# Patient Record
Sex: FEMALE | Race: WHITE | NOT HISPANIC OR LATINO | Employment: OTHER | URBAN - METROPOLITAN AREA
[De-identification: names, ages, dates, MRNs, and addresses within clinical notes are randomized per-mention and may not be internally consistent; named-entity substitution may affect disease eponyms.]

---

## 2023-11-12 RX ORDER — MELOXICAM 15 MG/1
TABLET ORAL
COMMUNITY

## 2023-11-12 RX ORDER — POTASSIUM CHLORIDE 20 MEQ/1
TABLET, EXTENDED RELEASE ORAL
COMMUNITY
Start: 2023-01-21

## 2023-11-12 RX ORDER — METHOCARBAMOL 750 MG/1
TABLET, FILM COATED ORAL
COMMUNITY

## 2023-11-12 RX ORDER — FUROSEMIDE 20 MG/1
TABLET ORAL
COMMUNITY

## 2023-11-12 RX ORDER — OXYCODONE AND ACETAMINOPHEN 5; 325 MG/1; MG/1
0.5 TABLET ORAL EVERY 6 HOURS PRN
COMMUNITY
Start: 2021-04-08

## 2023-11-12 RX ORDER — ACETAMINOPHEN 325 MG/1
TABLET ORAL
COMMUNITY

## 2023-11-12 RX ORDER — PRAMIPEXOLE DIHYDROCHLORIDE 0.5 MG/1
TABLET ORAL
COMMUNITY

## 2023-11-12 RX ORDER — LIDOCAINE HYDROCHLORIDE 20 MG/ML
JELLY TOPICAL EVERY 12 HOURS
COMMUNITY
Start: 2023-03-13

## 2023-11-12 RX ORDER — GABAPENTIN 300 MG/1
CAPSULE ORAL
COMMUNITY
Start: 2023-06-28

## 2023-11-12 RX ORDER — DOCUSATE SODIUM 100 MG/1
100 CAPSULE, LIQUID FILLED ORAL DAILY
COMMUNITY
Start: 2021-04-08

## 2023-11-12 RX ORDER — SIMVASTATIN 20 MG/1
TABLET, FILM COATED ORAL
COMMUNITY

## 2023-11-12 RX ORDER — DILTIAZEM HYDROCHLORIDE EXTENDED-RELEASE TABLETS 180 MG/1
TABLET, EXTENDED RELEASE ORAL
COMMUNITY
Start: 2023-05-01

## 2023-11-12 RX ORDER — LIDOCAINE 50 MG/G
OINTMENT TOPICAL EVERY 8 HOURS
COMMUNITY
Start: 2020-01-07

## 2023-11-12 RX ORDER — MIRTAZAPINE 7.5 MG/1
TABLET, FILM COATED ORAL
COMMUNITY
Start: 2023-07-11

## 2023-11-12 RX ORDER — LORAZEPAM 2 MG/1
TABLET ORAL
COMMUNITY

## 2023-11-12 RX ORDER — DILTIAZEM HYDROCHLORIDE 90 MG/1
TABLET, FILM COATED ORAL
COMMUNITY

## 2023-11-14 ENCOUNTER — OFFICE VISIT (OUTPATIENT)
Dept: OPHTHALMOLOGY | Facility: CLINIC | Age: 88
End: 2023-11-14
Payer: MEDICARE

## 2023-11-14 DIAGNOSIS — H02.403 PTOSIS OF BOTH EYELIDS: Primary | ICD-10-CM

## 2023-11-14 PROCEDURE — 1159F MED LIST DOCD IN RCRD: CPT | Performed by: OPHTHALMOLOGY

## 2023-11-14 PROCEDURE — 99202 OFFICE O/P NEW SF 15 MIN: CPT | Performed by: OPHTHALMOLOGY

## 2023-11-14 ASSESSMENT — ENCOUNTER SYMPTOMS
GASTROINTESTINAL NEGATIVE: 0
CARDIOVASCULAR NEGATIVE: 0
ENDOCRINE NEGATIVE: 0
EYES NEGATIVE: 0
ALLERGIC/IMMUNOLOGIC NEGATIVE: 0
PSYCHIATRIC NEGATIVE: 0
RESPIRATORY NEGATIVE: 0
MUSCULOSKELETAL NEGATIVE: 0
CONSTITUTIONAL NEGATIVE: 0
NEUROLOGICAL NEGATIVE: 0
HEMATOLOGIC/LYMPHATIC NEGATIVE: 0

## 2023-11-14 ASSESSMENT — TONOMETRY
IOP_METHOD: GOLDMANN APPLANATION
OD_IOP_MMHG: 14
OS_IOP_MMHG: 14

## 2023-11-14 ASSESSMENT — VISUAL ACUITY
OS_SC: 20/50
METHOD: SNELLEN - LINEAR
OD_PH_SC: 20/30
OS_SC+: -2
OD_PH_SC+: -3
OD_SC: 20/40

## 2023-11-14 ASSESSMENT — SLIT LAMP EXAM - LIDS
COMMENTS: DERMATOCHALASIS - UPPER LID, PTOSIS
COMMENTS: DERMATOCHALASIS - UPPER LID, PTOSIS

## 2023-11-14 ASSESSMENT — EXTERNAL EXAM - RIGHT EYE: OD_EXAM: NORMAL

## 2023-11-14 ASSESSMENT — EXTERNAL EXAM - LEFT EYE: OS_EXAM: NORMAL

## 2023-11-14 NOTE — PROGRESS NOTES
Assessment/Plan   Diagnoses and all orders for this visit:  Ptosis of both eyelids  Rec plastic surgery consult

## 2023-11-14 NOTE — PATIENT INSTRUCTIONS
Schedule an evaluation for PTOSIS surgery with oculoplastic surgeon or plastic surgeon  Can try [facility] or [facility] or plastic surgeon near you

## 2023-12-28 ENCOUNTER — APPOINTMENT (OUTPATIENT)
Dept: OPHTHALMOLOGY | Facility: CLINIC | Age: 88
End: 2023-12-28
Payer: COMMERCIAL

## 2024-07-01 ENCOUNTER — APPOINTMENT (OUTPATIENT)
Dept: CARDIOLOGY | Facility: HOSPITAL | Age: 89
DRG: 522 | End: 2024-07-01
Payer: MEDICARE

## 2024-07-01 ENCOUNTER — HOSPITAL ENCOUNTER (INPATIENT)
Facility: HOSPITAL | Age: 89
Discharge: NURSING, REHAB OR LONG-TERM CARE | DRG: 522 | End: 2024-07-01
Attending: STUDENT IN AN ORGANIZED HEALTH CARE EDUCATION/TRAINING PROGRAM | Admitting: STUDENT IN AN ORGANIZED HEALTH CARE EDUCATION/TRAINING PROGRAM
Payer: MEDICARE

## 2024-07-01 ENCOUNTER — APPOINTMENT (OUTPATIENT)
Dept: RADIOLOGY | Facility: HOSPITAL | Age: 89
DRG: 522 | End: 2024-07-01
Payer: MEDICARE

## 2024-07-01 DIAGNOSIS — S72.002A CLOSED DISPLACED FRACTURE OF LEFT FEMORAL NECK (MULTI): Primary | ICD-10-CM

## 2024-07-01 DIAGNOSIS — S72.032A: ICD-10-CM

## 2024-07-01 LAB
ABO GROUP (TYPE) IN BLOOD: NORMAL
ALBUMIN SERPL BCP-MCNC: 3.7 G/DL (ref 3.4–5)
ALP SERPL-CCNC: 60 U/L (ref 33–136)
ALT SERPL W P-5'-P-CCNC: 11 U/L (ref 7–45)
ANION GAP SERPL CALC-SCNC: 12 MMOL/L (ref 10–20)
ANTIBODY SCREEN: NORMAL
APTT PPP: 38 SECONDS (ref 27–38)
AST SERPL W P-5'-P-CCNC: 17 U/L (ref 9–39)
BASOPHILS # BLD AUTO: 0.03 X10*3/UL (ref 0–0.1)
BASOPHILS NFR BLD AUTO: 0.5 %
BILIRUB SERPL-MCNC: 0.5 MG/DL (ref 0–1.2)
BUN SERPL-MCNC: 55 MG/DL (ref 6–23)
BURR CELLS BLD QL SMEAR: NORMAL
CALCIUM SERPL-MCNC: 8.7 MG/DL (ref 8.6–10.3)
CHLORIDE SERPL-SCNC: 103 MMOL/L (ref 98–107)
CO2 SERPL-SCNC: 28 MMOL/L (ref 21–32)
CREAT SERPL-MCNC: 2.4 MG/DL (ref 0.5–1.05)
EGFRCR SERPLBLD CKD-EPI 2021: 19 ML/MIN/1.73M*2
EOSINOPHIL # BLD AUTO: 0.06 X10*3/UL (ref 0–0.4)
EOSINOPHIL NFR BLD AUTO: 1 %
ERYTHROCYTE [DISTWIDTH] IN BLOOD BY AUTOMATED COUNT: 14.9 % (ref 11.5–14.5)
GLUCOSE SERPL-MCNC: 95 MG/DL (ref 74–99)
HCT VFR BLD AUTO: 39.5 % (ref 36–46)
HGB BLD-MCNC: 12.5 G/DL (ref 12–16)
IMM GRANULOCYTES # BLD AUTO: 0.02 X10*3/UL (ref 0–0.5)
IMM GRANULOCYTES NFR BLD AUTO: 0.3 % (ref 0–0.9)
INR PPP: 1 (ref 0.9–1.1)
LYMPHOCYTES # BLD AUTO: 0.71 X10*3/UL (ref 0.8–3)
LYMPHOCYTES NFR BLD AUTO: 11.6 %
MAGNESIUM SERPL-MCNC: 2.41 MG/DL (ref 1.6–2.4)
MCH RBC QN AUTO: 29.6 PG (ref 26–34)
MCHC RBC AUTO-ENTMCNC: 31.6 G/DL (ref 32–36)
MCV RBC AUTO: 94 FL (ref 80–100)
MONOCYTES # BLD AUTO: 0.28 X10*3/UL (ref 0.05–0.8)
MONOCYTES NFR BLD AUTO: 4.6 %
NEUTROPHILS # BLD AUTO: 5.02 X10*3/UL (ref 1.6–5.5)
NEUTROPHILS NFR BLD AUTO: 82 %
NRBC BLD-RTO: 0 /100 WBCS (ref 0–0)
OVALOCYTES BLD QL SMEAR: NORMAL
PLATELET # BLD AUTO: 85 X10*3/UL (ref 150–450)
POTASSIUM SERPL-SCNC: 4.4 MMOL/L (ref 3.5–5.3)
PROT SERPL-MCNC: 6.5 G/DL (ref 6.4–8.2)
PROTHROMBIN TIME: 10.8 SECONDS (ref 9.8–12.8)
RBC # BLD AUTO: 4.22 X10*6/UL (ref 4–5.2)
RBC MORPH BLD: NORMAL
RH FACTOR (ANTIGEN D): NORMAL
SCHISTOCYTES BLD QL SMEAR: NORMAL
SODIUM SERPL-SCNC: 139 MMOL/L (ref 136–145)
WBC # BLD AUTO: 6.1 X10*3/UL (ref 4.4–11.3)

## 2024-07-01 PROCEDURE — 73552 X-RAY EXAM OF FEMUR 2/>: CPT | Mod: LEFT SIDE | Performed by: RADIOLOGY

## 2024-07-01 PROCEDURE — 36415 COLL VENOUS BLD VENIPUNCTURE: CPT

## 2024-07-01 PROCEDURE — 86900 BLOOD TYPING SEROLOGIC ABO: CPT

## 2024-07-01 PROCEDURE — 99285 EMERGENCY DEPT VISIT HI MDM: CPT

## 2024-07-01 PROCEDURE — 93005 ELECTROCARDIOGRAM TRACING: CPT

## 2024-07-01 PROCEDURE — 85610 PROTHROMBIN TIME: CPT

## 2024-07-01 PROCEDURE — 73560 X-RAY EXAM OF KNEE 1 OR 2: CPT | Mod: RIGHT SIDE | Performed by: RADIOLOGY

## 2024-07-01 PROCEDURE — 73590 X-RAY EXAM OF LOWER LEG: CPT | Mod: LEFT SIDE | Performed by: RADIOLOGY

## 2024-07-01 PROCEDURE — 71045 X-RAY EXAM CHEST 1 VIEW: CPT

## 2024-07-01 PROCEDURE — 73560 X-RAY EXAM OF KNEE 1 OR 2: CPT | Mod: RT

## 2024-07-01 PROCEDURE — 99222 1ST HOSP IP/OBS MODERATE 55: CPT | Performed by: STUDENT IN AN ORGANIZED HEALTH CARE EDUCATION/TRAINING PROGRAM

## 2024-07-01 PROCEDURE — 73502 X-RAY EXAM HIP UNI 2-3 VIEWS: CPT | Mod: RIGHT SIDE | Performed by: RADIOLOGY

## 2024-07-01 PROCEDURE — 85025 COMPLETE CBC W/AUTO DIFF WBC: CPT

## 2024-07-01 PROCEDURE — 71045 X-RAY EXAM CHEST 1 VIEW: CPT | Mod: FOREIGN READ | Performed by: RADIOLOGY

## 2024-07-01 PROCEDURE — 1100000001 HC PRIVATE ROOM DAILY

## 2024-07-01 PROCEDURE — 2500000004 HC RX 250 GENERAL PHARMACY W/ HCPCS (ALT 636 FOR OP/ED)

## 2024-07-01 PROCEDURE — 80053 COMPREHEN METABOLIC PANEL: CPT

## 2024-07-01 PROCEDURE — 73552 X-RAY EXAM OF FEMUR 2/>: CPT | Mod: LT

## 2024-07-01 PROCEDURE — 73590 X-RAY EXAM OF LOWER LEG: CPT | Mod: LT

## 2024-07-01 PROCEDURE — 83735 ASSAY OF MAGNESIUM: CPT

## 2024-07-01 PROCEDURE — 73502 X-RAY EXAM HIP UNI 2-3 VIEWS: CPT | Mod: RT

## 2024-07-01 RX ORDER — POLYETHYLENE GLYCOL 3350 17 G/17G
17 POWDER, FOR SOLUTION ORAL DAILY
Status: DISCONTINUED | OUTPATIENT
Start: 2024-07-02 | End: 2024-07-08 | Stop reason: HOSPADM

## 2024-07-01 RX ORDER — SODIUM CHLORIDE, SODIUM LACTATE, POTASSIUM CHLORIDE, CALCIUM CHLORIDE 600; 310; 30; 20 MG/100ML; MG/100ML; MG/100ML; MG/100ML
75 INJECTION, SOLUTION INTRAVENOUS CONTINUOUS
Status: DISCONTINUED | OUTPATIENT
Start: 2024-07-01 | End: 2024-07-02

## 2024-07-01 RX ORDER — OXYCODONE AND ACETAMINOPHEN 5; 325 MG/1; MG/1
1 TABLET ORAL EVERY 4 HOURS PRN
Status: DISCONTINUED | OUTPATIENT
Start: 2024-07-01 | End: 2024-07-02

## 2024-07-01 RX ORDER — MORPHINE SULFATE 4 MG/ML
2 INJECTION, SOLUTION INTRAMUSCULAR; INTRAVENOUS ONCE
Status: COMPLETED | OUTPATIENT
Start: 2024-07-01 | End: 2024-07-01

## 2024-07-01 RX ORDER — ACETAMINOPHEN 500 MG
5 TABLET ORAL NIGHTLY PRN
Status: DISCONTINUED | OUTPATIENT
Start: 2024-07-01 | End: 2024-07-08 | Stop reason: HOSPADM

## 2024-07-01 RX ORDER — FENTANYL CITRATE 50 UG/ML
25 INJECTION, SOLUTION INTRAMUSCULAR; INTRAVENOUS ONCE
Status: DISCONTINUED | OUTPATIENT
Start: 2024-07-01 | End: 2024-07-01

## 2024-07-01 RX ORDER — ACETAMINOPHEN 650 MG/1
650 SUPPOSITORY RECTAL EVERY 4 HOURS PRN
Status: DISCONTINUED | OUTPATIENT
Start: 2024-07-01 | End: 2024-07-08 | Stop reason: HOSPADM

## 2024-07-01 RX ORDER — ONDANSETRON 4 MG/1
4 TABLET, FILM COATED ORAL EVERY 8 HOURS PRN
Status: DISCONTINUED | OUTPATIENT
Start: 2024-07-01 | End: 2024-07-02

## 2024-07-01 RX ORDER — ACETAMINOPHEN 325 MG/1
650 TABLET ORAL EVERY 4 HOURS PRN
Status: DISCONTINUED | OUTPATIENT
Start: 2024-07-01 | End: 2024-07-08 | Stop reason: HOSPADM

## 2024-07-01 RX ORDER — HYDROMORPHONE HYDROCHLORIDE 0.2 MG/ML
0.2 INJECTION INTRAMUSCULAR; INTRAVENOUS; SUBCUTANEOUS EVERY 6 HOURS PRN
Status: DISCONTINUED | OUTPATIENT
Start: 2024-07-01 | End: 2024-07-02

## 2024-07-01 RX ORDER — ONDANSETRON HYDROCHLORIDE 2 MG/ML
4 INJECTION, SOLUTION INTRAVENOUS EVERY 8 HOURS PRN
Status: DISCONTINUED | OUTPATIENT
Start: 2024-07-01 | End: 2024-07-02

## 2024-07-01 RX ORDER — ACETAMINOPHEN 160 MG/5ML
650 SOLUTION ORAL EVERY 4 HOURS PRN
Status: DISCONTINUED | OUTPATIENT
Start: 2024-07-01 | End: 2024-07-08 | Stop reason: HOSPADM

## 2024-07-01 SDOH — ECONOMIC STABILITY: HOUSING INSECURITY
IN THE LAST 12 MONTHS, WAS THERE A TIME WHEN YOU DID NOT HAVE A STEADY PLACE TO SLEEP OR SLEPT IN A SHELTER (INCLUDING NOW)?: NO

## 2024-07-01 SDOH — ECONOMIC STABILITY: INCOME INSECURITY: IN THE LAST 12 MONTHS, WAS THERE A TIME WHEN YOU WERE NOT ABLE TO PAY THE MORTGAGE OR RENT ON TIME?: NO

## 2024-07-01 SDOH — ECONOMIC STABILITY: FOOD INSECURITY: WITHIN THE PAST 12 MONTHS, THE FOOD YOU BOUGHT JUST DIDN'T LAST AND YOU DIDN'T HAVE MONEY TO GET MORE.: NEVER TRUE

## 2024-07-01 SDOH — SOCIAL STABILITY: SOCIAL NETWORK: ARE YOU MARRIED, WIDOWED, DIVORCED, SEPARATED, NEVER MARRIED, OR LIVING WITH A PARTNER?: WIDOWED

## 2024-07-01 SDOH — SOCIAL STABILITY: SOCIAL INSECURITY
WITHIN THE LAST YEAR, HAVE TO BEEN RAPED OR FORCED TO HAVE ANY KIND OF SEXUAL ACTIVITY BY YOUR PARTNER OR EX-PARTNER?: NO

## 2024-07-01 SDOH — SOCIAL STABILITY: SOCIAL NETWORK: HOW OFTEN DO YOU ATTENT MEETINGS OF THE CLUB OR ORGANIZATION YOU BELONG TO?: NEVER

## 2024-07-01 SDOH — SOCIAL STABILITY: SOCIAL INSECURITY: ABUSE: ADULT

## 2024-07-01 SDOH — SOCIAL STABILITY: SOCIAL INSECURITY: WITHIN THE LAST YEAR, HAVE YOU BEEN AFRAID OF YOUR PARTNER OR EX-PARTNER?: NO

## 2024-07-01 SDOH — SOCIAL STABILITY: SOCIAL INSECURITY: HAVE YOU HAD THOUGHTS OF HARMING ANYONE ELSE?: YES

## 2024-07-01 SDOH — SOCIAL STABILITY: SOCIAL INSECURITY: WITHIN THE LAST YEAR, HAVE YOU BEEN HUMILIATED OR EMOTIONALLY ABUSED IN OTHER WAYS BY YOUR PARTNER OR EX-PARTNER?: NO

## 2024-07-01 SDOH — SOCIAL STABILITY: SOCIAL INSECURITY: HAS ANYONE EVER THREATENED TO HURT YOUR FAMILY OR YOUR PETS?: NO

## 2024-07-01 SDOH — SOCIAL STABILITY: SOCIAL NETWORK
IN A TYPICAL WEEK, HOW MANY TIMES DO YOU TALK ON THE PHONE WITH FAMILY, FRIENDS, OR NEIGHBORS?: MORE THAN THREE TIMES A WEEK

## 2024-07-01 SDOH — SOCIAL STABILITY: SOCIAL INSECURITY: DO YOU FEEL ANYONE HAS EXPLOITED OR TAKEN ADVANTAGE OF YOU FINANCIALLY OR OF YOUR PERSONAL PROPERTY?: NO

## 2024-07-01 SDOH — SOCIAL STABILITY: SOCIAL NETWORK: HOW OFTEN DO YOU ATTEND CHURCH OR RELIGIOUS SERVICES?: MORE THAN 4 TIMES PER YEAR

## 2024-07-01 SDOH — HEALTH STABILITY: PHYSICAL HEALTH: ON AVERAGE, HOW MANY MINUTES DO YOU ENGAGE IN EXERCISE AT THIS LEVEL?: 10 MIN

## 2024-07-01 SDOH — SOCIAL STABILITY: SOCIAL INSECURITY: HAVE YOU HAD ANY THOUGHTS OF HARMING ANYONE ELSE?: NO

## 2024-07-01 SDOH — SOCIAL STABILITY: SOCIAL INSECURITY: DO YOU FEEL UNSAFE GOING BACK TO THE PLACE WHERE YOU ARE LIVING?: NO

## 2024-07-01 SDOH — ECONOMIC STABILITY: TRANSPORTATION INSECURITY
IN THE PAST 12 MONTHS, HAS LACK OF TRANSPORTATION KEPT YOU FROM MEETINGS, WORK, OR FROM GETTING THINGS NEEDED FOR DAILY LIVING?: NO

## 2024-07-01 SDOH — SOCIAL STABILITY: SOCIAL NETWORK
DO YOU BELONG TO ANY CLUBS OR ORGANIZATIONS SUCH AS CHURCH GROUPS UNIONS, FRATERNAL OR ATHLETIC GROUPS, OR SCHOOL GROUPS?: NO

## 2024-07-01 SDOH — SOCIAL STABILITY: SOCIAL NETWORK: HOW OFTEN DO YOU GET TOGETHER WITH FRIENDS OR RELATIVES?: MORE THAN THREE TIMES A WEEK

## 2024-07-01 SDOH — HEALTH STABILITY: MENTAL HEALTH
STRESS IS WHEN SOMEONE FEELS TENSE, NERVOUS, ANXIOUS, OR CAN'T SLEEP AT NIGHT BECAUSE THEIR MIND IS TROUBLED. HOW STRESSED ARE YOU?: ONLY A LITTLE

## 2024-07-01 SDOH — HEALTH STABILITY: PHYSICAL HEALTH: ON AVERAGE, HOW MANY DAYS PER WEEK DO YOU ENGAGE IN MODERATE TO STRENUOUS EXERCISE (LIKE A BRISK WALK)?: 1 DAY

## 2024-07-01 SDOH — SOCIAL STABILITY: SOCIAL INSECURITY
WITHIN THE LAST YEAR, HAVE YOU BEEN KICKED, HIT, SLAPPED, OR OTHERWISE PHYSICALLY HURT BY YOUR PARTNER OR EX-PARTNER?: NO

## 2024-07-01 SDOH — ECONOMIC STABILITY: FOOD INSECURITY: WITHIN THE PAST 12 MONTHS, YOU WORRIED THAT YOUR FOOD WOULD RUN OUT BEFORE YOU GOT MONEY TO BUY MORE.: NEVER TRUE

## 2024-07-01 SDOH — SOCIAL STABILITY: SOCIAL INSECURITY: ARE YOU OR HAVE YOU BEEN THREATENED OR ABUSED PHYSICALLY, EMOTIONALLY, OR SEXUALLY BY ANYONE?: NO

## 2024-07-01 SDOH — ECONOMIC STABILITY: TRANSPORTATION INSECURITY
IN THE PAST 12 MONTHS, HAS THE LACK OF TRANSPORTATION KEPT YOU FROM MEDICAL APPOINTMENTS OR FROM GETTING MEDICATIONS?: NO

## 2024-07-01 SDOH — SOCIAL STABILITY: SOCIAL INSECURITY: DOES ANYONE TRY TO KEEP YOU FROM HAVING/CONTACTING OTHER FRIENDS OR DOING THINGS OUTSIDE YOUR HOME?: NO

## 2024-07-01 SDOH — ECONOMIC STABILITY: INCOME INSECURITY: HOW HARD IS IT FOR YOU TO PAY FOR THE VERY BASICS LIKE FOOD, HOUSING, MEDICAL CARE, AND HEATING?: NOT HARD AT ALL

## 2024-07-01 SDOH — SOCIAL STABILITY: SOCIAL INSECURITY: ARE THERE ANY APPARENT SIGNS OF INJURIES/BEHAVIORS THAT COULD BE RELATED TO ABUSE/NEGLECT?: NO

## 2024-07-01 ASSESSMENT — PAIN SCALES - GENERAL
PAINLEVEL_OUTOF10: 10 - WORST POSSIBLE PAIN
PAINLEVEL_OUTOF10: 8

## 2024-07-01 ASSESSMENT — ACTIVITIES OF DAILY LIVING (ADL)
TOILETING: NEEDS ASSISTANCE
ASSISTIVE_DEVICE: WALKER
JUDGMENT_ADEQUATE_SAFELY_COMPLETE_DAILY_ACTIVITIES: YES
PATIENT'S MEMORY ADEQUATE TO SAFELY COMPLETE DAILY ACTIVITIES?: YES
HEARING - RIGHT EAR: DIFFICULTY WITH NOISE
ADEQUATE_TO_COMPLETE_ADL: YES
DRESSING YOURSELF: INDEPENDENT
WALKS IN HOME: INDEPENDENT
ASSISTIVE_DEVICE: WALKER
JUDGMENT_ADEQUATE_SAFELY_COMPLETE_DAILY_ACTIVITIES: YES
FEEDING YOURSELF: INDEPENDENT
BATHING: INDEPENDENT
LACK_OF_TRANSPORTATION: NO
DRESSING YOURSELF: INDEPENDENT
PATIENT'S MEMORY ADEQUATE TO SAFELY COMPLETE DAILY ACTIVITIES?: YES
GROOMING: INDEPENDENT
WALKS IN HOME: INDEPENDENT
ADEQUATE_TO_COMPLETE_ADL: YES
HEARING - RIGHT EAR: HEARING AID
GROOMING: INDEPENDENT
FEEDING YOURSELF: INDEPENDENT
HEARING - LEFT EAR: HEARING AID
BATHING: INDEPENDENT
HEARING - LEFT EAR: DIFFICULTY WITH NOISE

## 2024-07-01 ASSESSMENT — LIFESTYLE VARIABLES
HOW MANY STANDARD DRINKS CONTAINING ALCOHOL DO YOU HAVE ON A TYPICAL DAY: PATIENT DOES NOT DRINK
SUBSTANCE_ABUSE_PAST_12_MONTHS: NO
AUDIT-C TOTAL SCORE: 0
HOW OFTEN DO YOU HAVE A DRINK CONTAINING ALCOHOL: NEVER
SKIP TO QUESTIONS 9-10: 1
PRESCIPTION_ABUSE_PAST_12_MONTHS: NO
HOW OFTEN DO YOU HAVE 6 OR MORE DRINKS ON ONE OCCASION: NEVER
AUDIT-C TOTAL SCORE: 0

## 2024-07-01 ASSESSMENT — COGNITIVE AND FUNCTIONAL STATUS - GENERAL
MOVING FROM LYING ON BACK TO SITTING ON SIDE OF FLAT BED WITH BEDRAILS: A LITTLE
EATING MEALS: A LITTLE
DRESSING REGULAR UPPER BODY CLOTHING: A LITTLE
PERSONAL GROOMING: A LITTLE
MOBILITY SCORE: 18
CLIMB 3 TO 5 STEPS WITH RAILING: A LITTLE
STANDING UP FROM CHAIR USING ARMS: A LITTLE
MOVING TO AND FROM BED TO CHAIR: A LITTLE
DRESSING REGULAR LOWER BODY CLOTHING: A LITTLE
PATIENT BASELINE BEDBOUND: NO
HELP NEEDED FOR BATHING: A LITTLE
DAILY ACTIVITIY SCORE: 18
TOILETING: A LITTLE
WALKING IN HOSPITAL ROOM: A LITTLE
TURNING FROM BACK TO SIDE WHILE IN FLAT BAD: A LITTLE

## 2024-07-01 ASSESSMENT — COLUMBIA-SUICIDE SEVERITY RATING SCALE - C-SSRS
1. IN THE PAST MONTH, HAVE YOU WISHED YOU WERE DEAD OR WISHED YOU COULD GO TO SLEEP AND NOT WAKE UP?: NO
6. HAVE YOU EVER DONE ANYTHING, STARTED TO DO ANYTHING, OR PREPARED TO DO ANYTHING TO END YOUR LIFE?: NO
2. HAVE YOU ACTUALLY HAD ANY THOUGHTS OF KILLING YOURSELF?: NO

## 2024-07-01 ASSESSMENT — PATIENT HEALTH QUESTIONNAIRE - PHQ9
2. FEELING DOWN, DEPRESSED OR HOPELESS: NOT AT ALL
1. LITTLE INTEREST OR PLEASURE IN DOING THINGS: NOT AT ALL
SUM OF ALL RESPONSES TO PHQ9 QUESTIONS 1 & 2: 0

## 2024-07-01 ASSESSMENT — ENCOUNTER SYMPTOMS: ARTHRALGIAS: 1

## 2024-07-01 NOTE — ED PROVIDER NOTES
EMERGENCY DEPARTMENT ENCOUNTER      Pt Name: Estefanía Contreras  MRN: [id number]  Birthdate 12/11/1934  Date of evaluation: 7/1/2024  Provider: Nate Cardozo DO    CHIEF COMPLAINT     No chief complaint on file.        HISTORY OF PRESENT ILLNESS    This is an 89-year-old female arrives via EMS with chief complaint of bilateral leg pain.  Patient does have a history significant for hypertension and CKD 3B, PVD, carotid stenosis, aortic aneurysm without repair, history of unsteady gait per medical chart review.  Patient is alert and oriented to self, place and time.  Patient states that she recently had surgery performed on her right toe by her podiatrist and was at home today where she usually ambulates with a walker when she leaned forward to make a can of soup and fell forward onto her left femur.  She states she did not strike her head, states she did not lose consciousness, states she did not feel lightheaded or dizzy.  She denies any anticoagulative medications and chart review shows no anticoagulant medications.  She denies nausea or vomiting, denies chest pain, denies blood in stool or urine, denies other symptomology.  She does endorse left femur and hip pain as well as right knee pain.  Patient states she does live at home alone and has had some falls in the past.  She also states she has a history of neuropathy in all distal extremities and that has been progressively worsening.      History provided by:  Patient, medical records and EMS personnel      Nursing Notes were reviewed.    PAST MEDICAL HISTORY   No past medical history on file.      SURGICAL HISTORY     No past surgical history on file.      CURRENT MEDICATIONS       Previous Medications    ACETAMINOPHEN (TYLENOL) 325 MG TABLET    every 4 hours.    DILTIAZEM (CARDIZEM) 90 MG IMMEDIATE RELEASE TABLET    Take by mouth.    DILTIAZEM LA (CARDIZEM LA) 180 MG 24 HR TABLET    Take by mouth.    DOCUSATE SODIUM (COLACE) 100 MG CAPSULE    Take 1 capsule (100 mg)  by mouth once daily.    FUROSEMIDE (LASIX) 20 MG TABLET    Take by mouth.    GABAPENTIN (NEURONTIN) 300 MG CAPSULE    Take by mouth.    LIDOCAINE (XYLOCAINE) 2 % GEL    Apply topically every 12 hours.    LIDOCAINE (XYLOCAINE) 5 % OINTMENT    every 8 hours.    LORAZEPAM (ATIVAN) 2 MG TABLET    Take by mouth.    MELOXICAM (MOBIC) 15 MG TABLET    Take by mouth.    METHOCARBAMOL (ROBAXIN) 750 MG TABLET    every 4 hours.    MIRTAZAPINE (REMERON) 7.5 MG TABLET    Take by mouth.    OXYCODONE-ACETAMINOPHEN (PERCOCET) 5-325 MG TABLET    Take 0.5 tablets by mouth every 6 hours if needed (pain).    POTASSIUM CHLORIDE CR 20 MEQ ER TABLET    Take by mouth.    PRAMIPEXOLE (MIRAPEX) 0.5 MG TABLET    Take by mouth.    SIMVASTATIN (ZOCOR) 20 MG TABLET    Take by mouth.       ALLERGIES     Patient has no known allergies.    FAMILY HISTORY     No family history on file.       SOCIAL HISTORY       Social History     Socioeconomic History   • Marital status:      Spouse name: Not on file   • Number of children: Not on file   • Years of education: Not on file   • Highest education level: Not on file   Occupational History   • Not on file   Tobacco Use   • Smoking status: Not on file   • Smokeless tobacco: Not on file   Substance and Sexual Activity   • Alcohol use: Not on file   • Drug use: Not on file   • Sexual activity: Not on file   Other Topics Concern   • Not on file   Social History Narrative   • Not on file     Social Determinants of Health     Financial Resource Strain: Not on file   Food Insecurity: Not on file   Transportation Needs: Not on file   Physical Activity: Not on file   Stress: Not on file   Social Connections: Not on file   Intimate Partner Violence: Not on file   Housing Stability: Not on file       SCREENINGS                        PHYSICAL EXAM    (up to 7 for level 4, 8 or more for level 5)     ED Triage Vitals   Temp Pulse Resp BP   -- -- -- --      SpO2 Temp src Heart Rate Source Patient Position   --  -- -- --      BP Location FiO2 (%)     -- --       Physical Exam  Constitutional:       General: She is not in acute distress.     Appearance: Normal appearance. She is normal weight. She is not toxic-appearing.   HENT:      Head: Normocephalic and atraumatic.      Nose: Nose normal. No congestion.      Mouth/Throat:      Mouth: Mucous membranes are moist.      Pharynx: Oropharynx is clear.   Eyes:      Extraocular Movements: Extraocular movements intact.      Pupils: Pupils are equal, round, and reactive to light.   Cardiovascular:      Rate and Rhythm: Normal rate and regular rhythm.      Pulses: Normal pulses.      Heart sounds: Normal heart sounds. No murmur heard.     No gallop.   Pulmonary:      Effort: Pulmonary effort is normal. No respiratory distress.      Breath sounds: Normal breath sounds. No wheezing or rales.   Abdominal:      General: Abdomen is flat.      Palpations: Abdomen is soft.   Musculoskeletal:         General: Tenderness and signs of injury present. No swelling or deformity. Normal range of motion.      Cervical back: Normal range of motion.      Right lower leg: No edema.      Left lower leg: No edema.      Comments: Right toenail surgery site for toenail removal was evaluated, dressing is clean and dry no dehiscence   Skin:     General: Skin is warm and dry.      Capillary Refill: Capillary refill takes less than 2 seconds.      Coloration: Skin is not jaundiced.      Findings: No bruising or lesion.   Neurological:      General: No focal deficit present.      Mental Status: She is alert and oriented to person, place, and time.      Cranial Nerves: No cranial nerve deficit.      Motor: No weakness.      Gait: Gait normal.   Psychiatric:         Mood and Affect: Mood normal.         Behavior: Behavior normal.         Thought Content: Thought content normal.         Judgment: Judgment normal.          DIAGNOSTIC RESULTS     LABS:  Labs Reviewed - No data to display    All other labs were  within normal range or not returned as of this dictation.    Imaging  No orders to display        Procedures  Procedures     EMERGENCY DEPARTMENT COURSE/MDM:     ED Course as of 07/02/24 2026 Mon Jul 01, 2024   1910 Brought in by EMS after mechanical fall at home.  Patient states that she had an ingrown toenail removed from her right nail today.  States that she was in the kitchen using her walker when her can of soup spilled.  She bent over to try to pick this up when she felt her left side.  States she not strike her head.  No loss consciousness.  On exam, she is well-appearing.  She has no signs of trauma to the head.  Her lungs are clear to auscultation bilaterally.  Her pulses are equal peripherally.  GCS is 15.  Her pelvis is stable.  On exam of the left lower extremity, there is ecchymosis of the proximal tibia that is mildly tender to palpation; the compartments are soft; this extremity is neurovascular intact; she does not have tenderness palpation of the proximal femur or significant discomfort with logroll of this extremity.  On examination of the right lower extremity, there is a bandage to the right great toe; no other obvious deformity; no bony tenderness palpation; no pain with logroll of the extremity; this extremity is neurovascularly intact.  Will obtain imaging and reevaluate.  Patient is very much hoping to be discharged home, where she lives alone with family nearby. [AB]   1955 XR femur left 2+ views  Per my view, there is a left femoral neck fracture.  Fracture alert paged.  Orthopedic surgery paged.  Will escalate care to hospitalization for further management. [AB]   2018 X-ray of the left femoral neck shows a fracture, orthopedic surgery was consulted as well as hospitalist for admission.  Additionally type and screen coagulation panel were ordered anticipation of possible surgical intervention. [PV]   2019 Admission to the hospital for further surgical treatment and evaluation of femoral  head fracture was discussed with the patient and she was amenable for admission as well as for pain management. [PV]   2038 This time patient still have pain despite administration of 2 mg of morphine, and additional dose of 2 mg of morphine was provided for pain management. [PV]   2039 XR hip right with pelvis when performed 2 or 3 views [PV]   2039 XR chest 1 view [PV]   2039 XR tibia fibula left 2 views [PV]   2039 XR femur left 2+ views [PV]   2039 XR knee right 1-2 views  X-ray images showed an acute left subcapital femoral neck fracture. [PV]   2040 Chest x-ray interpreted as mild cardiomegaly  with mild pulmonary vascular congestion.. [PV]   2041 Patient showed no leukocytosis to indicate a systemic infection, stable hematocrit and hemoglobin 12.5/39.5, mild elevation of magnesium of 2.41.  Does appear on review of the patient's last renal function exams that the patient has developed an acute kidney injury, rehydration therapy was begun with 500 cc of lactated Ringer's. [PV]      ED Course User Index  [AB] Scott Lyons MD  [PV] Nate Cardozo, DO         Diagnoses as of 07/02/24 2026   Closed displaced fracture of left femoral neck (Multi)        Medical Decision Making  89-year-old female who arrives via EMS with chief complaint of mechanical fall and pain in her left hip and right knee afterwards, no loss of consciousness, does report any head injury, no nausea or vomiting, no associated symptomology.  However of concern patient does live alone, states she uses a walker to ambulate.  She is alert and oriented to self, place and time and does possess capacity.  X-rays of the left femur and hip were ordered as well as right hip and pelvis, x-ray of the knee on the right ordered due to complaint of pain as well. .  Initially patient was offered Tylenol, states that she would like something more potent.  Basic lab work was ordered for the patient CBC, CMP, urinalysis, chest x-ray due to reported low  pulse oximetry saturation however this could be due to cold extremities, and the patient does states she has a history of peripheral vascular disease as well as neuropathy in her hands.  Patient was given a dose of 2 milligrams of morphine for initial pain management, will reconsider dosing with morphine or extra fentanyl should the patient tolerate appropriately.  We did discuss admission to the hospital for physical therapy and Occupational Therapy, the patient will consider this however initially states that she would prefer to be discharged back to her own home if imaging and lab work are unremarkable.  Please see ED course for further medical decision making    Reviewed and discussed with attending physician      Nate Cardozo DO  PGY-3 Emergency Medicine        Patient and or family in agreement and understanding of treatment plan.  All questions answered.      I reviewed the case with the attending ED physician. The attending ED physician agrees with the plan. Patient and/or patient´s representative was counseled regarding labs, imaging, likely diagnosis, and plan. All questions were answered.    ED Medications administered this visit:  Medications - No data to display    New Prescriptions from this visit:    New Prescriptions    No medications on file       Follow-up:  No follow-up provider specified.      Final Impression: No diagnosis found.      (Please note that portions of this note were completed with a voice recognition program.  Efforts were made to edit the dictations but occasionally words are mis-transcribed.)     Nate Cardozo DO  Resident  07/02/24 2026

## 2024-07-01 NOTE — ED TRIAGE NOTES
Pt presents to ED with c/o left leg pain after a fall. Pt bent down and fell, denies hitting her head or LOC or use of thinners. Pt states she walks with walker at home, lives alone.

## 2024-07-02 ENCOUNTER — ANESTHESIA (OUTPATIENT)
Dept: OPERATING ROOM | Facility: HOSPITAL | Age: 89
End: 2024-07-02
Payer: MEDICARE

## 2024-07-02 ENCOUNTER — APPOINTMENT (OUTPATIENT)
Dept: RADIOLOGY | Facility: HOSPITAL | Age: 89
DRG: 522 | End: 2024-07-02
Payer: MEDICARE

## 2024-07-02 ENCOUNTER — ANESTHESIA EVENT (OUTPATIENT)
Dept: OPERATING ROOM | Facility: HOSPITAL | Age: 89
End: 2024-07-02
Payer: MEDICARE

## 2024-07-02 PROBLEM — I10 HTN (HYPERTENSION): Status: ACTIVE | Noted: 2024-07-02

## 2024-07-02 LAB
ANION GAP SERPL CALC-SCNC: 11 MMOL/L (ref 10–20)
ATRIAL RATE: 76 BPM
BUN SERPL-MCNC: 55 MG/DL (ref 6–23)
CALCIUM SERPL-MCNC: 8.2 MG/DL (ref 8.6–10.3)
CHLORIDE SERPL-SCNC: 105 MMOL/L (ref 98–107)
CO2 SERPL-SCNC: 28 MMOL/L (ref 21–32)
CREAT SERPL-MCNC: 1.91 MG/DL (ref 0.5–1.05)
EGFRCR SERPLBLD CKD-EPI 2021: 25 ML/MIN/1.73M*2
ERYTHROCYTE [DISTWIDTH] IN BLOOD BY AUTOMATED COUNT: 14.9 % (ref 11.5–14.5)
GLUCOSE SERPL-MCNC: 77 MG/DL (ref 74–99)
HCT VFR BLD AUTO: 37.2 % (ref 36–46)
HGB BLD-MCNC: 11.6 G/DL (ref 12–16)
MCH RBC QN AUTO: 29.4 PG (ref 26–34)
MCHC RBC AUTO-ENTMCNC: 31.2 G/DL (ref 32–36)
MCV RBC AUTO: 94 FL (ref 80–100)
NRBC BLD-RTO: 0 /100 WBCS (ref 0–0)
P AXIS: 74 DEGREES
P OFFSET: 175 MS
P ONSET: 114 MS
PLATELET # BLD AUTO: 82 X10*3/UL (ref 150–450)
POTASSIUM SERPL-SCNC: 5 MMOL/L (ref 3.5–5.3)
PR INTERVAL: 200 MS
Q ONSET: 214 MS
QRS COUNT: 12 BEATS
QRS DURATION: 84 MS
QT INTERVAL: 386 MS
QTC CALCULATION(BAZETT): 434 MS
QTC FREDERICIA: 417 MS
R AXIS: -50 DEGREES
RBC # BLD AUTO: 3.95 X10*6/UL (ref 4–5.2)
SODIUM SERPL-SCNC: 139 MMOL/L (ref 136–145)
T AXIS: 41 DEGREES
T OFFSET: 407 MS
VENTRICULAR RATE: 76 BPM
WBC # BLD AUTO: 8.5 X10*3/UL (ref 4.4–11.3)

## 2024-07-02 PROCEDURE — C1713 ANCHOR/SCREW BN/BN,TIS/BN: HCPCS | Performed by: ORTHOPAEDIC SURGERY

## 2024-07-02 PROCEDURE — 2720000007 HC OR 272 NO HCPCS: Performed by: ORTHOPAEDIC SURGERY

## 2024-07-02 PROCEDURE — 2500000004 HC RX 250 GENERAL PHARMACY W/ HCPCS (ALT 636 FOR OP/ED): Performed by: STUDENT IN AN ORGANIZED HEALTH CARE EDUCATION/TRAINING PROGRAM

## 2024-07-02 PROCEDURE — 85027 COMPLETE CBC AUTOMATED: CPT | Performed by: STUDENT IN AN ORGANIZED HEALTH CARE EDUCATION/TRAINING PROGRAM

## 2024-07-02 PROCEDURE — 72170 X-RAY EXAM OF PELVIS: CPT | Performed by: RADIOLOGY

## 2024-07-02 PROCEDURE — 3600000009 HC OR TIME - EACH INCREMENTAL 1 MINUTE - PROCEDURE LEVEL FOUR: Performed by: ORTHOPAEDIC SURGERY

## 2024-07-02 PROCEDURE — C1776 JOINT DEVICE (IMPLANTABLE): HCPCS | Performed by: ORTHOPAEDIC SURGERY

## 2024-07-02 PROCEDURE — 99221 1ST HOSP IP/OBS SF/LOW 40: CPT | Performed by: ORTHOPAEDIC SURGERY

## 2024-07-02 PROCEDURE — 36415 COLL VENOUS BLD VENIPUNCTURE: CPT | Performed by: STUDENT IN AN ORGANIZED HEALTH CARE EDUCATION/TRAINING PROGRAM

## 2024-07-02 PROCEDURE — 0SRS0J9 REPLACEMENT OF LEFT HIP JOINT, FEMORAL SURFACE WITH SYNTHETIC SUBSTITUTE, CEMENTED, OPEN APPROACH: ICD-10-PCS | Performed by: ORTHOPAEDIC SURGERY

## 2024-07-02 PROCEDURE — 72170 X-RAY EXAM OF PELVIS: CPT

## 2024-07-02 PROCEDURE — 2500000004 HC RX 250 GENERAL PHARMACY W/ HCPCS (ALT 636 FOR OP/ED): Mod: JZ | Performed by: ORTHOPAEDIC SURGERY

## 2024-07-02 PROCEDURE — 2780000003 HC OR 278 NO HCPCS: Performed by: ORTHOPAEDIC SURGERY

## 2024-07-02 PROCEDURE — 2500000004 HC RX 250 GENERAL PHARMACY W/ HCPCS (ALT 636 FOR OP/ED): Performed by: ANESTHESIOLOGIST ASSISTANT

## 2024-07-02 PROCEDURE — 99233 SBSQ HOSP IP/OBS HIGH 50: CPT | Performed by: STUDENT IN AN ORGANIZED HEALTH CARE EDUCATION/TRAINING PROGRAM

## 2024-07-02 PROCEDURE — 3600000004 HC OR TIME - INITIAL BASE CHARGE - PROCEDURE LEVEL FOUR: Performed by: ORTHOPAEDIC SURGERY

## 2024-07-02 PROCEDURE — 82374 ASSAY BLOOD CARBON DIOXIDE: CPT | Performed by: STUDENT IN AN ORGANIZED HEALTH CARE EDUCATION/TRAINING PROGRAM

## 2024-07-02 PROCEDURE — 3700000001 HC GENERAL ANESTHESIA TIME - INITIAL BASE CHARGE: Performed by: ORTHOPAEDIC SURGERY

## 2024-07-02 PROCEDURE — 2500000001 HC RX 250 WO HCPCS SELF ADMINISTERED DRUGS (ALT 637 FOR MEDICARE OP): Performed by: ORTHOPAEDIC SURGERY

## 2024-07-02 PROCEDURE — 2500000004 HC RX 250 GENERAL PHARMACY W/ HCPCS (ALT 636 FOR OP/ED): Performed by: ANESTHESIOLOGY

## 2024-07-02 PROCEDURE — 2500000005 HC RX 250 GENERAL PHARMACY W/O HCPCS: Performed by: ANESTHESIOLOGIST ASSISTANT

## 2024-07-02 PROCEDURE — 7100000001 HC RECOVERY ROOM TIME - INITIAL BASE CHARGE: Performed by: ORTHOPAEDIC SURGERY

## 2024-07-02 PROCEDURE — 7100000002 HC RECOVERY ROOM TIME - EACH INCREMENTAL 1 MINUTE: Performed by: ORTHOPAEDIC SURGERY

## 2024-07-02 PROCEDURE — 1100000001 HC PRIVATE ROOM DAILY

## 2024-07-02 PROCEDURE — 2500000002 HC RX 250 W HCPCS SELF ADMINISTERED DRUGS (ALT 637 FOR MEDICARE OP, ALT 636 FOR OP/ED): Performed by: STUDENT IN AN ORGANIZED HEALTH CARE EDUCATION/TRAINING PROGRAM

## 2024-07-02 PROCEDURE — 2500000005 HC RX 250 GENERAL PHARMACY W/O HCPCS: Performed by: ORTHOPAEDIC SURGERY

## 2024-07-02 PROCEDURE — 27236 TREAT THIGH FRACTURE: CPT | Performed by: ORTHOPAEDIC SURGERY

## 2024-07-02 PROCEDURE — 3700000002 HC GENERAL ANESTHESIA TIME - EACH INCREMENTAL 1 MINUTE: Performed by: ORTHOPAEDIC SURGERY

## 2024-07-02 PROCEDURE — 2500000004 HC RX 250 GENERAL PHARMACY W/ HCPCS (ALT 636 FOR OP/ED): Performed by: ORTHOPAEDIC SURGERY

## 2024-07-02 PROCEDURE — 2500000001 HC RX 250 WO HCPCS SELF ADMINISTERED DRUGS (ALT 637 FOR MEDICARE OP): Performed by: STUDENT IN AN ORGANIZED HEALTH CARE EDUCATION/TRAINING PROGRAM

## 2024-07-02 PROCEDURE — 2500000002 HC RX 250 W HCPCS SELF ADMINISTERED DRUGS (ALT 637 FOR MEDICARE OP, ALT 636 FOR OP/ED): Performed by: ORTHOPAEDIC SURGERY

## 2024-07-02 DEVICE — FULL DOSE BONE CEMENT, 10 PACK CATALOG NUMBER IS 6191-1-010
Type: IMPLANTABLE DEVICE | Site: HIP | Status: FUNCTIONAL
Brand: SIMPLEX

## 2024-07-02 DEVICE — SUMMIT FEMORAL STEM 12/14 TAPER CEMENTED SIZE 6 HI 127MM
Type: IMPLANTABLE DEVICE | Site: HIP | Status: FUNCTIONAL
Brand: SUMMIT

## 2024-07-02 DEVICE — SELF CENTERING BI-POLAR HEAD 28MM ID 46MM OD
Type: IMPLANTABLE DEVICE | Site: HIP | Status: FUNCTIONAL
Brand: SELF CENTERING

## 2024-07-02 DEVICE — ARTICUL/EZE FEMORAL HEAD DIAMETER 28MM +8.5 12/14 TAPER
Type: IMPLANTABLE DEVICE | Site: HIP | Status: FUNCTIONAL
Brand: ARTICUL/EZE

## 2024-07-02 DEVICE — CEMENTRALIZER STEM CENTRALIZER 11.0MM CEMENTED
Type: IMPLANTABLE DEVICE | Site: HIP | Status: FUNCTIONAL
Brand: CEMENTRALIZER

## 2024-07-02 RX ORDER — LIDOCAINE HYDROCHLORIDE 10 MG/ML
0.1 INJECTION INFILTRATION; PERINEURAL ONCE
Status: DISCONTINUED | OUTPATIENT
Start: 2024-07-02 | End: 2024-07-02 | Stop reason: HOSPADM

## 2024-07-02 RX ORDER — DILTIAZEM HYDROCHLORIDE 60 MG/1
90 TABLET, FILM COATED ORAL DAILY
Status: DISCONTINUED | OUTPATIENT
Start: 2024-07-02 | End: 2024-07-04

## 2024-07-02 RX ORDER — OXYCODONE HYDROCHLORIDE 5 MG/1
10 TABLET ORAL EVERY 4 HOURS PRN
Status: DISCONTINUED | OUTPATIENT
Start: 2024-07-02 | End: 2024-07-04

## 2024-07-02 RX ORDER — CEFAZOLIN SODIUM 2 G/100ML
2 INJECTION, SOLUTION INTRAVENOUS ONCE
Status: COMPLETED | OUTPATIENT
Start: 2024-07-02 | End: 2024-07-02

## 2024-07-02 RX ORDER — SODIUM CHLORIDE 9 MG/ML
70 INJECTION, SOLUTION INTRAVENOUS CONTINUOUS
Status: DISCONTINUED | OUTPATIENT
Start: 2024-07-02 | End: 2024-07-04

## 2024-07-02 RX ORDER — PHENYLEPHRINE HCL IN 0.9% NACL 1 MG/10 ML
SYRINGE (ML) INTRAVENOUS AS NEEDED
Status: DISCONTINUED | OUTPATIENT
Start: 2024-07-02 | End: 2024-07-02

## 2024-07-02 RX ORDER — DOCUSATE SODIUM 100 MG/1
100 CAPSULE, LIQUID FILLED ORAL 2 TIMES DAILY
Status: DISCONTINUED | OUTPATIENT
Start: 2024-07-02 | End: 2024-07-04

## 2024-07-02 RX ORDER — GLYCOPYRROLATE 0.2 MG/ML
INJECTION INTRAMUSCULAR; INTRAVENOUS AS NEEDED
Status: DISCONTINUED | OUTPATIENT
Start: 2024-07-02 | End: 2024-07-02

## 2024-07-02 RX ORDER — OXYCODONE AND ACETAMINOPHEN 5; 325 MG/1; MG/1
1 TABLET ORAL EVERY 4 HOURS PRN
Status: DISCONTINUED | OUTPATIENT
Start: 2024-07-02 | End: 2024-07-04

## 2024-07-02 RX ORDER — ONDANSETRON HYDROCHLORIDE 2 MG/ML
INJECTION, SOLUTION INTRAVENOUS AS NEEDED
Status: DISCONTINUED | OUTPATIENT
Start: 2024-07-02 | End: 2024-07-02

## 2024-07-02 RX ORDER — PRAMIPEXOLE DIHYDROCHLORIDE 0.25 MG/1
0.5 TABLET ORAL NIGHTLY
Status: DISCONTINUED | OUTPATIENT
Start: 2024-07-02 | End: 2024-07-06

## 2024-07-02 RX ORDER — LORAZEPAM 0.5 MG/1
2 TABLET ORAL NIGHTLY PRN
Status: DISCONTINUED | OUTPATIENT
Start: 2024-07-02 | End: 2024-07-06

## 2024-07-02 RX ORDER — FENTANYL CITRATE 50 UG/ML
INJECTION, SOLUTION INTRAMUSCULAR; INTRAVENOUS AS NEEDED
Status: DISCONTINUED | OUTPATIENT
Start: 2024-07-02 | End: 2024-07-02

## 2024-07-02 RX ORDER — SODIUM CHLORIDE, SODIUM LACTATE, POTASSIUM CHLORIDE, CALCIUM CHLORIDE 600; 310; 30; 20 MG/100ML; MG/100ML; MG/100ML; MG/100ML
100 INJECTION, SOLUTION INTRAVENOUS CONTINUOUS
Status: DISCONTINUED | OUTPATIENT
Start: 2024-07-02 | End: 2024-07-02

## 2024-07-02 RX ORDER — MEPERIDINE HYDROCHLORIDE 25 MG/ML
12.5 INJECTION INTRAMUSCULAR; INTRAVENOUS; SUBCUTANEOUS EVERY 10 MIN PRN
Status: DISCONTINUED | OUTPATIENT
Start: 2024-07-02 | End: 2024-07-02 | Stop reason: HOSPADM

## 2024-07-02 RX ORDER — ENOXAPARIN SODIUM 100 MG/ML
30 INJECTION SUBCUTANEOUS DAILY
Status: DISCONTINUED | OUTPATIENT
Start: 2024-07-02 | End: 2024-07-08 | Stop reason: HOSPADM

## 2024-07-02 RX ORDER — OXYCODONE AND ACETAMINOPHEN 5; 325 MG/1; MG/1
0.5 TABLET ORAL EVERY 4 HOURS PRN
Status: DISCONTINUED | OUTPATIENT
Start: 2024-07-02 | End: 2024-07-04

## 2024-07-02 RX ORDER — PROPOFOL 10 MG/ML
INJECTION, EMULSION INTRAVENOUS AS NEEDED
Status: DISCONTINUED | OUTPATIENT
Start: 2024-07-02 | End: 2024-07-02

## 2024-07-02 RX ORDER — ONDANSETRON 4 MG/1
4 TABLET, FILM COATED ORAL EVERY 8 HOURS PRN
Status: DISCONTINUED | OUTPATIENT
Start: 2024-07-02 | End: 2024-07-08 | Stop reason: HOSPADM

## 2024-07-02 RX ORDER — FENTANYL CITRATE 50 UG/ML
25 INJECTION, SOLUTION INTRAMUSCULAR; INTRAVENOUS EVERY 5 MIN PRN
Status: DISCONTINUED | OUTPATIENT
Start: 2024-07-02 | End: 2024-07-02 | Stop reason: HOSPADM

## 2024-07-02 RX ORDER — ACETAMINOPHEN 325 MG/1
650 TABLET ORAL EVERY 6 HOURS SCHEDULED
Status: DISCONTINUED | OUTPATIENT
Start: 2024-07-02 | End: 2024-07-08 | Stop reason: HOSPADM

## 2024-07-02 RX ORDER — FUROSEMIDE 20 MG/1
20 TABLET ORAL DAILY
Status: DISCONTINUED | OUTPATIENT
Start: 2024-07-02 | End: 2024-07-02

## 2024-07-02 RX ORDER — LIDOCAINE HCL/PF 100 MG/5ML
SYRINGE (ML) INTRAVENOUS AS NEEDED
Status: DISCONTINUED | OUTPATIENT
Start: 2024-07-02 | End: 2024-07-02

## 2024-07-02 RX ORDER — FENTANYL CITRATE 50 UG/ML
50 INJECTION, SOLUTION INTRAMUSCULAR; INTRAVENOUS EVERY 5 MIN PRN
Status: DISCONTINUED | OUTPATIENT
Start: 2024-07-02 | End: 2024-07-02 | Stop reason: HOSPADM

## 2024-07-02 RX ORDER — NALOXONE HYDROCHLORIDE 1 MG/ML
0.2 INJECTION INTRAMUSCULAR; INTRAVENOUS; SUBCUTANEOUS EVERY 5 MIN PRN
Status: DISCONTINUED | OUTPATIENT
Start: 2024-07-02 | End: 2024-07-08 | Stop reason: HOSPADM

## 2024-07-02 RX ORDER — CEFAZOLIN SODIUM 2 G/100ML
INJECTION, SOLUTION INTRAVENOUS
Status: COMPLETED
Start: 2024-07-02 | End: 2024-07-02

## 2024-07-02 RX ORDER — ONDANSETRON HYDROCHLORIDE 2 MG/ML
4 INJECTION, SOLUTION INTRAVENOUS EVERY 8 HOURS PRN
Status: DISCONTINUED | OUTPATIENT
Start: 2024-07-02 | End: 2024-07-08 | Stop reason: HOSPADM

## 2024-07-02 RX ORDER — CEFAZOLIN SODIUM 2 G/100ML
2 INJECTION, SOLUTION INTRAVENOUS EVERY 8 HOURS
Status: COMPLETED | OUTPATIENT
Start: 2024-07-02 | End: 2024-07-03

## 2024-07-02 RX ORDER — FUROSEMIDE 20 MG/1
20 TABLET ORAL DAILY
Status: DISCONTINUED | OUTPATIENT
Start: 2024-07-02 | End: 2024-07-08 | Stop reason: HOSPADM

## 2024-07-02 RX ORDER — ROCURONIUM BROMIDE 10 MG/ML
INJECTION, SOLUTION INTRAVENOUS AS NEEDED
Status: DISCONTINUED | OUTPATIENT
Start: 2024-07-02 | End: 2024-07-02

## 2024-07-02 RX ORDER — MORPHINE SULFATE 2 MG/ML
2 INJECTION, SOLUTION INTRAMUSCULAR; INTRAVENOUS EVERY 2 HOUR PRN
Status: DISCONTINUED | OUTPATIENT
Start: 2024-07-02 | End: 2024-07-04

## 2024-07-02 RX ORDER — SIMVASTATIN 20 MG/1
20 TABLET, FILM COATED ORAL NIGHTLY
Status: DISCONTINUED | OUTPATIENT
Start: 2024-07-02 | End: 2024-07-08 | Stop reason: HOSPADM

## 2024-07-02 SDOH — HEALTH STABILITY: MENTAL HEALTH: HOW OFTEN DO YOU HAVE A DRINK CONTAINING ALCOHOL?: NEVER

## 2024-07-02 SDOH — SOCIAL STABILITY: SOCIAL INSECURITY: WITHIN THE LAST YEAR, HAVE YOU BEEN HUMILIATED OR EMOTIONALLY ABUSED IN OTHER WAYS BY YOUR PARTNER OR EX-PARTNER?: NO

## 2024-07-02 SDOH — SOCIAL STABILITY: SOCIAL INSECURITY: WITHIN THE LAST YEAR, HAVE YOU BEEN AFRAID OF YOUR PARTNER OR EX-PARTNER?: NO

## 2024-07-02 SDOH — ECONOMIC STABILITY: INCOME INSECURITY: IN THE LAST 12 MONTHS, WAS THERE A TIME WHEN YOU WERE NOT ABLE TO PAY THE MORTGAGE OR RENT ON TIME?: NO

## 2024-07-02 SDOH — SOCIAL STABILITY: SOCIAL NETWORK: ARE YOU MARRIED, WIDOWED, DIVORCED, SEPARATED, NEVER MARRIED, OR LIVING WITH A PARTNER?: WIDOWED

## 2024-07-02 SDOH — ECONOMIC STABILITY: INCOME INSECURITY: HOW HARD IS IT FOR YOU TO PAY FOR THE VERY BASICS LIKE FOOD, HOUSING, MEDICAL CARE, AND HEATING?: NOT VERY HARD

## 2024-07-02 SDOH — HEALTH STABILITY: MENTAL HEALTH: HOW OFTEN DO YOU HAVE 6 OR MORE DRINKS ON ONE OCCASION?: NEVER

## 2024-07-02 SDOH — HEALTH STABILITY: MENTAL HEALTH
HOW OFTEN DO YOU NEED TO HAVE SOMEONE HELP YOU WHEN YOU READ INSTRUCTIONS, PAMPHLETS, OR OTHER WRITTEN MATERIAL FROM YOUR DOCTOR OR PHARMACY?: SOMETIMES

## 2024-07-02 SDOH — ECONOMIC STABILITY: HOUSING INSECURITY: IN THE LAST 12 MONTHS, HOW MANY PLACES HAVE YOU LIVED?: 1

## 2024-07-02 SDOH — ECONOMIC STABILITY: FOOD INSECURITY: WITHIN THE PAST 12 MONTHS, YOU WORRIED THAT YOUR FOOD WOULD RUN OUT BEFORE YOU GOT MONEY TO BUY MORE.: NEVER TRUE

## 2024-07-02 SDOH — SOCIAL STABILITY: SOCIAL NETWORK: HOW OFTEN DO YOU GET TOGETHER WITH FRIENDS OR RELATIVES?: MORE THAN THREE TIMES A WEEK

## 2024-07-02 SDOH — HEALTH STABILITY: MENTAL HEALTH: CURRENT SMOKER: 0

## 2024-07-02 SDOH — HEALTH STABILITY: MENTAL HEALTH
STRESS IS WHEN SOMEONE FEELS TENSE, NERVOUS, ANXIOUS, OR CAN'T SLEEP AT NIGHT BECAUSE THEIR MIND IS TROUBLED. HOW STRESSED ARE YOU?: NOT AT ALL

## 2024-07-02 SDOH — ECONOMIC STABILITY: INCOME INSECURITY: IN THE PAST 12 MONTHS, HAS THE ELECTRIC, GAS, OIL, OR WATER COMPANY THREATENED TO SHUT OFF SERVICE IN YOUR HOME?: NO

## 2024-07-02 SDOH — SOCIAL STABILITY: SOCIAL NETWORK: HOW OFTEN DO YOU ATTENT MEETINGS OF THE CLUB OR ORGANIZATION YOU BELONG TO?: NEVER

## 2024-07-02 SDOH — SOCIAL STABILITY: SOCIAL NETWORK: HOW OFTEN DO YOU ATTEND CHURCH OR RELIGIOUS SERVICES?: MORE THAN 4 TIMES PER YEAR

## 2024-07-02 SDOH — ECONOMIC STABILITY: FOOD INSECURITY: WITHIN THE PAST 12 MONTHS, THE FOOD YOU BOUGHT JUST DIDN'T LAST AND YOU DIDN'T HAVE MONEY TO GET MORE.: NEVER TRUE

## 2024-07-02 SDOH — HEALTH STABILITY: PHYSICAL HEALTH: ON AVERAGE, HOW MANY MINUTES DO YOU ENGAGE IN EXERCISE AT THIS LEVEL?: 10 MIN

## 2024-07-02 SDOH — HEALTH STABILITY: PHYSICAL HEALTH: ON AVERAGE, HOW MANY DAYS PER WEEK DO YOU ENGAGE IN MODERATE TO STRENUOUS EXERCISE (LIKE A BRISK WALK)?: 1 DAY

## 2024-07-02 SDOH — HEALTH STABILITY: MENTAL HEALTH: HOW MANY STANDARD DRINKS CONTAINING ALCOHOL DO YOU HAVE ON A TYPICAL DAY?: PATIENT DOES NOT DRINK

## 2024-07-02 ASSESSMENT — PAIN SCALES - GENERAL
PAINLEVEL_OUTOF10: 0 - NO PAIN
PAIN_LEVEL: 0
PAINLEVEL_OUTOF10: 3
PAINLEVEL_OUTOF10: 6
PAINLEVEL_OUTOF10: 0 - NO PAIN

## 2024-07-02 ASSESSMENT — COGNITIVE AND FUNCTIONAL STATUS - GENERAL
MOVING TO AND FROM BED TO CHAIR: A LOT
MOBILITY SCORE: 10
MOVING FROM LYING ON BACK TO SITTING ON SIDE OF FLAT BED WITH BEDRAILS: A LOT
DAILY ACTIVITIY SCORE: 15
DRESSING REGULAR LOWER BODY CLOTHING: A LOT
WALKING IN HOSPITAL ROOM: TOTAL
HELP NEEDED FOR BATHING: A LOT
TOILETING: A LOT
PERSONAL GROOMING: A LITTLE
CLIMB 3 TO 5 STEPS WITH RAILING: TOTAL
DRESSING REGULAR UPPER BODY CLOTHING: A LOT
TURNING FROM BACK TO SIDE WHILE IN FLAT BAD: A LOT
STANDING UP FROM CHAIR USING ARMS: A LOT

## 2024-07-02 ASSESSMENT — PAIN - FUNCTIONAL ASSESSMENT
PAIN_FUNCTIONAL_ASSESSMENT: 0-10
PAIN_FUNCTIONAL_ASSESSMENT: 0-10

## 2024-07-02 ASSESSMENT — PAIN DESCRIPTION - ORIENTATION: ORIENTATION: LEFT

## 2024-07-02 ASSESSMENT — LIFESTYLE VARIABLES
SKIP TO QUESTIONS 9-10: 1
AUDIT-C TOTAL SCORE: 0

## 2024-07-02 ASSESSMENT — PAIN DESCRIPTION - DESCRIPTORS: DESCRIPTORS: ACHING

## 2024-07-02 ASSESSMENT — PAIN DESCRIPTION - LOCATION: LOCATION: HIP

## 2024-07-02 NOTE — CARE PLAN
The patient's goals for the shift include rest.     The clinical goals for the shift include safety and pain control.    Skin  Add All  Decreased wound size/increased tissue granulation at next dressing change  Add  Today at 0138 - Progressing by Bahman Atkins RN  Add  Flowsheets  Taken today at 0138  Decreased wound size/increased tissue granulation at next dressing change  Promote sleep for wound healing  Participates in plan/prevention/treatment measures  Add  Today at 0138 - Progressing by Bahman Atkins RN  Add  Flowsheets  Taken today at 0138  Participates in plan/prevention/treatment measures  Elevate heels  Prevent/manage excess moisture  Add  Today at 0138 - Progressing by Bahman Atkins RN  Add  Flowsheets  Taken today at 0138  Prevent/manage excess moisture  Cleanse incontinence/protect with barrier cream  Prevent/minimize sheer/friction injuries  Add  Today at 0138 - Progressing by Bahman Atkins RN  Add  Flowsheets  Taken today at 0138  Prevent/minimize sheer/friction injuries  HOB 30 degrees or less  Promote/optimize nutrition  Add  Today at 0138 - Progressing by Bahman Atkins RN  Add  Flowsheets  Taken today at 0138  Promote/optimize nutrition  Discuss with provider if NPO > 2 days  Promote skin healing  Add  Today at 0138 - Progressing by Bahman Atkins RN  Add  Flowsheets  Taken today at 0138  Promote skin healing  Assess skin/pad under line(s)/device(s)  Pain - Adult  Add All  Verbalizes/displays adequate comfort level or baseline comfort level  Add  Today at 0138 - Progressing by Bahman Atkins RN  Add  Safety - Adult  Add All  Free from fall injury  Add  Today at 0138 - Progressing by Bahman Atkins RN  Add  Discharge Planning  Add All  Discharge to home or other facility with appropriate resources  Add  Today at 0138 - Progressing by Bahman Atkins RN  Add  Chronic Conditions and Co-morbidities  Add All  Patient's chronic conditions and co-morbidity symptoms are  monitored and maintained or improved  Add  Today at 0138 - Progressing by Bahman Atkins RN  Add  Resident is recovering from orthopedic surgery  Add All  I will report effective pain management  Add  Today at 0138 - Progressing by Bahman Atkins RN  Add  I will remain free from infection  Add  Today at 0138 - Progressing by Bahman Atkins RN  Add  I will verbalize and demonstrate increased strength and ability to move  Add  Today at 0138 - Progressing by Bahman Atkins RN  Add  I will demonstrate an understanding of plan to heal skin and care for incision  Add  Today at 0138 - Progressing by Bahman Atkins RN

## 2024-07-02 NOTE — PROGRESS NOTES
Physical Therapy                 Therapy Communication Note    Patient Name: Estefanía Contreras  MRN: [id number]  Today's Date: 7/2/2024     Discipline: Physical Therapy    Missed Visit Reason: Missed Visit Reason:  (Pt presents s/p fall,LE xrays showed acute L subcapital femoral neck fracture w/moderate angulation;  PT eval deferred await ortho consult & orders.)

## 2024-07-02 NOTE — PROGRESS NOTES
Occupational Therapy                 Therapy Communication Note    Patient Name: Estefanía Contreras  MRN: [id number]  Today's Date: 7/2/2024     Discipline: Occupational Therapy    Missed Visit Reason: Missed Visit Reason:  (Pt presents s/p fall,LE xrays showed acute L subcapital femoral neck fracture w/moderate angulation; OT eval deferred await ortho consult & orders.)

## 2024-07-02 NOTE — H&P
History Of Present Illness  Estefanía Contreras is a 89 y.o. female presenting after a mechanical fall at home. Patient reports using a walker; states she fell forward when trying to  a can of soup. She fell onto her L side and reports significant L hip pain. Patient reports she had podiatric surgery for an ingrown toenail earlier today. She denies frequent falls, primarily d/t the use of assistive devices as she has peripheral neuropathy that she reports is worsening. She denies head injury, LOC, dizziness, lightheadedness, CP, SOB, palpitations. Patient also report R knee pain after the fall. ED vitals are stable. Pt temporarily placed on NC d/t slightly low O2 sat of 93 while supine. Labs significant for BUN/Cr 55/2.4, plts of 85. LE xrays showed acute L subcapital femoral neck fracture w/moderate angulation; RLE xrays negative for acute fx. Ortho consulted by ED. Patient received IVF and pain control.     Past Medical History  HTN, HLD, CKD, PVD, AAA, peripheral neuropathy, ITP in remission    Surgical History  Back sx, hysterectomy, R shoulder     Social History  Denies smoking, alcohol, or illicit drug use    Family History  No family history on file.     Allergies  Patient has no known allergies.    Review of Systems   Musculoskeletal:  Positive for arthralgias and gait problem.   All other systems reviewed and are negative.       Physical Exam  General Appearance: awake and alert, AAOx3, slight distress 2/2 pain  HEENT: nc/at, eomi, perrla, dry oral mucosa  Resp: ctab; no wheezing, rhonchi, or rales, normal respiratory effort  Cardio: rrr. S1s2  GI: soft, ntnd, BS+  Ext: no edema, 2+ pulses b/l, limited ROM of LLE 2/2 hip pain, tenderness of lateral L hip region, b/l knee tenderness       Last Recorded Vitals  /87   Pulse 80   Wt 65.8 kg (145 lb)   SpO2 (!) 93%     Relevant Results  Results for orders placed or performed during the hospital encounter of 07/01/24 (from the past 24 hour(s))   CBC and  Auto Differential   Result Value Ref Range    WBC 6.1 4.4 - 11.3 x10*3/uL    nRBC 0.0 0.0 - 0.0 /100 WBCs    RBC 4.22 4.00 - 5.20 x10*6/uL    Hemoglobin 12.5 12.0 - 16.0 g/dL    Hematocrit 39.5 36.0 - 46.0 %    MCV 94 80 - 100 fL    MCH 29.6 26.0 - 34.0 pg    MCHC 31.6 (L) 32.0 - 36.0 g/dL    RDW 14.9 (H) 11.5 - 14.5 %    Platelets 85 (L) 150 - 450 x10*3/uL    Neutrophils % 82.0 40.0 - 80.0 %    Immature Granulocytes %, Automated 0.3 0.0 - 0.9 %    Lymphocytes % 11.6 13.0 - 44.0 %    Monocytes % 4.6 2.0 - 10.0 %    Eosinophils % 1.0 0.0 - 6.0 %    Basophils % 0.5 0.0 - 2.0 %    Neutrophils Absolute 5.02 1.60 - 5.50 x10*3/uL    Immature Granulocytes Absolute, Automated 0.02 0.00 - 0.50 x10*3/uL    Lymphocytes Absolute 0.71 (L) 0.80 - 3.00 x10*3/uL    Monocytes Absolute 0.28 0.05 - 0.80 x10*3/uL    Eosinophils Absolute 0.06 0.00 - 0.40 x10*3/uL    Basophils Absolute 0.03 0.00 - 0.10 x10*3/uL   Comprehensive metabolic panel   Result Value Ref Range    Glucose 95 74 - 99 mg/dL    Sodium 139 136 - 145 mmol/L    Potassium 4.4 3.5 - 5.3 mmol/L    Chloride 103 98 - 107 mmol/L    Bicarbonate 28 21 - 32 mmol/L    Anion Gap 12 10 - 20 mmol/L    Urea Nitrogen 55 (H) 6 - 23 mg/dL    Creatinine 2.40 (H) 0.50 - 1.05 mg/dL    eGFR 19 (L) >60 mL/min/1.73m*2    Calcium 8.7 8.6 - 10.3 mg/dL    Albumin 3.7 3.4 - 5.0 g/dL    Alkaline Phosphatase 60 33 - 136 U/L    Total Protein 6.5 6.4 - 8.2 g/dL    AST 17 9 - 39 U/L    Bilirubin, Total 0.5 0.0 - 1.2 mg/dL    ALT 11 7 - 45 U/L   Magnesium   Result Value Ref Range    Magnesium 2.41 (H) 1.60 - 2.40 mg/dL   Morphology   Result Value Ref Range    RBC Morphology See Below     RBC Fragments Few     Ovalocytes Few     Panfilo Cells Few    Coagulation Screen   Result Value Ref Range    Protime 10.8 9.8 - 12.8 seconds    INR 1.0 0.9 - 1.1    aPTT 38 27 - 38 seconds     XR hip right with pelvis when performed 2 or 3 views    Result Date: 7/1/2024  STUDY: Pelvis and right Hip Radiographs; 7/1/2024  7:46PM INDICATION: Patient had a mechanical fall forward complaining of left hip and femur pain, also tender to palpation of right knee and right hip. COMPARISON: None available. ACCESSION NUMBER(S): [id number] ORDERING CLINICIAN: JOVITA WOOTEN TECHNIQUE:  AP view of the pelvis and two view(s) of the right hip. FINDINGS:  PELVIS: The pelvic ring is intact.  There is no acute fracture in the pelvis itself but there is a displaced femoral neck fracture on the left.. An aortobiiliac stent is partially visible in the upper pelvis. Right HIP: There is no displaced fracture.  There is mild osteoarthritis in the right hip joint..  No soft tissue abnormality is seen.    There is mild osteoarthritis in the right hip but the right hip otherwise appears intact.  There is a displaced femoral neck fracture on the left.. Signed by Wilfred Choi MD    XR chest 1 view    Result Date: 7/1/2024  STUDY: Chest Radiograph;  7/1/2024 7:46PM INDICATION: Reported new oxygen demand. COMPARISON: XR chest 12/14/2022 ACCESSION NUMBER(S): [id number] ORDERING CLINICIAN: JOVITA WOOTEN TECHNIQUE:  Frontal chest was obtained at 19:46 hours. FINDINGS: CARDIOMEDIASTINAL SILHOUETTE: Heart is mildly enlarged with mild pulmonary vascular congestion..  LUNGS: Lungs are clear. Calcified granuloma in the left midlung field.  ABDOMEN: Aortic stent graft noted.  BONES: No acute osseous changes. Reverse left shoulder arthroplasty noted.    Mild cardiomegaly and pulmonary vascular congestion. Signed by Chauncey Chu MD    XR tibia fibula left 2 views    Result Date: 7/1/2024  STUDY: Tibia and Fibula Radiographs; 7/1/2024 7:46 PM. INDICATION: Pain and bruising at left tibial plateau.  Evaluate for fracture. COMPARISON: None. ACCESSION NUMBER(S): [id number] ORDERING CLINICIAN: JOVITA WOOTEN TECHNIQUE:  2 view(s) of the left tibia and fibula. FINDINGS:  There is no displaced fracture.  The alignment is anatomic.  No soft tissue abnormality is seen.  Degenerative change of the knee and ankle joint noted.    No acute osseous abnormality Signed by Chauncey Chu MD    XR femur left 2+ views    Result Date: 7/1/2024  STUDY: Femur Radiographs; 7/1/2024 7:46PM INDICATION: Patient had a mechanical fall forward complaining of left hip and femur pain. COMPARISON: None available. ACCESSION NUMBER(S): [id number] ORDERING CLINICIAN: JOVITA WOOTEN TECHNIQUE:  Two view(four images) of the left femur. FINDINGS:  There is an acute left subcapital femoral neck fracture with moderate varus angulation.  The alignment is anatomic.  No soft tissue abnormality is seen. Degenerative changes of the hip and knee joint noted.    Acute left subcapital femoral neck fracture. Signed by Chauncey Chu MD    XR knee right 1-2 views    Result Date: 7/1/2024  STUDY: Knee Radiographs; 7/1/2024 7:46PM INDICATION: Patient had a mechanical fall forward complaining of left hip and femur pain also tender to palpitation of right knee and right hip. COMPARISON: None available. ACCESSION NUMBER(S): [id number] ORDERING CLINICIAN: JOVITA WOOTEN TECHNIQUE:  Two view(s) of the right knee. FINDINGS:  There is no displaced fracture.  The alignment is anatomic.  Diffuse osteopenia is seen.  Chondrocalcinosis is present..  There is no joint effusion.    Osteopenia and chondrocalcinosis.  No acute abnormality.. Signed by Ky Greenberg MD   Scheduled medications  lactated Ringer's, 500 mL, intravenous, Once      Continuous medications     PRN medications                 Assessment/Plan   Principal Problem:    Closed displaced midcervical fracture of left femur (Multi)  Active Problems:    Closed displaced fracture of left femoral neck (Multi)      88 y/o F w/PMH of HTN, HLD, CKD, PVD, AAA, peripheral neuropathy presenting after mechanical fall at home injuring her L hip. ED workup revealing L hip fracture.     Acute L femoral neck fracture, s/p mechanical fall  Likely PALLAVI on CKD, unknown baseline  H/o ITP;  per chart in remission since 2014 tx  HTN  HLD  PVD    Continue pain regimen w/IV dilaudid, percocet prn  Gentle IVF, NPO at midnight  Ortho consulted by ED  Creatinine elevated; last Cr 1.39 from >1 year ago. Received IVF, repeat labs in the am  On lasix for LE edema  Plts low but stable. Follows w/outpt hem/onc  Resume home meds once reconciled  PT/OT  DVT ppx w/SCDs    DNR/DNI confirmed w/pt    Patient's RCRI score is 1. She is at low risk for major adverse cardiac events in the perioperative period. EKG reviewed. No further cardiac workup required at this time. She is medically optimized for surgery.          Esvin Sims MD

## 2024-07-02 NOTE — PROGRESS NOTES
Medication Adjustment    The following medication(s) was/were adjusted for Estefanía A Niec per protocol/policy due to altered renal function.    Medication(s) adjusted:   Enoxaparin 40mg subcutaneous daily adjust to enoxaparin 30mg subcutaneous daily based on hospital renal dosing protocol and CrCl <30mL/min (current CrCl is 16.9mL/min)     Karlene Meraz, David, BCPS   7/2/2024 5:32 PM

## 2024-07-02 NOTE — PROGRESS NOTES
07/02/24 1140   Discharge Planning   Living Arrangements Alone   Support Systems Children   Assistance Needed adl's   Type of Residence Private residence   Number of Stairs to Enter Residence 7   Number of Stairs Within Residence 0   Do you have animals or pets at home? No   Home or Post Acute Services In home services;Post acute facilities (Rehab/SNF/etc)   Type of Post Acute Facility Services Skilled nursing   Patient expects to be discharged to: SNF     Met with patient and family at bedside. Introduced self and role as care coordinator. Demographic verified. Patient has care givers 3-4 times a week for 4 hours a day. Patient uses a walker. Care coordinators will follow for discharge planning.

## 2024-07-02 NOTE — ANESTHESIA PREPROCEDURE EVALUATION
Patient: Estefanía Contreras    Procedure Information       Date/Time: 07/02/24 1400    Procedure: LEFT BIPOLAR HIP HEMIARTHROPLASTY (Left: Hip)    Location: [facility] / [facility]    Surgeons: Demarco Montaño MD            Relevant Problems   Anesthesia (within normal limits)      Cardiac   (+) HTN (hypertension)       Clinical information reviewed:   Tobacco  Allergies  Meds  Problems  Med Hx  Surg Hx   Fam Hx  Soc   Hx        NPO Detail:  NPO/Void Status  Carbohydrate Drink Given Prior to Surgery? : N  Date of Last Liquid: 07/02/24  Time of Last Liquid: 1130  Date of Last Solid: 07/01/24  Time of Last Solid: 2100  Last Intake Type: Light meal         Physical Exam    Airway  Mallampati: II  TM distance: >3 FB  Neck ROM: full     Cardiovascular - normal exam  (+) murmur     Dental - normal exam     Pulmonary - normal exam     Abdominal - normal exam             Anesthesia Plan    History of general anesthesia?: yes  History of complications of general anesthesia?: no    ASA 3     general     The patient is not a current smoker.  Patient was previously instructed to abstain from smoking on day of procedure.  Patient did not smoke on day of procedure.    intravenous induction   Postoperative administration of opioids is intended.  Trial extubation is planned.  Anesthetic plan and risks discussed with patient.  Use of blood products discussed with patient who consented to blood products.

## 2024-07-02 NOTE — OP NOTE
LEFT BIPOLAR HIP HEMIARTHROPLASTY (L) Operative Note     Date: 2024 - 2024  OR Location: [facility]    Name: Estefanía Contreras, : 1934, Age: 89 y.o., MRN: [id number], Sex: female    Diagnosis  Pre-op Diagnosis     * Closed displaced midcervical fracture of left femur, initial encounter (Multi) [S72.032A] Post-op Diagnosis     * Closed displaced midcervical fracture of left femur, initial encounter (Multi) [S72.032A]     Procedures  LEFT BIPOLAR HIP HEMIARTHROPLASTY  14753 - AK OPTX FEM FX PROX END NCK INT FIXJ/PROSTC RPLCMT      Surgeons      * Demarco Montaño - Primary    Resident/Fellow/Other Assistant:  Surgeons and Role:  * No surgeons found with a matching role *    Procedure Summary  Anesthesia: General  ASA: III  Anesthesia Staff: Anesthesiologist: Jones Molina MD  C-AA: EARNESTINE Monahan; EARNESTINE Andrade  Estimated Blood Loss: 350 mL  Intra-op Medications: Administrations occurring from 1400 to 1535 on 24:  * No intraprocedure medications in log *           Anesthesia Record               Intraprocedure I/O Totals          Intake    ceFAZolin in dextrose (iso-os) (Ancef) IVPB 2 g 100.00 mL    Total Intake 100 mL       Output    Est. Blood Loss 350 mL    Total Output 350 mL       Net    Net Volume -250 mL          Specimen:   ID Type Source Tests Collected by Time   1 : left hip bone and tissue Tissue FEMORAL HEAD LEFT SURGICAL PATHOLOGY EXAM Demarco Montaño MD 2024 1452        Staff:   Donnell: Yanira Davis Person: Jose  Circulator: Rosemary  Circulator: Nori         Drains and/or Catheters: * None in log *    Tourniquet Times:         Implants:  Implants       Type Name Action Serial No.      Implant CEMENT, BONE, SIMPLEX P, RADIOPAQUE, FULL DOSE, 40 GM - [id number] Implanted      Implant CEMENT, BONE, SIMPLEX P, RADIOPAQUE, FULL DOSE, 40 GM - [id number] Implanted      Implant CEMENT, BONE, SIMPLEX P, RADIOPAQUE, FULL DOSE, 40 GM - [id number] Implanted      Joint Hip  HEAD, FEMORAL, BIPOLAR, SELF-CENTERING, 28 MM, 46 MM - [id number] Implanted      Joint Hip HEAD, FEMORAL, 12/14 TAPER, ARTICUL/RAJESH, 28 MM, +8.5 MM NECK, COBALT CHROME - [id number] Implanted      Joint Hip HEAD, FEMORAL, 12/14 TAPER, ARTICUL/RAJESH, 28 MM, +8.5 MM NECK, COBALT CHROME - [id number] Implanted      Joint Hip HIP STEM, SUMMIT CEMENT 6 HI - [id number] Implanted      Joint Hip CEMENTRALIZER, HIP, 11.0MM - [id number] Implanted               Findings: Displaced left femoral neck fracture    Indications: Estefanía Contreras is an 89 y.o. female who is having surgery for Closed displaced midcervical fracture of left femur, initial encounter (Multi) [S72.032A].  Patient had fall at home 1 day prior and landed on her left lower extremity.  She was found to have sustained a left femoral neck fracture.  She was admitted to the medical service.  She was medically optimized.  I explained to the patient that typically this fracture is treated with surgical intervention.  I explained her this is treated with a left hip hemiarthroplasty.  I explained to her the risks, benefits alternatives of a left hip hemiarthroplasty.  I explained to her that the risks of the procedure include but are not limited to infection, iatrogenic fracture, damage to nerves and blood vessels, postoperative pain and stiffness, limb length inequality, postoperative dislocation, postoperative DVT and pulm emboli, as well as risks associate with anesthesia.  The patient voiced understanding and informed consent was obtained.    The patient was seen in the preoperative area. The risks, benefits, complications, treatment options, non-operative alternatives, expected recovery and outcomes were discussed with the patient. The possibilities of reaction to medication, pulmonary aspiration, injury to surrounding structures, bleeding, recurrent infection, the need for additional procedures, failure to diagnose a condition, and creating a complication requiring  transfusion or operation were discussed with the patient. The patient concurred with the proposed plan, giving informed consent.  The site of surgery was properly noted/marked if necessary per policy. The patient has been actively warmed in preoperative area. Preoperative antibiotics have been ordered and given within 1 hours of incision. Venous thrombosis prophylaxis have been ordered including unilateral sequential compression device    Procedure Details: The patient was prepped identified the preoperative waiting area and her left hip was marked as site of surgery.  Ancef was administered intravenously.  Patient was taken back to the operating room suite placed supine on the OR table.  After general anesthesia with endotracheal ovation was administered patient was placed in the right lateral decubitus position with the left hip elevated.  Patient's left lower extremity was prepped and draped in the usual sterile fashion.  A preoperative verification timeout was taken.  Approximately an 8 inch curvilinear incision was made along the lateral aspect of the proximal femur.  Sharp dissection was carried through skin and subcutaneous tissue.  Electrocautery was used to achieve hemostasis.  I dissected down to level of the IT band and opened this up.  At this point I tenotomized the short external rotators off the greater trochanter and tagged them with a #2 Ethibond.  The fracture site was easily identified within the femoral neck.  I used a sagittal saw to make my cut through the neck at about 14 mm of neck length.  Excess bone from the neck was removed.  I also removed the femoral head from the acetabulum.  Head was sized to a 46 mm diameter head.  Soft tissue was removed from the acetabulum.    I began preparing the proximal femur.  I used a box cutting osteotome followed by canal finding reamer.  I then used a lateralizing reamer.  I began broaching with a size 2 broach and increased to I had best fit with a size 6  broach.  I initially trialed with a standard offset neck but had best stability with a high offset neck.  The best construct for stability was the size 6 broach with a high offset neck with a 28 mm diameter femoral head with a +8.5 mm neck length.  There was a 46 mm bipolar cup on top of this.  With this trial construct in place the hip was stable in full extension as well as with the hip flexed to 90 degrees and internally rotated greater than 60 degrees.  Hip was dislocated.  Trial components were removed from the femur.  Proximal femur was prepared for cementing.  I then cemented in the final DePuy size 6 Starke femoral stem.  This had an high offset neck.  Excess cement was removed.  Once the cement hardened I impacted on the 28 mm diameter femoral head with +8.5 mm neck length as well as the 46 mm bipolar cup and liner.  Femoral head was relocated.  Again I am satisfied with my limb length as well as stability in full extension and with the hip flexed to 90 degrees and internally rotated greater than 60 degrees.  Wound was copiously irrigated with normal saline.  The short external rotators were repaired back to the greater trochanter.  IT band was closed with #1 Vicryl.  Subcutaneous tissue was closed with 2-0 Vicryl.  Skin was approximated staples.  A sterile Mepilex dressing was applied.  Patient was returned to the supine position with an abduction pillow between her legs.  Patient was awakened from anesthesia and returned to recovery room stable condition.  There were no complications in the case.  All sponge and needle counts were correct at the end of the case.    The patient will be weightbearing as tolerated on her left lower extremity.  She will be on Ancef antibiotic prophylaxis.  She will be on Lovenox for DVT prophylaxis.  Complications:  None; patient tolerated the procedure well.    Disposition: PACU - hemodynamically stable.  Condition: stable         Additional Details: None    Attending  Attestation: I performed the procedure.    Demarco Montaño  Phone Number: [phone]

## 2024-07-02 NOTE — ANESTHESIA POSTPROCEDURE EVALUATION
Patient: Estefanía Contreras    Procedure Summary       Date: 07/02/24 Room / Location: [facility] / [facility]    Anesthesia Start: 1334 Anesthesia Stop: 1545    Procedure: LEFT BIPOLAR HIP HEMIARTHROPLASTY (Left: Hip) Diagnosis:       Closed displaced midcervical fracture of left femur, initial encounter (Multi)      (Closed displaced midcervical fracture of left femur, initial encounter (Multi) [S72.032A])    Surgeons: Demarco Montaño MD Responsible Provider: Jones Molina MD    Anesthesia Type: general ASA Status: 3            Anesthesia Type: general    Vitals Value Taken Time   /84 07/02/24 1535   Temp 36.7 °C (98.1 °F) 07/02/24 1535   Pulse 82 07/02/24 1544   Resp 16 07/02/24 1535   SpO2 99 % 07/02/24 1544   Vitals shown include unfiled device data.    Anesthesia Post Evaluation    Patient location during evaluation: PACU  Patient participation: complete - patient participated  Level of consciousness: awake  Pain score: 0  Pain management: adequate  Airway patency: patent  Cardiovascular status: acceptable  Respiratory status: acceptable  Hydration status: acceptable  Postoperative Nausea and Vomiting: none        There were no known notable events for this encounter.

## 2024-07-02 NOTE — CARE PLAN
Problem: Pain - Adult  Goal: Verbalizes/displays adequate comfort level or baseline comfort level  Outcome: Progressing     Problem: Safety - Adult  Goal: Free from fall injury  Outcome: Progressing     Problem: Discharge Planning  Goal: Discharge to home or other facility with appropriate resources  Outcome: Progressing     Problem: Chronic Conditions and Co-morbidities  Goal: Patient's chronic conditions and co-morbidity symptoms are monitored and maintained or improved  Outcome: Progressing     Problem: Skin  Goal: Decreased wound size/increased tissue granulation at next dressing change  7/2/2024 1755 by Carla Saini RN  Outcome: Progressing  7/2/2024 1755 by Carla Saini RN  Flowsheets (Taken 7/2/2024 1755)  Decreased wound size/increased tissue granulation at next dressing change: Promote sleep for wound healing  Goal: Participates in plan/prevention/treatment measures  7/2/2024 1755 by Carla Saini RN  Outcome: Progressing  7/2/2024 1755 by Carla Saini RN  Flowsheets (Taken 7/2/2024 1755)  Participates in plan/prevention/treatment measures: Elevate heels  Goal: Prevent/manage excess moisture  7/2/2024 1755 by Carla Saini RN  Outcome: Progressing  7/2/2024 1755 by Carla Saini RN  Flowsheets (Taken 7/2/2024 1755)  Prevent/manage excess moisture: Cleanse incontinence/protect with barrier cream  Goal: Prevent/minimize sheer/friction injuries  7/2/2024 1755 by Carla Saini RN  Outcome: Progressing  7/2/2024 1755 by Carla Saini RN  Flowsheets (Taken 7/2/2024 1755)  Prevent/minimize sheer/friction injuries: Turn/reposition every 2 hours/use positioning/transfer devices  Goal: Promote/optimize nutrition  7/2/2024 1755 by Carla Saini RN  Outcome: Progressing  7/2/2024 1755 by Carla Saini RN  Flowsheets (Taken 7/2/2024 1755)  Promote/optimize nutrition: Monitor/record intake including meals  Goal: Promote skin  healing  7/2/2024 1755 by Carla Saini RN  Outcome: Progressing  7/2/2024 1755 by Carla Saini RN  Flowsheets (Taken 7/2/2024 1755)  Promote skin healing: Turn/reposition every 2 hours/use positioning/transfer devices     Problem: Resident is recovering from orthopedic surgery  Goal: I will report effective pain management  Outcome: Progressing  Goal: I will remain free from infection  Outcome: Progressing  Goal: I will verbalize and demonstrate increased strength and ability to move  Outcome: Progressing  Goal: I will demonstrate an understanding of plan to heal skin and care for incision  Outcome: Progressing   The patient's goals for the shift include      The clinical goals for the shift include safety and pain control.

## 2024-07-02 NOTE — PROGRESS NOTES
Estefanía Contreras is a 89 y.o. female on day 1 of admission presenting with Closed displaced midcervical fracture of left femur (Multi).      Subjective   Seen this AM, pain controlled at time of exam, will be going to OR later this afternoon     Objective     Last Recorded Vitals  /58   Pulse 79   Temp 36.7 °C (98.1 °F) (Temporal)   Resp 16   Wt 65.8 kg (145 lb)   SpO2 94%     Physical Exam  G: aox3, NAD, cooperative  HENT: neck supple, no JVD  Eyes: clear sclera  CV: RRR s1 s2  L: clear  Abd: soft, NT, non distended  Ext: no c/c/e  N: no appreciable acute focal deficits  Psych: appropriate mood and behavior    Assessment/Plan      Acute L femoral neck fx  PALLAVI on CKD IIIb  Mechanical fall    HTN, HLD  PAD  AAA  Peripheral neuropathy  h/o ITP  DVT ppx    Plan:  - Cr improving with IVF, continue to hold lasix, avoid hypotension, avoid NSAIDs, PVR, repeat BMP in AM, continue IVF overnight  - Pain control, orthopedics taking for surgery later today  - High normal K on AM BMP, will change IVF from LR which contains a small amount of K to NS  - Continue home cardizem with hold parameters  - Post op PT/OT ortega Leong,

## 2024-07-02 NOTE — CONSULTS
Reason For Consult  Left femoral neck fracture    History Of Present Illness  Estefanía Contreras is a 89 y.o. female presenting with left hip pain after a fall at home.  Patient typically uses a walker to assist with ambulation.  1 day earlier she was using it in her home and she fell in her kitchen.  She landed on her left side.  She was unable to ambulate and was brought to the emergency department where she was found to have sustained a left femoral neck fracture.  She denies any other trauma.  She denies any loss of consciousness.  She was admitted to medicine and orthopedics was consulted because of her left hip fracture.     Past Medical History  She has a past medical history of H/O heart artery stent and H/O shoulder surgery.    Surgical History  She has no past surgical history on file.     Social History  She reports that she has never smoked. She has never been exposed to tobacco smoke. She has never used smokeless tobacco. She reports that she does not use drugs. No history on file for alcohol use.    Family History  No family history on file.     Allergies  Patient has no known allergies.    Review of Systems  Noncontributory     Physical Exam  Patient is otherwise healthy appearing female in no acute distress.  She is alert and oriented x 3.  Exam of her left leg reveals her skin envelope is intact.  Her leg is being held shortened externally rotated.  She was able to plantarflex and dorsiflex her toes appropriately.  She had a palpable dorsalis pedis pulse.  She is tender to palpation near her greater trochanter.     Last Recorded Vitals  Blood pressure 132/58, pulse 67, temperature 37.3 °C (99.1 °F), temperature source Temporal, resp. rate 18, height 1.524 m (5'), weight 65.8 kg (145 lb), SpO2 90%.    Relevant Results  Patient has left hip x-rays which were personally reviewed.  They do show a displaced left femoral neck fracture.  Scheduled medications  dilTIAZem, 90 mg, oral, Daily  [Held by provider]  furosemide, 20 mg, oral, Daily  lactated Ringer's, 500 mL, intravenous, Once  polyethylene glycol, 17 g, oral, Daily  pramipexole, 0.5 mg, oral, Nightly  simvastatin, 20 mg, oral, Nightly      Continuous medications     PRN medications  PRN medications: acetaminophen **OR** acetaminophen **OR** acetaminophen, acetaminophen **OR** acetaminophen **OR** acetaminophen, HYDROmorphone, LORazepam, melatonin, ondansetron **OR** ondansetron, oxyCODONE-acetaminophen  Results for orders placed or performed during the hospital encounter of 07/01/24 (from the past 24 hour(s))   CBC and Auto Differential   Result Value Ref Range    WBC 6.1 4.4 - 11.3 x10*3/uL    nRBC 0.0 0.0 - 0.0 /100 WBCs    RBC 4.22 4.00 - 5.20 x10*6/uL    Hemoglobin 12.5 12.0 - 16.0 g/dL    Hematocrit 39.5 36.0 - 46.0 %    MCV 94 80 - 100 fL    MCH 29.6 26.0 - 34.0 pg    MCHC 31.6 (L) 32.0 - 36.0 g/dL    RDW 14.9 (H) 11.5 - 14.5 %    Platelets 85 (L) 150 - 450 x10*3/uL    Neutrophils % 82.0 40.0 - 80.0 %    Immature Granulocytes %, Automated 0.3 0.0 - 0.9 %    Lymphocytes % 11.6 13.0 - 44.0 %    Monocytes % 4.6 2.0 - 10.0 %    Eosinophils % 1.0 0.0 - 6.0 %    Basophils % 0.5 0.0 - 2.0 %    Neutrophils Absolute 5.02 1.60 - 5.50 x10*3/uL    Immature Granulocytes Absolute, Automated 0.02 0.00 - 0.50 x10*3/uL    Lymphocytes Absolute 0.71 (L) 0.80 - 3.00 x10*3/uL    Monocytes Absolute 0.28 0.05 - 0.80 x10*3/uL    Eosinophils Absolute 0.06 0.00 - 0.40 x10*3/uL    Basophils Absolute 0.03 0.00 - 0.10 x10*3/uL   Comprehensive metabolic panel   Result Value Ref Range    Glucose 95 74 - 99 mg/dL    Sodium 139 136 - 145 mmol/L    Potassium 4.4 3.5 - 5.3 mmol/L    Chloride 103 98 - 107 mmol/L    Bicarbonate 28 21 - 32 mmol/L    Anion Gap 12 10 - 20 mmol/L    Urea Nitrogen 55 (H) 6 - 23 mg/dL    Creatinine 2.40 (H) 0.50 - 1.05 mg/dL    eGFR 19 (L) >60 mL/min/1.73m*2    Calcium 8.7 8.6 - 10.3 mg/dL    Albumin 3.7 3.4 - 5.0 g/dL    Alkaline Phosphatase 60 33 - 136 U/L     Total Protein 6.5 6.4 - 8.2 g/dL    AST 17 9 - 39 U/L    Bilirubin, Total 0.5 0.0 - 1.2 mg/dL    ALT 11 7 - 45 U/L   Magnesium   Result Value Ref Range    Magnesium 2.41 (H) 1.60 - 2.40 mg/dL   Morphology   Result Value Ref Range    RBC Morphology See Below     RBC Fragments Few     Ovalocytes Few     Panfilo Cells Few    Coagulation Screen   Result Value Ref Range    Protime 10.8 9.8 - 12.8 seconds    INR 1.0 0.9 - 1.1    aPTT 38 27 - 38 seconds   Type And Screen   Result Value Ref Range    ABO TYPE A     Rh TYPE POS     ANTIBODY SCREEN NEG    ECG 12 lead   Result Value Ref Range    Ventricular Rate 76 BPM    Atrial Rate 76 BPM    MS Interval 200 ms    QRS Duration 84 ms    QT Interval 386 ms    QTC Calculation(Bazett) 434 ms    P Axis 74 degrees    R Axis -50 degrees    T Axis 41 degrees    QRS Count 12 beats    Q Onset 214 ms    P Onset 114 ms    P Offset 175 ms    T Offset 407 ms    QTC Fredericia 417 ms   CBC   Result Value Ref Range    WBC 8.5 4.4 - 11.3 x10*3/uL    nRBC 0.0 0.0 - 0.0 /100 WBCs    RBC 3.95 (L) 4.00 - 5.20 x10*6/uL    Hemoglobin 11.6 (L) 12.0 - 16.0 g/dL    Hematocrit 37.2 36.0 - 46.0 %    MCV 94 80 - 100 fL    MCH 29.4 26.0 - 34.0 pg    MCHC 31.2 (L) 32.0 - 36.0 g/dL    RDW 14.9 (H) 11.5 - 14.5 %    Platelets 82 (L) 150 - 450 x10*3/uL   Basic metabolic panel   Result Value Ref Range    Glucose 77 74 - 99 mg/dL    Sodium 139 136 - 145 mmol/L    Potassium 5.0 3.5 - 5.3 mmol/L    Chloride 105 98 - 107 mmol/L    Bicarbonate 28 21 - 32 mmol/L    Anion Gap 11 10 - 20 mmol/L    Urea Nitrogen 55 (H) 6 - 23 mg/dL    Creatinine 1.91 (H) 0.50 - 1.05 mg/dL    eGFR 25 (L) >60 mL/min/1.73m*2    Calcium 8.2 (L) 8.6 - 10.3 mg/dL        Assessment/Plan     Patient had fallen at home and sustained a left femoral neck fracture which is displaced.  She was admitted to medicine.  She will undergo medical optimization.  I did explain to the patient that typically this fracture is treated with surgical intervention.  I  explained her that this is typically treated with a placement of a left hip hemiarthroplasty.  I explained her in detail the risk, benefits alternatives of this procedure.  I also explained her that typically therapy is needed afterwards and she may require discharge to a facility.  The patient voiced understanding and informed consent was obtained.  The patient will undergo the procedure following medical optimization.    Demarco Montaño MD

## 2024-07-02 NOTE — BRIEF OP NOTE
Date: 2024 - 2024  OR Location: [facility]    Name: Estefanía Contreras, : 1934, Age: 89 y.o., MRN: [id number], Sex: female    Diagnosis  Pre-op Diagnosis     * Closed displaced midcervical fracture of left femur, initial encounter (Multi) [S72.032A] Post-op Diagnosis     * Closed displaced midcervical fracture of left femur, initial encounter (Multi) [S72.032A]     Procedures  LEFT BIPOLAR HIP HEMIARTHROPLASTY  20642 - [place] OPTX FEM FX PROX END NCK INT FIXJ/PROSTC RPLCMT      Surgeons      * Demarco Montaño - Primary    Resident/Fellow/Other Assistant:  Surgeons and Role:  * No surgeons found with a matching role *    Procedure Summary  Anesthesia: General  ASA: III  Anesthesia Staff: Anesthesiologist: Jones Molina MD  C-AA: EARNESTINE Monahan; EARNESTINE Andrade  Estimated Blood Loss: 350 mL  Intra-op Medications: Administrations occurring from 1400 to 1535 on 24:  * No intraprocedure medications in log *           Anesthesia Record               Intraprocedure I/O Totals          Intake    ceFAZolin in dextrose (iso-os) (Ancef) IVPB 2 g 100.00 mL    Total Intake 100 mL       Output    Est. Blood Loss 350 mL    Total Output 350 mL       Net    Net Volume -250 mL          Specimen:   ID Type Source Tests Collected by Time   1 : left hip bone and tissue Tissue FEMORAL HEAD LEFT SURGICAL PATHOLOGY EXAM Demarco Montaño MD 2024 0721        Staff:   Circulator: Yanira Davis Person: Jose  Circulator: Rosemary  Circulator: Nori          Findings: Displaced left femoral neck fracture    Complications:  None; patient tolerated the procedure well.     Disposition: PACU - hemodynamically stable.  Condition: stable  Specimens Collected:   ID Type Source Tests Collected by Time   1 : left hip bone and tissue Tissue FEMORAL HEAD LEFT SURGICAL PATHOLOGY EXAM Demarco Montaño MD 2024 923     Attending Attestation: I performed the procedure.    Demarco Montaño  Phone Number: [phone]

## 2024-07-02 NOTE — ANESTHESIA PROCEDURE NOTES
Airway  Date/Time: 7/2/2024 1:46 PM  Urgency: elective    Airway not difficult    Staffing  Performed: attending and CAA   Authorized by: Jones Molina MD    Performed by: EARNESTINE Andrade  Patient location during procedure: OR    Indications and Patient Condition  Indications for airway management: anesthesia  Spontaneous Ventilation: absent  Sedation level: deep  Preoxygenated: yes  Patient position: sniffing  Mask difficulty assessment: 1 - vent by mask    Final Airway Details  Final airway type: endotracheal airway      Successful airway: ETT  Cuffed: yes   Successful intubation technique: direct laryngoscopy  Facilitating devices/methods: intubating stylet  Endotracheal tube insertion site: oral  Blade: Jayro  Blade size: #4  ETT size (mm): 7.0  Cormack-Lehane Classification: grade I - full view of glottis  Placement verified by: chest auscultation and capnometry   Cuff volume (mL): 7  Measured from: gums  ETT to gums (cm): 20

## 2024-07-03 LAB
ANION GAP SERPL CALC-SCNC: 13 MMOL/L (ref 10–20)
BUN SERPL-MCNC: 53 MG/DL (ref 6–23)
CALCIUM SERPL-MCNC: 7.6 MG/DL (ref 8.6–10.3)
CHLORIDE SERPL-SCNC: 105 MMOL/L (ref 98–107)
CO2 SERPL-SCNC: 23 MMOL/L (ref 21–32)
CREAT SERPL-MCNC: 2.06 MG/DL (ref 0.5–1.05)
EGFRCR SERPLBLD CKD-EPI 2021: 23 ML/MIN/1.73M*2
ERYTHROCYTE [DISTWIDTH] IN BLOOD BY AUTOMATED COUNT: 14.5 % (ref 11.5–14.5)
GLUCOSE SERPL-MCNC: 194 MG/DL (ref 74–99)
HCT VFR BLD AUTO: 35 % (ref 36–46)
HGB BLD-MCNC: 10.7 G/DL (ref 12–16)
MAGNESIUM SERPL-MCNC: 2.28 MG/DL (ref 1.6–2.4)
MCH RBC QN AUTO: 29.8 PG (ref 26–34)
MCHC RBC AUTO-ENTMCNC: 30.6 G/DL (ref 32–36)
MCV RBC AUTO: 98 FL (ref 80–100)
NRBC BLD-RTO: 0 /100 WBCS (ref 0–0)
PLATELET # BLD AUTO: 85 X10*3/UL (ref 150–450)
POTASSIUM SERPL-SCNC: 5.3 MMOL/L (ref 3.5–5.3)
RBC # BLD AUTO: 3.59 X10*6/UL (ref 4–5.2)
SODIUM SERPL-SCNC: 136 MMOL/L (ref 136–145)
WBC # BLD AUTO: 6.5 X10*3/UL (ref 4.4–11.3)

## 2024-07-03 PROCEDURE — 99024 POSTOP FOLLOW-UP VISIT: CPT | Performed by: ORTHOPAEDIC SURGERY

## 2024-07-03 PROCEDURE — 2500000001 HC RX 250 WO HCPCS SELF ADMINISTERED DRUGS (ALT 637 FOR MEDICARE OP): Performed by: ORTHOPAEDIC SURGERY

## 2024-07-03 PROCEDURE — 1100000001 HC PRIVATE ROOM DAILY

## 2024-07-03 PROCEDURE — 2500000004 HC RX 250 GENERAL PHARMACY W/ HCPCS (ALT 636 FOR OP/ED): Performed by: ORTHOPAEDIC SURGERY

## 2024-07-03 PROCEDURE — 80048 BASIC METABOLIC PNL TOTAL CA: CPT | Performed by: STUDENT IN AN ORGANIZED HEALTH CARE EDUCATION/TRAINING PROGRAM

## 2024-07-03 PROCEDURE — 97162 PT EVAL MOD COMPLEX 30 MIN: CPT | Mod: GP

## 2024-07-03 PROCEDURE — 97166 OT EVAL MOD COMPLEX 45 MIN: CPT | Mod: GO

## 2024-07-03 PROCEDURE — 36415 COLL VENOUS BLD VENIPUNCTURE: CPT | Performed by: STUDENT IN AN ORGANIZED HEALTH CARE EDUCATION/TRAINING PROGRAM

## 2024-07-03 PROCEDURE — 99233 SBSQ HOSP IP/OBS HIGH 50: CPT | Performed by: STUDENT IN AN ORGANIZED HEALTH CARE EDUCATION/TRAINING PROGRAM

## 2024-07-03 PROCEDURE — 85027 COMPLETE CBC AUTOMATED: CPT | Performed by: STUDENT IN AN ORGANIZED HEALTH CARE EDUCATION/TRAINING PROGRAM

## 2024-07-03 PROCEDURE — 83735 ASSAY OF MAGNESIUM: CPT | Performed by: STUDENT IN AN ORGANIZED HEALTH CARE EDUCATION/TRAINING PROGRAM

## 2024-07-03 PROCEDURE — 2500000002 HC RX 250 W HCPCS SELF ADMINISTERED DRUGS (ALT 637 FOR MEDICARE OP, ALT 636 FOR OP/ED): Performed by: STUDENT IN AN ORGANIZED HEALTH CARE EDUCATION/TRAINING PROGRAM

## 2024-07-03 PROCEDURE — 2500000002 HC RX 250 W HCPCS SELF ADMINISTERED DRUGS (ALT 637 FOR MEDICARE OP, ALT 636 FOR OP/ED): Performed by: ORTHOPAEDIC SURGERY

## 2024-07-03 ASSESSMENT — ACTIVITIES OF DAILY LIVING (ADL)
ADLS_ADDRESSED: YES
ADL_ASSISTANCE: NEEDS ASSISTANCE
ADL_ASSISTANCE: NEEDS ASSISTANCE

## 2024-07-03 ASSESSMENT — PAIN - FUNCTIONAL ASSESSMENT
PAIN_FUNCTIONAL_ASSESSMENT: 0-10

## 2024-07-03 ASSESSMENT — COGNITIVE AND FUNCTIONAL STATUS - GENERAL
WALKING IN HOSPITAL ROOM: A LOT
DAILY ACTIVITIY SCORE: 14
DRESSING REGULAR LOWER BODY CLOTHING: TOTAL
DRESSING REGULAR LOWER BODY CLOTHING: TOTAL
TOILETING: TOTAL
TURNING FROM BACK TO SIDE WHILE IN FLAT BAD: A LOT
HELP NEEDED FOR BATHING: A LOT
MOVING FROM LYING ON BACK TO SITTING ON SIDE OF FLAT BED WITH BEDRAILS: A LOT
CLIMB 3 TO 5 STEPS WITH RAILING: A LOT
DRESSING REGULAR UPPER BODY CLOTHING: A LITTLE
MOBILITY SCORE: 12
PERSONAL GROOMING: A LITTLE
MOVING TO AND FROM BED TO CHAIR: A LOT
TURNING FROM BACK TO SIDE WHILE IN FLAT BAD: A LOT
CLIMB 3 TO 5 STEPS WITH RAILING: A LOT
DAILY ACTIVITIY SCORE: 14
MOVING TO AND FROM BED TO CHAIR: A LOT
STANDING UP FROM CHAIR USING ARMS: A LOT
MOBILITY SCORE: 12
PERSONAL GROOMING: A LITTLE
MOVING FROM LYING ON BACK TO SITTING ON SIDE OF FLAT BED WITH BEDRAILS: A LOT
DRESSING REGULAR UPPER BODY CLOTHING: A LITTLE
WALKING IN HOSPITAL ROOM: A LOT
STANDING UP FROM CHAIR USING ARMS: A LOT
TOILETING: TOTAL
HELP NEEDED FOR BATHING: A LOT

## 2024-07-03 ASSESSMENT — PAIN SCALES - GENERAL
PAINLEVEL_OUTOF10: 0 - NO PAIN
PAINLEVEL_OUTOF10: 10 - WORST POSSIBLE PAIN
PAINLEVEL_OUTOF10: 5 - MODERATE PAIN
PAINLEVEL_OUTOF10: 7
PAINLEVEL_OUTOF10: 10 - WORST POSSIBLE PAIN
PAINLEVEL_OUTOF10: 5 - MODERATE PAIN

## 2024-07-03 ASSESSMENT — PAIN DESCRIPTION - LOCATION: LOCATION: HIP

## 2024-07-03 ASSESSMENT — PAIN DESCRIPTION - ORIENTATION: ORIENTATION: LEFT

## 2024-07-03 ASSESSMENT — PAIN DESCRIPTION - DESCRIPTORS: DESCRIPTORS: ACHING

## 2024-07-03 NOTE — PROGRESS NOTES
Spiritual Care Visit    Clinical Encounter Type  Visited With: Patient and family together (Mrs. Contreras's son, Sharan, was present in the room.)  Routine Visit:  (I responded to a Spiritual Care Consult order.)  Referral From: Patient  Mrs. Contreras is [employer]. Her family (son) was present. Per her son, her spiritual needs are being met.  Mrs. Contreras and family offered emotional, spiritual support. No other needs at this time. I educated them on how to reach out for spiritual care in the future. I communicated with her medical team members prior to this visit. I remain available upon request. The Spiritual Care Consult order is completed.  Total time: 35 min.

## 2024-07-03 NOTE — PROGRESS NOTES
Estefanía Contreras is a 89 y.o. female on day 2 of admission presenting with Closed displaced midcervical fracture of left femur (Multi).      Subjective   Doing well post operatively, pain improved,     Objective     Last Recorded Vitals  /64 (BP Location: Left arm, Patient Position: Sitting)   Pulse 57   Temp 36.8 °C (98.2 °F) (Temporal)   Resp 16   Wt 65.8 kg (145 lb)   SpO2 98%     Physical Exam  G: aox3, NAD, cooperative  HENT: neck supple, no JVD  Eyes: clear sclera  CV: RRR s1 s2  L: clear  Abd: soft, NT, non distended  Ext: no c/c/e, L hip in dressing  N: no appreciable acute focal deficits  Psych: appropriate mood and behavior    Assessment/Plan      Acute L femoral neck fx, s/p left hip hemiarthroplasty, POD#1  PALLAVI on CKD IIIb, possible progression of CKD  Mechanical fall    HTN, HLD  PAD  AAA  Peripheral neuropathy  h/o ITP  DVT ppx    Plan:  - Cr small uptrend today but overall better from admission, no renal function for over a year, will continue IVF overnight, continue to hold lasix, avoid hypotension, avoid NSAIDs, PVR, repeat BMP in AM, she may actually have had progression of her CKD too over the past year, will ask renal to see her, K high normal, will restrict dietary K and also give a dose of Lokelma  - Pain control, orthopedics following, PT/OT, will likely need SNF  - Continue home cardizem with hold parameters        Travon Leong, DO

## 2024-07-03 NOTE — PROGRESS NOTES
Occupational Therapy    Evaluation    Patient Name: Estefanía Contreras  MRN: [id number]  Today's Date: 7/3/2024  Time Calculation  Start Time: 0855  Stop Time: 0929  Time Calculation (min): 34 min  723/723-A    Assessment  IP OT Assessment  OT Assessment: Patient would benefit from further OT to address ADL's and functional transfers/mobility due to fall, sustaining closed displaced left femoral neck fracture and recovering:  **7/2/2024   LEFT BIPOLAR HIP HEMIARTHROPLASTY  Prognosis: Good  End of Session Communication: Bedside nurse  End of Session Patient Position: Up in chair, Alarm on; call-light within reach    Plan:  Treatment Interventions: ADL retraining, Functional transfer training, Patient/family training, Equipment evaluation/education  OT Frequency: 5 times per week  OT Discharge Recommendations: Moderate intensity level of continued care  OT - OK to Discharge: Yes (to next level of care when medically cleared by physician/medical team)    Subjective     Current Problem:  1. Closed displaced fracture of left femoral neck (Multi)        2. Closed displaced midcervical fracture of left femur, initial encounter (Multi)  Case Request Operating Room: Open Reduction Internal Fixation Femur    Case Request Operating Room: Open Reduction Internal Fixation Femur    Surgical Pathology Exam    Surgical Pathology Exam          General:  General  Reason for Referral: recent surgery  Referred By: Demarco Montaño MD  Past Medical History Relevant to Rehab: HTN, HLD, CKD, PVD, AAA, peripheral neuropathy, ITP in remission, back sx, hysterectomy, R shoulder  Co-Treatment: PT (co-eval)  Prior to Session Communication: Bedside nurse who confirmed that patient is medically stable to participate in this OT session  Patient Position Received: Bed, 2 rail up, Alarm on (abduction pillow)  General Comment: Patient seen in room 723; cooperative, motivated; BLE swelling    Precautions:  LE Weight Bearing Status:  (FWB LLE)  Medical  Precautions: Fall precautions, Oxygen therapy device and L/min (UE IV)  Post-Surgical Precautions: Left hip precautions  Precautions Comment: [place] mild    Pain:  Pain Assessment  Pain Assessment: 0-10  0-10 (Numeric) Pain Score: 5 - Moderate pain  Pain Type: Surgical pain  Pain Location: Hip  Pain Orientation: Left    Objective   Cognition:  Overall Cognitive Status: Within Functional Limits     Home Living:  Type of Home: Apartment  Lives With: Alone (however son lives across street)  Home Adaptive Equipment: Walker rolling or standard  Home Access: Stairs to enter with rails (7)  Bathroom Shower/Tub: Tub/shower unit  Bathroom Toilet: Standard  Bathroom Equipment: Grab bars in shower, Shower chair with back, Hand-held shower hose, Bedside commode  Home Living Comments: also has recliner lift chair     Prior Function:  Receives Help From:  (home aide 4 days/week for 4 hours per visit)  ADL Assistance: Needs assistance (from home aide for back and feet otherwise indep in dressing tasks)  Homemaking Assistance: Needs assistance (home aide)  Ambulatory Assistance: Independent (wheeled walker)  Hand Dominance: Right  Prior Function Comments: son does driving and shopping    Current ADL:  ADL Comments: to further address lower body self-care using assisitive techniques/equipment as needed in order to adhere to post-op hip precautions    Bed Mobility/Transfers:   Bed Mobility  Bed Mobility: Yes  Bed Mobility 1  Bed Mobility 1: Supine to sitting  Level of Assistance 1: Maximum assistance, +2, Maximum verbal cues  Bed Mobility Comments 1: HOB elevated  Transfers  Transfer: Yes  Transfer 1  Transfer From 1: Sit to, Stand to  Transfer to 1: Bed, Chair with arms  Transfer Device 1: Walker, Gait belt  Transfer Level of Assistance 1: Moderate assistance, Moderate verbal cues    Ambulation/Gait Training:  Functional Mobility  Functional Mobility Performed: Yes  Functional Mobility 1  Device 1: Rolling walker  Functional Mobility  Support Devices: Gait belt  Assistance 1: Moderate assistance, Moderate verbal cues, Moderate tactile cues  Comments 1: ambulated from hospital bed to chair    Sitting Balance:  Static Sitting Balance  Static Sitting-Level of Assistance: Close supervision    Standing Balance:  Static Standing Balance  Static Standing-Level of Assistance: Moderate assistance (fair)    Extremities: RUE   RUE : Within Functional Limits (AROM throughout except shoulder flex compensated in abd ~30 degrees; elbow strength 4/5; hand  4-/5) and LUE   LUE: Within Functional Limits (AROM throughout except shoulder flex compensated in abd ~40 degrees; elbow strength 4/5; hand  4-/5)    Outcome Measures: [facility] Daily Activity  Putting on and taking off regular lower body clothing: Total  Bathing (including washing, rinsing, drying): A lot  Putting on and taking off regular upper body clothing: A little  Toileting, which includes using toilet, bedpan or urinal: Total  Taking care of personal grooming such as brushing teeth: A little  Eating Meals: None  Daily Activity - Total Score: 14     EDUCATION:  Education Documentation  Precautions, taught by Daylin Wallace OT at 7/3/2024  2:26 PM.  Learner: Patient  Readiness: Acceptance  Method: Explanation  Response: Verbalizes Understanding, Demonstrated Understanding, Needs Reinforcement  Comment: left hip precautions; proper mobility techniques to promote safety/fall prevention    Goals:   Encounter Problems       Encounter Problems (Active)       OT Goals       Patient will complete upper and lower body bathing/dressing; toileting with modified independence using adaptive equipment as needed  (Progressing)       Start:  07/03/24    Expected End:  07/24/24            Patient will perform bed mobility and functional transfers safely and independently: bed, chair, commode using DME as needed  (Progressing)       Start:  07/03/24    Expected End:  07/24/24            Patient will tolerate  standing for 5 mins. and show overall good (-) standing balance during ADL's and functional transfers/mobility  (Progressing)       Start:  07/03/24    Expected End:  07/24/24            adhere to left hip precautions during ADL's and functional transfers with no cues needed (Progressing)       Start:  07/03/24    Expected End:  07/24/24

## 2024-07-03 NOTE — PROGRESS NOTES
Estefanía Contreras is a 89 y.o. female on day 2 of admission presenting with Closed displaced midcervical fracture of left femur (Multi).    Subjective   Patient is postoperative day 1 following a left hip hemiarthroplasty for left femoral neck fracture.  Pain was well-controlled overnight.  There is no complaints of chest pain or shortness of breath.       Objective     Physical Exam  Patient is in no acute distress.  Exam of her left hip reveals her bandage is clean and dry.  She has no left calf tenderness.  She has appropriate strength with plantarflexion and dorsiflexion of her left foot and ankle.  Capillary refills less than 2 seconds distally.    Last Recorded Vitals  Blood pressure 98/57, pulse 65, temperature 36.3 °C (97.3 °F), resp. rate 16, height 1.524 m (5'), weight 65.8 kg (145 lb), SpO2 (!) 83%.  Intake/Output last 3 Shifts:  I/O last 3 completed shifts:  In: 2775.4 (42.2 mL/kg) [P.O.:120; I.V.:1455.4 (22.1 mL/kg); IV Piggyback:1200]  Out: 800 (12.2 mL/kg) [Urine:450 (0.2 mL/kg/hr); Blood:350]  Weight: 65.8 kg     Relevant Results  X-rays of her pelvis were personally reviewed.  They show adequate lamina of the prosthesis.  There is no evidence of any fracture, dislocation or loosening.  Scheduled medications  acetaminophen, 650 mg, oral, q6h CURTIS  dilTIAZem, 90 mg, oral, Daily  docusate sodium, 100 mg, oral, BID  enoxaparin, 30 mg, subcutaneous, Daily  [Held by provider] furosemide, 20 mg, oral, Daily  polyethylene glycol, 17 g, oral, Daily  pramipexole, 0.5 mg, oral, Nightly  simvastatin, 20 mg, oral, Nightly      Continuous medications  oxygen, 2 L/min  sodium chloride 0.9%, 75 mL/hr, Last Rate: 75 mL/hr (07/02/24 0714)      PRN medications  PRN medications: acetaminophen **OR** acetaminophen **OR** acetaminophen, acetaminophen **OR** acetaminophen **OR** acetaminophen, LORazepam, melatonin, morphine, naloxone, ondansetron **OR** ondansetron, oxyCODONE, oxyCODONE-acetaminophen,  oxyCODONE-acetaminophen  Results for orders placed or performed during the hospital encounter of 07/01/24 (from the past 24 hour(s))   CBC   Result Value Ref Range    WBC 6.5 4.4 - 11.3 x10*3/uL    nRBC 0.0 0.0 - 0.0 /100 WBCs    RBC 3.59 (L) 4.00 - 5.20 x10*6/uL    Hemoglobin 10.7 (L) 12.0 - 16.0 g/dL    Hematocrit 35.0 (L) 36.0 - 46.0 %    MCV 98 80 - 100 fL    MCH 29.8 26.0 - 34.0 pg    MCHC 30.6 (L) 32.0 - 36.0 g/dL    RDW 14.5 11.5 - 14.5 %    Platelets 85 (L) 150 - 450 x10*3/uL   Basic Metabolic Panel   Result Value Ref Range    Glucose 194 (H) 74 - 99 mg/dL    Sodium 136 136 - 145 mmol/L    Potassium 5.3 3.5 - 5.3 mmol/L    Chloride 105 98 - 107 mmol/L    Bicarbonate 23 21 - 32 mmol/L    Anion Gap 13 10 - 20 mmol/L    Urea Nitrogen 53 (H) 6 - 23 mg/dL    Creatinine 2.06 (H) 0.50 - 1.05 mg/dL    eGFR 23 (L) >60 mL/min/1.73m*2    Calcium 7.6 (L) 8.6 - 10.3 mg/dL   Magnesium   Result Value Ref Range    Magnesium 2.28 1.60 - 2.40 mg/dL                            Assessment/Plan   Principal Problem:    Closed displaced midcervical fracture of left femur (Multi)  Active Problems:    Closed displaced fracture of left femoral neck (Multi)    HTN (hypertension)    Patient is doing satisfactory following her left hip hemiarthroplasty and is postoperative day 1.  She will be mobilized with physical therapy.  She has weightbearing as tolerated in her left lower extremity.  She is on Lovenox for DVT prophylaxis.  Discharge planning will likely begin as the patient will likely require discharge to a facility.       I spent 15 minutes in the professional and overall care of this patient.      Demarco Montaño MD

## 2024-07-03 NOTE — PROGRESS NOTES
Met with son and patient at bedside. Provided a list of SNF's for them to review. Will [place] back to collect choices later today.

## 2024-07-03 NOTE — CONSULTS
PODIATRY CONSULT NOTE    SERVICE DATE: 7/3/2024   SERVICE TIME:  1615    REASON FOR CONSULT: Right hallux nail procedure post-op  REQUESTING PHYSICIAN: Travon Leong DO   PRIMARY CARE PHYSICIAN: Ki Martinez MD    Subjective   HPI:  Ms. Contreras is a 89 y.o. female who presents after a mechanical fall at home. Patient reports using a walker; states she fell forward when trying to  a can of soup. She fell onto her L side and reports significant L hip pain. She was found to have displaced femoral neck fracture and underwent left hip hemiarthroplasty on 7/02, recovering well.    Podiatry was consulted for Right hallux nail procedure post-op. Patient had right hallux lateral border partial nail avulsion with Winograd-type matrixectomy with her Podiatrist, Dr. Joyce, on 7/01/24 . She missed her outpatient follow-up and Podiatry was consulted to assess her site. Spoke with Dr. Joyce earlier regarding her care.     ROS: 10-point review of systems was performed and is otherwise negative except as noted in HPI.  PMH: Reviewed/documented below.  PSH:  Noncontributory except per HPI   FH: Reviewed and noncontributory   SOCIAL:  Reviewed/documented below.  ALLERGIES: Reviewed/documented below.  MEDS: Reviewed/documented below.  VS: Reviewed/documented below.    Past Medical History:   Diagnosis Date    H/O heart artery stent     H/O shoulder surgery      History reviewed. No pertinent surgical history.  No family history on file.  Social History     Tobacco Use    Smoking status: Never     Passive exposure: Never    Smokeless tobacco: Never   Vaping Use    Vaping status: Never Used   Substance Use Topics    Drug use: Never      Medications Prior to Admission   Medication Sig Dispense Refill Last Dose    dilTIAZem (Cardizem) 90 mg immediate release tablet Take by mouth.   6/30/2024 at 0800    furosemide (Lasix) 20 mg tablet Take by mouth.   6/30/2024 at 0800    gabapentin (Neurontin) 300 mg capsule Take by mouth.    Past Week    lidocaine (Xylocaine) 2 % gel Apply topically every 12 hours.   6/30/2024 at 0800    LORazepam (Ativan) 2 mg tablet Take by mouth.   6/30/2024 at 2100    oxyCODONE-acetaminophen (Percocet) 5-325 mg tablet Take 0.5 tablets by mouth every 6 hours if needed (pain).   7/1/2024 at 0800    potassium chloride CR 20 mEq ER tablet Take by mouth.   6/30/2024 at 45835    pramipexole (Mirapex) 0.5 mg tablet Take by mouth.   6/30/2024 at 2100    simvastatin (Zocor) 20 mg tablet Take 1 tablet (20 mg) by mouth once daily at bedtime.   6/30/2024 at 2100    acetaminophen (Tylenol) 325 mg tablet every 4 hours.       dilTIAZem LA (Cardizem LA) 180 mg 24 hr tablet Take by mouth.       docusate sodium (Colace) 100 mg capsule Take 1 capsule (100 mg) by mouth once daily.   More than a month    lidocaine (Xylocaine) 5 % ointment every 8 hours.   Unknown    meloxicam (Mobic) 15 mg tablet Take by mouth.   Unknown    methocarbamol (Robaxin) 750 mg tablet every 4 hours.       mirtazapine (Remeron) 7.5 mg tablet Take by mouth.   Unknown        Medications:  Scheduled Meds: acetaminophen, 650 mg, oral, q6h CURTIS  [Held by provider] dilTIAZem, 90 mg, oral, Daily  docusate sodium, 100 mg, oral, BID  enoxaparin, 30 mg, subcutaneous, Daily  [Held by provider] furosemide, 20 mg, oral, Daily  polyethylene glycol, 17 g, oral, Daily  pramipexole, 0.5 mg, oral, Nightly  simvastatin, 20 mg, oral, Nightly  sodium zirconium cyclosilicate, 10 g, oral, Once      Continuous Infusions: oxygen, 2 L/min  sodium chloride 0.9%, 70 mL/hr, Last Rate: 75 mL/hr (07/02/24 4724)      PRN Meds: PRN medications: acetaminophen **OR** acetaminophen **OR** acetaminophen, acetaminophen **OR** acetaminophen **OR** acetaminophen, LORazepam, melatonin, morphine, naloxone, ondansetron **OR** ondansetron, oxyCODONE, oxyCODONE-acetaminophen, oxyCODONE-acetaminophen    Allergies as of 07/01/2024    (No Known Allergies)            Objective   PHYSICAL EXAM:  Physical Exam  Performed:  Vitals:    07/03/24 1531   BP: 140/64   Pulse: 57   Resp: 16   Temp: 36.8 °C (98.2 °F)   SpO2: 98%     Body mass index is 28.32 kg/m².    Patient is Alert and oriented and in no acute distress. Patient is alert and cooperative. Sitting comfortably in bedside chair  with right hallux dressing clean, dry and intact.     Vascular: Palpable DP/PT pulses B/L. Mild pitting edema noted B/L. Hair growth diminished B/L. CFT<5 to B/L hallux. Temperature is warm to cool from tibial tuberosity to distal digits B/L.     Musculoskeletal: Mild tenderness to right hallux nail. Gross active and passive ROM diminished to age and activity level. Moves all extremities spontaneously.     Neurological: Intact light touch sensation B/L. Pain stimuli intact B/L. Denies any numbness, burning or tingling.    Dermatologic: Nails 2-5 are within normal limits for thickness and length B/L. Skin appears diffusely xerotic B/L. Web spaces 1-4 B/L are clean, dry and intact. No rashes or nodules noted B/L. No hyperkeratotic tissue noted B/L.     Right hallux nail avulsion site:    Incision borders well-approximated with sutures intact and no signs of maceration, necrosis, or dehiscence. Minimal erythema to hallux without callor or other signs of infection. No deep structures exposed.    LABS:   Results for orders placed or performed during the hospital encounter of 07/01/24 (from the past 24 hour(s))   CBC   Result Value Ref Range    WBC 6.5 4.4 - 11.3 x10*3/uL    nRBC 0.0 0.0 - 0.0 /100 WBCs    RBC 3.59 (L) 4.00 - 5.20 x10*6/uL    Hemoglobin 10.7 (L) 12.0 - 16.0 g/dL    Hematocrit 35.0 (L) 36.0 - 46.0 %    MCV 98 80 - 100 fL    MCH 29.8 26.0 - 34.0 pg    MCHC 30.6 (L) 32.0 - 36.0 g/dL    RDW 14.5 11.5 - 14.5 %    Platelets 85 (L) 150 - 450 x10*3/uL   Basic Metabolic Panel   Result Value Ref Range    Glucose 194 (H) 74 - 99 mg/dL    Sodium 136 136 - 145 mmol/L    Potassium 5.3 3.5 - 5.3 mmol/L    Chloride 105 98 - 107 mmol/L     "Bicarbonate 23 21 - 32 mmol/L    Anion Gap 13 10 - 20 mmol/L    Urea Nitrogen 53 (H) 6 - 23 mg/dL    Creatinine 2.06 (H) 0.50 - 1.05 mg/dL    eGFR 23 (L) >60 mL/min/1.73m*2    Calcium 7.6 (L) 8.6 - 10.3 mg/dL   Magnesium   Result Value Ref Range    Magnesium 2.28 1.60 - 2.40 mg/dL      No results found for: \"HGBA1C\"   No results found for: \"CRP\"   No results found for: \"SEDRATE\"     Results from last 7 days   Lab Units 07/03/24  0614   WBC AUTO x10*3/uL 6.5   RBC AUTO x10*6/uL 3.59*   HEMOGLOBIN g/dL 10.7*   HEMATOCRIT % 35.0*     Results from last 7 days   Lab Units 07/03/24  0614 07/02/24  0623 07/01/24 1953   SODIUM mmol/L 136   < > 139   POTASSIUM mmol/L 5.3   < > 4.4   CHLORIDE mmol/L 105   < > 103   CO2 mmol/L 23   < > 28   BUN mg/dL 53*   < > 55*   CREATININE mg/dL 2.06*   < > 2.40*   CALCIUM mg/dL 7.6*   < > 8.7   MAGNESIUM mg/dL 2.28  --  2.41*   BILIRUBIN TOTAL mg/dL  --   --  0.5   ALT U/L  --   --  11   AST U/L  --   --  17    < > = values in this interval not displayed.           IMAGING REVIEW:  XR pelvis 1-2 views    Result Date: 7/2/2024  Interpreted By:  Abraham Rodrigues, STUDY: XR PELVIS 1-2 VIEWS; ;  7/2/2024 5:36 pm   INDICATION: Signs/Symptoms:Post op hip.   COMPARISON: 07/01/2024   ACCESSION NUMBER(S): [id number]   ORDERING CLINICIAN: DAMARIS LAMBERT   FINDINGS: Pelvis, two views   Interval left hip hemiarthroplasty. No periprosthetic fracture or lucency seen. No dislocation.       No immediate complication of the left hip hemiarthroplasty     MACRO: None   Signed by: Abraham Rodrigues 7/2/2024 6:13 PM Dictation workstation:   [id number]    ECG 12 lead    Result Date: 7/2/2024  Sinus rhythm with Premature atrial complexes Left anterior fascicular block Possible Anterior infarct , age undetermined Abnormal ECG No previous ECGs available    XR hip right with pelvis when performed 2 or 3 views    Result Date: 7/1/2024  STUDY: Pelvis and right Hip Radiographs; 7/1/2024 7:46PM INDICATION: Patient " had a mechanical fall forward complaining of left hip and femur pain, also tender to palpation of right knee and right hip. COMPARISON: None available. ACCESSION NUMBER(S): [id number] ORDERING CLINICIAN: JOVITA WOOTEN TECHNIQUE:  AP view of the pelvis and two view(s) of the right hip. FINDINGS:  PELVIS: The pelvic ring is intact.  There is no acute fracture in the pelvis itself but there is a displaced femoral neck fracture on the left.. An aortobiiliac stent is partially visible in the upper pelvis. Right HIP: There is no displaced fracture.  There is mild osteoarthritis in the right hip joint..  No soft tissue abnormality is seen.    There is mild osteoarthritis in the right hip but the right hip otherwise appears intact.  There is a displaced femoral neck fracture on the left.. Signed by Wilfred Choi MD    XR chest 1 view    Result Date: 7/1/2024  STUDY: Chest Radiograph;  7/1/2024 7:46PM INDICATION: Reported new oxygen demand. COMPARISON: XR chest 12/14/2022 ACCESSION NUMBER(S): [id number] ORDERING CLINICIAN: JOVITA WOOTEN TECHNIQUE:  Frontal chest was obtained at 19:46 hours. FINDINGS: CARDIOMEDIASTINAL SILHOUETTE: Heart is mildly enlarged with mild pulmonary vascular congestion..  LUNGS: Lungs are clear. Calcified granuloma in the left midlung field.  ABDOMEN: Aortic stent graft noted.  BONES: No acute osseous changes. Reverse left shoulder arthroplasty noted.    Mild cardiomegaly and pulmonary vascular congestion. Signed by Chauncey Chu MD    XR tibia fibula left 2 views    Result Date: 7/1/2024  STUDY: Tibia and Fibula Radiographs; 7/1/2024 7:46 PM. INDICATION: Pain and bruising at left tibial plateau.  Evaluate for fracture. COMPARISON: None. ACCESSION NUMBER(S): [id number] ORDERING CLINICIAN: JOVITA WOOTEN TECHNIQUE:  2 view(s) of the left tibia and fibula. FINDINGS:  There is no displaced fracture.  The alignment is anatomic.  No soft tissue abnormality is seen. Degenerative change of the knee  and ankle joint noted.    No acute osseous abnormality Signed by Chauncey Chu MD    XR femur left 2+ views    Result Date: 7/1/2024  STUDY: Femur Radiographs; 7/1/2024 7:46PM INDICATION: Patient had a mechanical fall forward complaining of left hip and femur pain. COMPARISON: None available. ACCESSION NUMBER(S): [id number] ORDERING CLINICIAN: JOVITA WOOTEN TECHNIQUE:  Two view(four images) of the left femur. FINDINGS:  There is an acute left subcapital femoral neck fracture with moderate varus angulation.  The alignment is anatomic.  No soft tissue abnormality is seen. Degenerative changes of the hip and knee joint noted.    Acute left subcapital femoral neck fracture. Signed by Chauncey Chu MD    XR knee right 1-2 views    Result Date: 7/1/2024  STUDY: Knee Radiographs; 7/1/2024 7:46PM INDICATION: Patient had a mechanical fall forward complaining of left hip and femur pain also tender to palpitation of right knee and right hip. COMPARISON: None available. ACCESSION NUMBER(S): [id number] ORDERING CLINICIAN: JOVITA WOOTEN TECHNIQUE:  Two view(s) of the right knee. FINDINGS:  There is no displaced fracture.  The alignment is anatomic.  Diffuse osteopenia is seen.  Chondrocalcinosis is present..  There is no joint effusion.    Osteopenia and chondrocalcinosis.  No acute abnormality.. Signed by Ky Greenberg MD            Assessment/Plan   ASSESSMENT & PLAN:    # Onychocryptosis, right hallux nail, s/p lateral PNA with Winograd (DOS: 07/01/24) with Dr. Joyce.  # Fall with L femoral neck fracture, s/p L hip Hemiarthroplasty (DOS: 7/02/24)  # Pain in right toes    - Patient was seen and evaluated; all findings were discussed and all questions were answered to patient's satisfaction.  - Charts, labs, vitals and imaging all reviewed.     Plan:  - Right hallux nail avulsion site assessed and redressed. Sutures intact, no active drainage or SOI.  - Dressings: Vashe-moistened 2x2, dry 2x2s, yevgeniy, coban to Right  hallux. To remain intact. Please contact Podiatry resident below if dressing becomes disheveled or removed.  - Podiatry will sign off. Patient is to follow up with her Podiatrist, Dr. Joyce outpatient.    Weightbearing: WBAT RLE from Podiatry's standpoint  Discharge: Pt to follow up  week after discharge with Dr. Joyce      The Podiatry service will sign off. Please Epic Chat the corresponding residents below with questions or concerns.      Josef Govea DPM PGY-3  Podiatric Medicine and Surgery   [facility] Secure Chat            SIGNATURE: Josef Govea DPM PATIENT NAME: Estefanía Contreras   DATE: July 3, 2024 MRN: [id number]   TIME: 4:39 PM CONTACT: Haiku kenia     ATTENDING ATTESTATION:    No signs of infection.  Sutures intact.   Continue dressing changes.  Podiatry will sign off.  Patient is to follow-up with Dr. Joyce as scheduled.     I saw and evaluated the patient. I personally obtained the key and critical portions of the history and physical exam or was physically present for key and critical portions performed by the resident/fellow. I reviewed the resident/fellow's documentation and discussed the patient with the resident/fellow. I agree with the resident/fellow's medical decision making as documented in the note.    Liliane Lowry DPM

## 2024-07-03 NOTE — PROGRESS NOTES
Physical Therapy    Physical Therapy    Physical Therapy Evaluation    Patient Name: Estefanía Contreras  MRN: [id number]  Today's Date: 7/3/2024   Time Calculation  Start Time: 0856  Stop Time: 0928  Time Calculation (min): 32 min  723/723-A    Assessment/Plan   PT Assessment  PT Assessment Results: Decreased strength, Decreased endurance, Decreased mobility  Rehab Prognosis: Good  Evaluation/Treatment Tolerance: Patient limited by pain  Medical Staff Made Aware: Yes  End of Session Communication: Bedside nurse  Assessment Comment: Instructed in antiembolic exercise, educated on hip precautions and wt bearing status.  End of Session Patient Position: Up in chair, Alarm on  IP OR SWING BED PT PLAN  Inpatient or Swing Bed: Inpatient  PT Plan  Treatment/Interventions: Bed mobility, Transfer training, Gait training, Neuromuscular re-education, Strengthening, Endurance training, Therapeutic exercise, Therapeutic activity  PT Plan: Ongoing PT  PT Frequency: 5 times per week  PT Discharge Recommendations: Moderate intensity level of continued care  PT Recommended Transfer Status: Assist x2  PT - OK to Discharge: Yes (To next level of care when cleared by medical team)    Subjective     Current Problem:  1. Closed displaced fracture of left femoral neck (Multi)        2. Closed displaced midcervical fracture of left femur, initial encounter (Multi)  Case Request Operating Room: Open Reduction Internal Fixation Femur    Case Request Operating Room: Open Reduction Internal Fixation Femur    Surgical Pathology Exam    Surgical Pathology Exam        Patient Active Problem List   Diagnosis    Closed displaced midcervical fracture of left femur (Multi)    Closed displaced fracture of left femoral neck (Multi)    HTN (hypertension)       General Visit Information:  General  Reason for Referral: PT Eval and treat Left femoral neck fracture s/p left hemiarthroplasty 7/2/24.  Referred By: Travon Leong DO  Family/Caregiver Present:  No  Co-Treatment: OT  Co-Treatment Reason: Maximise safety and mobility of patient  Prior to Session Communication: Bedside nurse  Patient Position Received: Bed, 2 rail up, Alarm on  General Comment: 89 year old female admit post fall with closed displaced left femoral neck fracture, closed displaced midcervical fracture left femur s/p Left bipolar hemiarthroplasty 7/2/24    Home Living:  Home Living  Type of Home: Apartment  Lives With: Alone  Home Adaptive Equipment: Walker rolling or standard  Home Layout: One level  Home Access: Stairs to enter with rails  Entrance Stairs-Number of Steps: 7  Bathroom Shower/Tub: Tub/shower unit  Bathroom Toilet: Standard  Bathroom Equipment: Grab bars in shower, Hand-held shower hose, Raised toilet seat with rails  Home Living Comments: Alone in apartment, Indep with dressing, amb with ww. Son lives across the street.    Prior Level of Function:  Prior Function Per Pt/Caregiver Report  Level of [place]: Independent with ADLs and functional transfers, Needs assistance with homemaking, Needs assistance with ADLs  Receives Help From: Personal care attendant, Family  ADL Assistance: Needs assistance  Homemaking Assistance: Needs assistance  Prior Function Comments: [facility] comes 4x/ wk for 4 hrs/ each to assist with bathing, laundry, some household tasks.  Son drives and takes Pt shopping.    Precautions:  Precautions  LE Weight Bearing Status: Weight Bearing as Tolerated  Medical Precautions: Fall precautions  Post-Surgical Precautions: Left hip precautions  Precautions Comment: FWB per chart    Vital Signs:     Objective     Pain:  Pain Assessment  Pain Assessment: 0-10  0-10 (Numeric) Pain Score: 5 - Moderate pain  Pain Type: Surgical pain  Pain Location: Hip  Pain Orientation: Left  Response to Interventions: No additional c/o of pain with gait    Cognition:  Cognition  Overall Cognitive Status: Within Functional Limits  Orientation Level: Oriented X4    General  Assessments:  General Observation  General Observation: Pleasant and cooperative with treatment, eager to be able to walk again.   Activity Tolerance  Endurance: Tolerates less than 10 min exercise, no significant change in vital signs  Activity Tolerance Comments: Tolerated transfers and moving bed to chair  Sensation  Sensation Comment: reports numness and tingling of left hand  Strength  Strength Comments: overall at least4/5     Coordination  Heel to Shin: Impaired  Finger to Target: Intact  Coordination Comment: Able to manipulate remote  Postural Control  Postural Control: Impaired  Posture Comment: Increased thoracokyphosis with forward head posture  Static Sitting Balance  Static Sitting-Comment/Number of Minutes: good  Dynamic Sitting Balance  Dynamic Sitting-Comments: fair  Static Standing Balance  Static Standing-Comment/Number of Minutes: fair  Dynamic Standing Balance  Dynamic Standing-Comments: fair: amb with ww    Functional Assessments:  ADL  ADL's Addressed: Yes  ADL Comments: Indep with basics, HHA assists with bathing.  Bed Mobility  Bed Mobility: Yes (Max assist x 2)  Transfers  Transfer: Yes (Moderate assist x 2 sit to stand)  Ambulation/Gait Training  Ambulation/Gait Training Performed: Yes (Amb 10 ft w/ ww with slow step to gait.  Able to bear wt on LLE to advance strides.)          Extremity/Trunk Assessments:  RUE   RUE : Within Functional Limits  LUE   LUE: Within Functional Limits  RLE   RLE : Within Functional Limits  LLE   LLE : Exceptions to WFL (Hip to 90 degrees sitting, hip precautions maintained)    Outcome Measures:     [facility] Basic Mobility  Turning from your back to your side while in a flat bed without using bedrails: A lot  Moving from lying on your back to sitting on the side of a flat bed without using bedrails: A lot  Moving to and from bed to chair (including a wheelchair): A lot  Standing up from a chair using your arms (e.g. wheelchair or bedside chair): A lot  To walk in  hospital room: A lot  Climbing 3-5 steps with railing: A lot  Basic Mobility - Total Score: 12                                        Goals:  Encounter Problems       Encounter Problems (Active)       PT Problem       PT Goal 1 (Progressing)       Start:  07/03/24    Expected End:  07/17/24       STG - Pt will transition supine <> sitting with mod assist x1          PT Goal 2 (Progressing)       Start:  07/03/24    Expected End:  07/17/24       STG - Pt will transfer STS with mod assist x 1            PT Goal 3 (Progressing)       Start:  07/03/24    Expected End:  07/17/24       STG - Pt will amb 50 ft' using ww with min assist             Pain - Adult            Education Documentation  Mobility Training, taught by Krista Jara, PT at 7/3/2024 12:34 PM.  Learner: Patient  Readiness: Acceptance  Method: Explanation  Response: Verbalizes Understanding    Education Comments  No comments found.

## 2024-07-04 ENCOUNTER — APPOINTMENT (OUTPATIENT)
Dept: RADIOLOGY | Facility: HOSPITAL | Age: 89
DRG: 522 | End: 2024-07-04
Payer: MEDICARE

## 2024-07-04 LAB
ANION GAP SERPL CALC-SCNC: 10 MMOL/L (ref 10–20)
BUN SERPL-MCNC: 53 MG/DL (ref 6–23)
CALCIUM SERPL-MCNC: 7.5 MG/DL (ref 8.6–10.3)
CHLORIDE SERPL-SCNC: 106 MMOL/L (ref 98–107)
CO2 SERPL-SCNC: 27 MMOL/L (ref 21–32)
CREAT SERPL-MCNC: 2 MG/DL (ref 0.5–1.05)
EGFRCR SERPLBLD CKD-EPI 2021: 23 ML/MIN/1.73M*2
ERYTHROCYTE [DISTWIDTH] IN BLOOD BY AUTOMATED COUNT: 14.6 % (ref 11.5–14.5)
GLUCOSE SERPL-MCNC: 89 MG/DL (ref 74–99)
HCT VFR BLD AUTO: 27.6 % (ref 36–46)
HGB BLD-MCNC: 8.6 G/DL (ref 12–16)
IRON SATN MFR SERPL: 13 % (ref 25–45)
IRON SERPL-MCNC: 28 UG/DL (ref 35–150)
MCH RBC QN AUTO: 29.5 PG (ref 26–34)
MCHC RBC AUTO-ENTMCNC: 31.2 G/DL (ref 32–36)
MCV RBC AUTO: 95 FL (ref 80–100)
NRBC BLD-RTO: 0 /100 WBCS (ref 0–0)
PLATELET # BLD AUTO: 81 X10*3/UL (ref 150–450)
POTASSIUM SERPL-SCNC: 4.9 MMOL/L (ref 3.5–5.3)
RBC # BLD AUTO: 2.92 X10*6/UL (ref 4–5.2)
SODIUM SERPL-SCNC: 138 MMOL/L (ref 136–145)
TIBC SERPL-MCNC: 210 UG/DL (ref 240–445)
UIBC SERPL-MCNC: 182 UG/DL (ref 110–370)
WBC # BLD AUTO: 6.1 X10*3/UL (ref 4.4–11.3)

## 2024-07-04 PROCEDURE — 71045 X-RAY EXAM CHEST 1 VIEW: CPT

## 2024-07-04 PROCEDURE — 2500000001 HC RX 250 WO HCPCS SELF ADMINISTERED DRUGS (ALT 637 FOR MEDICARE OP): Performed by: ORTHOPAEDIC SURGERY

## 2024-07-04 PROCEDURE — 2500000004 HC RX 250 GENERAL PHARMACY W/ HCPCS (ALT 636 FOR OP/ED): Performed by: STUDENT IN AN ORGANIZED HEALTH CARE EDUCATION/TRAINING PROGRAM

## 2024-07-04 PROCEDURE — 2500000001 HC RX 250 WO HCPCS SELF ADMINISTERED DRUGS (ALT 637 FOR MEDICARE OP): Performed by: INTERNAL MEDICINE

## 2024-07-04 PROCEDURE — 85027 COMPLETE CBC AUTOMATED: CPT | Performed by: STUDENT IN AN ORGANIZED HEALTH CARE EDUCATION/TRAINING PROGRAM

## 2024-07-04 PROCEDURE — 36415 COLL VENOUS BLD VENIPUNCTURE: CPT | Performed by: STUDENT IN AN ORGANIZED HEALTH CARE EDUCATION/TRAINING PROGRAM

## 2024-07-04 PROCEDURE — 2500000002 HC RX 250 W HCPCS SELF ADMINISTERED DRUGS (ALT 637 FOR MEDICARE OP, ALT 636 FOR OP/ED): Performed by: ORTHOPAEDIC SURGERY

## 2024-07-04 PROCEDURE — 71045 X-RAY EXAM CHEST 1 VIEW: CPT | Performed by: RADIOLOGY

## 2024-07-04 PROCEDURE — 2500000001 HC RX 250 WO HCPCS SELF ADMINISTERED DRUGS (ALT 637 FOR MEDICARE OP): Performed by: STUDENT IN AN ORGANIZED HEALTH CARE EDUCATION/TRAINING PROGRAM

## 2024-07-04 PROCEDURE — 83550 IRON BINDING TEST: CPT | Performed by: INTERNAL MEDICINE

## 2024-07-04 PROCEDURE — 2500000004 HC RX 250 GENERAL PHARMACY W/ HCPCS (ALT 636 FOR OP/ED): Performed by: ORTHOPAEDIC SURGERY

## 2024-07-04 PROCEDURE — 80048 BASIC METABOLIC PNL TOTAL CA: CPT | Performed by: STUDENT IN AN ORGANIZED HEALTH CARE EDUCATION/TRAINING PROGRAM

## 2024-07-04 PROCEDURE — 1100000001 HC PRIVATE ROOM DAILY

## 2024-07-04 PROCEDURE — 99232 SBSQ HOSP IP/OBS MODERATE 35: CPT | Performed by: STUDENT IN AN ORGANIZED HEALTH CARE EDUCATION/TRAINING PROGRAM

## 2024-07-04 RX ORDER — DILTIAZEM HYDROCHLORIDE 60 MG/1
60 TABLET, FILM COATED ORAL DAILY
Status: DISCONTINUED | OUTPATIENT
Start: 2024-07-05 | End: 2024-07-04

## 2024-07-04 RX ORDER — TRAMADOL HYDROCHLORIDE 50 MG/1
50 TABLET ORAL EVERY 12 HOURS PRN
Status: DISCONTINUED | OUTPATIENT
Start: 2024-07-04 | End: 2024-07-08 | Stop reason: HOSPADM

## 2024-07-04 RX ORDER — DILTIAZEM HYDROCHLORIDE 60 MG/1
30 TABLET, FILM COATED ORAL EVERY 6 HOURS SCHEDULED
Status: DISCONTINUED | OUTPATIENT
Start: 2024-07-04 | End: 2024-07-06

## 2024-07-04 RX ORDER — OXYCODONE HYDROCHLORIDE 5 MG/1
5 TABLET ORAL EVERY 6 HOURS PRN
Status: DISCONTINUED | OUTPATIENT
Start: 2024-07-04 | End: 2024-07-08 | Stop reason: HOSPADM

## 2024-07-04 RX ORDER — OXYCODONE AND ACETAMINOPHEN 5; 325 MG/1; MG/1
1 TABLET ORAL EVERY 4 HOURS PRN
Status: DISCONTINUED | OUTPATIENT
Start: 2024-07-04 | End: 2024-07-04

## 2024-07-04 RX ORDER — FUROSEMIDE 10 MG/ML
20 INJECTION INTRAMUSCULAR; INTRAVENOUS ONCE
Status: COMPLETED | OUTPATIENT
Start: 2024-07-04 | End: 2024-07-04

## 2024-07-04 RX ORDER — MORPHINE SULFATE 2 MG/ML
2 INJECTION, SOLUTION INTRAMUSCULAR; INTRAVENOUS EVERY 4 HOURS PRN
Status: DISCONTINUED | OUTPATIENT
Start: 2024-07-04 | End: 2024-07-08 | Stop reason: HOSPADM

## 2024-07-04 RX ORDER — AMOXICILLIN 250 MG
2 CAPSULE ORAL 2 TIMES DAILY
Status: DISCONTINUED | OUTPATIENT
Start: 2024-07-04 | End: 2024-07-08 | Stop reason: HOSPADM

## 2024-07-04 ASSESSMENT — PAIN SCALES - GENERAL
PAINLEVEL_OUTOF10: 0 - NO PAIN
PAINLEVEL_OUTOF10: 6
PAINLEVEL_OUTOF10: 10 - WORST POSSIBLE PAIN
PAINLEVEL_OUTOF10: 8
PAINLEVEL_OUTOF10: 3
PAINLEVEL_OUTOF10: 6
PAINLEVEL_OUTOF10: 0 - NO PAIN
PAINLEVEL_OUTOF10: 3
PAINLEVEL_OUTOF10: 3
PAINLEVEL_OUTOF10: 0 - NO PAIN

## 2024-07-04 ASSESSMENT — COGNITIVE AND FUNCTIONAL STATUS - GENERAL
PERSONAL GROOMING: A LITTLE
MOBILITY SCORE: 12
MOVING TO AND FROM BED TO CHAIR: A LOT
TOILETING: TOTAL
PERSONAL GROOMING: A LITTLE
MOVING FROM LYING ON BACK TO SITTING ON SIDE OF FLAT BED WITH BEDRAILS: A LOT
DRESSING REGULAR UPPER BODY CLOTHING: A LITTLE
STANDING UP FROM CHAIR USING ARMS: A LOT
MOVING FROM LYING ON BACK TO SITTING ON SIDE OF FLAT BED WITH BEDRAILS: A LOT
STANDING UP FROM CHAIR USING ARMS: A LOT
CLIMB 3 TO 5 STEPS WITH RAILING: A LOT
DAILY ACTIVITIY SCORE: 14
TURNING FROM BACK TO SIDE WHILE IN FLAT BAD: A LOT
DAILY ACTIVITIY SCORE: 14
DRESSING REGULAR LOWER BODY CLOTHING: TOTAL
TOILETING: TOTAL
MOBILITY SCORE: 12
WALKING IN HOSPITAL ROOM: A LOT
DRESSING REGULAR UPPER BODY CLOTHING: A LITTLE
MOVING TO AND FROM BED TO CHAIR: A LOT
TURNING FROM BACK TO SIDE WHILE IN FLAT BAD: A LOT
CLIMB 3 TO 5 STEPS WITH RAILING: A LOT
WALKING IN HOSPITAL ROOM: A LOT
DRESSING REGULAR LOWER BODY CLOTHING: TOTAL
HELP NEEDED FOR BATHING: A LOT
HELP NEEDED FOR BATHING: A LOT

## 2024-07-04 ASSESSMENT — PAIN DESCRIPTION - DESCRIPTORS: DESCRIPTORS: ACHING;DISCOMFORT

## 2024-07-04 ASSESSMENT — PAIN DESCRIPTION - ORIENTATION: ORIENTATION: LEFT

## 2024-07-04 ASSESSMENT — PAIN - FUNCTIONAL ASSESSMENT
PAIN_FUNCTIONAL_ASSESSMENT: 0-10

## 2024-07-04 ASSESSMENT — PAIN DESCRIPTION - LOCATION: LOCATION: HIP

## 2024-07-04 NOTE — CARE PLAN
The patient's goals for the shift include      The clinical goals for the shift include comfort & safety needs met    Problem: Pain - Adult  Goal: Verbalizes/displays adequate comfort level or baseline comfort level  7/3/2024 2055 by Eladia Muse RN  Outcome: Progressing  7/3/2024 1937 by Eladia Muse RN  Outcome: Progressing  7/3/2024 1936 by Eladia Muse RN  Outcome: Progressing     Problem: Safety - Adult  Goal: Free from fall injury  7/3/2024 2055 by Eladia Muse RN  Outcome: Progressing  7/3/2024 1937 by Eladia Muse RN  Outcome: Progressing  7/3/2024 1936 by Eladia Muse RN  Outcome: Progressing     Problem: Discharge Planning  Goal: Discharge to home or other facility with appropriate resources  7/3/2024 2055 by Eladia Muse RN  Outcome: Progressing  7/3/2024 1937 by Eladia Muse RN  Outcome: Progressing  7/3/2024 1936 by Eladia Muse RN  Outcome: Progressing     Problem: Chronic Conditions and Co-morbidities  Goal: Patient's chronic conditions and co-morbidity symptoms are monitored and maintained or improved  7/3/2024 2055 by Eladia Muse RN  Outcome: Progressing  7/3/2024 1937 by Eladia Muse RN  Outcome: Progressing  7/3/2024 1936 by Eladia Muse RN  Outcome: Progressing     Problem: Skin  Goal: Decreased wound size/increased tissue granulation at next dressing change  7/3/2024 2055 by Eladia Muse RN  Outcome: Progressing  7/3/2024 2055 by Eladia Muse RN  Flowsheets (Taken 7/3/2024 2055)  Decreased wound size/increased tissue granulation at next dressing change: Promote sleep for wound healing  7/3/2024 1937 by Eladia Muse RN  Outcome: Progressing  7/3/2024 1937 by Eladia Muse RN  Flowsheets (Taken 7/3/2024 0917 by Madeline Amos RN)  Decreased wound size/increased tissue granulation at next dressing change: Promote sleep for wound healing  7/3/2024 1936 by Eladia Muse RN  Flowsheets (Taken 7/3/2024 0917 by Madeline  RAMANA Amos)  Decreased wound size/increased tissue granulation at next dressing change: Promote sleep for wound healing  7/3/2024 1936 by Eladia Muse RN  Outcome: Progressing  Goal: Participates in plan/prevention/treatment measures  7/3/2024 2055 by Eladia Muse RN  Outcome: Progressing  7/3/2024 2055 by Eladia Muse RN  Flowsheets (Taken 7/3/2024 2055)  Participates in plan/prevention/treatment measures: Elevate heels  7/3/2024 1937 by Eladia Muse RN  Outcome: Progressing  Flowsheets (Taken 7/2/2024 2320 by Karishma Murphy RN)  Participates in plan/prevention/treatment measures: Elevate heels  7/3/2024 1936 by Eladia Muse RN  Flowsheets (Taken 7/2/2024 2320 by Karishma Murphy RN)  Participates in plan/prevention/treatment measures: Elevate heels  7/3/2024 1936 by Eladia Muse RN  Outcome: Progressing  Goal: Prevent/manage excess moisture  7/3/2024 2055 by Eladia Muse RN  Outcome: Progressing  7/3/2024 2055 by Eladia Muse RN  Flowsheets (Taken 7/3/2024 2055)  Prevent/manage excess moisture: Cleanse incontinence/protect with barrier cream  7/3/2024 1937 by Eladia Muse RN  Outcome: Progressing  Flowsheets (Taken 7/2/2024 2320 by Karishma Murphy RN)  Prevent/manage excess moisture: Cleanse incontinence/protect with barrier cream  7/3/2024 1936 by Eladia Muse RN  Flowsheets (Taken 7/2/2024 2320 by Karishma Murphy RN)  Prevent/manage excess moisture: Cleanse incontinence/protect with barrier cream  7/3/2024 1936 by Eladia Muse RN  Outcome: Progressing  Goal: Prevent/minimize sheer/friction injuries  7/3/2024 2055 by Eladia Muse RN  Outcome: Progressing  7/3/2024 2055 by Eladia Muse RN  Flowsheets (Taken 7/3/2024 2055)  Prevent/minimize sheer/friction injuries:   HOB 30 degrees or less   Turn/reposition every 2 hours/use positioning/transfer devices  7/3/2024 1937 by Eladia Muse RN  Outcome: Progressing  Flowsheets (Taken 7/3/2024 0917 by Madeline Amos,  RN)  Prevent/minimize sheer/friction injuries: HOB 30 degrees or less  7/3/2024 1936 by Eladia Muse RN  Flowsheets (Taken 7/3/2024 0917 by Madeline Amos RN)  Prevent/minimize sheer/friction injuries: HOB 30 degrees or less  7/3/2024 1936 by Eladia Muse RN  Outcome: Progressing  Goal: Promote/optimize nutrition  7/3/2024 2055 by Eladia Muse RN  Outcome: Progressing  7/3/2024 2055 by Eladia Muse RN  Flowsheets (Taken 7/3/2024 2055)  Promote/optimize nutrition:   Consume > 50% meals/supplements   Offer water/supplements/favorite foods  7/3/2024 1937 by Eladia Muse RN  Outcome: Progressing  Flowsheets (Taken 7/3/2024 0917 by Madeline Amos RN)  Promote/optimize nutrition: Consume > 50% meals/supplements  7/3/2024 1936 by Eladia Muse RN  Flowsheets (Taken 7/3/2024 0917 by Madeline Amos RN)  Promote/optimize nutrition: Consume > 50% meals/supplements  7/3/2024 1936 by Eladia Muse RN  Outcome: Progressing  Goal: Promote skin healing  7/3/2024 2055 by Eladia Muse RN  Outcome: Progressing  7/3/2024 2055 by Eladia Muse RN  Flowsheets (Taken 7/3/2024 2055)  Promote skin healing: Assess skin/pad under line(s)/device(s)  7/3/2024 1937 by Eladia Muse RN  Outcome: Progressing  Flowsheets (Taken 7/3/2024 0917 by Madeline Amos RN)  Promote skin healing: Assess skin/pad under line(s)/device(s)  7/3/2024 1936 by Eladia Muse RN  Flowsheets (Taken 7/3/2024 0917 by Madeline Amos RN)  Promote skin healing: Assess skin/pad under line(s)/device(s)  7/3/2024 1936 by Eladia Muse RN  Outcome: Progressing     Problem: Resident is recovering from orthopedic surgery  Goal: I will report effective pain management  7/3/2024 2055 by Eladia Muse RN  Outcome: Progressing  7/3/2024 1937 by Eladia Muse RN  Outcome: Progressing  7/3/2024 1936 by Eladia Muse RN  Outcome: Progressing  Goal: I will remain free from infection  7/3/2024 2055 by Eladia Muse,  RN  Outcome: Progressing  7/3/2024 1937 by Eladia Muse RN  Outcome: Progressing  7/3/2024 1936 by Eladia Muse RN  Outcome: Progressing  Goal: I will verbalize and demonstrate increased strength and ability to move  7/3/2024 2055 by Eladia Muse RN  Outcome: Progressing  7/3/2024 1937 by Eladia Muse RN  Outcome: Progressing  7/3/2024 1936 by Eladia Muse RN  Outcome: Progressing  Goal: I will demonstrate an understanding of plan to heal skin and care for incision  7/3/2024 2055 by Eladia Muse RN  Outcome: Progressing  7/3/2024 1937 by Eladia Msue RN  Outcome: Progressing  7/3/2024 1936 by Eladia Muse RN  Outcome: Progressing     Problem: Fall/Injury  Goal: Not fall by end of shift  7/3/2024 2055 by Eladia Muse RN  Outcome: Progressing  7/3/2024 1937 by Eladia Muse RN  Outcome: Progressing  7/3/2024 1936 by Eladia Muse RN  Outcome: Progressing  Goal: Be free from injury by end of the shift  7/3/2024 2055 by Eladia Muse RN  Outcome: Progressing  7/3/2024 1937 by Eladia Muse RN  Outcome: Progressing  7/3/2024 1936 by Eladia Muse RN  Outcome: Progressing  Goal: Verbalize understanding of personal risk factors for fall in the hospital  7/3/2024 2055 by Eladia Muse RN  Outcome: Progressing  7/3/2024 1937 by Eladia Muse RN  Outcome: Progressing  7/3/2024 1936 by Eladia Muse RN  Outcome: Progressing  Goal: Verbalize understanding of risk factor reduction measures to prevent injury from fall in the home  7/3/2024 2055 by Eladia Muse RN  Outcome: Progressing  7/3/2024 1937 by Eladia Muse RN  Outcome: Progressing  7/3/2024 1936 by Eladia Muse RN  Outcome: Progressing  Goal: Use assistive devices by end of the shift  7/3/2024 2055 by Eladia Muse RN  Outcome: Progressing  7/3/2024 1937 by Eladia Muse, RN  Outcome: Progressing  7/3/2024 1936 by Eladia Muse, RN  Outcome: Progressing  Goal: Pace  activities to prevent fatigue by end of the shift  7/3/2024 2055 by Eladia Muse RN  Outcome: Progressing  7/3/2024 1937 by Eladia Muse RN  Outcome: Progressing  7/3/2024 1936 by Eladia Muse RN  Outcome: Progressing

## 2024-07-04 NOTE — PROGRESS NOTES
Estefaína Contreras is a 89 y.o. female on day 3 of admission presenting with Closed displaced midcervical fracture of left femur (Multi).      Subjective   Pain controlled, mild sob, episode of hypoxia overnight     Objective     Last Recorded Vitals  /57   Pulse 59   Temp 37.2 °C (99 °F)   Resp 16   Wt 65.8 kg (145 lb)   SpO2 96%     Physical Exam  G: aox3, NAD, cooperative  HENT: neck supple, no JVD  Eyes: clear sclera  CV: RRR s1 s2  L: mild b/l rhonchi  Abd: soft, NT, non distended  Ext: no c/c/e, L hip in dressing  N: no appreciable acute focal deficits  Psych: appropriate mood and behavior    Assessment/Plan      Acute L femoral neck fx, s/p left hip hemiarthroplasty, POD#2  PALLAVI on CKD IIIb, possible progression of CKD  Mechanical fall    HTN, HLD  PAD  AAA  Peripheral neuropathy  h/o ITP  DVT ppx    Plan:  - Overall doing well post operatively, pain controlled, lovenox DVT ppx., PT/OT, orthopedics following, discharge planning to SNF   - Cr stable, no further significant change with IVF, no significant retention, this may be her new baseline, last Cr is from last year and was 1.39, starting to become more sob/hypoxic, rhonchi on exam, CXR with some pulm edema, will go ahead and give lasix 20mg IV now, resume PO lasix tomorrow, nephrology consult pending  - Continue home cardizem with hold parameters        Travon Leong, DO

## 2024-07-04 NOTE — PROGRESS NOTES
Met with son and patient at bedside. Choices given [facility] is [facility], [facility]. Referrals placed in care port by DCS.

## 2024-07-04 NOTE — PROGRESS NOTES
Estefanía Contreras is a 89 y.o. female on day 3 of admission presenting with Closed displaced midcervical fracture of left femur (Multi).    Subjective   Incisional pain       Objective     Physical Exam  Dressing clean dry and intact.  Moves foot and toes.  Lower extremities well-perfused.    Last Recorded Vitals  Blood pressure 124/53, pulse 69, temperature 36.8 °C (98.2 °F), resp. rate 16, height 1.524 m (5'), weight 65.8 kg (145 lb), SpO2 96%.  Intake/Output last 3 Shifts:  I/O last 3 completed shifts:  In: 2146.7 (32.6 mL/kg) [P.O.:480; I.V.:1466.7 (22.3 mL/kg); IV Piggyback:200]  Out: 1050 (16 mL/kg) [Urine:1050 (0.4 mL/kg/hr)]  Weight: 65.8 kg     Relevant Results      Scheduled medications  acetaminophen, 650 mg, oral, q6h CURTIS  dilTIAZem, 90 mg, oral, Daily  docusate sodium, 100 mg, oral, BID  enoxaparin, 30 mg, subcutaneous, Daily  [Held by provider] furosemide, 20 mg, oral, Daily  polyethylene glycol, 17 g, oral, Daily  pramipexole, 0.5 mg, oral, Nightly  simvastatin, 20 mg, oral, Nightly      Continuous medications  oxygen, 2 L/min  sodium chloride 0.9%, 70 mL/hr, Last Rate: 70 mL/hr (07/04/24 0645)      PRN medications  PRN medications: acetaminophen **OR** acetaminophen **OR** acetaminophen, acetaminophen **OR** acetaminophen **OR** acetaminophen, LORazepam, melatonin, morphine, naloxone, ondansetron **OR** ondansetron, oxyCODONE, oxyCODONE-acetaminophen, oxyCODONE-acetaminophen  Results for orders placed or performed during the hospital encounter of 07/01/24 (from the past 24 hour(s))   CBC   Result Value Ref Range    WBC 6.1 4.4 - 11.3 x10*3/uL    nRBC 0.0 0.0 - 0.0 /100 WBCs    RBC 2.92 (L) 4.00 - 5.20 x10*6/uL    Hemoglobin 8.6 (L) 12.0 - 16.0 g/dL    Hematocrit 27.6 (L) 36.0 - 46.0 %    MCV 95 80 - 100 fL    MCH 29.5 26.0 - 34.0 pg    MCHC 31.2 (L) 32.0 - 36.0 g/dL    RDW 14.6 (H) 11.5 - 14.5 %    Platelets 81 (L) 150 - 450 x10*3/uL   Basic Metabolic Panel   Result Value Ref Range    Glucose 89 74 - 99  Requested medication:  sildenafil (VIAGRA) 50 MG tablet 30 tablet 0 7/8/2022     Sig - Route: TAKE 1 TABLET BY MOUTH AS NEEDED FOR ERECTILE DYSFUNCTION - Oral    Sent to pharmacy as: Sildenafil Citrate 50 MG Oral Tablet (VIAGRA)    Class: Eprescribe    E-Prescribing Status: Receipt confirmed by pharmacy (7/8/2022 10:00 AM CDT)        Last office visit/physical: 5/10/22  Last follow up/disposition: 6 months  Appointment scheduled (FP)?  Yes - date: 11/10/22      BP Readings from Last 3 Encounters:   05/10/22 126/84   02/10/22 134/84   12/30/21 126/88         Medication is not to take daily. This is a PRN medication. Patient just got a refill last month.   mg/dL    Sodium 138 136 - 145 mmol/L    Potassium 4.9 3.5 - 5.3 mmol/L    Chloride 106 98 - 107 mmol/L    Bicarbonate 27 21 - 32 mmol/L    Anion Gap 10 10 - 20 mmol/L    Urea Nitrogen 53 (H) 6 - 23 mg/dL    Creatinine 2.00 (H) 0.50 - 1.05 mg/dL    eGFR 23 (L) >60 mL/min/1.73m*2    Calcium 7.5 (L) 8.6 - 10.3 mg/dL                            Assessment/Plan   Principal Problem:    Closed displaced midcervical fracture of left femur (Multi)  Active Problems:    Closed displaced fracture of left femoral neck (Multi)    HTN (hypertension)    Expected progress.  Continue to mobilize with physical therapy.       Ta Garcia MD

## 2024-07-04 NOTE — CONSULTS
Nephrology Consult Note    Reason For Consult  Elevated creatinine    History Of Present Illness  Estefanía Contreras is a 89 y.o. female with PMH of CKD, anemia, AAA, PAD, ITP, HTN, HL presenting with acute L femoral neck fx she is s/p left hip hemiarthroplasty course c/b PALLAVI  Hx taken from records and nursing staff only as patient unable to provide  Poor po intake  In moderate- severe pain  nonoliguric     Past Medical History  She has a past medical history of H/O heart artery stent and H/O shoulder surgery.    Surgical History  She has no past surgical history on file.     Social History  She reports that she has never smoked. She has never been exposed to tobacco smoke. She has never used smokeless tobacco. She reports that she does not use drugs. No history on file for alcohol use.    Family History  No family history on file.     Allergies  Patient has no known allergies.    Review of Systems  Per HPI     Physical Exam    Vitals 24HR  Heart Rate:  [59-69]   Temp:  [35.6 °C (96.1 °F)-37.2 °C (99 °F)]   Resp:  [16]   BP: (124-139)/(53-65)   SpO2:  [94 %-96 %]        G: aox3, moderate distress due to pain, cooperative, on RA  HENT: neck supple, no JVD  Eyes: clear sclera  CV: RRR s1 s2  L: clear  Abd: soft, NT, non distended  Ext: no c/c/e, L hip in dressing  N: no appreciable acute focal deficits  Psych: appropriate mood and behavior           I&O 24HR    Intake/Output Summary (Last 24 hours) at 7/4/2024 1532  Last data filed at 7/4/2024 1242  Gross per 24 hour   Intake 852.42 ml   Output 750 ml   Net 102.42 ml       Scheduled medications  acetaminophen, 650 mg, oral, q6h CURTIS  dilTIAZem, 90 mg, oral, Daily  enoxaparin, 30 mg, subcutaneous, Daily  furosemide, 20 mg, oral, Daily  polyethylene glycol, 17 g, oral, Daily  pramipexole, 0.5 mg, oral, Nightly  sennosides-docusate sodium, 2 tablet, oral, BID  simvastatin, 20 mg, oral, Nightly      Continuous medications  oxygen, 2 L/min      PRN medications  PRN medications:  acetaminophen **OR** acetaminophen **OR** acetaminophen, acetaminophen **OR** acetaminophen **OR** acetaminophen, LORazepam, melatonin, morphine, naloxone, ondansetron **OR** ondansetron, oxyCODONE, traMADol    Relevant Results      Admission on 07/01/2024   Component Date Value Ref Range Status    WBC 07/01/2024 6.1  4.4 - 11.3 x10*3/uL Final    nRBC 07/01/2024 0.0  0.0 - 0.0 /100 WBCs Final    RBC 07/01/2024 4.22  4.00 - 5.20 x10*6/uL Final    Hemoglobin 07/01/2024 12.5  12.0 - 16.0 g/dL Final    Hematocrit 07/01/2024 39.5  36.0 - 46.0 % Final    MCV 07/01/2024 94  80 - 100 fL Final    MCH 07/01/2024 29.6  26.0 - 34.0 pg Final    MCHC 07/01/2024 31.6 (L)  32.0 - 36.0 g/dL Final    RDW 07/01/2024 14.9 (H)  11.5 - 14.5 % Final    Platelets 07/01/2024 85 (L)  150 - 450 x10*3/uL Final    Platelet count verified by smear review.    Neutrophils % 07/01/2024 82.0  40.0 - 80.0 % Final    Immature Granulocytes %, Automated 07/01/2024 0.3  0.0 - 0.9 % Final    Immature Granulocyte Count (IG) includes promyelocytes, myelocytes and metamyelocytes but does not include bands. Percent differential counts (%) should be interpreted in the context of the absolute cell counts (cells/UL).    Lymphocytes % 07/01/2024 11.6  13.0 - 44.0 % Final    Monocytes % 07/01/2024 4.6  2.0 - 10.0 % Final    Eosinophils % 07/01/2024 1.0  0.0 - 6.0 % Final    Basophils % 07/01/2024 0.5  0.0 - 2.0 % Final    Neutrophils Absolute 07/01/2024 5.02  1.60 - 5.50 x10*3/uL Final    Percent differential counts (%) should be interpreted in the context of the absolute cell counts (cells/uL).    Immature Granulocytes Absolute, Au* 07/01/2024 0.02  0.00 - 0.50 x10*3/uL Final    Lymphocytes Absolute 07/01/2024 0.71 (L)  0.80 - 3.00 x10*3/uL Final    Monocytes Absolute 07/01/2024 0.28  0.05 - 0.80 x10*3/uL Final    Eosinophils Absolute 07/01/2024 0.06  0.00 - 0.40 x10*3/uL Final    Basophils Absolute 07/01/2024 0.03  0.00 - 0.10 x10*3/uL Final    Glucose  07/01/2024 95  74 - 99 mg/dL Final    Sodium 07/01/2024 139  136 - 145 mmol/L Final    Potassium 07/01/2024 4.4  3.5 - 5.3 mmol/L Final    Chloride 07/01/2024 103  98 - 107 mmol/L Final    Bicarbonate 07/01/2024 28  21 - 32 mmol/L Final    Anion Gap 07/01/2024 12  10 - 20 mmol/L Final    Urea Nitrogen 07/01/2024 55 (H)  6 - 23 mg/dL Final    Creatinine 07/01/2024 2.40 (H)  0.50 - 1.05 mg/dL Final    eGFR 07/01/2024 19 (L)  >60 mL/min/1.73m*2 Final    Calculations of estimated GFR are performed using the 2021 CKD-EPI Study Refit equation without the race variable for the IDMS-Traceable creatinine methods.  https://jasn.asnjournals.org/content/early/2021/09/22/ASN.1136944356    Calcium 07/01/2024 8.7  8.6 - 10.3 mg/dL Final    Albumin 07/01/2024 3.7  3.4 - 5.0 g/dL Final    Alkaline Phosphatase 07/01/2024 60  33 - 136 U/L Final    Total Protein 07/01/2024 6.5  6.4 - 8.2 g/dL Final    AST 07/01/2024 17  9 - 39 U/L Final    Bilirubin, Total 07/01/2024 0.5  0.0 - 1.2 mg/dL Final    ALT 07/01/2024 11  7 - 45 U/L Final    Patients treated with Sulfasalazine may generate falsely decreased results for ALT.    Magnesium 07/01/2024 2.41 (H)  1.60 - 2.40 mg/dL Final    Ventricular Rate 07/01/2024 76  BPM Preliminary    Atrial Rate 07/01/2024 76  BPM Preliminary    NV Interval 07/01/2024 200  ms Preliminary    QRS Duration 07/01/2024 84  ms Preliminary    QT Interval 07/01/2024 386  ms Preliminary    QTC Calculation(Bazett) 07/01/2024 434  ms Preliminary    P Axis 07/01/2024 74  degrees Preliminary    R Axis 07/01/2024 -50  degrees Preliminary    T Axis 07/01/2024 41  degrees Preliminary    QRS Count 07/01/2024 12  beats Preliminary    Q Onset 07/01/2024 214  ms Preliminary    P Onset 07/01/2024 114  ms Preliminary    P Offset 07/01/2024 175  ms Preliminary    T Offset 07/01/2024 407  ms Preliminary    QTC Fredericia 07/01/2024 417  ms Preliminary    Protime 07/01/2024 10.8  9.8 - 12.8 seconds Final    INR 07/01/2024 1.0  0.9 -  1.1 Final    aPTT 07/01/2024 38  27 - 38 seconds Final    ABO TYPE 07/01/2024 A   Final    Rh TYPE 07/01/2024 POS   Final    ANTIBODY SCREEN 07/01/2024 NEG   Final    RBC Morphology 07/01/2024 See Below   Final    RBC Fragments 07/01/2024 Few   Final    Ovalocytes 07/01/2024 Few   Final    [place] Cells 07/01/2024 Few   Final    WBC 07/02/2024 8.5  4.4 - 11.3 x10*3/uL Final    nRBC 07/02/2024 0.0  0.0 - 0.0 /100 WBCs Final    RBC 07/02/2024 3.95 (L)  4.00 - 5.20 x10*6/uL Final    Hemoglobin 07/02/2024 11.6 (L)  12.0 - 16.0 g/dL Final    Hematocrit 07/02/2024 37.2  36.0 - 46.0 % Final    MCV 07/02/2024 94  80 - 100 fL Final    MCH 07/02/2024 29.4  26.0 - 34.0 pg Final    MCHC 07/02/2024 31.2 (L)  32.0 - 36.0 g/dL Final    RDW 07/02/2024 14.9 (H)  11.5 - 14.5 % Final    Platelets 07/02/2024 82 (L)  150 - 450 x10*3/uL Final    Glucose 07/02/2024 77  74 - 99 mg/dL Final    Sodium 07/02/2024 139  136 - 145 mmol/L Final    Potassium 07/02/2024 5.0  3.5 - 5.3 mmol/L Final    Chloride 07/02/2024 105  98 - 107 mmol/L Final    Bicarbonate 07/02/2024 28  21 - 32 mmol/L Final    Anion Gap 07/02/2024 11  10 - 20 mmol/L Final    Urea Nitrogen 07/02/2024 55 (H)  6 - 23 mg/dL Final    Creatinine 07/02/2024 1.91 (H)  0.50 - 1.05 mg/dL Final    eGFR 07/02/2024 25 (L)  >60 mL/min/1.73m*2 Final    Calculations of estimated GFR are performed using the 2021 CKD-EPI Study Refit equation without the race variable for the IDMS-Traceable creatinine methods.  https://jasn.asnjournals.org/content/early/2021/09/22/ASN.0634358818    Calcium 07/02/2024 8.2 (L)  8.6 - 10.3 mg/dL Final    WBC 07/03/2024 6.5  4.4 - 11.3 x10*3/uL Final    nRBC 07/03/2024 0.0  0.0 - 0.0 /100 WBCs Final    RBC 07/03/2024 3.59 (L)  4.00 - 5.20 x10*6/uL Final    Hemoglobin 07/03/2024 10.7 (L)  12.0 - 16.0 g/dL Final    Hematocrit 07/03/2024 35.0 (L)  36.0 - 46.0 % Final    MCV 07/03/2024 98  80 - 100 fL Final    MCH 07/03/2024 29.8  26.0 - 34.0 pg Final    MCHC 07/03/2024  30.6 (L)  32.0 - 36.0 g/dL Final    RDW 07/03/2024 14.5  11.5 - 14.5 % Final    Platelets 07/03/2024 85 (L)  150 - 450 x10*3/uL Final    Glucose 07/03/2024 194 (H)  74 - 99 mg/dL Final    Sodium 07/03/2024 136  136 - 145 mmol/L Final    Potassium 07/03/2024 5.3  3.5 - 5.3 mmol/L Final    Chloride 07/03/2024 105  98 - 107 mmol/L Final    Bicarbonate 07/03/2024 23  21 - 32 mmol/L Final    Anion Gap 07/03/2024 13  10 - 20 mmol/L Final    Urea Nitrogen 07/03/2024 53 (H)  6 - 23 mg/dL Final    Creatinine 07/03/2024 2.06 (H)  0.50 - 1.05 mg/dL Final    eGFR 07/03/2024 23 (L)  >60 mL/min/1.73m*2 Final    Calculations of estimated GFR are performed using the 2021 CKD-EPI Study Refit equation without the race variable for the IDMS-Traceable creatinine methods.  https://jasn.asnjournals.org/content/early/2021/09/22/ASN.0967749278    Calcium 07/03/2024 7.6 (L)  8.6 - 10.3 mg/dL Final    Magnesium 07/03/2024 2.28  1.60 - 2.40 mg/dL Final    WBC 07/04/2024 6.1  4.4 - 11.3 x10*3/uL Final    nRBC 07/04/2024 0.0  0.0 - 0.0 /100 WBCs Final    RBC 07/04/2024 2.92 (L)  4.00 - 5.20 x10*6/uL Final    Hemoglobin 07/04/2024 8.6 (L)  12.0 - 16.0 g/dL Final    Hematocrit 07/04/2024 27.6 (L)  36.0 - 46.0 % Final    MCV 07/04/2024 95  80 - 100 fL Final    MCH 07/04/2024 29.5  26.0 - 34.0 pg Final    MCHC 07/04/2024 31.2 (L)  32.0 - 36.0 g/dL Final    RDW 07/04/2024 14.6 (H)  11.5 - 14.5 % Final    Platelets 07/04/2024 81 (L)  150 - 450 x10*3/uL Final    Glucose 07/04/2024 89  74 - 99 mg/dL Final    Sodium 07/04/2024 138  136 - 145 mmol/L Final    Potassium 07/04/2024 4.9  3.5 - 5.3 mmol/L Final    Chloride 07/04/2024 106  98 - 107 mmol/L Final    Bicarbonate 07/04/2024 27  21 - 32 mmol/L Final    Anion Gap 07/04/2024 10  10 - 20 mmol/L Final    Urea Nitrogen 07/04/2024 53 (H)  6 - 23 mg/dL Final    Creatinine 07/04/2024 2.00 (H)  0.50 - 1.05 mg/dL Final    eGFR 07/04/2024 23 (L)  >60 mL/min/1.73m*2 Final    Calculations of estimated GFR are  performed using the 2021 CKD-EPI Study Refit equation without the race variable for the IDMS-Traceable creatinine methods.  https://jasn.asnjournals.org/content/early/2021/09/22/ASN.8222227088    Calcium 07/04/2024 7.5 (L)  8.6 - 10.3 mg/dL Final      XR chest 1 view    Result Date: 7/4/2024  Interpreted By:  Heather Hernadez, STUDY: XR CHEST 1 VIEW;  7/4/2024 1:30 pm   INDICATION: Signs/Symptoms:hypoxia.   COMPARISON: 07/01/2024   ACCESSION NUMBER(S): [id number]   ORDERING CLINICIAN: ZENIA SEALS   FINDINGS: Patient's chin obscures part of left lung apex. The heart appears to be enlarged. Prominent dextroscoliosis is seen. A vascular stent is also noted in the abdomen. Calcified granulomas in the left midlung. Diffuse bilateral interstitial prominence is seen. No gross consolidation, pleural effusion pneumothorax is noted.       Cardiomegaly.   Diffuse bilateral interstitial prominence, which is unchanged may be related to chronic lung changes and/or pulmonary edema.   Old granulomatous disease.       MACRO: None   Signed by: Heather Hernadez 7/4/2024 1:44 PM Dictation workstation:   [id number]    XR pelvis 1-2 views    Result Date: 7/2/2024  Interpreted By:  Abraham Rodrigues, STUDY: XR PELVIS 1-2 VIEWS; ;  7/2/2024 5:36 pm   INDICATION: Signs/Symptoms:Post op hip.   COMPARISON: 07/01/2024   ACCESSION NUMBER(S): [id number]   ORDERING CLINICIAN: DAMARIS LAMBERT   FINDINGS: Pelvis, two views   Interval left hip hemiarthroplasty. No periprosthetic fracture or lucency seen. No dislocation.       No immediate complication of the left hip hemiarthroplasty     MACRO: None   Signed by: Abraham Rodrigues 7/2/2024 6:13 PM Dictation workstation:   [id number]    ECG 12 lead    Result Date: 7/2/2024  Sinus rhythm with Premature atrial complexes Left anterior fascicular block Possible Anterior infarct , age undetermined Abnormal ECG No previous ECGs available    XR hip right with pelvis when performed 2 or 3 views    Result Date:  7/1/2024  STUDY: Pelvis and right Hip Radiographs; 7/1/2024 7:46PM INDICATION: Patient had a mechanical fall forward complaining of left hip and femur pain, also tender to palpation of right knee and right hip. COMPARISON: None available. ACCESSION NUMBER(S): [id number] ORDERING CLINICIAN: JOVITA WOOTEN TECHNIQUE:  AP view of the pelvis and two view(s) of the right hip. FINDINGS:  PELVIS: The pelvic ring is intact.  There is no acute fracture in the pelvis itself but there is a displaced femoral neck fracture on the left.. An aortobiiliac stent is partially visible in the upper pelvis. Right HIP: There is no displaced fracture.  There is mild osteoarthritis in the right hip joint..  No soft tissue abnormality is seen.    There is mild osteoarthritis in the right hip but the right hip otherwise appears intact.  There is a displaced femoral neck fracture on the left.. Signed by Wilfred Choi MD    XR chest 1 view    Result Date: 7/1/2024  STUDY: Chest Radiograph;  7/1/2024 7:46PM INDICATION: Reported new oxygen demand. COMPARISON: XR chest 12/14/2022 ACCESSION NUMBER(S): [id number] ORDERING CLINICIAN: JOVITA WOOTEN TECHNIQUE:  Frontal chest was obtained at 19:46 hours. FINDINGS: CARDIOMEDIASTINAL SILHOUETTE: Heart is mildly enlarged with mild pulmonary vascular congestion..  LUNGS: Lungs are clear. Calcified granuloma in the left midlung field.  ABDOMEN: Aortic stent graft noted.  BONES: No acute osseous changes. Reverse left shoulder arthroplasty noted.    Mild cardiomegaly and pulmonary vascular congestion. Signed by Chauncey Chu MD    XR tibia fibula left 2 views    Result Date: 7/1/2024  STUDY: Tibia and Fibula Radiographs; 7/1/2024 7:46 PM. INDICATION: Pain and bruising at left tibial plateau.  Evaluate for fracture. COMPARISON: None. ACCESSION NUMBER(S): [id number] ORDERING CLINICIAN: JOVITA WOOTEN TECHNIQUE:  2 view(s) of the left tibia and fibula. FINDINGS:  There is no displaced fracture.  The  alignment is anatomic.  No soft tissue abnormality is seen. Degenerative change of the knee and ankle joint noted.    No acute osseous abnormality Signed by Chauncey Chu MD    XR femur left 2+ views    Result Date: 7/1/2024  STUDY: Femur Radiographs; 7/1/2024 7:46PM INDICATION: Patient had a mechanical fall forward complaining of left hip and femur pain. COMPARISON: None available. ACCESSION NUMBER(S): [id number] ORDERING CLINICIAN: JOVITA WOOTEN TECHNIQUE:  Two view(four images) of the left femur. FINDINGS:  There is an acute left subcapital femoral neck fracture with moderate varus angulation.  The alignment is anatomic.  No soft tissue abnormality is seen. Degenerative changes of the hip and knee joint noted.    Acute left subcapital femoral neck fracture. Signed by Chauncey Chu MD    XR knee right 1-2 views    Result Date: 7/1/2024  STUDY: Knee Radiographs; 7/1/2024 7:46PM INDICATION: Patient had a mechanical fall forward complaining of left hip and femur pain also tender to palpitation of right knee and right hip. COMPARISON: None available. ACCESSION NUMBER(S): [id number] ORDERING CLINICIAN: JOVITA WOOTEN TECHNIQUE:  Two view(s) of the right knee. FINDINGS:  There is no displaced fracture.  The alignment is anatomic.  Diffuse osteopenia is seen.  Chondrocalcinosis is present..  There is no joint effusion.    Osteopenia and chondrocalcinosis.  No acute abnormality.. Signed by Ky Greenberg MD      Medications  Medications Prior to Admission   Medication Sig Dispense Refill Last Dose    dilTIAZem (Cardizem) 90 mg immediate release tablet Take by mouth.   6/30/2024 at 0800    furosemide (Lasix) 20 mg tablet Take by mouth.   6/30/2024 at 0800    gabapentin (Neurontin) 300 mg capsule Take by mouth.   Past Week    lidocaine (Xylocaine) 2 % gel Apply topically every 12 hours.   6/30/2024 at 0800    LORazepam (Ativan) 2 mg tablet Take by mouth.   6/30/2024 at 2100    oxyCODONE-acetaminophen (Percocet) 5-325 mg  tablet Take 0.5 tablets by mouth every 6 hours if needed (pain).   7/1/2024 at 0800    potassium chloride CR 20 mEq ER tablet Take by mouth.   6/30/2024 at 44744    pramipexole (Mirapex) 0.5 mg tablet Take by mouth.   6/30/2024 at 2100    simvastatin (Zocor) 20 mg tablet Take 1 tablet (20 mg) by mouth once daily at bedtime.   6/30/2024 at 2100    acetaminophen (Tylenol) 325 mg tablet every 4 hours.       dilTIAZem LA (Cardizem LA) 180 mg 24 hr tablet Take by mouth.       docusate sodium (Colace) 100 mg capsule Take 1 capsule (100 mg) by mouth once daily.   More than a month    lidocaine (Xylocaine) 5 % ointment every 8 hours.   Unknown    meloxicam (Mobic) 15 mg tablet Take by mouth.   Unknown    methocarbamol (Robaxin) 750 mg tablet every 4 hours.       mirtazapine (Remeron) 7.5 mg tablet Take by mouth.   Unknown      Scheduled medications  acetaminophen, 650 mg, oral, q6h CURTIS  dilTIAZem, 90 mg, oral, Daily  enoxaparin, 30 mg, subcutaneous, Daily  furosemide, 20 mg, oral, Daily  polyethylene glycol, 17 g, oral, Daily  pramipexole, 0.5 mg, oral, Nightly  sennosides-docusate sodium, 2 tablet, oral, BID  simvastatin, 20 mg, oral, Nightly      Continuous medications  oxygen, 2 L/min      PRN medications  PRN medications: acetaminophen **OR** acetaminophen **OR** acetaminophen, acetaminophen **OR** acetaminophen **OR** acetaminophen, LORazepam, melatonin, morphine, naloxone, ondansetron **OR** ondansetron, oxyCODONE, traMADol    Assessment/Plan     PALLAVI on CKD, baseline creatinine of 1.2-1.3, was 1.9 on admission and up to 2.06, stabilizing, probably ischemic ATN with documented SBP in the 90s    Hx of HTN, now BP borderline low    Lytes controlled, no acidosis    Volume status - ok    Anemia, acute on chronic    Plan  - off ivf , encourage po  - nonoliguric, please record IO s  - avoid hypotension and nephrotoxins  - PVRs, might need franz  - reduce cardizem to 60 mg every day and give with holding parameters  - can  continue daily lasix  - repeat UA  - no abd imaging available, hold on renal US as creatinine stabilizing  - low K diet  - trend HH and supprt with PRBCs if <7, check iron stores    Thank you for the opportunity to assist in the care of this patient, please call with questions  Digna Mccloud MD PhD

## 2024-07-05 PROBLEM — S72.032A: Status: RESOLVED | Noted: 2024-07-01 | Resolved: 2024-07-05

## 2024-07-05 PROBLEM — S72.002A CLOSED DISPLACED FRACTURE OF LEFT FEMORAL NECK (MULTI): Status: RESOLVED | Noted: 2024-07-01 | Resolved: 2024-07-05

## 2024-07-05 PROBLEM — I10 HTN (HYPERTENSION): Status: RESOLVED | Noted: 2024-07-02 | Resolved: 2024-07-05

## 2024-07-05 LAB
ANION GAP SERPL CALC-SCNC: 10 MMOL/L (ref 10–20)
BUN SERPL-MCNC: 49 MG/DL (ref 6–23)
CALCIUM SERPL-MCNC: 7.7 MG/DL (ref 8.6–10.3)
CHLORIDE SERPL-SCNC: 107 MMOL/L (ref 98–107)
CO2 SERPL-SCNC: 25 MMOL/L (ref 21–32)
CREAT SERPL-MCNC: 1.69 MG/DL (ref 0.5–1.05)
EGFRCR SERPLBLD CKD-EPI 2021: 29 ML/MIN/1.73M*2
ERYTHROCYTE [DISTWIDTH] IN BLOOD BY AUTOMATED COUNT: 14.6 % (ref 11.5–14.5)
GLUCOSE SERPL-MCNC: 84 MG/DL (ref 74–99)
HCT VFR BLD AUTO: 31.6 % (ref 36–46)
HGB BLD-MCNC: 9.6 G/DL (ref 12–16)
MCH RBC QN AUTO: 29.5 PG (ref 26–34)
MCHC RBC AUTO-ENTMCNC: 30.4 G/DL (ref 32–36)
MCV RBC AUTO: 97 FL (ref 80–100)
NRBC BLD-RTO: 0 /100 WBCS (ref 0–0)
PLATELET # BLD AUTO: 113 X10*3/UL (ref 150–450)
POTASSIUM SERPL-SCNC: 4.8 MMOL/L (ref 3.5–5.3)
RBC # BLD AUTO: 3.25 X10*6/UL (ref 4–5.2)
SODIUM SERPL-SCNC: 137 MMOL/L (ref 136–145)
WBC # BLD AUTO: 6 X10*3/UL (ref 4.4–11.3)

## 2024-07-05 PROCEDURE — 1100000001 HC PRIVATE ROOM DAILY

## 2024-07-05 PROCEDURE — 36415 COLL VENOUS BLD VENIPUNCTURE: CPT | Performed by: STUDENT IN AN ORGANIZED HEALTH CARE EDUCATION/TRAINING PROGRAM

## 2024-07-05 PROCEDURE — 2500000001 HC RX 250 WO HCPCS SELF ADMINISTERED DRUGS (ALT 637 FOR MEDICARE OP): Performed by: INTERNAL MEDICINE

## 2024-07-05 PROCEDURE — 2500000001 HC RX 250 WO HCPCS SELF ADMINISTERED DRUGS (ALT 637 FOR MEDICARE OP): Performed by: ORTHOPAEDIC SURGERY

## 2024-07-05 PROCEDURE — 99232 SBSQ HOSP IP/OBS MODERATE 35: CPT | Performed by: STUDENT IN AN ORGANIZED HEALTH CARE EDUCATION/TRAINING PROGRAM

## 2024-07-05 PROCEDURE — 2500000001 HC RX 250 WO HCPCS SELF ADMINISTERED DRUGS (ALT 637 FOR MEDICARE OP): Performed by: STUDENT IN AN ORGANIZED HEALTH CARE EDUCATION/TRAINING PROGRAM

## 2024-07-05 PROCEDURE — 2500000002 HC RX 250 W HCPCS SELF ADMINISTERED DRUGS (ALT 637 FOR MEDICARE OP, ALT 636 FOR OP/ED): Performed by: ORTHOPAEDIC SURGERY

## 2024-07-05 PROCEDURE — 85027 COMPLETE CBC AUTOMATED: CPT | Performed by: STUDENT IN AN ORGANIZED HEALTH CARE EDUCATION/TRAINING PROGRAM

## 2024-07-05 PROCEDURE — 97110 THERAPEUTIC EXERCISES: CPT | Mod: GP,CQ

## 2024-07-05 PROCEDURE — 80048 BASIC METABOLIC PNL TOTAL CA: CPT | Performed by: STUDENT IN AN ORGANIZED HEALTH CARE EDUCATION/TRAINING PROGRAM

## 2024-07-05 PROCEDURE — 2500000004 HC RX 250 GENERAL PHARMACY W/ HCPCS (ALT 636 FOR OP/ED): Performed by: ORTHOPAEDIC SURGERY

## 2024-07-05 RX ORDER — DILTIAZEM HYDROCHLORIDE 180 MG/1
180 CAPSULE, COATED, EXTENDED RELEASE ORAL DAILY
Status: ON HOLD | COMMUNITY
End: 2024-07-05 | Stop reason: ALTCHOICE

## 2024-07-05 RX ORDER — BISACODYL 10 MG/1
10 SUPPOSITORY RECTAL ONCE
Status: COMPLETED | OUTPATIENT
Start: 2024-07-05 | End: 2024-07-05

## 2024-07-05 ASSESSMENT — PAIN SCALES - GENERAL
PAINLEVEL_OUTOF10: 10 - WORST POSSIBLE PAIN
PAINLEVEL_OUTOF10: 8
PAINLEVEL_OUTOF10: 2
PAINLEVEL_OUTOF10: 10 - WORST POSSIBLE PAIN

## 2024-07-05 ASSESSMENT — COGNITIVE AND FUNCTIONAL STATUS - GENERAL
TURNING FROM BACK TO SIDE WHILE IN FLAT BAD: A LOT
MOVING TO AND FROM BED TO CHAIR: A LOT
MOVING FROM LYING ON BACK TO SITTING ON SIDE OF FLAT BED WITH BEDRAILS: A LOT
CLIMB 3 TO 5 STEPS WITH RAILING: A LOT
STANDING UP FROM CHAIR USING ARMS: A LOT
WALKING IN HOSPITAL ROOM: A LOT
PERSONAL GROOMING: A LITTLE
DRESSING REGULAR LOWER BODY CLOTHING: TOTAL
STANDING UP FROM CHAIR USING ARMS: A LOT
MOBILITY SCORE: 12
CLIMB 3 TO 5 STEPS WITH RAILING: A LOT
MOVING FROM LYING ON BACK TO SITTING ON SIDE OF FLAT BED WITH BEDRAILS: A LOT
MOVING TO AND FROM BED TO CHAIR: A LOT
TURNING FROM BACK TO SIDE WHILE IN FLAT BAD: A LOT
DRESSING REGULAR UPPER BODY CLOTHING: A LITTLE
WALKING IN HOSPITAL ROOM: A LOT
HELP NEEDED FOR BATHING: A LOT
TOILETING: TOTAL
MOBILITY SCORE: 12
DAILY ACTIVITIY SCORE: 14

## 2024-07-05 NOTE — PROGRESS NOTES
Physical Therapy    Physical Therapy Treatment    Patient Name: Estefanía Contreras  MRN: [id number]  Today's Date: 7/5/2024  Time Calculation  Start Time: 1545  Stop Time: 1600  Time Calculation (min): 15 min    Assessment/Plan   PT Assessment  End of Session Patient Position: Alarm on, Bed, 2 rail up (Supine with HOB elevated, call bell in reach.)     PT Plan  Treatment/Interventions: Bed mobility, Transfer training, Gait training, Neuromuscular re-education, Strengthening, Endurance training, Therapeutic exercise, Therapeutic activity  PT Plan: Ongoing PT  PT Frequency: 5 times per week  PT Discharge Recommendations: Moderate intensity level of continued care  PT Recommended Transfer Status: Assist x2  PT - OK to Discharge: Yes (To next level of care when cleared by medical team)      General Visit Information:   PT  Visit  PT Received On: 07/05/24  General  Prior to Session Communication: Bedside nurse  Patient Position Received: Bed, 2 rail up, Alarm on  General Comment:  (Pt resting in bed upon arrival.  Initially declined therapy d/t feeling very tired; with education and encouragement agreed to participate in supine ther ex.  SCD's donned/activated throughout entire session.)    Subjective   Precautions:  Precautions  LE Weight Bearing Status: Weight Bearing as Tolerated  Medical Precautions: Fall precautions  Post-Surgical Precautions: Left hip precautions  Precautions Comment: 2LO2 NC, purwick     Objective   Pain:  Pain Assessment  Pain Assessment:  (No pain reported.)              Treatments:  Therapeutic Exercise  Therapeutic Exercise Performed: Yes (Pt performed supine BLE ther ex consisting of AP, HS, GS, QS, ABD x10 reps each.)    Outcome Measures:  [facility] Basic Mobility  Turning from your back to your side while in a flat bed without using bedrails: A lot  Moving from lying on your back to sitting on the side of a flat bed without using bedrails: A lot  Moving to and from bed to chair (including a wheelchair):  A lot  Standing up from a chair using your arms (e.g. wheelchair or bedside chair): A lot  To walk in hospital room: A lot  Climbing 3-5 steps with railing: A lot  Basic Mobility - Total Score: 12    Education Documentation  Mobility Training, taught by Stiven Beck PTA at 7/5/2024  4:32 PM.  Learner: Patient  Readiness: Acceptance  Method: Explanation  Response: Verbalizes Understanding    Education Comments  No comments found.        OP EDUCATION:       Encounter Problems       Encounter Problems (Active)       PT Problem       PT Goal 1 (Progressing)       Start:  07/03/24    Expected End:  07/17/24       STG - Pt will transition supine <> sitting with mod assist x1          PT Goal 2 (Progressing)       Start:  07/03/24    Expected End:  07/17/24       STG - Pt will transfer STS with mod assist x 1            PT Goal 3 (Progressing)       Start:  07/03/24    Expected End:  07/17/24       STG - Pt will amb 50 ft' using ww with min assist             Pain - Adult

## 2024-07-05 NOTE — PROGRESS NOTES
Estefanía Contreras is a 89 y.o. female on day 4 of admission presenting with Closed displaced midcervical fracture of left femur (Multi).      Subjective   Pain controlled, no further sob, feels well, asking about discharge     Objective     Last Recorded Vitals  /60 (BP Location: Left arm, Patient Position: Lying)   Pulse 71   Temp 37 °C (98.6 °F) (Temporal)   Resp 16   Wt 65.8 kg (145 lb)   SpO2 95%     Physical Exam  G: aox3, NAD, cooperative  HENT: neck supple, no JVD  Eyes: clear sclera  CV: RRR s1 s2  L: clear  Abd: soft, NT, non distended  Ext: no c/c/e, L hip in dressing  N: no appreciable acute focal deficits  Psych: appropriate mood and behavior    Assessment/Plan      Acute L femoral neck fx, s/p left hip hemiarthroplasty, POD#3  PALLAVI on CKD IIIb, possible progression of CKD  Mechanical fall    HTN, HLD  PAD  AAA  Peripheral neuropathy  h/o ITP  DVT ppx    Plan:  - Overall doing well post operatively, pain controlled, lovenox DVT ppx., PT/OT, orthopedics following, discharge planning to SNF   - Cr improved, given a dose of IV lasix 20mg IV yesterday due to mild hypoxia, pulm edema, rhonchi/JVD on exam, lungs clear this AM and JVD significantly improved, have resumed home PO lasix, nephrology following  - Continue home cardizem at lower dose with hold parameters  - Should be medically stable for discharge to SNF today as long as ok from nephrology standpoint        rTavon Leong DO

## 2024-07-05 NOTE — CARE PLAN
The patient's goals for the shift include  safety and pain control    The clinical goals for the shift include Pt will have decrease pain during shift

## 2024-07-05 NOTE — CARE PLAN
Problem: Skin  Goal: Decreased wound size/increased tissue granulation at next dressing change  Outcome: Progressing  Goal: Participates in plan/prevention/treatment measures  Outcome: Progressing  Goal: Prevent/manage excess moisture  Outcome: Progressing  Goal: Prevent/minimize sheer/friction injuries  Outcome: Progressing  Goal: Promote/optimize nutrition  Outcome: Progressing  Goal: Promote skin healing  Outcome: Progressing     Problem: Safety - Adult  Goal: Free from fall injury  Outcome: Progressing     Problem: Discharge Planning  Goal: Discharge to home or other facility with appropriate resources  Outcome: Progressing     Problem: Chronic Conditions and Co-morbidities  Goal: Patient's chronic conditions and co-morbidity symptoms are monitored and maintained or improved  Outcome: Progressing     Problem: Resident is recovering from orthopedic surgery  Goal: I will report effective pain management  Outcome: Progressing  Goal: I will remain free from infection  Outcome: Progressing  Goal: I will verbalize and demonstrate increased strength and ability to move  Outcome: Progressing  Goal: I will demonstrate an understanding of plan to heal skin and care for incision  Outcome: Progressing     Problem: Fall/Injury  Goal: Not fall by end of shift  Outcome: Progressing  Goal: Be free from injury by end of the shift  Outcome: Progressing  Goal: Verbalize understanding of personal risk factors for fall in the hospital  Outcome: Progressing  Goal: Verbalize understanding of risk factor reduction measures to prevent injury from fall in the home  Outcome: Progressing  Goal: Use assistive devices by end of the shift  Outcome: Progressing  Goal: Pace activities to prevent fatigue by end of the shift  Outcome: Progressing   The patient's goals for the shift include      The clinical goals for the shift include safety and comfort

## 2024-07-05 NOTE — ACP (ADVANCE CARE PLANNING)
Notified by RN that patient would like to have her code status re-addressed. On admission after discussion with admitting physician she was a DNAR/DNI. This was reversed to full code for surgery/perioperative period. Now she would like to resume her code status as DNAR/DNI. I confirmed this with patient and code status has been updated to DNAR/DNI.

## 2024-07-05 NOTE — PROGRESS NOTES
[facility] Confirmed with patient. Updated patient we will initiate precert for [facility]. TCC to continue to follow for discharge planning.   CELINA MONROY RN TCC

## 2024-07-05 NOTE — PROGRESS NOTES
Nephrology Consult Progress Note    Estefanía Contreras is a 89 y.o. female on day 4 of admission presenting with Closed displaced midcervical fracture of left femur (Multi).      Subjective   No acute events overnight       Objective          Physical Exam    Vitals 24HR  Heart Rate:  [54-95]   Temp:  [35.6 °C (96.1 °F)-37.2 °C (99 °F)]   Resp:  [16-18]   BP: (122-141)/(57-80)   SpO2:  [86 %-95 %]           G: awake, moderate distress due to pain, cooperative, on RA  HENT: neck supple, no JVD  Eyes: clear sclera  CV: RRR s1 s2  L: clear  Abd: soft, NT, non distended  Ext: no c/c/e, L hip in dressing  N: no appreciable acute focal deficits  Psych: appropriate mood and behavior           I&O 24HR    Intake/Output Summary (Last 24 hours) at 7/5/2024 1258  Last data filed at 7/5/2024 0642  Gross per 24 hour   Intake --   Output 950 ml   Net -950 ml         Medications  Medications Prior to Admission   Medication Sig Dispense Refill Last Dose    dilTIAZem (Cardizem) 90 mg immediate release tablet Take by mouth.   6/30/2024 at 0800    furosemide (Lasix) 20 mg tablet Take by mouth.   6/30/2024 at 0800    gabapentin (Neurontin) 300 mg capsule Take by mouth.   Past Week    lidocaine (Xylocaine) 2 % gel Apply topically every 12 hours.   6/30/2024 at 0800    LORazepam (Ativan) 2 mg tablet Take by mouth.   6/30/2024 at 2100    oxyCODONE-acetaminophen (Percocet) 5-325 mg tablet Take 0.5 tablets by mouth every 6 hours if needed (pain).   7/1/2024 at 0800    potassium chloride CR 20 mEq ER tablet Take by mouth.   6/30/2024 at 04005    pramipexole (Mirapex) 0.5 mg tablet Take by mouth.   6/30/2024 at 2100    simvastatin (Zocor) 20 mg tablet Take 1 tablet (20 mg) by mouth once daily at bedtime.   6/30/2024 at 2100    acetaminophen (Tylenol) 325 mg tablet every 4 hours.       dilTIAZem LA (Cardizem LA) 180 mg 24 hr tablet Take by mouth.       docusate sodium (Colace) 100 mg capsule Take 1 capsule (100 mg) by mouth once daily.   More than a  month    lidocaine (Xylocaine) 5 % ointment every 8 hours.   Unknown    meloxicam (Mobic) 15 mg tablet Take by mouth.   Unknown    methocarbamol (Robaxin) 750 mg tablet every 4 hours.       mirtazapine (Remeron) 7.5 mg tablet Take by mouth.   Unknown      Scheduled medications  acetaminophen, 650 mg, oral, q6h CURTIS  dilTIAZem, 30 mg, oral, q6h CURTIS  enoxaparin, 30 mg, subcutaneous, Daily  furosemide, 20 mg, oral, Daily  polyethylene glycol, 17 g, oral, Daily  pramipexole, 0.5 mg, oral, Nightly  sennosides-docusate sodium, 2 tablet, oral, BID  simvastatin, 20 mg, oral, Nightly      Continuous medications  oxygen, 2 L/min      PRN medications  PRN medications: acetaminophen **OR** acetaminophen **OR** acetaminophen, acetaminophen **OR** acetaminophen **OR** acetaminophen, LORazepam, melatonin, morphine, naloxone, ondansetron **OR** ondansetron, oxyCODONE, traMADol    Relevant Results      Admission on 07/01/2024   Component Date Value Ref Range Status    WBC 07/01/2024 6.1  4.4 - 11.3 x10*3/uL Final    nRBC 07/01/2024 0.0  0.0 - 0.0 /100 WBCs Final    RBC 07/01/2024 4.22  4.00 - 5.20 x10*6/uL Final    Hemoglobin 07/01/2024 12.5  12.0 - 16.0 g/dL Final    Hematocrit 07/01/2024 39.5  36.0 - 46.0 % Final    MCV 07/01/2024 94  80 - 100 fL Final    MCH 07/01/2024 29.6  26.0 - 34.0 pg Final    MCHC 07/01/2024 31.6 (L)  32.0 - 36.0 g/dL Final    RDW 07/01/2024 14.9 (H)  11.5 - 14.5 % Final    Platelets 07/01/2024 85 (L)  150 - 450 x10*3/uL Final    Platelet count verified by smear review.    Neutrophils % 07/01/2024 82.0  40.0 - 80.0 % Final    Immature Granulocytes %, Automated 07/01/2024 0.3  0.0 - 0.9 % Final    Immature Granulocyte Count (IG) includes promyelocytes, myelocytes and metamyelocytes but does not include bands. Percent differential counts (%) should be interpreted in the context of the absolute cell counts (cells/UL).    Lymphocytes % 07/01/2024 11.6  13.0 - 44.0 % Final    Monocytes % 07/01/2024 4.6  2.0 -  10.0 % Final    Eosinophils % 07/01/2024 1.0  0.0 - 6.0 % Final    Basophils % 07/01/2024 0.5  0.0 - 2.0 % Final    Neutrophils Absolute 07/01/2024 5.02  1.60 - 5.50 x10*3/uL Final    Percent differential counts (%) should be interpreted in the context of the absolute cell counts (cells/uL).    Immature Granulocytes Absolute, Au* 07/01/2024 0.02  0.00 - 0.50 x10*3/uL Final    Lymphocytes Absolute 07/01/2024 0.71 (L)  0.80 - 3.00 x10*3/uL Final    Monocytes Absolute 07/01/2024 0.28  0.05 - 0.80 x10*3/uL Final    Eosinophils Absolute 07/01/2024 0.06  0.00 - 0.40 x10*3/uL Final    Basophils Absolute 07/01/2024 0.03  0.00 - 0.10 x10*3/uL Final    Glucose 07/01/2024 95  74 - 99 mg/dL Final    Sodium 07/01/2024 139  136 - 145 mmol/L Final    Potassium 07/01/2024 4.4  3.5 - 5.3 mmol/L Final    Chloride 07/01/2024 103  98 - 107 mmol/L Final    Bicarbonate 07/01/2024 28  21 - 32 mmol/L Final    Anion Gap 07/01/2024 12  10 - 20 mmol/L Final    Urea Nitrogen 07/01/2024 55 (H)  6 - 23 mg/dL Final    Creatinine 07/01/2024 2.40 (H)  0.50 - 1.05 mg/dL Final    eGFR 07/01/2024 19 (L)  >60 mL/min/1.73m*2 Final    Calculations of estimated GFR are performed using the 2021 CKD-EPI Study Refit equation without the race variable for the IDMS-Traceable creatinine methods.  https://jasn.asnjournals.org/content/early/2021/09/22/ASN.8599934675    Calcium 07/01/2024 8.7  8.6 - 10.3 mg/dL Final    Albumin 07/01/2024 3.7  3.4 - 5.0 g/dL Final    Alkaline Phosphatase 07/01/2024 60  33 - 136 U/L Final    Total Protein 07/01/2024 6.5  6.4 - 8.2 g/dL Final    AST 07/01/2024 17  9 - 39 U/L Final    Bilirubin, Total 07/01/2024 0.5  0.0 - 1.2 mg/dL Final    ALT 07/01/2024 11  7 - 45 U/L Final    Patients treated with Sulfasalazine may generate falsely decreased results for ALT.    Magnesium 07/01/2024 2.41 (H)  1.60 - 2.40 mg/dL Final    Ventricular Rate 07/01/2024 76  BPM Preliminary    Atrial Rate 07/01/2024 76  BPM Preliminary    NV Interval  07/01/2024 200  ms Preliminary    QRS Duration 07/01/2024 84  ms Preliminary    QT Interval 07/01/2024 386  ms Preliminary    QTC Calculation(Bazett) 07/01/2024 434  ms Preliminary    P Axis 07/01/2024 74  degrees Preliminary    R Axis 07/01/2024 -50  degrees Preliminary    T Axis 07/01/2024 41  degrees Preliminary    QRS Count 07/01/2024 12  beats Preliminary    Q Onset 07/01/2024 214  ms Preliminary    P Onset 07/01/2024 114  ms Preliminary    P Offset 07/01/2024 175  ms Preliminary    T Offset 07/01/2024 407  ms Preliminary    QTC Fredericia 07/01/2024 417  ms Preliminary    Protime 07/01/2024 10.8  9.8 - 12.8 seconds Final    INR 07/01/2024 1.0  0.9 - 1.1 Final    aPTT 07/01/2024 38  27 - 38 seconds Final    ABO TYPE 07/01/2024 A   Final    Rh TYPE 07/01/2024 POS   Final    ANTIBODY SCREEN 07/01/2024 NEG   Final    RBC Morphology 07/01/2024 See Below   Final    RBC Fragments 07/01/2024 Few   Final    Ovalocytes 07/01/2024 Few   Final    Panfilo Cells 07/01/2024 Few   Final    WBC 07/02/2024 8.5  4.4 - 11.3 x10*3/uL Final    nRBC 07/02/2024 0.0  0.0 - 0.0 /100 WBCs Final    RBC 07/02/2024 3.95 (L)  4.00 - 5.20 x10*6/uL Final    Hemoglobin 07/02/2024 11.6 (L)  12.0 - 16.0 g/dL Final    Hematocrit 07/02/2024 37.2  36.0 - 46.0 % Final    MCV 07/02/2024 94  80 - 100 fL Final    MCH 07/02/2024 29.4  26.0 - 34.0 pg Final    MCHC 07/02/2024 31.2 (L)  32.0 - 36.0 g/dL Final    RDW 07/02/2024 14.9 (H)  11.5 - 14.5 % Final    Platelets 07/02/2024 82 (L)  150 - 450 x10*3/uL Final    Glucose 07/02/2024 77  74 - 99 mg/dL Final    Sodium 07/02/2024 139  136 - 145 mmol/L Final    Potassium 07/02/2024 5.0  3.5 - 5.3 mmol/L Final    Chloride 07/02/2024 105  98 - 107 mmol/L Final    Bicarbonate 07/02/2024 28  21 - 32 mmol/L Final    Anion Gap 07/02/2024 11  10 - 20 mmol/L Final    Urea Nitrogen 07/02/2024 55 (H)  6 - 23 mg/dL Final    Creatinine 07/02/2024 1.91 (H)  0.50 - 1.05 mg/dL Final    eGFR 07/02/2024 25 (L)  >60  mL/min/1.73m*2 Final    Calculations of estimated GFR are performed using the 2021 CKD-EPI Study Refit equation without the race variable for the IDMS-Traceable creatinine methods.  https://jasn.asnjournals.org/content/early/2021/09/22/ASN.5199660133    Calcium 07/02/2024 8.2 (L)  8.6 - 10.3 mg/dL Final    WBC 07/03/2024 6.5  4.4 - 11.3 x10*3/uL Final    nRBC 07/03/2024 0.0  0.0 - 0.0 /100 WBCs Final    RBC 07/03/2024 3.59 (L)  4.00 - 5.20 x10*6/uL Final    Hemoglobin 07/03/2024 10.7 (L)  12.0 - 16.0 g/dL Final    Hematocrit 07/03/2024 35.0 (L)  36.0 - 46.0 % Final    MCV 07/03/2024 98  80 - 100 fL Final    MCH 07/03/2024 29.8  26.0 - 34.0 pg Final    MCHC 07/03/2024 30.6 (L)  32.0 - 36.0 g/dL Final    RDW 07/03/2024 14.5  11.5 - 14.5 % Final    Platelets 07/03/2024 85 (L)  150 - 450 x10*3/uL Final    Glucose 07/03/2024 194 (H)  74 - 99 mg/dL Final    Sodium 07/03/2024 136  136 - 145 mmol/L Final    Potassium 07/03/2024 5.3  3.5 - 5.3 mmol/L Final    Chloride 07/03/2024 105  98 - 107 mmol/L Final    Bicarbonate 07/03/2024 23  21 - 32 mmol/L Final    Anion Gap 07/03/2024 13  10 - 20 mmol/L Final    Urea Nitrogen 07/03/2024 53 (H)  6 - 23 mg/dL Final    Creatinine 07/03/2024 2.06 (H)  0.50 - 1.05 mg/dL Final    eGFR 07/03/2024 23 (L)  >60 mL/min/1.73m*2 Final    Calculations of estimated GFR are performed using the 2021 CKD-EPI Study Refit equation without the race variable for the IDMS-Traceable creatinine methods.  https://jasn.asnjournals.org/content/early/2021/09/22/ASN.4539629938    Calcium 07/03/2024 7.6 (L)  8.6 - 10.3 mg/dL Final    Magnesium 07/03/2024 2.28  1.60 - 2.40 mg/dL Final    WBC 07/04/2024 6.1  4.4 - 11.3 x10*3/uL Final    nRBC 07/04/2024 0.0  0.0 - 0.0 /100 WBCs Final    RBC 07/04/2024 2.92 (L)  4.00 - 5.20 x10*6/uL Final    Hemoglobin 07/04/2024 8.6 (L)  12.0 - 16.0 g/dL Final    Hematocrit 07/04/2024 27.6 (L)  36.0 - 46.0 % Final    MCV 07/04/2024 95  80 - 100 fL Final    MCH 07/04/2024 29.5   26.0 - 34.0 pg Final    MCHC 07/04/2024 31.2 (L)  32.0 - 36.0 g/dL Final    RDW 07/04/2024 14.6 (H)  11.5 - 14.5 % Final    Platelets 07/04/2024 81 (L)  150 - 450 x10*3/uL Final    Glucose 07/04/2024 89  74 - 99 mg/dL Final    Sodium 07/04/2024 138  136 - 145 mmol/L Final    Potassium 07/04/2024 4.9  3.5 - 5.3 mmol/L Final    Chloride 07/04/2024 106  98 - 107 mmol/L Final    Bicarbonate 07/04/2024 27  21 - 32 mmol/L Final    Anion Gap 07/04/2024 10  10 - 20 mmol/L Final    Urea Nitrogen 07/04/2024 53 (H)  6 - 23 mg/dL Final    Creatinine 07/04/2024 2.00 (H)  0.50 - 1.05 mg/dL Final    eGFR 07/04/2024 23 (L)  >60 mL/min/1.73m*2 Final    Calculations of estimated GFR are performed using the 2021 CKD-EPI Study Refit equation without the race variable for the IDMS-Traceable creatinine methods.  https://jasn.asnjournals.org/content/early/2021/09/22/ASN.6783575936    Calcium 07/04/2024 7.5 (L)  8.6 - 10.3 mg/dL Final    Iron 07/04/2024 28 (L)  35 - 150 ug/dL Final    UIBC 07/04/2024 182  110 - 370 ug/dL Final    TIBC 07/04/2024 210 (L)  240 - 445 ug/dL Final    % Saturation 07/04/2024 13 (L)  25 - 45 % Final    WBC 07/05/2024 6.0  4.4 - 11.3 x10*3/uL Final    nRBC 07/05/2024 0.0  0.0 - 0.0 /100 WBCs Final    RBC 07/05/2024 3.25 (L)  4.00 - 5.20 x10*6/uL Final    Hemoglobin 07/05/2024 9.6 (L)  12.0 - 16.0 g/dL Final    Hematocrit 07/05/2024 31.6 (L)  36.0 - 46.0 % Final    MCV 07/05/2024 97  80 - 100 fL Final    MCH 07/05/2024 29.5  26.0 - 34.0 pg Final    MCHC 07/05/2024 30.4 (L)  32.0 - 36.0 g/dL Final    RDW 07/05/2024 14.6 (H)  11.5 - 14.5 % Final    Platelets 07/05/2024 113 (L)  150 - 450 x10*3/uL Final    Glucose 07/05/2024 84  74 - 99 mg/dL Final    Sodium 07/05/2024 137  136 - 145 mmol/L Final    Potassium 07/05/2024 4.8  3.5 - 5.3 mmol/L Final    Chloride 07/05/2024 107  98 - 107 mmol/L Final    Bicarbonate 07/05/2024 25  21 - 32 mmol/L Final    Anion Gap 07/05/2024 10  10 - 20 mmol/L Final    Urea Nitrogen  07/05/2024 49 (H)  6 - 23 mg/dL Final    Creatinine 07/05/2024 1.69 (H)  0.50 - 1.05 mg/dL Final    eGFR 07/05/2024 29 (L)  >60 mL/min/1.73m*2 Final    Calculations of estimated GFR are performed using the 2021 CKD-EPI Study Refit equation without the race variable for the IDMS-Traceable creatinine methods.  https://jasn.asnjournals.org/content/early/2021/09/22/ASN.1474052493    Calcium 07/05/2024 7.7 (L)  8.6 - 10.3 mg/dL Final      XR chest 1 view    Result Date: 7/4/2024  Interpreted By:  Heather Hernadez, STUDY: XR CHEST 1 VIEW;  7/4/2024 1:30 pm   INDICATION: Signs/Symptoms:hypoxia.   COMPARISON: 07/01/2024   ACCESSION NUMBER(S): [id number]   ORDERING CLINICIAN: ZENIA SEALS   FINDINGS: Patient's chin obscures part of left lung apex. The heart appears to be enlarged. Prominent dextroscoliosis is seen. A vascular stent is also noted in the abdomen. Calcified granulomas in the left midlung. Diffuse bilateral interstitial prominence is seen. No gross consolidation, pleural effusion pneumothorax is noted.       Cardiomegaly.   Diffuse bilateral interstitial prominence, which is unchanged may be related to chronic lung changes and/or pulmonary edema.   Old granulomatous disease.       MACRO: None   Signed by: Heather Hernadez 7/4/2024 1:44 PM Dictation workstation:   [id number]    XR pelvis 1-2 views    Result Date: 7/2/2024  Interpreted By:  Abraham Rodrigues, STUDY: XR PELVIS 1-2 VIEWS; ;  7/2/2024 5:36 pm   INDICATION: Signs/Symptoms:Post op hip.   COMPARISON: 07/01/2024   ACCESSION NUMBER(S): [id number]   ORDERING CLINICIAN: DAMARIS LAMBERT   FINDINGS: Pelvis, two views   Interval left hip hemiarthroplasty. No periprosthetic fracture or lucency seen. No dislocation.       No immediate complication of the left hip hemiarthroplasty     MACRO: None   Signed by: Abraham Rodrigues 7/2/2024 6:13 PM Dictation workstation:   [id number]    ECG 12 lead    Result Date: 7/2/2024  Sinus rhythm with Premature atrial complexes Left  anterior fascicular block Possible Anterior infarct , age undetermined Abnormal ECG No previous ECGs available    XR hip right with pelvis when performed 2 or 3 views    Result Date: 7/1/2024  STUDY: Pelvis and right Hip Radiographs; 7/1/2024 7:46PM INDICATION: Patient had a mechanical fall forward complaining of left hip and femur pain, also tender to palpation of right knee and right hip. COMPARISON: None available. ACCESSION NUMBER(S): [id number] ORDERING CLINICIAN: JOVITA WOOTEN TECHNIQUE:  AP view of the pelvis and two view(s) of the right hip. FINDINGS:  PELVIS: The pelvic ring is intact.  There is no acute fracture in the pelvis itself but there is a displaced femoral neck fracture on the left.. An aortobiiliac stent is partially visible in the upper pelvis. Right HIP: There is no displaced fracture.  There is mild osteoarthritis in the right hip joint..  No soft tissue abnormality is seen.    There is mild osteoarthritis in the right hip but the right hip otherwise appears intact.  There is a displaced femoral neck fracture on the left.. Signed by Wilfred Choi MD    XR chest 1 view    Result Date: 7/1/2024  STUDY: Chest Radiograph;  7/1/2024 7:46PM INDICATION: Reported new oxygen demand. COMPARISON: XR chest 12/14/2022 ACCESSION NUMBER(S): [id number] ORDERING CLINICIAN: JOVITA WOOTEN TECHNIQUE:  Frontal chest was obtained at 19:46 hours. FINDINGS: CARDIOMEDIASTINAL SILHOUETTE: Heart is mildly enlarged with mild pulmonary vascular congestion..  LUNGS: Lungs are clear. Calcified granuloma in the left midlung field.  ABDOMEN: Aortic stent graft noted.  BONES: No acute osseous changes. Reverse left shoulder arthroplasty noted.    Mild cardiomegaly and pulmonary vascular congestion. Signed by Chauncey Chu MD    XR tibia fibula left 2 views    Result Date: 7/1/2024  STUDY: Tibia and Fibula Radiographs; 7/1/2024 7:46 PM. INDICATION: Pain and bruising at left tibial plateau.  Evaluate for fracture.  COMPARISON: None. ACCESSION NUMBER(S): [id number] ORDERING CLINICIAN: JOVITA WOOTEN TECHNIQUE:  2 view(s) of the left tibia and fibula. FINDINGS:  There is no displaced fracture.  The alignment is anatomic.  No soft tissue abnormality is seen. Degenerative change of the knee and ankle joint noted.    No acute osseous abnormality Signed by Chauncey Chu MD    XR femur left 2+ views    Result Date: 7/1/2024  STUDY: Femur Radiographs; 7/1/2024 7:46PM INDICATION: Patient had a mechanical fall forward complaining of left hip and femur pain. COMPARISON: None available. ACCESSION NUMBER(S): [id number] ORDERING CLINICIAN: JOVITA WOOTEN TECHNIQUE:  Two view(four images) of the left femur. FINDINGS:  There is an acute left subcapital femoral neck fracture with moderate varus angulation.  The alignment is anatomic.  No soft tissue abnormality is seen. Degenerative changes of the hip and knee joint noted.    Acute left subcapital femoral neck fracture. Signed by Chauncey Chu MD    XR knee right 1-2 views    Result Date: 7/1/2024  STUDY: Knee Radiographs; 7/1/2024 7:46PM INDICATION: Patient had a mechanical fall forward complaining of left hip and femur pain also tender to palpitation of right knee and right hip. COMPARISON: None available. ACCESSION NUMBER(S): [id number] ORDERING CLINICIAN: JOVITA WOOTEN TECHNIQUE:  Two view(s) of the right knee. FINDINGS:  There is no displaced fracture.  The alignment is anatomic.  Diffuse osteopenia is seen.  Chondrocalcinosis is present..  There is no joint effusion.    Osteopenia and chondrocalcinosis.  No acute abnormality.. Signed by Ky Greenberg MD        ASSESSMENT AND PLAN    PALLAVI on CKD, baseline creatinine of 1.2-1.3, was 1.9 on admission and up to 2.06, improving, probably ischemic ATN with documented SBP in the 90s     Hx of HTN, now BP borderline low     Lytes controlled, no acidosis     Volume status - ok     Anemia, acute on chronic     Plan  - off ivf , encourage  po  - reduce cardizem to 60 mg and give with holding parameters  - can continue daily lasix  - repeat UA  - ok to dc from renal standpoint     Thank you for the opportunity to assist in the care of this patient, please call with questions  Digna Mccloud MD PhD

## 2024-07-06 LAB
ANION GAP SERPL CALC-SCNC: 9 MMOL/L (ref 10–20)
BUN SERPL-MCNC: 39 MG/DL (ref 6–23)
CALCIUM SERPL-MCNC: 7.9 MG/DL (ref 8.6–10.3)
CHLORIDE SERPL-SCNC: 106 MMOL/L (ref 98–107)
CO2 SERPL-SCNC: 30 MMOL/L (ref 21–32)
CREAT SERPL-MCNC: 1.39 MG/DL (ref 0.5–1.05)
EGFRCR SERPLBLD CKD-EPI 2021: 36 ML/MIN/1.73M*2
ERYTHROCYTE [DISTWIDTH] IN BLOOD BY AUTOMATED COUNT: 14.5 % (ref 11.5–14.5)
GLUCOSE SERPL-MCNC: 86 MG/DL (ref 74–99)
HCT VFR BLD AUTO: 29 % (ref 36–46)
HGB BLD-MCNC: 9.1 G/DL (ref 12–16)
MCH RBC QN AUTO: 29.3 PG (ref 26–34)
MCHC RBC AUTO-ENTMCNC: 31.4 G/DL (ref 32–36)
MCV RBC AUTO: 93 FL (ref 80–100)
NRBC BLD-RTO: 0 /100 WBCS (ref 0–0)
PLATELET # BLD AUTO: 110 X10*3/UL (ref 150–450)
POTASSIUM SERPL-SCNC: 4.7 MMOL/L (ref 3.5–5.3)
RBC # BLD AUTO: 3.11 X10*6/UL (ref 4–5.2)
SODIUM SERPL-SCNC: 140 MMOL/L (ref 136–145)
WBC # BLD AUTO: 5.1 X10*3/UL (ref 4.4–11.3)

## 2024-07-06 PROCEDURE — 2500000002 HC RX 250 W HCPCS SELF ADMINISTERED DRUGS (ALT 637 FOR MEDICARE OP, ALT 636 FOR OP/ED): Performed by: ORTHOPAEDIC SURGERY

## 2024-07-06 PROCEDURE — 99232 SBSQ HOSP IP/OBS MODERATE 35: CPT | Performed by: STUDENT IN AN ORGANIZED HEALTH CARE EDUCATION/TRAINING PROGRAM

## 2024-07-06 PROCEDURE — 36415 COLL VENOUS BLD VENIPUNCTURE: CPT | Performed by: STUDENT IN AN ORGANIZED HEALTH CARE EDUCATION/TRAINING PROGRAM

## 2024-07-06 PROCEDURE — 80048 BASIC METABOLIC PNL TOTAL CA: CPT | Performed by: STUDENT IN AN ORGANIZED HEALTH CARE EDUCATION/TRAINING PROGRAM

## 2024-07-06 PROCEDURE — 1100000001 HC PRIVATE ROOM DAILY

## 2024-07-06 PROCEDURE — 2500000001 HC RX 250 WO HCPCS SELF ADMINISTERED DRUGS (ALT 637 FOR MEDICARE OP): Performed by: STUDENT IN AN ORGANIZED HEALTH CARE EDUCATION/TRAINING PROGRAM

## 2024-07-06 PROCEDURE — 2500000004 HC RX 250 GENERAL PHARMACY W/ HCPCS (ALT 636 FOR OP/ED): Performed by: ORTHOPAEDIC SURGERY

## 2024-07-06 PROCEDURE — 2500000001 HC RX 250 WO HCPCS SELF ADMINISTERED DRUGS (ALT 637 FOR MEDICARE OP): Performed by: INTERNAL MEDICINE

## 2024-07-06 PROCEDURE — 2500000004 HC RX 250 GENERAL PHARMACY W/ HCPCS (ALT 636 FOR OP/ED): Performed by: STUDENT IN AN ORGANIZED HEALTH CARE EDUCATION/TRAINING PROGRAM

## 2024-07-06 PROCEDURE — 85027 COMPLETE CBC AUTOMATED: CPT | Performed by: STUDENT IN AN ORGANIZED HEALTH CARE EDUCATION/TRAINING PROGRAM

## 2024-07-06 RX ORDER — LORAZEPAM 0.5 MG/1
2 TABLET ORAL NIGHTLY PRN
Status: DISCONTINUED | OUTPATIENT
Start: 2024-07-06 | End: 2024-07-08 | Stop reason: HOSPADM

## 2024-07-06 RX ORDER — MIRTAZAPINE 15 MG/1
15 TABLET, FILM COATED ORAL NIGHTLY
Status: DISCONTINUED | OUTPATIENT
Start: 2024-07-06 | End: 2024-07-08 | Stop reason: HOSPADM

## 2024-07-06 RX ORDER — DILTIAZEM HYDROCHLORIDE 180 MG/1
180 CAPSULE, COATED, EXTENDED RELEASE ORAL DAILY
Status: DISCONTINUED | OUTPATIENT
Start: 2024-07-07 | End: 2024-07-08 | Stop reason: HOSPADM

## 2024-07-06 RX ORDER — ENOXAPARIN SODIUM 100 MG/ML
30 INJECTION SUBCUTANEOUS DAILY
Qty: 24 EACH | Refills: 0 | Status: SHIPPED | OUTPATIENT
Start: 2024-07-07 | End: 2024-07-31

## 2024-07-06 RX ORDER — AMOXICILLIN 250 MG
2 CAPSULE ORAL 2 TIMES DAILY
Qty: 40 TABLET | Refills: 0 | Status: SHIPPED | OUTPATIENT
Start: 2024-07-06 | End: 2024-07-16

## 2024-07-06 RX ORDER — PRAMIPEXOLE DIHYDROCHLORIDE 1 MG/1
1 TABLET ORAL NIGHTLY
Status: DISCONTINUED | OUTPATIENT
Start: 2024-07-06 | End: 2024-07-08 | Stop reason: HOSPADM

## 2024-07-06 RX ORDER — POLYETHYLENE GLYCOL 3350 17 G/17G
17 POWDER, FOR SOLUTION ORAL DAILY
Qty: 14 PACKET | Refills: 0 | Status: SHIPPED | OUTPATIENT
Start: 2024-07-07

## 2024-07-06 RX ORDER — OXYCODONE HYDROCHLORIDE 5 MG/1
5 TABLET ORAL EVERY 6 HOURS PRN
Qty: 15 TABLET | Refills: 0 | Status: SHIPPED | OUTPATIENT
Start: 2024-07-06 | End: 2024-07-08

## 2024-07-06 ASSESSMENT — PAIN - FUNCTIONAL ASSESSMENT
PAIN_FUNCTIONAL_ASSESSMENT: 0-10
PAIN_FUNCTIONAL_ASSESSMENT: 0-10

## 2024-07-06 ASSESSMENT — PAIN SCALES - GENERAL
PAINLEVEL_OUTOF10: 6
PAINLEVEL_OUTOF10: 7
PAINLEVEL_OUTOF10: 10 - WORST POSSIBLE PAIN
PAINLEVEL_OUTOF10: 10 - WORST POSSIBLE PAIN

## 2024-07-06 NOTE — PROGRESS NOTES
Estefanía Contreras is a 89 y.o. female on day 5 of admission presenting with Closed displaced midcervical fracture of left femur (Multi).      Subjective   Pain controlled, no further sob, feels well, asking about discharge     Objective     Last Recorded Vitals  /60   Pulse 70   Temp 36.8 °C (98.2 °F) (Temporal)   Resp 18   Wt 65.8 kg (145 lb)   SpO2 97%     Physical Exam  G: aox3, NAD, cooperative  HENT: neck supple, no JVD  Eyes: clear sclera  CV: RRR s1 s2  L: clear  Abd: soft, NT, non distended  Ext: no c/c/e, L hip in dressing  N: no appreciable acute focal deficits  Psych: appropriate mood and behavior    Assessment/Plan      Acute L femoral neck fx, s/p left hip hemiarthroplasty, POD#4  PALLAVI on CKD IIIb  Mechanical fall    HTN, HLD  PAD  AAA  Peripheral neuropathy  h/o ITP  DVT ppx    Plan:  - Overall doing well post operatively, pain controlled, lovenox DVT ppx., PT/OT, orthopedics following, discharge planning to SNF   - Cr continues , back on PO lasix, Cr essentially now back to baseline, nephrology has been following and ok with discharge  - Continue home cardizem with hold parameters  - Remains stable for discharge to SNF        Travon Leong DO

## 2024-07-06 NOTE — CARE PLAN
The patient's goals for the shift include rest.    The clinical goals for the shift include comfort, safety and pain control.    Decreased wound size/increased tissue granulation at next dressing change  Add  Today at 0131 - Progressing by Bahman Atkins RN  Add    Today at 0130 by Bahman Atkins RN  Add  Participates in plan/prevention/treatment measures  Add  Today at 0131 - Progressing by Bahman Atkins RN  Add    Today at 0130 by Bahman Atkins RN  Add  Prevent/manage excess moisture  Add  Today at 0131 - Progressing by Bahman Atkins RN  Add    Today at 0130 by Bahman Atkins RN  Add  Prevent/minimize sheer/friction injuries  Add  Today at 0131 - Progressing by Bahman Atkins RN  Add    Today at 0130 by Bahman Atkins RN  Add  Promote/optimize nutrition  Add  Today at 0131 - Progressing by Bahman Atkins RN  Add    Today at 0130 by Bahman Atkins RN  Add  Promote skin healing  Add  Today at 0131 - Progressing by Bahman Atkins RN  Add    Today at 0130 by Bahman Atkins RN  Add  Pain - Adult  Add All  Verbalizes/displays adequate comfort level or baseline comfort level  Add  Today at 0131 - Progressing by Bahman Atkins RN  Add  Safety - Adult  Add All  Free from fall injury  Add  Today at 0131 - Progressing by Bahman Atkins RN  Add  Discharge Planning  Add All  Discharge to home or other facility with appropriate resources  Add  Today at 0131 - Progressing by Bahman Atkins RN  Add  Chronic Conditions and Co-morbidities  Add All  Patient's chronic conditions and co-morbidity symptoms are monitored and maintained or improved  Add  Today at 0131 - Progressing by Bahman Atkins RN  Add  Resident is recovering from orthopedic surgery  Add All  I will report effective pain management  Add  Today at 0131 - Progressing by Bahman Atkins RN  Add  I will remain free from infection  Add  Today at 0131 - Progressing by Bahman Atkins RN  Add  I will verbalize and  demonstrate increased strength and ability to move  Add  Today at 0131 - Progressing by Bahman Atkins, RAMANA  Add  I will demonstrate an understanding of plan to heal skin and care for incision  Add  Today at 0131 - Progressing by Bahman Atkins, RAMANA  Add  Fall/Injury  Add All  Not fall by end of shift  Add  Today at 0131 - Progressing by Bahman Atkins, RAMANA  Add  Be free from injury by end of the shift  Add  Today at 0131 - Progressing by Bahman Atkins, RN  Add  Verbalize understanding of personal risk factors for fall in the hospital  Add  Today at 0131 - Progressing by Bahman Atkins, RN  Add  Verbalize understanding of risk factor reduction measures to prevent injury from fall in the home  Add  Today at 0131 - Progressing by Bahman Atkins, RAMANA  Add  Use assistive devices by end of the shift  Add  Today at 0131 - Progressing by Bahman Atkins, RAMANA  Add  Pace activities to prevent fatigue by end of the shift  Add  Today at 0131 - Progressing by Bahman Mckenzie

## 2024-07-06 NOTE — PROGRESS NOTES
Nephrology Consult Progress Note    Estefanía Contreras is a 89 y.o. female on day 5 of admission presenting with Closed displaced midcervical fracture of left femur (Multi).      Subjective   No acute events overnight       Objective          Physical Exam    Vitals 24HR  Heart Rate:  [59-86]   Temp:  [35.8 °C (96.4 °F)-37.6 °C (99.7 °F)]   Resp:  [16-18]   BP: (114-146)/(56-67)   SpO2:  [90 %-97 %]           G: awake, moderate distress due to pain, cooperative, on RA  HENT: neck supple, no JVD  Eyes: clear sclera  CV: RRR s1 s2  L: clear  Abd: soft, NT, non distended  Ext: no c/c/e, L hip in dressing  N: no appreciable acute focal deficits  Psych: appropriate mood and behavior           I&O 24HR    Intake/Output Summary (Last 24 hours) at 7/6/2024 1421  Last data filed at 7/6/2024 1222  Gross per 24 hour   Intake 480 ml   Output --   Net 480 ml         Medications  Medications Prior to Admission   Medication Sig Dispense Refill Last Dose    furosemide (Lasix) 20 mg tablet Take by mouth.   6/30/2024 at 0800    LORazepam (Ativan) 2 mg tablet Take 1 tablet (2 mg) by mouth as needed at bedtime for sedation.   6/30/2024 at 2100    potassium chloride CR 20 mEq ER tablet Take by mouth.   6/30/2024 at 97084    pramipexole (Mirapex) 1 mg tablet Take 1 tablet (1 mg) by mouth once daily at bedtime.   6/30/2024 at 2100    simvastatin (Zocor) 20 mg tablet Take 1 tablet (20 mg) by mouth once daily at bedtime.   6/30/2024 at 2100    acetaminophen (Tylenol) 325 mg tablet Take 1 tablet (325 mg) by mouth every 4 hours if needed for mild pain (1 - 3).       dilTIAZem LA (Cardizem LA) 180 mg 24 hr tablet Take by mouth.       meloxicam (Mobic) 15 mg tablet Take by mouth.   Unknown    mirtazapine (Remeron) 7.5 mg tablet Take 2 tablets (15 mg) by mouth once daily at bedtime.   Unknown      Scheduled medications  acetaminophen, 650 mg, oral, q6h CURTIS  [START ON 7/7/2024] dilTIAZem CD, 180 mg, oral, Daily  enoxaparin, 30 mg, subcutaneous,  Daily  furosemide, 20 mg, oral, Daily  mirtazapine, 15 mg, oral, Nightly  polyethylene glycol, 17 g, oral, Daily  pramipexole, 1 mg, oral, Nightly  sennosides-docusate sodium, 2 tablet, oral, BID  simvastatin, 20 mg, oral, Nightly      Continuous medications  oxygen, 2 L/min      PRN medications  PRN medications: acetaminophen **OR** acetaminophen **OR** acetaminophen, acetaminophen **OR** acetaminophen **OR** acetaminophen, LORazepam, melatonin, morphine, naloxone, ondansetron **OR** ondansetron, oxyCODONE, traMADol    Relevant Results      Admission on 07/01/2024   Component Date Value Ref Range Status    WBC 07/01/2024 6.1  4.4 - 11.3 x10*3/uL Final    nRBC 07/01/2024 0.0  0.0 - 0.0 /100 WBCs Final    RBC 07/01/2024 4.22  4.00 - 5.20 x10*6/uL Final    Hemoglobin 07/01/2024 12.5  12.0 - 16.0 g/dL Final    Hematocrit 07/01/2024 39.5  36.0 - 46.0 % Final    MCV 07/01/2024 94  80 - 100 fL Final    MCH 07/01/2024 29.6  26.0 - 34.0 pg Final    MCHC 07/01/2024 31.6 (L)  32.0 - 36.0 g/dL Final    RDW 07/01/2024 14.9 (H)  11.5 - 14.5 % Final    Platelets 07/01/2024 85 (L)  150 - 450 x10*3/uL Final    Platelet count verified by smear review.    Neutrophils % 07/01/2024 82.0  40.0 - 80.0 % Final    Immature Granulocytes %, Automated 07/01/2024 0.3  0.0 - 0.9 % Final    Immature Granulocyte Count (IG) includes promyelocytes, myelocytes and metamyelocytes but does not include bands. Percent differential counts (%) should be interpreted in the context of the absolute cell counts (cells/UL).    Lymphocytes % 07/01/2024 11.6  13.0 - 44.0 % Final    Monocytes % 07/01/2024 4.6  2.0 - 10.0 % Final    Eosinophils % 07/01/2024 1.0  0.0 - 6.0 % Final    Basophils % 07/01/2024 0.5  0.0 - 2.0 % Final    Neutrophils Absolute 07/01/2024 5.02  1.60 - 5.50 x10*3/uL Final    Percent differential counts (%) should be interpreted in the context of the absolute cell counts (cells/uL).    Immature Granulocytes Absolute, Au* 07/01/2024 0.02  0.00  - 0.50 x10*3/uL Final    Lymphocytes Absolute 07/01/2024 0.71 (L)  0.80 - 3.00 x10*3/uL Final    Monocytes Absolute 07/01/2024 0.28  0.05 - 0.80 x10*3/uL Final    Eosinophils Absolute 07/01/2024 0.06  0.00 - 0.40 x10*3/uL Final    Basophils Absolute 07/01/2024 0.03  0.00 - 0.10 x10*3/uL Final    Glucose 07/01/2024 95  74 - 99 mg/dL Final    Sodium 07/01/2024 139  136 - 145 mmol/L Final    Potassium 07/01/2024 4.4  3.5 - 5.3 mmol/L Final    Chloride 07/01/2024 103  98 - 107 mmol/L Final    Bicarbonate 07/01/2024 28  21 - 32 mmol/L Final    Anion Gap 07/01/2024 12  10 - 20 mmol/L Final    Urea Nitrogen 07/01/2024 55 (H)  6 - 23 mg/dL Final    Creatinine 07/01/2024 2.40 (H)  0.50 - 1.05 mg/dL Final    eGFR 07/01/2024 19 (L)  >60 mL/min/1.73m*2 Final    Calculations of estimated GFR are performed using the 2021 CKD-EPI Study Refit equation without the race variable for the IDMS-Traceable creatinine methods.  https://jasn.asnjournals.org/content/early/2021/09/22/ASN.0756159096    Calcium 07/01/2024 8.7  8.6 - 10.3 mg/dL Final    Albumin 07/01/2024 3.7  3.4 - 5.0 g/dL Final    Alkaline Phosphatase 07/01/2024 60  33 - 136 U/L Final    Total Protein 07/01/2024 6.5  6.4 - 8.2 g/dL Final    AST 07/01/2024 17  9 - 39 U/L Final    Bilirubin, Total 07/01/2024 0.5  0.0 - 1.2 mg/dL Final    ALT 07/01/2024 11  7 - 45 U/L Final    Patients treated with Sulfasalazine may generate falsely decreased results for ALT.    Magnesium 07/01/2024 2.41 (H)  1.60 - 2.40 mg/dL Final    Ventricular Rate 07/01/2024 76  BPM Preliminary    Atrial Rate 07/01/2024 76  BPM Preliminary    NE Interval 07/01/2024 200  ms Preliminary    QRS Duration 07/01/2024 84  ms Preliminary    QT Interval 07/01/2024 386  ms Preliminary    QTC Calculation(Bazett) 07/01/2024 434  ms Preliminary    P Axis 07/01/2024 74  degrees Preliminary    R Axis 07/01/2024 -50  degrees Preliminary    T Axis 07/01/2024 41  degrees Preliminary    QRS Count 07/01/2024 12  beats  Preliminary    Q Onset 07/01/2024 214  ms Preliminary    P Onset 07/01/2024 114  ms Preliminary    P Offset 07/01/2024 175  ms Preliminary    T Offset 07/01/2024 407  ms Preliminary    QTC Fredericia 07/01/2024 417  ms Preliminary    Protime 07/01/2024 10.8  9.8 - 12.8 seconds Final    INR 07/01/2024 1.0  0.9 - 1.1 Final    aPTT 07/01/2024 38  27 - 38 seconds Final    ABO TYPE 07/01/2024 A   Final    Rh TYPE 07/01/2024 POS   Final    ANTIBODY SCREEN 07/01/2024 NEG   Final    RBC Morphology 07/01/2024 See Below   Final    RBC Fragments 07/01/2024 Few   Final    Ovalocytes 07/01/2024 Few   Final    Panfilo Cells 07/01/2024 Few   Final    WBC 07/02/2024 8.5  4.4 - 11.3 x10*3/uL Final    nRBC 07/02/2024 0.0  0.0 - 0.0 /100 WBCs Final    RBC 07/02/2024 3.95 (L)  4.00 - 5.20 x10*6/uL Final    Hemoglobin 07/02/2024 11.6 (L)  12.0 - 16.0 g/dL Final    Hematocrit 07/02/2024 37.2  36.0 - 46.0 % Final    MCV 07/02/2024 94  80 - 100 fL Final    MCH 07/02/2024 29.4  26.0 - 34.0 pg Final    MCHC 07/02/2024 31.2 (L)  32.0 - 36.0 g/dL Final    RDW 07/02/2024 14.9 (H)  11.5 - 14.5 % Final    Platelets 07/02/2024 82 (L)  150 - 450 x10*3/uL Final    Glucose 07/02/2024 77  74 - 99 mg/dL Final    Sodium 07/02/2024 139  136 - 145 mmol/L Final    Potassium 07/02/2024 5.0  3.5 - 5.3 mmol/L Final    Chloride 07/02/2024 105  98 - 107 mmol/L Final    Bicarbonate 07/02/2024 28  21 - 32 mmol/L Final    Anion Gap 07/02/2024 11  10 - 20 mmol/L Final    Urea Nitrogen 07/02/2024 55 (H)  6 - 23 mg/dL Final    Creatinine 07/02/2024 1.91 (H)  0.50 - 1.05 mg/dL Final    eGFR 07/02/2024 25 (L)  >60 mL/min/1.73m*2 Final    Calculations of estimated GFR are performed using the 2021 CKD-EPI Study Refit equation without the race variable for the IDMS-Traceable creatinine methods.  https://jasn.asnjournals.org/content/early/2021/09/22/ASN.4296716006    Calcium 07/02/2024 8.2 (L)  8.6 - 10.3 mg/dL Final    WBC 07/03/2024 6.5  4.4 - 11.3 x10*3/uL Final    nRBC  07/03/2024 0.0  0.0 - 0.0 /100 WBCs Final    RBC 07/03/2024 3.59 (L)  4.00 - 5.20 x10*6/uL Final    Hemoglobin 07/03/2024 10.7 (L)  12.0 - 16.0 g/dL Final    Hematocrit 07/03/2024 35.0 (L)  36.0 - 46.0 % Final    MCV 07/03/2024 98  80 - 100 fL Final    MCH 07/03/2024 29.8  26.0 - 34.0 pg Final    MCHC 07/03/2024 30.6 (L)  32.0 - 36.0 g/dL Final    RDW 07/03/2024 14.5  11.5 - 14.5 % Final    Platelets 07/03/2024 85 (L)  150 - 450 x10*3/uL Final    Glucose 07/03/2024 194 (H)  74 - 99 mg/dL Final    Sodium 07/03/2024 136  136 - 145 mmol/L Final    Potassium 07/03/2024 5.3  3.5 - 5.3 mmol/L Final    Chloride 07/03/2024 105  98 - 107 mmol/L Final    Bicarbonate 07/03/2024 23  21 - 32 mmol/L Final    Anion Gap 07/03/2024 13  10 - 20 mmol/L Final    Urea Nitrogen 07/03/2024 53 (H)  6 - 23 mg/dL Final    Creatinine 07/03/2024 2.06 (H)  0.50 - 1.05 mg/dL Final    eGFR 07/03/2024 23 (L)  >60 mL/min/1.73m*2 Final    Calculations of estimated GFR are performed using the 2021 CKD-EPI Study Refit equation without the race variable for the IDMS-Traceable creatinine methods.  https://jasn.asnjournals.org/content/early/2021/09/22/ASN.5936278229    Calcium 07/03/2024 7.6 (L)  8.6 - 10.3 mg/dL Final    Magnesium 07/03/2024 2.28  1.60 - 2.40 mg/dL Final    WBC 07/04/2024 6.1  4.4 - 11.3 x10*3/uL Final    nRBC 07/04/2024 0.0  0.0 - 0.0 /100 WBCs Final    RBC 07/04/2024 2.92 (L)  4.00 - 5.20 x10*6/uL Final    Hemoglobin 07/04/2024 8.6 (L)  12.0 - 16.0 g/dL Final    Hematocrit 07/04/2024 27.6 (L)  36.0 - 46.0 % Final    MCV 07/04/2024 95  80 - 100 fL Final    MCH 07/04/2024 29.5  26.0 - 34.0 pg Final    MCHC 07/04/2024 31.2 (L)  32.0 - 36.0 g/dL Final    RDW 07/04/2024 14.6 (H)  11.5 - 14.5 % Final    Platelets 07/04/2024 81 (L)  150 - 450 x10*3/uL Final    Glucose 07/04/2024 89  74 - 99 mg/dL Final    Sodium 07/04/2024 138  136 - 145 mmol/L Final    Potassium 07/04/2024 4.9  3.5 - 5.3 mmol/L Final    Chloride 07/04/2024 106  98 - 107  mmol/L Final    Bicarbonate 07/04/2024 27  21 - 32 mmol/L Final    Anion Gap 07/04/2024 10  10 - 20 mmol/L Final    Urea Nitrogen 07/04/2024 53 (H)  6 - 23 mg/dL Final    Creatinine 07/04/2024 2.00 (H)  0.50 - 1.05 mg/dL Final    eGFR 07/04/2024 23 (L)  >60 mL/min/1.73m*2 Final    Calculations of estimated GFR are performed using the 2021 CKD-EPI Study Refit equation without the race variable for the IDMS-Traceable creatinine methods.  https://jasn.asnjournals.org/content/early/2021/09/22/ASN.5442659247    Calcium 07/04/2024 7.5 (L)  8.6 - 10.3 mg/dL Final    Iron 07/04/2024 28 (L)  35 - 150 ug/dL Final    UIBC 07/04/2024 182  110 - 370 ug/dL Final    TIBC 07/04/2024 210 (L)  240 - 445 ug/dL Final    % Saturation 07/04/2024 13 (L)  25 - 45 % Final    WBC 07/05/2024 6.0  4.4 - 11.3 x10*3/uL Final    nRBC 07/05/2024 0.0  0.0 - 0.0 /100 WBCs Final    RBC 07/05/2024 3.25 (L)  4.00 - 5.20 x10*6/uL Final    Hemoglobin 07/05/2024 9.6 (L)  12.0 - 16.0 g/dL Final    Hematocrit 07/05/2024 31.6 (L)  36.0 - 46.0 % Final    MCV 07/05/2024 97  80 - 100 fL Final    MCH 07/05/2024 29.5  26.0 - 34.0 pg Final    MCHC 07/05/2024 30.4 (L)  32.0 - 36.0 g/dL Final    RDW 07/05/2024 14.6 (H)  11.5 - 14.5 % Final    Platelets 07/05/2024 113 (L)  150 - 450 x10*3/uL Final    Glucose 07/05/2024 84  74 - 99 mg/dL Final    Sodium 07/05/2024 137  136 - 145 mmol/L Final    Potassium 07/05/2024 4.8  3.5 - 5.3 mmol/L Final    Chloride 07/05/2024 107  98 - 107 mmol/L Final    Bicarbonate 07/05/2024 25  21 - 32 mmol/L Final    Anion Gap 07/05/2024 10  10 - 20 mmol/L Final    Urea Nitrogen 07/05/2024 49 (H)  6 - 23 mg/dL Final    Creatinine 07/05/2024 1.69 (H)  0.50 - 1.05 mg/dL Final    eGFR 07/05/2024 29 (L)  >60 mL/min/1.73m*2 Final    Calculations of estimated GFR are performed using the 2021 CKD-EPI Study Refit equation without the race variable for the IDMS-Traceable creatinine  methods.  https://jasn.asnjournals.org/content/early/2021/09/22/ASN.4710475613    Calcium 07/05/2024 7.7 (L)  8.6 - 10.3 mg/dL Final    WBC 07/06/2024 5.1  4.4 - 11.3 x10*3/uL Final    nRBC 07/06/2024 0.0  0.0 - 0.0 /100 WBCs Final    RBC 07/06/2024 3.11 (L)  4.00 - 5.20 x10*6/uL Final    Hemoglobin 07/06/2024 9.1 (L)  12.0 - 16.0 g/dL Final    Hematocrit 07/06/2024 29.0 (L)  36.0 - 46.0 % Final    MCV 07/06/2024 93  80 - 100 fL Final    MCH 07/06/2024 29.3  26.0 - 34.0 pg Final    MCHC 07/06/2024 31.4 (L)  32.0 - 36.0 g/dL Final    RDW 07/06/2024 14.5  11.5 - 14.5 % Final    Platelets 07/06/2024 110 (L)  150 - 450 x10*3/uL Final    Glucose 07/06/2024 86  74 - 99 mg/dL Final    Sodium 07/06/2024 140  136 - 145 mmol/L Final    Potassium 07/06/2024 4.7  3.5 - 5.3 mmol/L Final    Chloride 07/06/2024 106  98 - 107 mmol/L Final    Bicarbonate 07/06/2024 30  21 - 32 mmol/L Final    Anion Gap 07/06/2024 9 (L)  10 - 20 mmol/L Final    Urea Nitrogen 07/06/2024 39 (H)  6 - 23 mg/dL Final    Creatinine 07/06/2024 1.39 (H)  0.50 - 1.05 mg/dL Final    eGFR 07/06/2024 36 (L)  >60 mL/min/1.73m*2 Final    Calculations of estimated GFR are performed using the 2021 CKD-EPI Study Refit equation without the race variable for the IDMS-Traceable creatinine methods.  https://jasn.asnjournals.org/content/early/2021/09/22/ASN.6397286710    Calcium 07/06/2024 7.9 (L)  8.6 - 10.3 mg/dL Final      XR chest 1 view    Result Date: 7/4/2024  Interpreted By:  Heather Hernadez, STUDY: XR CHEST 1 VIEW;  7/4/2024 1:30 pm   INDICATION: Signs/Symptoms:hypoxia.   COMPARISON: 07/01/2024   ACCESSION NUMBER(S): [id number]   ORDERING CLINICIAN: ZENIA SEALS   FINDINGS: Patient's chin obscures part of left lung apex. The heart appears to be enlarged. Prominent dextroscoliosis is seen. A vascular stent is also noted in the abdomen. Calcified granulomas in the left midlung. Diffuse bilateral interstitial prominence is seen. No gross consolidation, pleural  effusion pneumothorax is noted.       Cardiomegaly.   Diffuse bilateral interstitial prominence, which is unchanged may be related to chronic lung changes and/or pulmonary edema.   Old granulomatous disease.       MACRO: None   Signed by: Heather Hernadez 7/4/2024 1:44 PM Dictation workstation:   [id number]    XR pelvis 1-2 views    Result Date: 7/2/2024  Interpreted By:  Abraham Rodrigues, STUDY: XR PELVIS 1-2 VIEWS; ;  7/2/2024 5:36 pm   INDICATION: Signs/Symptoms:Post op hip.   COMPARISON: 07/01/2024   ACCESSION NUMBER(S): [id number]   ORDERING CLINICIAN: DAMARIS LAMBERT   FINDINGS: Pelvis, two views   Interval left hip hemiarthroplasty. No periprosthetic fracture or lucency seen. No dislocation.       No immediate complication of the left hip hemiarthroplasty     MACRO: None   Signed by: Abraham Rodrigues 7/2/2024 6:13 PM Dictation workstation:   [id number]    ECG 12 lead    Result Date: 7/2/2024  Sinus rhythm with Premature atrial complexes Left anterior fascicular block Possible Anterior infarct , age undetermined Abnormal ECG No previous ECGs available    XR hip right with pelvis when performed 2 or 3 views    Result Date: 7/1/2024  STUDY: Pelvis and right Hip Radiographs; 7/1/2024 7:46PM INDICATION: Patient had a mechanical fall forward complaining of left hip and femur pain, also tender to palpation of right knee and right hip. COMPARISON: None available. ACCESSION NUMBER(S): [id number] ORDERING CLINICIAN: JOVITA WOOTEN TECHNIQUE:  AP view of the pelvis and two view(s) of the right hip. FINDINGS:  PELVIS: The pelvic ring is intact.  There is no acute fracture in the pelvis itself but there is a displaced femoral neck fracture on the left.. An aortobiiliac stent is partially visible in the upper pelvis. Right HIP: There is no displaced fracture.  There is mild osteoarthritis in the right hip joint..  No soft tissue abnormality is seen.    There is mild osteoarthritis in the right hip but the right hip  otherwise appears intact.  There is a displaced femoral neck fracture on the left.. Signed by Wilfred Choi MD    XR chest 1 view    Result Date: 7/1/2024  STUDY: Chest Radiograph;  7/1/2024 7:46PM INDICATION: Reported new oxygen demand. COMPARISON: XR chest 12/14/2022 ACCESSION NUMBER(S): [id number] ORDERING CLINICIAN: JOVITA WOOTEN TECHNIQUE:  Frontal chest was obtained at 19:46 hours. FINDINGS: CARDIOMEDIASTINAL SILHOUETTE: Heart is mildly enlarged with mild pulmonary vascular congestion..  LUNGS: Lungs are clear. Calcified granuloma in the left midlung field.  ABDOMEN: Aortic stent graft noted.  BONES: No acute osseous changes. Reverse left shoulder arthroplasty noted.    Mild cardiomegaly and pulmonary vascular congestion. Signed by Chauncey Chu MD    XR tibia fibula left 2 views    Result Date: 7/1/2024  STUDY: Tibia and Fibula Radiographs; 7/1/2024 7:46 PM. INDICATION: Pain and bruising at left tibial plateau.  Evaluate for fracture. COMPARISON: None. ACCESSION NUMBER(S): [id number] ORDERING CLINICIAN: JOVITA WOOTEN TECHNIQUE:  2 view(s) of the left tibia and fibula. FINDINGS:  There is no displaced fracture.  The alignment is anatomic.  No soft tissue abnormality is seen. Degenerative change of the knee and ankle joint noted.    No acute osseous abnormality Signed by Chauncey Chu MD    XR femur left 2+ views    Result Date: 7/1/2024  STUDY: Femur Radiographs; 7/1/2024 7:46PM INDICATION: Patient had a mechanical fall forward complaining of left hip and femur pain. COMPARISON: None available. ACCESSION NUMBER(S): [id number] ORDERING CLINICIAN: JOVITA WOOTEN TECHNIQUE:  Two view(four images) of the left femur. FINDINGS:  There is an acute left subcapital femoral neck fracture with moderate varus angulation.  The alignment is anatomic.  No soft tissue abnormality is seen. Degenerative changes of the hip and knee joint noted.    Acute left subcapital femoral neck fracture. Signed by Chauncey Chu  MD    XR knee right 1-2 views    Result Date: 7/1/2024  STUDY: Knee Radiographs; 7/1/2024 7:46PM INDICATION: Patient had a mechanical fall forward complaining of left hip and femur pain also tender to palpitation of right knee and right hip. COMPARISON: None available. ACCESSION NUMBER(S): [id number] ORDERING CLINICIAN: JOVITA WOOTEN TECHNIQUE:  Two view(s) of the right knee. FINDINGS:  There is no displaced fracture.  The alignment is anatomic.  Diffuse osteopenia is seen.  Chondrocalcinosis is present..  There is no joint effusion.    Osteopenia and chondrocalcinosis.  No acute abnormality.. Signed by Ky Greenberg MD        ASSESSMENT AND PLAN    PALLAVI on CKD, baseline creatinine of 1.2-1.3, was 1.9 on admission and up to 2.06, improving, probably ischemic ATN with documented SBP in the 90s     Hx of HTN, now BP borderline low     Lytes controlled, no acidosis     Volume status - ok     Anemia, acute on chronic     Plan  - off ivf , encourage po  - reduce cardizem to 60 mg and give with holding parameters  - can continue daily lasix  - repeat UA  - ok to dc from renal standpoint, awaiting placement     Thank you for the opportunity to assist in the care of this patient, please call with questions  Digna Mccloud MD PhD

## 2024-07-06 NOTE — PROGRESS NOTES
Plan is for pt to go to [facility] , spoke with Dr Travon Leong and plan is for discharge today, need precert , was started, 7000 form completed.  Updated therapies noted.  Wen Martinez RN [facility]

## 2024-07-06 NOTE — CARE PLAN
The patient's goals for the shift include safety and decrease pain     The clinical goals for the shift include Pt will remain safe and comfortable during shift

## 2024-07-07 VITALS
HEART RATE: 64 BPM | HEIGHT: 60 IN | RESPIRATION RATE: 18 BRPM | BODY MASS INDEX: 28.47 KG/M2 | WEIGHT: 145 LBS | OXYGEN SATURATION: 94 % | DIASTOLIC BLOOD PRESSURE: 67 MMHG | TEMPERATURE: 98.8 F | SYSTOLIC BLOOD PRESSURE: 139 MMHG

## 2024-07-07 PROCEDURE — 1100000001 HC PRIVATE ROOM DAILY

## 2024-07-07 PROCEDURE — 2500000002 HC RX 250 W HCPCS SELF ADMINISTERED DRUGS (ALT 637 FOR MEDICARE OP, ALT 636 FOR OP/ED): Performed by: ORTHOPAEDIC SURGERY

## 2024-07-07 PROCEDURE — 99232 SBSQ HOSP IP/OBS MODERATE 35: CPT | Performed by: STUDENT IN AN ORGANIZED HEALTH CARE EDUCATION/TRAINING PROGRAM

## 2024-07-07 PROCEDURE — 2500000001 HC RX 250 WO HCPCS SELF ADMINISTERED DRUGS (ALT 637 FOR MEDICARE OP): Performed by: STUDENT IN AN ORGANIZED HEALTH CARE EDUCATION/TRAINING PROGRAM

## 2024-07-07 PROCEDURE — 2500000004 HC RX 250 GENERAL PHARMACY W/ HCPCS (ALT 636 FOR OP/ED): Performed by: ORTHOPAEDIC SURGERY

## 2024-07-07 PROCEDURE — 2500000004 HC RX 250 GENERAL PHARMACY W/ HCPCS (ALT 636 FOR OP/ED): Performed by: STUDENT IN AN ORGANIZED HEALTH CARE EDUCATION/TRAINING PROGRAM

## 2024-07-07 ASSESSMENT — COGNITIVE AND FUNCTIONAL STATUS - GENERAL
MOVING FROM LYING ON BACK TO SITTING ON SIDE OF FLAT BED WITH BEDRAILS: A LOT
DAILY ACTIVITIY SCORE: 11
EATING MEALS: A LITTLE
DRESSING REGULAR LOWER BODY CLOTHING: A LOT
MOVING TO AND FROM BED TO CHAIR: A LOT
STANDING UP FROM CHAIR USING ARMS: A LOT
CLIMB 3 TO 5 STEPS WITH RAILING: A LOT
TURNING FROM BACK TO SIDE WHILE IN FLAT BAD: A LOT
DRESSING REGULAR UPPER BODY CLOTHING: TOTAL
MOBILITY SCORE: 12
TOILETING: TOTAL
PERSONAL GROOMING: A LOT
HELP NEEDED FOR BATHING: A LOT
WALKING IN HOSPITAL ROOM: A LOT

## 2024-07-07 ASSESSMENT — PAIN - FUNCTIONAL ASSESSMENT
PAIN_FUNCTIONAL_ASSESSMENT: 0-10

## 2024-07-07 ASSESSMENT — PAIN SCALES - GENERAL
PAINLEVEL_OUTOF10: 7
PAINLEVEL_OUTOF10: 8
PAINLEVEL_OUTOF10: 7
PAINLEVEL_OUTOF10: 8
PAINLEVEL_OUTOF10: 5 - MODERATE PAIN
PAINLEVEL_OUTOF10: 8

## 2024-07-07 ASSESSMENT — PAIN DESCRIPTION - ORIENTATION
ORIENTATION: RIGHT

## 2024-07-07 ASSESSMENT — PAIN DESCRIPTION - LOCATION
LOCATION: TOE (COMMENT WHICH ONE)
LOCATION: TOE (COMMENT WHICH ONE)

## 2024-07-07 ASSESSMENT — PAIN SCALES - PAIN ASSESSMENT IN ADVANCED DEMENTIA (PAINAD)
TOTALSCORE: MEDICATION (SEE MAR)
TOTALSCORE: MEDICATION (SEE MAR)

## 2024-07-07 NOTE — PROGRESS NOTES
Nephrology Consult Progress Note    Estefanía Contreras is a 89 y.o. female on day 6 of admission presenting with Closed displaced midcervical fracture of left femur (Multi).      Subjective   No acute events overnight       Objective          Physical Exam    Vitals 24HR  Heart Rate:  [62-74]   Temp:  [35.6 °C (96.1 °F)-37.7 °C (99.9 °F)]   Resp:  [18]   BP: (134-162)/(68-75)   SpO2:  [91 %-93 %]           G: awake, moderate distress due to pain, cooperative, on RA  HENT: neck supple, no JVD  Eyes: clear sclera  CV: RRR s1 s2  L: clear  Abd: soft, NT, non distended  Ext: no c/c/e, L hip in dressing  N: no appreciable acute focal deficits  Psych: appropriate mood and behavior           I&O 24HR    Intake/Output Summary (Last 24 hours) at 7/7/2024 1551  Last data filed at 7/7/2024 0805  Gross per 24 hour   Intake 170 ml   Output 100 ml   Net 70 ml         Medications  Medications Prior to Admission   Medication Sig Dispense Refill Last Dose    furosemide (Lasix) 20 mg tablet Take by mouth.   6/30/2024 at 0800    LORazepam (Ativan) 2 mg tablet Take 1 tablet (2 mg) by mouth as needed at bedtime for sedation.   6/30/2024 at 2100    potassium chloride CR 20 mEq ER tablet Take by mouth.   6/30/2024 at 13120    pramipexole (Mirapex) 1 mg tablet Take 1 tablet (1 mg) by mouth once daily at bedtime.   6/30/2024 at 2100    simvastatin (Zocor) 20 mg tablet Take 1 tablet (20 mg) by mouth once daily at bedtime.   6/30/2024 at 2100    acetaminophen (Tylenol) 325 mg tablet Take 1 tablet (325 mg) by mouth every 4 hours if needed for mild pain (1 - 3).       dilTIAZem LA (Cardizem LA) 180 mg 24 hr tablet Take by mouth.       meloxicam (Mobic) 15 mg tablet Take by mouth.   Unknown    mirtazapine (Remeron) 7.5 mg tablet Take 2 tablets (15 mg) by mouth once daily at bedtime.   Unknown      Scheduled medications  acetaminophen, 650 mg, oral, q6h CURTIS  dilTIAZem CD, 180 mg, oral, Daily  enoxaparin, 30 mg, subcutaneous, Daily  furosemide, 20 mg,  oral, Daily  mirtazapine, 15 mg, oral, Nightly  polyethylene glycol, 17 g, oral, Daily  pramipexole, 1 mg, oral, Nightly  sennosides-docusate sodium, 2 tablet, oral, BID  simvastatin, 20 mg, oral, Nightly      Continuous medications  oxygen, 2 L/min      PRN medications  PRN medications: acetaminophen **OR** acetaminophen **OR** acetaminophen, acetaminophen **OR** acetaminophen **OR** acetaminophen, LORazepam, melatonin, morphine, naloxone, ondansetron **OR** ondansetron, oxyCODONE, traMADol    Relevant Results      Admission on 07/01/2024   Component Date Value Ref Range Status    WBC 07/01/2024 6.1  4.4 - 11.3 x10*3/uL Final    nRBC 07/01/2024 0.0  0.0 - 0.0 /100 WBCs Final    RBC 07/01/2024 4.22  4.00 - 5.20 x10*6/uL Final    Hemoglobin 07/01/2024 12.5  12.0 - 16.0 g/dL Final    Hematocrit 07/01/2024 39.5  36.0 - 46.0 % Final    MCV 07/01/2024 94  80 - 100 fL Final    MCH 07/01/2024 29.6  26.0 - 34.0 pg Final    MCHC 07/01/2024 31.6 (L)  32.0 - 36.0 g/dL Final    RDW 07/01/2024 14.9 (H)  11.5 - 14.5 % Final    Platelets 07/01/2024 85 (L)  150 - 450 x10*3/uL Final    Platelet count verified by smear review.    Neutrophils % 07/01/2024 82.0  40.0 - 80.0 % Final    Immature Granulocytes %, Automated 07/01/2024 0.3  0.0 - 0.9 % Final    Immature Granulocyte Count (IG) includes promyelocytes, myelocytes and metamyelocytes but does not include bands. Percent differential counts (%) should be interpreted in the context of the absolute cell counts (cells/UL).    Lymphocytes % 07/01/2024 11.6  13.0 - 44.0 % Final    Monocytes % 07/01/2024 4.6  2.0 - 10.0 % Final    Eosinophils % 07/01/2024 1.0  0.0 - 6.0 % Final    Basophils % 07/01/2024 0.5  0.0 - 2.0 % Final    Neutrophils Absolute 07/01/2024 5.02  1.60 - 5.50 x10*3/uL Final    Percent differential counts (%) should be interpreted in the context of the absolute cell counts (cells/uL).    Immature Granulocytes Absolute, Au* 07/01/2024 0.02  0.00 - 0.50 x10*3/uL Final     Lymphocytes Absolute 07/01/2024 0.71 (L)  0.80 - 3.00 x10*3/uL Final    Monocytes Absolute 07/01/2024 0.28  0.05 - 0.80 x10*3/uL Final    Eosinophils Absolute 07/01/2024 0.06  0.00 - 0.40 x10*3/uL Final    Basophils Absolute 07/01/2024 0.03  0.00 - 0.10 x10*3/uL Final    Glucose 07/01/2024 95  74 - 99 mg/dL Final    Sodium 07/01/2024 139  136 - 145 mmol/L Final    Potassium 07/01/2024 4.4  3.5 - 5.3 mmol/L Final    Chloride 07/01/2024 103  98 - 107 mmol/L Final    Bicarbonate 07/01/2024 28  21 - 32 mmol/L Final    Anion Gap 07/01/2024 12  10 - 20 mmol/L Final    Urea Nitrogen 07/01/2024 55 (H)  6 - 23 mg/dL Final    Creatinine 07/01/2024 2.40 (H)  0.50 - 1.05 mg/dL Final    eGFR 07/01/2024 19 (L)  >60 mL/min/1.73m*2 Final    Calculations of estimated GFR are performed using the 2021 CKD-EPI Study Refit equation without the race variable for the IDMS-Traceable creatinine methods.  https://jasn.asnjournals.org/content/early/2021/09/22/ASN.2667364104    Calcium 07/01/2024 8.7  8.6 - 10.3 mg/dL Final    Albumin 07/01/2024 3.7  3.4 - 5.0 g/dL Final    Alkaline Phosphatase 07/01/2024 60  33 - 136 U/L Final    Total Protein 07/01/2024 6.5  6.4 - 8.2 g/dL Final    AST 07/01/2024 17  9 - 39 U/L Final    Bilirubin, Total 07/01/2024 0.5  0.0 - 1.2 mg/dL Final    ALT 07/01/2024 11  7 - 45 U/L Final    Patients treated with Sulfasalazine may generate falsely decreased results for ALT.    Magnesium 07/01/2024 2.41 (H)  1.60 - 2.40 mg/dL Final    Ventricular Rate 07/01/2024 76  BPM Preliminary    Atrial Rate 07/01/2024 76  BPM Preliminary    ME Interval 07/01/2024 200  ms Preliminary    QRS Duration 07/01/2024 84  ms Preliminary    QT Interval 07/01/2024 386  ms Preliminary    QTC Calculation(Bazett) 07/01/2024 434  ms Preliminary    P Axis 07/01/2024 74  degrees Preliminary    R Axis 07/01/2024 -50  degrees Preliminary    T Axis 07/01/2024 41  degrees Preliminary    QRS Count 07/01/2024 12  beats Preliminary    Q Onset 07/01/2024  214  ms Preliminary    P Onset 07/01/2024 114  ms Preliminary    P Offset 07/01/2024 175  ms Preliminary    T Offset 07/01/2024 407  ms Preliminary    QTC Fredericia 07/01/2024 417  ms Preliminary    Protime 07/01/2024 10.8  9.8 - 12.8 seconds Final    INR 07/01/2024 1.0  0.9 - 1.1 Final    aPTT 07/01/2024 38  27 - 38 seconds Final    ABO TYPE 07/01/2024 A   Final    Rh TYPE 07/01/2024 POS   Final    ANTIBODY SCREEN 07/01/2024 NEG   Final    RBC Morphology 07/01/2024 See Below   Final    RBC Fragments 07/01/2024 Few   Final    Ovalocytes 07/01/2024 Few   Final    Panfilo Cells 07/01/2024 Few   Final    WBC 07/02/2024 8.5  4.4 - 11.3 x10*3/uL Final    nRBC 07/02/2024 0.0  0.0 - 0.0 /100 WBCs Final    RBC 07/02/2024 3.95 (L)  4.00 - 5.20 x10*6/uL Final    Hemoglobin 07/02/2024 11.6 (L)  12.0 - 16.0 g/dL Final    Hematocrit 07/02/2024 37.2  36.0 - 46.0 % Final    MCV 07/02/2024 94  80 - 100 fL Final    MCH 07/02/2024 29.4  26.0 - 34.0 pg Final    MCHC 07/02/2024 31.2 (L)  32.0 - 36.0 g/dL Final    RDW 07/02/2024 14.9 (H)  11.5 - 14.5 % Final    Platelets 07/02/2024 82 (L)  150 - 450 x10*3/uL Final    Glucose 07/02/2024 77  74 - 99 mg/dL Final    Sodium 07/02/2024 139  136 - 145 mmol/L Final    Potassium 07/02/2024 5.0  3.5 - 5.3 mmol/L Final    Chloride 07/02/2024 105  98 - 107 mmol/L Final    Bicarbonate 07/02/2024 28  21 - 32 mmol/L Final    Anion Gap 07/02/2024 11  10 - 20 mmol/L Final    Urea Nitrogen 07/02/2024 55 (H)  6 - 23 mg/dL Final    Creatinine 07/02/2024 1.91 (H)  0.50 - 1.05 mg/dL Final    eGFR 07/02/2024 25 (L)  >60 mL/min/1.73m*2 Final    Calculations of estimated GFR are performed using the 2021 CKD-EPI Study Refit equation without the race variable for the IDMS-Traceable creatinine methods.  https://jasn.asnjournals.org/content/early/2021/09/22/ASN.3600698867    Calcium 07/02/2024 8.2 (L)  8.6 - 10.3 mg/dL Final    WBC 07/03/2024 6.5  4.4 - 11.3 x10*3/uL Final    nRBC 07/03/2024 0.0  0.0 - 0.0 /100 WBCs  Final    RBC 07/03/2024 3.59 (L)  4.00 - 5.20 x10*6/uL Final    Hemoglobin 07/03/2024 10.7 (L)  12.0 - 16.0 g/dL Final    Hematocrit 07/03/2024 35.0 (L)  36.0 - 46.0 % Final    MCV 07/03/2024 98  80 - 100 fL Final    MCH 07/03/2024 29.8  26.0 - 34.0 pg Final    MCHC 07/03/2024 30.6 (L)  32.0 - 36.0 g/dL Final    RDW 07/03/2024 14.5  11.5 - 14.5 % Final    Platelets 07/03/2024 85 (L)  150 - 450 x10*3/uL Final    Glucose 07/03/2024 194 (H)  74 - 99 mg/dL Final    Sodium 07/03/2024 136  136 - 145 mmol/L Final    Potassium 07/03/2024 5.3  3.5 - 5.3 mmol/L Final    Chloride 07/03/2024 105  98 - 107 mmol/L Final    Bicarbonate 07/03/2024 23  21 - 32 mmol/L Final    Anion Gap 07/03/2024 13  10 - 20 mmol/L Final    Urea Nitrogen 07/03/2024 53 (H)  6 - 23 mg/dL Final    Creatinine 07/03/2024 2.06 (H)  0.50 - 1.05 mg/dL Final    eGFR 07/03/2024 23 (L)  >60 mL/min/1.73m*2 Final    Calculations of estimated GFR are performed using the 2021 CKD-EPI Study Refit equation without the race variable for the IDMS-Traceable creatinine methods.  https://jasn.asnjournals.org/content/early/2021/09/22/ASN.9406580028    Calcium 07/03/2024 7.6 (L)  8.6 - 10.3 mg/dL Final    Magnesium 07/03/2024 2.28  1.60 - 2.40 mg/dL Final    WBC 07/04/2024 6.1  4.4 - 11.3 x10*3/uL Final    nRBC 07/04/2024 0.0  0.0 - 0.0 /100 WBCs Final    RBC 07/04/2024 2.92 (L)  4.00 - 5.20 x10*6/uL Final    Hemoglobin 07/04/2024 8.6 (L)  12.0 - 16.0 g/dL Final    Hematocrit 07/04/2024 27.6 (L)  36.0 - 46.0 % Final    MCV 07/04/2024 95  80 - 100 fL Final    MCH 07/04/2024 29.5  26.0 - 34.0 pg Final    MCHC 07/04/2024 31.2 (L)  32.0 - 36.0 g/dL Final    RDW 07/04/2024 14.6 (H)  11.5 - 14.5 % Final    Platelets 07/04/2024 81 (L)  150 - 450 x10*3/uL Final    Glucose 07/04/2024 89  74 - 99 mg/dL Final    Sodium 07/04/2024 138  136 - 145 mmol/L Final    Potassium 07/04/2024 4.9  3.5 - 5.3 mmol/L Final    Chloride 07/04/2024 106  98 - 107 mmol/L Final    Bicarbonate 07/04/2024  27  21 - 32 mmol/L Final    Anion Gap 07/04/2024 10  10 - 20 mmol/L Final    Urea Nitrogen 07/04/2024 53 (H)  6 - 23 mg/dL Final    Creatinine 07/04/2024 2.00 (H)  0.50 - 1.05 mg/dL Final    eGFR 07/04/2024 23 (L)  >60 mL/min/1.73m*2 Final    Calculations of estimated GFR are performed using the 2021 CKD-EPI Study Refit equation without the race variable for the IDMS-Traceable creatinine methods.  https://jasn.asnjournals.org/content/early/2021/09/22/ASN.8964802598    Calcium 07/04/2024 7.5 (L)  8.6 - 10.3 mg/dL Final    Iron 07/04/2024 28 (L)  35 - 150 ug/dL Final    UIBC 07/04/2024 182  110 - 370 ug/dL Final    TIBC 07/04/2024 210 (L)  240 - 445 ug/dL Final    % Saturation 07/04/2024 13 (L)  25 - 45 % Final    WBC 07/05/2024 6.0  4.4 - 11.3 x10*3/uL Final    nRBC 07/05/2024 0.0  0.0 - 0.0 /100 WBCs Final    RBC 07/05/2024 3.25 (L)  4.00 - 5.20 x10*6/uL Final    Hemoglobin 07/05/2024 9.6 (L)  12.0 - 16.0 g/dL Final    Hematocrit 07/05/2024 31.6 (L)  36.0 - 46.0 % Final    MCV 07/05/2024 97  80 - 100 fL Final    MCH 07/05/2024 29.5  26.0 - 34.0 pg Final    MCHC 07/05/2024 30.4 (L)  32.0 - 36.0 g/dL Final    RDW 07/05/2024 14.6 (H)  11.5 - 14.5 % Final    Platelets 07/05/2024 113 (L)  150 - 450 x10*3/uL Final    Glucose 07/05/2024 84  74 - 99 mg/dL Final    Sodium 07/05/2024 137  136 - 145 mmol/L Final    Potassium 07/05/2024 4.8  3.5 - 5.3 mmol/L Final    Chloride 07/05/2024 107  98 - 107 mmol/L Final    Bicarbonate 07/05/2024 25  21 - 32 mmol/L Final    Anion Gap 07/05/2024 10  10 - 20 mmol/L Final    Urea Nitrogen 07/05/2024 49 (H)  6 - 23 mg/dL Final    Creatinine 07/05/2024 1.69 (H)  0.50 - 1.05 mg/dL Final    eGFR 07/05/2024 29 (L)  >60 mL/min/1.73m*2 Final    Calculations of estimated GFR are performed using the 2021 CKD-EPI Study Refit equation without the race variable for the IDMS-Traceable creatinine methods.  https://jasn.asnjournals.org/content/early/2021/09/22/ASN.0797153650    Calcium 07/05/2024 7.7  (L)  8.6 - 10.3 mg/dL Final    WBC 07/06/2024 5.1  4.4 - 11.3 x10*3/uL Final    nRBC 07/06/2024 0.0  0.0 - 0.0 /100 WBCs Final    RBC 07/06/2024 3.11 (L)  4.00 - 5.20 x10*6/uL Final    Hemoglobin 07/06/2024 9.1 (L)  12.0 - 16.0 g/dL Final    Hematocrit 07/06/2024 29.0 (L)  36.0 - 46.0 % Final    MCV 07/06/2024 93  80 - 100 fL Final    MCH 07/06/2024 29.3  26.0 - 34.0 pg Final    MCHC 07/06/2024 31.4 (L)  32.0 - 36.0 g/dL Final    RDW 07/06/2024 14.5  11.5 - 14.5 % Final    Platelets 07/06/2024 110 (L)  150 - 450 x10*3/uL Final    Glucose 07/06/2024 86  74 - 99 mg/dL Final    Sodium 07/06/2024 140  136 - 145 mmol/L Final    Potassium 07/06/2024 4.7  3.5 - 5.3 mmol/L Final    Chloride 07/06/2024 106  98 - 107 mmol/L Final    Bicarbonate 07/06/2024 30  21 - 32 mmol/L Final    Anion Gap 07/06/2024 9 (L)  10 - 20 mmol/L Final    Urea Nitrogen 07/06/2024 39 (H)  6 - 23 mg/dL Final    Creatinine 07/06/2024 1.39 (H)  0.50 - 1.05 mg/dL Final    eGFR 07/06/2024 36 (L)  >60 mL/min/1.73m*2 Final    Calculations of estimated GFR are performed using the 2021 CKD-EPI Study Refit equation without the race variable for the IDMS-Traceable creatinine methods.  https://jasn.asnjournals.org/content/early/2021/09/22/ASN.6691200020    Calcium 07/06/2024 7.9 (L)  8.6 - 10.3 mg/dL Final      XR chest 1 view    Result Date: 7/4/2024  Interpreted By:  Heather Hernadez, STUDY: XR CHEST 1 VIEW;  7/4/2024 1:30 pm   INDICATION: Signs/Symptoms:hypoxia.   COMPARISON: 07/01/2024   ACCESSION NUMBER(S): [id number]   ORDERING CLINICIAN: ZENIA SEALS   FINDINGS: Patient's chin obscures part of left lung apex. The heart appears to be enlarged. Prominent dextroscoliosis is seen. A vascular stent is also noted in the abdomen. Calcified granulomas in the left midlung. Diffuse bilateral interstitial prominence is seen. No gross consolidation, pleural effusion pneumothorax is noted.       Cardiomegaly.   Diffuse bilateral interstitial prominence, which is  unchanged may be related to chronic lung changes and/or pulmonary edema.   Old granulomatous disease.       MACRO: None   Signed by: Heather Hernadez 7/4/2024 1:44 PM Dictation workstation:   [id number]    XR pelvis 1-2 views    Result Date: 7/2/2024  Interpreted By:  Abraham Rodrigues, STUDY: XR PELVIS 1-2 VIEWS; ;  7/2/2024 5:36 pm   INDICATION: Signs/Symptoms:Post op hip.   COMPARISON: 07/01/2024   ACCESSION NUMBER(S): [id number]   ORDERING CLINICIAN: DAMARIS LAMBERT   FINDINGS: Pelvis, two views   Interval left hip hemiarthroplasty. No periprosthetic fracture or lucency seen. No dislocation.       No immediate complication of the left hip hemiarthroplasty     MACRO: None   Signed by: Abraham Rodrigues 7/2/2024 6:13 PM Dictation workstation:   [id number]    ECG 12 lead    Result Date: 7/2/2024  Sinus rhythm with Premature atrial complexes Left anterior fascicular block Possible Anterior infarct , age undetermined Abnormal ECG No previous ECGs available    XR hip right with pelvis when performed 2 or 3 views    Result Date: 7/1/2024  STUDY: Pelvis and right Hip Radiographs; 7/1/2024 7:46PM INDICATION: Patient had a mechanical fall forward complaining of left hip and femur pain, also tender to palpation of right knee and right hip. COMPARISON: None available. ACCESSION NUMBER(S): [id number] ORDERING CLINICIAN: JOVITA WOOTEN TECHNIQUE:  AP view of the pelvis and two view(s) of the right hip. FINDINGS:  PELVIS: The pelvic ring is intact.  There is no acute fracture in the pelvis itself but there is a displaced femoral neck fracture on the left.. An aortobiiliac stent is partially visible in the upper pelvis. Right HIP: There is no displaced fracture.  There is mild osteoarthritis in the right hip joint..  No soft tissue abnormality is seen.    There is mild osteoarthritis in the right hip but the right hip otherwise appears intact.  There is a displaced femoral neck fracture on the left.. Signed by Wilfred Choi  MD    XR chest 1 view    Result Date: 7/1/2024  STUDY: Chest Radiograph;  7/1/2024 7:46PM INDICATION: Reported new oxygen demand. COMPARISON: XR chest 12/14/2022 ACCESSION NUMBER(S): [id number] ORDERING CLINICIAN: JOVITA WOOTEN TECHNIQUE:  Frontal chest was obtained at 19:46 hours. FINDINGS: CARDIOMEDIASTINAL SILHOUETTE: Heart is mildly enlarged with mild pulmonary vascular congestion..  LUNGS: Lungs are clear. Calcified granuloma in the left midlung field.  ABDOMEN: Aortic stent graft noted.  BONES: No acute osseous changes. Reverse left shoulder arthroplasty noted.    Mild cardiomegaly and pulmonary vascular congestion. Signed by Chauncey Chu MD    XR tibia fibula left 2 views    Result Date: 7/1/2024  STUDY: Tibia and Fibula Radiographs; 7/1/2024 7:46 PM. INDICATION: Pain and bruising at left tibial plateau.  Evaluate for fracture. COMPARISON: None. ACCESSION NUMBER(S): [id number] ORDERING CLINICIAN: JOVITA WOOTEN TECHNIQUE:  2 view(s) of the left tibia and fibula. FINDINGS:  There is no displaced fracture.  The alignment is anatomic.  No soft tissue abnormality is seen. Degenerative change of the knee and ankle joint noted.    No acute osseous abnormality Signed by Chauncey Chu MD    XR femur left 2+ views    Result Date: 7/1/2024  STUDY: Femur Radiographs; 7/1/2024 7:46PM INDICATION: Patient had a mechanical fall forward complaining of left hip and femur pain. COMPARISON: None available. ACCESSION NUMBER(S): [id number] ORDERING CLINICIAN: JOVITA WOOTEN TECHNIQUE:  Two view(four images) of the left femur. FINDINGS:  There is an acute left subcapital femoral neck fracture with moderate varus angulation.  The alignment is anatomic.  No soft tissue abnormality is seen. Degenerative changes of the hip and knee joint noted.    Acute left subcapital femoral neck fracture. Signed by Chauncey Chu MD    XR knee right 1-2 views    Result Date: 7/1/2024  STUDY: Knee Radiographs; 7/1/2024 7:46PM INDICATION:  Patient had a mechanical fall forward complaining of left hip and femur pain also tender to palpitation of right knee and right hip. COMPARISON: None available. ACCESSION NUMBER(S): [id number] ORDERING CLINICIAN: JOVITA WOOTEN TECHNIQUE:  Two view(s) of the right knee. FINDINGS:  There is no displaced fracture.  The alignment is anatomic.  Diffuse osteopenia is seen.  Chondrocalcinosis is present..  There is no joint effusion.    Osteopenia and chondrocalcinosis.  No acute abnormality.. Signed by Ky Greenberg MD        ASSESSMENT AND PLAN    PALLAVI on CKD, baseline creatinine of 1.2-1.3, was 1.9 on admission and up to 2.06, improving, probably ischemic ATN with documented SBP in the 90s     Hx of HTN, now BP borderline low     Lytes controlled, no acidosis     Volume status - ok     Anemia, acute on chronic     Plan  - resolved PALLAVI, encourage po  - reduce cardizem to 60 mg and give with holding parameters  - can continue daily lasix  - repeat UA  - ok to dc from renal standpoint, awaiting placement     Thank you for the opportunity to assist in the care of this patient, please call with questions  Digna Mccloud MD PhD

## 2024-07-07 NOTE — CARE PLAN
The patient's goals for the shift include rest.    The clinical goals for the shift include comfort, safety and pain control.    Problem: Skin  Goal: Decreased wound size/increased tissue granulation at next dressing change  Outcome: Progressing  Flowsheets (Taken 7/7/2024 0950)  Decreased wound size/increased tissue granulation at next dressing change: Promote sleep for wound healing  Goal: Participates in plan/prevention/treatment measures  Outcome: Progressing  Flowsheets (Taken 7/7/2024 0950)  Participates in plan/prevention/treatment measures: Elevate heels  Goal: Prevent/manage excess moisture  Outcome: Progressing  Flowsheets (Taken 7/7/2024 0950)  Prevent/manage excess moisture:   Cleanse incontinence/protect with barrier cream   Moisturize dry skin   Follow provider orders for dressing changes  Goal: Prevent/minimize sheer/friction injuries  Outcome: Progressing  Flowsheets (Taken 7/7/2024 0950)  Prevent/minimize sheer/friction injuries:   Use pull sheet   HOB 30 degrees or less  Goal: Promote/optimize nutrition  Outcome: Progressing  Flowsheets (Taken 7/7/2024 0950)  Promote/optimize nutrition:   Assist with feeding   Consume > 50% meals/supplements   Monitor/record intake including meals  Goal: Promote skin healing  Outcome: Progressing  Flowsheets (Taken 7/7/2024 0950)  Promote skin healing:   Turn/reposition every 2 hours/use positioning/transfer devices   Ensure correct size (line/device) and apply per  instructions     Problem: Pain - Adult  Goal: Verbalizes/displays adequate comfort level or baseline comfort level  Outcome: Progressing     Problem: Safety - Adult  Goal: Free from fall injury  Outcome: Progressing     Problem: Discharge Planning  Goal: Discharge to home or other facility with appropriate resources  Outcome: Progressing     Problem: Chronic Conditions and Co-morbidities  Goal: Patient's chronic conditions and co-morbidity symptoms are monitored and maintained or  improved  Outcome: Progressing     Problem: Resident is recovering from orthopedic surgery  Goal: I will report effective pain management  Outcome: Progressing  Goal: I will remain free from infection  Outcome: Progressing  Goal: I will verbalize and demonstrate increased strength and ability to move  Outcome: Progressing  Goal: I will demonstrate an understanding of plan to heal skin and care for incision  Outcome: Progressing     Problem: Fall/Injury  Goal: Not fall by end of shift  Outcome: Progressing  Goal: Be free from injury by end of the shift  Outcome: Progressing  Goal: Verbalize understanding of personal risk factors for fall in the hospital  Outcome: Progressing  Goal: Verbalize understanding of risk factor reduction measures to prevent injury from fall in the home  Outcome: Progressing  Goal: Use assistive devices by end of the shift  Outcome: Progressing  Goal: Pace activities to prevent fatigue by end of the shift  Outcome: Progressing

## 2024-07-07 NOTE — CARE PLAN
The patient's goals for the shift include rest.    The clinical goals for the shift include comfort, safety and pain control.    Skin  Add All  Decreased wound size/increased tissue granulation at next dressing change  Add  Today at 0214 - Progressing by Bahman Atkins RN  Add  Flowsheets  Taken today at 0214  Decreased wound size/increased tissue granulation at next dressing change  Promote sleep for wound healing  Participates in plan/prevention/treatment measures  Add  Today at 0214 - Progressing by Bahman Atkins RN  Add  Flowsheets  Taken today at 0214  Participates in plan/prevention/treatment measures  Elevate heels  Prevent/manage excess moisture  Add  Today at 0214 - Progressing by Bahman Atkins RN  Add  Flowsheets  Taken today at 0214  Prevent/manage excess moisture  Monitor for/manage infection if present  Prevent/minimize sheer/friction injuries  Add  Today at 0214 - Progressing by Bahman Atkins RN  Add  Flowsheets  Taken today at 0214  Prevent/minimize sheer/friction injuries  HOB 30 degrees or less  Promote/optimize nutrition  Add  Today at 0214 - Progressing by Bahman Atkins RN  Add  Flowsheets  Taken today at 0214  Promote/optimize nutrition  Offer water/supplements/favorite foods  Promote skin healing  Add  Today at 0214 - Progressing by Bahman Atkins RN  Add  Flowsheets  Taken today at 0214  Promote skin healing  Assess skin/pad under line(s)/device(s)  Pain - Adult  Add All  Verbalizes/displays adequate comfort level or baseline comfort level  Add  Today at 0214 - Progressing by Bahman Atkins RN  Add  Safety - Adult  Add All  Free from fall injury  Add  Today at 0214 - Progressing by Bahman Atkins RN  Add  Discharge Planning  Add All  Discharge to home or other facility with appropriate resources  Add  Today at 0214 - Progressing by Bahman Atkins RN  Add  Chronic Conditions and Co-morbidities  Add All  Patient's chronic conditions and co-morbidity symptoms are  monitored and maintained or improved  Add  Today at 0214 - Progressing by Bahman Atkins RN  Add  Resident is recovering from orthopedic surgery  Add All  I will report effective pain management  Add  Today at 0214 - Progressing by Bahman Atkins RN  Add  I will remain free from infection  Add  Today at 0214 - Progressing by Bahman Atkins RN  Add  I will verbalize and demonstrate increased strength and ability to move  Add  Today at 0214 - Progressing by Bahman Atkins RN  Add  I will demonstrate an understanding of plan to heal skin and care for incision  Add  Today at 0214 - Progressing by Bahman Atkins RN  Add  Fall/Injury  Add All  Not fall by end of shift  Add  Today at 0214 - Progressing by Bahman Atkins RN  Add  Be free from injury by end of the shift  Add  Today at 0214 - Progressing by Bahman Atkins RN  Add  Verbalize understanding of personal risk factors for fall in the hospital  Add  Today at 0214 - Progressing by Bahman Atkins RN  Add  Verbalize understanding of risk factor reduction measures to prevent injury from fall in the home  Add  Today at 0214 - Progressing by Bahman Atkins RN  Add  Use assistive devices by end of the shift  Add  Today at 0214 - Progressing by Bahman Atkins RN  Add  Pace activities to prevent fatigue by end of the shift  Add  Today at 0214 - Progressing by Bahman Atkins RN  Add  Most Recent Note  My Note  Care PlanNo service 7/7/2024 02:14 AM

## 2024-07-07 NOTE — PROGRESS NOTES
Estefanía Contreras is a 89 y.o. female on day 6 of admission presenting with Closed displaced midcervical fracture of left femur (Multi).      Subjective   Hip pain controlled, no further sob, only complaint is of her R great toe pain,      Objective     Last Recorded Vitals  /68   Pulse 62   Temp 37.7 °C (99.9 °F)   Resp 18   Wt 65.8 kg (145 lb)   SpO2 91%     Physical Exam  G: aox3, NAD, cooperative  HENT: neck supple, no JVD  Eyes: clear sclera  CV: RRR s1 s2  L: clear  Abd: soft, NT, non distended  Ext: no c/c/e, L hip in dressing, R great toe in dressing  N: no appreciable acute focal deficits  Psych: appropriate mood and behavior    Assessment/Plan      Acute L femoral neck fx, s/p left hip hemiarthroplasty, POD#5  PALLAVI on CKD IIIb  Mechanical fall  R hallux nail avulsion s/p matrixectomy 7/1    HTN, HLD  PAD  AAA  Peripheral neuropathy  h/o ITP  DVT ppx    Plan:  - Overall doing well post operatively, pain controlled, lovenox DVT ppx., PT/OT, orthopedics following, discharge planning to SNF   - Cr continues , back on PO lasix, Cr essentially now back to baseline, nephrology has been following and ok with discharge  - Continue home cardizem with hold parameters  - Her R great toe was re-evaluated by podiatry during this hospitalization due to pain and to ensure no further issues since if office procedure, dressing changed, site looked well, and can follow up with her usual podiatrist Dr. Joyce after discharge  - Remains stable for discharge to SNF        Travon Leong DO

## 2024-07-08 ENCOUNTER — NURSING HOME VISIT (OUTPATIENT)
Dept: POST ACUTE CARE | Facility: EXTERNAL LOCATION | Age: 89
End: 2024-07-08
Payer: MEDICARE

## 2024-07-08 VITALS
WEIGHT: 145 LBS | TEMPERATURE: 99.3 F | BODY MASS INDEX: 28.47 KG/M2 | RESPIRATION RATE: 18 BRPM | OXYGEN SATURATION: 96 % | DIASTOLIC BLOOD PRESSURE: 61 MMHG | SYSTOLIC BLOOD PRESSURE: 132 MMHG | HEART RATE: 61 BPM | HEIGHT: 60 IN

## 2024-07-08 VITALS
HEART RATE: 67 BPM | TEMPERATURE: 97.8 F | BODY MASS INDEX: 27.97 KG/M2 | DIASTOLIC BLOOD PRESSURE: 71 MMHG | OXYGEN SATURATION: 93 % | SYSTOLIC BLOOD PRESSURE: 139 MMHG | RESPIRATION RATE: 18 BRPM | WEIGHT: 143.2 LBS

## 2024-07-08 DIAGNOSIS — G62.9 NEUROPATHY: ICD-10-CM

## 2024-07-08 DIAGNOSIS — N18.32 STAGE 3B CHRONIC KIDNEY DISEASE (MULTI): ICD-10-CM

## 2024-07-08 DIAGNOSIS — L60.0 ONYCHOCRYPTOSIS: ICD-10-CM

## 2024-07-08 DIAGNOSIS — I10 HYPERTENSION, UNSPECIFIED TYPE: ICD-10-CM

## 2024-07-08 DIAGNOSIS — S72.002D CLOSED FRACTURE OF NECK OF LEFT FEMUR WITH ROUTINE HEALING: Primary | ICD-10-CM

## 2024-07-08 DIAGNOSIS — K59.00 CONSTIPATION, UNSPECIFIED CONSTIPATION TYPE: ICD-10-CM

## 2024-07-08 DIAGNOSIS — D69.6 THROMBOCYTOPENIA (CMS-HCC): ICD-10-CM

## 2024-07-08 DIAGNOSIS — E78.5 HYPERLIPIDEMIA, UNSPECIFIED HYPERLIPIDEMIA TYPE: ICD-10-CM

## 2024-07-08 DIAGNOSIS — L03.031 CELLULITIS OF GREAT TOE OF RIGHT FOOT: ICD-10-CM

## 2024-07-08 DIAGNOSIS — F41.9 ANXIETY: ICD-10-CM

## 2024-07-08 DIAGNOSIS — D64.9 ANEMIA, UNSPECIFIED TYPE: ICD-10-CM

## 2024-07-08 DIAGNOSIS — R63.0 ANOREXIA: ICD-10-CM

## 2024-07-08 PROBLEM — Z96.612 STATUS POST REVERSE ARTHROPLASTY OF LEFT SHOULDER: Status: ACTIVE | Noted: 2021-08-02

## 2024-07-08 PROCEDURE — 2500000001 HC RX 250 WO HCPCS SELF ADMINISTERED DRUGS (ALT 637 FOR MEDICARE OP): Performed by: STUDENT IN AN ORGANIZED HEALTH CARE EDUCATION/TRAINING PROGRAM

## 2024-07-08 PROCEDURE — 2500000004 HC RX 250 GENERAL PHARMACY W/ HCPCS (ALT 636 FOR OP/ED): Performed by: ORTHOPAEDIC SURGERY

## 2024-07-08 PROCEDURE — 99232 SBSQ HOSP IP/OBS MODERATE 35: CPT | Performed by: STUDENT IN AN ORGANIZED HEALTH CARE EDUCATION/TRAINING PROGRAM

## 2024-07-08 PROCEDURE — 99310 SBSQ NF CARE HIGH MDM 45: CPT | Performed by: NURSE PRACTITIONER

## 2024-07-08 RX ORDER — OXYCODONE HYDROCHLORIDE 5 MG/1
5 TABLET ORAL EVERY 6 HOURS PRN
Qty: 15 TABLET | Refills: 0 | Status: SHIPPED | OUTPATIENT
Start: 2024-07-08

## 2024-07-08 RX ORDER — OXYCODONE HYDROCHLORIDE 5 MG/1
5 TABLET ORAL EVERY 6 HOURS PRN
Qty: 15 TABLET | Refills: 0 | Status: SHIPPED | OUTPATIENT
Start: 2024-07-08 | End: 2024-07-08

## 2024-07-08 ASSESSMENT — PAIN SCALES - WONG BAKER: WONGBAKER_NUMERICALRESPONSE: NO HURT

## 2024-07-08 ASSESSMENT — COGNITIVE AND FUNCTIONAL STATUS - GENERAL
MOVING TO AND FROM BED TO CHAIR: A LOT
DAILY ACTIVITIY SCORE: 11
DRESSING REGULAR UPPER BODY CLOTHING: TOTAL
TOILETING: TOTAL
MOVING FROM LYING ON BACK TO SITTING ON SIDE OF FLAT BED WITH BEDRAILS: A LOT
HELP NEEDED FOR BATHING: A LOT
CLIMB 3 TO 5 STEPS WITH RAILING: A LOT
STANDING UP FROM CHAIR USING ARMS: A LOT
PERSONAL GROOMING: A LOT
EATING MEALS: A LITTLE
DRESSING REGULAR LOWER BODY CLOTHING: A LOT
MOBILITY SCORE: 12
WALKING IN HOSPITAL ROOM: A LOT
TURNING FROM BACK TO SIDE WHILE IN FLAT BAD: A LOT

## 2024-07-08 ASSESSMENT — PAIN SCALES - GENERAL
PAINLEVEL_OUTOF10: 5 - MODERATE PAIN
PAINLEVEL_OUTOF10: 7

## 2024-07-08 ASSESSMENT — ENCOUNTER SYMPTOMS
SORE THROAT: 0
PALPITATIONS: 0
WEAKNESS: 0
FEVER: 0
DIZZINESS: 0
ENDOCRINE COMMENTS: DENIES HX OF DIABETES
RHINORRHEA: 0
FATIGUE: 0
DYSURIA: 0
CONSTIPATION: 1
DIFFICULTY URINATING: 0
SHORTNESS OF BREATH: 0
WOUND: 0
TROUBLE SWALLOWING: 0
COUGH: 0
ABDOMINAL DISTENTION: 0
VOMITING: 0
ABDOMINAL PAIN: 0
DIARRHEA: 0
NAUSEA: 0
CHILLS: 0

## 2024-07-08 ASSESSMENT — PAIN - FUNCTIONAL ASSESSMENT
PAIN_FUNCTIONAL_ASSESSMENT: WONG-BAKER FACES
PAIN_FUNCTIONAL_ASSESSMENT: 0-10
PAIN_FUNCTIONAL_ASSESSMENT: 0-10

## 2024-07-08 NOTE — PROGRESS NOTES
"MRN:[id number]     Subjective   Patient ID: Estefanía Contreras is a 89 y.o. female who presents for Initial SNF visit.  Hospital records reviewed.   89 year female [facility] to [facility] a [facility] day inpatient stay for left femoral neck fx s/p bipolar hemiarthroplasty on 7-2-24.  Also had procedure for right ingrown toenail on 7-1-24 by podiatrist.  Arrived to SNF with toe wound culture results from 7-5-24 showing growth of pseudomonas aeruginosa; currently on no ATB.  Resident states that the toenail had been ingrown for 3 years and she finally had it taken care of, but she's still having significant pain there.  \"It hurts more than my hip does.\"    Remaining hx:  HTN, HLD, CKD, PVD, AAA, peripheral neuropathy, ITP in remission.  See ROS. Full code.        Review of Systems   Constitutional:  Negative for chills, fatigue and fever.   HENT:  Negative for rhinorrhea, sore throat and trouble swallowing.    Respiratory:  Negative for cough and shortness of breath.         Does not use home oxygen.   Cardiovascular:  Negative for chest pain and palpitations.   Gastrointestinal:  Positive for constipation. Negative for abdominal distention, abdominal pain, diarrhea, nausea and vomiting.        Reports poor appetite.  Last BM 2 days ago.   Endocrine:        Denies hx of diabetes   Genitourinary:  Negative for difficulty urinating and dysuria.   Skin:  Negative for rash and wound.   Neurological:  Negative for dizziness and weakness.   Psychiatric/Behavioral:          Reports occasional anxiety and depression       Objective     /71   Pulse 67   Temp 36.6 °C (97.8 °F)   Resp 18   Wt 65 kg (143 lb 3.2 oz)   SpO2 93%   BMI 27.97 kg/m²    Physical Exam  Constitutional:       General: She is not in acute distress.     Appearance: She is not ill-appearing or toxic-appearing.      Comments: WD, WN, NAD.  Resting in bed; oxygen on.   HENT:      Head: Normocephalic and atraumatic.      Mouth/Throat: "      Mouth: Mucous membranes are moist.      Pharynx: Oropharynx is clear.   Eyes:      Conjunctiva/sclera: Conjunctivae normal.      Pupils: Pupils are equal, round, and reactive to light.   Neck:      Vascular: No carotid bruit.      Comments: No JVD  Cardiovascular:      Rate and Rhythm: Normal rate and regular rhythm.      Pulses: Normal pulses.   Pulmonary:      Effort: Pulmonary effort is normal.      Breath sounds: Normal breath sounds.   Abdominal:      General: Bowel sounds are normal. There is no distension.      Palpations: Abdomen is soft.      Tenderness: There is no abdominal tenderness. There is no guarding.   Musculoskeletal:      Right lower leg: No edema.      Left lower leg: No edema.      Comments: Wearing a rubber glove on left hand.   Skin:     General: Skin is warm and dry.      Comments: Gray dressing on left hip; dry.  Right great toe with sutures intact; mild erythema, no drainage.   Neurological:      Mental Status: She is alert and oriented to person, place, and time.       Component      Latest Ref Rng 7/6/2024   LEUKOCYTES (10*3/UL) IN BLOOD BY AUTOMATED COUNT, Citizen of [place]      4.4 - 11.3 x10*3/uL 5.1    nRBC      0.0 - 0.0 /100 WBCs 0.0    ERYTHROCYTES (10*6/UL) IN BLOOD BY AUTOMATED COUNT, Citizen of [place]      4.00 - 5.20 x10*6/uL 3.11 (L)    HEMOGLOBIN      12.0 - 16.0 g/dL 9.1 (L)    HEMATOCRIT      36.0 - 46.0 % 29.0 (L)    MCV      80 - 100 fL 93    MCHC      32.0 - 36.0 g/dL 31.4 (L)    PLATELETS (10*3/UL) IN BLOOD AUTOMATED COUNT, Citizen of [place]      150 - 450 x10*3/uL 110 (L)    RED CELL DISTRIBUTION WIDTH      11.5 - 14.5 % 14.5    MCH      26.0 - 34.0 pg 29.3       Legend:  (L) Low  Component      Latest Ref Rng 7/6/2024   GLUCOSE      74 - 99 mg/dL 86    SODIUM      136 - 145 mmol/L 140    POTASSIUM      3.5 - 5.3 mmol/L 4.7    CHLORIDE      98 - 107 mmol/L 106    Bicarbonate      21 - 32 mmol/L 30    Anion Gap      10 - 20 mmol/L 9 (L)    Blood Urea Nitrogen      6 - 23 mg/dL 39 (H)   "  Creatinine      0.50 - 1.05 mg/dL 1.39 (H)    Calcium      8.6 - 10.3 mg/dL 7.9 (L)    EGFR      >60 mL/min/1.73m*2 36 (L)       Legend:  (L) Low  (H) High    Assessment/Plan   Problem List Items Addressed This Visit             ICD-10-CM    Closed fracture of neck of left femur with routine healing - Primary S72.002D     Continue Lovenox x 24 days for clot prophylaxis, prn Oxy IR for pain; new Rx written.  PT/OT to maximize safety, strength, and function.         Stage 3b chronic kidney disease (Multi) N18.32     Will trend weekly BMP for now         Thrombocytopenia (CMS-HCC) D69.6     Trend weekly CBC for now.  Hx ITP, listed as being in remission.         HTN (hypertension) I10     Controlled with current doses of Lasix and Cardizem; continue.         Cellulitis of great toe of right foot L03.031     Wound culture report reviewed.  Will start Levaquin x 14 days with probiotic.         Onychocryptosis L60.0     S/p surgical procedure by podiatrist.  Observe.         Anemia D64.9     Will trend weekly CBC for now         Neuropathy G62.9     Left hand.  Wears a rubber glove alternating with \"arthritis glove\", per resident.           HLD (hyperlipidemia) E78.5     Continue statin         Anorexia R63.0     Continue Remeron at 15 mg nightly.  Will change weights to three times weekly to trend.         Constipation K59.00     Continue bid Senna S and daily Miralax.  Will add Colace.         Anxiety F41.9     Will change prn Valium to prn Ativan; Rx written.          Unclear use of pramipexole; continue.  Will refer back to PCP after discharge.  Time Spent  Prep time on day of patient encounter: 15 minutes  Time spent directly with patient, family or caregiver: 10 minutes  Documentation Time: 40 minutes    Time spent:  [id number] + [id number]      Diagnoses and all orders for this visit:  Closed fracture of neck of left femur with routine healing  Cellulitis of great toe of right foot  Onychocryptosis  Anemia, " unspecified type  Stage 3b chronic kidney disease (Multi)  Hypertension, unspecified type  Neuropathy  Thrombocytopenia (CMS-HCC)  Hyperlipidemia, unspecified hyperlipidemia type  Anorexia  Constipation, unspecified constipation type  Anxiety

## 2024-07-08 NOTE — PROGRESS NOTES
Estefanía Contreras is a 89 y.o. female on day 7 of admission presenting with Closed displaced midcervical fracture of left femur (Multi).      Subjective   Hip pain controlled, no further sob, only complaint is of her R great toe pain which has improved,      Objective     Last Recorded Vitals  /61   Pulse 61   Temp 37.4 °C (99.3 °F)   Resp 18   Wt 65.8 kg (145 lb)   SpO2 96%     Physical Exam  G: aox3, NAD, cooperative  HENT: neck supple, no JVD  Eyes: clear sclera  CV: RRR s1 s2  L: clear  Abd: soft, NT, non distended  Ext: no c/c/e, L hip in dressing, R great toe in dressing  N: no appreciable acute focal deficits  Psych: appropriate mood and behavior    Assessment/Plan      Acute L femoral neck fx, s/p left hip hemiarthroplasty, POD#6  PALLAVI on CKD IIIb  Mechanical fall  R hallux nail avulsion s/p matrixectomy 7/1    HTN, HLD  PAD  AAA  Peripheral neuropathy  h/o ITP  DVT ppx    Plan:  - Overall doing well post operatively, pain controlled, lovenox DVT ppx., PT/OT, orthopedics following, discharge planning to SNF   - Back on PO lasix, Cr essentially now back to baseline, nephrology has been following and ok with discharge  - Continue home cardizem with hold parameters  - Her R great toe was re-evaluated by podiatry during this hospitalization due to pain and to ensure no further issues since if office procedure, dressing changed, site looked well, and can follow up with her usual podiatrist Dr. Joyce after discharge  - Remains stable for discharge to SNF        Travon Leong DO

## 2024-07-08 NOTE — DISCHARGE SUMMARY
Discharge Diagnosis  Closed displaced midcervical fracture of left femur (Multi)    Discharge Meds     Your medication list        START taking these medications        Instructions Last Dose Given Next Dose Due   enoxaparin 30 mg/0.3 mL syringe  Commonly known as: Lovenox      Inject 0.3 mL (30 mg) under the skin once daily for 24 days.       oxyCODONE 5 mg immediate release tablet  Commonly known as: Roxicodone      Take 1 tablet (5 mg) by mouth every 6 hours if needed for moderate pain (4 - 6) or severe pain (7 - 10).       polyethylene glycol 17 gram packet  Commonly known as: Glycolax, Miralax      Take 17 g by mouth once daily.       sennosides-docusate sodium 8.6-50 mg tablet  Commonly known as: Lynn-Colace      Take 2 tablets by mouth 2 times a day for 20 doses.              CONTINUE taking these medications        Instructions Last Dose Given Next Dose Due   acetaminophen 325 mg tablet  Commonly known as: Tylenol           dilTIAZem  mg 24 hr tablet  Commonly known as: Cardizem LA           furosemide 20 mg tablet  Commonly known as: Lasix           LORazepam 2 mg tablet  Commonly known as: Ativan           mirtazapine 7.5 mg tablet  Commonly known as: Remeron           pramipexole 1 mg tablet  Commonly known as: Mirapex           simvastatin 20 mg tablet  Commonly known as: Zocor                  STOP taking these medications      meloxicam 15 mg tablet  Commonly known as: Mobic        potassium chloride CR 20 mEq ER tablet  Commonly known as: Klor-Con M20                  Where to Get Your Medications        These medications were sent to [facility], [place] - [address]  [address], [place] [address]      Phone: [phone]   enoxaparin 30 mg/0.3 mL syringe  oxyCODONE 5 mg immediate release tablet  polyethylene glycol 17 gram packet  sennosides-docusate sodium 8.6-50 mg tablet         Test Results Pending At Discharge  Pending Labs       Order Current Status    Surgical Pathology Exam  In process            Hospital Course   89 yo F with PMHx of CKD IIIb, HTN, HLD, PAD, AAA, peripheral neuropathy, and ITP presented with L hip pain after a mechanical fall. She was found to have an acute L femoral neck fx. Orthopedics was consulted and she was taken for a left hip hemiarthroplasty. She tolerated procedure well without complication. Her hospitalization was complicated by an PALLAVI which resolved by time of discharge. She was ultimately discharged to a SNF in stable condition.    35min    Pertinent Physical Exam At Time of Discharge  G: aox3, NAD, cooperative  HENT: neck supple, no JVD  Eyes: clear sclera  CV: RRR s1 s2  L: clear  Abd: soft, NT, non distended  Ext: no c/c/e, L hip in dressing, R great toe in dressing  N: no appreciable acute focal deficits  Psych: appropriate mood and behavior    Travon Leong, DO

## 2024-07-08 NOTE — ASSESSMENT & PLAN NOTE
Continue Lovenox x 24 days for clot prophylaxis, prn Oxy IR for pain; new Rx written.  PT/OT to maximize safety, strength, and function.

## 2024-07-08 NOTE — CARE PLAN
Problem: Pain - Adult  Goal: Verbalizes/displays adequate comfort level or baseline comfort level  Outcome: Progressing     Problem: Safety - Adult  Goal: Free from fall injury  Outcome: Progressing     Problem: Discharge Planning  Goal: Discharge to home or other facility with appropriate resources  Outcome: Progressing     Problem: Chronic Conditions and Co-morbidities  Goal: Patient's chronic conditions and co-morbidity symptoms are monitored and maintained or improved  Outcome: Progressing     Problem: Skin  Goal: Decreased wound size/increased tissue granulation at next dressing change  Outcome: Progressing  Flowsheets (Taken 7/8/2024 0911)  Decreased wound size/increased tissue granulation at next dressing change: Promote sleep for wound healing  Goal: Participates in plan/prevention/treatment measures  Outcome: Progressing  Flowsheets (Taken 7/8/2024 0911)  Participates in plan/prevention/treatment measures: Elevate heels  Goal: Prevent/manage excess moisture  Outcome: Progressing  Flowsheets (Taken 7/8/2024 0911)  Prevent/manage excess moisture: Moisturize dry skin  Goal: Prevent/minimize sheer/friction injuries  Outcome: Progressing  Flowsheets (Taken 7/8/2024 0911)  Prevent/minimize sheer/friction injuries: HOB 30 degrees or less  Goal: Promote/optimize nutrition  Outcome: Progressing  Flowsheets (Taken 7/8/2024 0911)  Promote/optimize nutrition: Consume > 50% meals/supplements  Goal: Promote skin healing  Outcome: Progressing  Flowsheets (Taken 7/8/2024 0911)  Promote skin healing: Assess skin/pad under line(s)/device(s)     Problem: Resident is recovering from orthopedic surgery  Goal: I will report effective pain management  Outcome: Progressing  Goal: I will remain free from infection  Outcome: Progressing  Goal: I will verbalize and demonstrate increased strength and ability to move  Outcome: Progressing  Goal: I will demonstrate an understanding of plan to heal skin and care for incision  Outcome:  Progressing     Problem: Fall/Injury  Goal: Not fall by end of shift  Outcome: Progressing  Goal: Be free from injury by end of the shift  Outcome: Progressing  Goal: Verbalize understanding of personal risk factors for fall in the hospital  Outcome: Progressing  Goal: Verbalize understanding of risk factor reduction measures to prevent injury from fall in the home  Outcome: Progressing  Goal: Use assistive devices by end of the shift  Outcome: Progressing  Goal: Pace activities to prevent fatigue by end of the shift  Outcome: Progressing   The patient's goals for the shift include rest.    The clinical goals for the shift include Pt will report a decrease in pain with PRN medications

## 2024-07-08 NOTE — PROGRESS NOTES
Nephrology Consult Progress Note    Estefanía Contreras is a 89 y.o. female on day 7 of admission presenting with Closed displaced midcervical fracture of left femur (Multi).      Subjective   No acute events overnight, family at bedside       Objective          Physical Exam    Vitals 24HR  Heart Rate:  [61-66]   Temp:  [36.1 °C (97 °F)-37.4 °C (99.3 °F)]   Resp:  [18]   BP: (125-139)/(59-67)   SpO2:  [92 %-96 %]           G: awake, moderate distress due to pain, cooperative, on RA  HENT: neck supple, no JVD  Eyes: clear sclera  CV: RRR s1 s2  L: clear  Abd: soft, NT, non distended  Ext: no c/c/e, L hip in dressing  N: no appreciable acute focal deficits  Psych: appropriate mood and behavior           I&O 24HR    Intake/Output Summary (Last 24 hours) at 7/8/2024 1217  Last data filed at 7/7/2024 2141  Gross per 24 hour   Intake 120 ml   Output --   Net 120 ml         Medications  Medications Prior to Admission   Medication Sig Dispense Refill Last Dose    furosemide (Lasix) 20 mg tablet Take by mouth.   6/30/2024 at 0800    LORazepam (Ativan) 2 mg tablet Take 1 tablet (2 mg) by mouth as needed at bedtime for sedation.   6/30/2024 at 2100    potassium chloride CR 20 mEq ER tablet Take by mouth.   6/30/2024 at 93256    pramipexole (Mirapex) 1 mg tablet Take 1 tablet (1 mg) by mouth once daily at bedtime.   6/30/2024 at 2100    simvastatin (Zocor) 20 mg tablet Take 1 tablet (20 mg) by mouth once daily at bedtime.   6/30/2024 at 2100    acetaminophen (Tylenol) 325 mg tablet Take 1 tablet (325 mg) by mouth every 4 hours if needed for mild pain (1 - 3).       dilTIAZem LA (Cardizem LA) 180 mg 24 hr tablet Take by mouth.       meloxicam (Mobic) 15 mg tablet Take by mouth.   Unknown    mirtazapine (Remeron) 7.5 mg tablet Take 2 tablets (15 mg) by mouth once daily at bedtime.   Unknown      Scheduled medications  acetaminophen, 650 mg, oral, q6h CURTIS  dilTIAZem CD, 180 mg, oral, Daily  enoxaparin, 30 mg, subcutaneous,  Daily  furosemide, 20 mg, oral, Daily  mirtazapine, 15 mg, oral, Nightly  polyethylene glycol, 17 g, oral, Daily  pramipexole, 1 mg, oral, Nightly  sennosides-docusate sodium, 2 tablet, oral, BID  simvastatin, 20 mg, oral, Nightly      Continuous medications  oxygen, 2 L/min      PRN medications  PRN medications: acetaminophen **OR** acetaminophen **OR** acetaminophen, acetaminophen **OR** acetaminophen **OR** acetaminophen, LORazepam, melatonin, morphine, naloxone, ondansetron **OR** ondansetron, oxyCODONE, traMADol    Relevant Results      Admission on 07/01/2024   Component Date Value Ref Range Status    WBC 07/01/2024 6.1  4.4 - 11.3 x10*3/uL Final    nRBC 07/01/2024 0.0  0.0 - 0.0 /100 WBCs Final    RBC 07/01/2024 4.22  4.00 - 5.20 x10*6/uL Final    Hemoglobin 07/01/2024 12.5  12.0 - 16.0 g/dL Final    Hematocrit 07/01/2024 39.5  36.0 - 46.0 % Final    MCV 07/01/2024 94  80 - 100 fL Final    MCH 07/01/2024 29.6  26.0 - 34.0 pg Final    MCHC 07/01/2024 31.6 (L)  32.0 - 36.0 g/dL Final    RDW 07/01/2024 14.9 (H)  11.5 - 14.5 % Final    Platelets 07/01/2024 85 (L)  150 - 450 x10*3/uL Final    Platelet count verified by smear review.    Neutrophils % 07/01/2024 82.0  40.0 - 80.0 % Final    Immature Granulocytes %, Automated 07/01/2024 0.3  0.0 - 0.9 % Final    Immature Granulocyte Count (IG) includes promyelocytes, myelocytes and metamyelocytes but does not include bands. Percent differential counts (%) should be interpreted in the context of the absolute cell counts (cells/UL).    Lymphocytes % 07/01/2024 11.6  13.0 - 44.0 % Final    Monocytes % 07/01/2024 4.6  2.0 - 10.0 % Final    Eosinophils % 07/01/2024 1.0  0.0 - 6.0 % Final    Basophils % 07/01/2024 0.5  0.0 - 2.0 % Final    Neutrophils Absolute 07/01/2024 5.02  1.60 - 5.50 x10*3/uL Final    Percent differential counts (%) should be interpreted in the context of the absolute cell counts (cells/uL).    Immature Granulocytes Absolute, Au* 07/01/2024 0.02  0.00  - 0.50 x10*3/uL Final    Lymphocytes Absolute 07/01/2024 0.71 (L)  0.80 - 3.00 x10*3/uL Final    Monocytes Absolute 07/01/2024 0.28  0.05 - 0.80 x10*3/uL Final    Eosinophils Absolute 07/01/2024 0.06  0.00 - 0.40 x10*3/uL Final    Basophils Absolute 07/01/2024 0.03  0.00 - 0.10 x10*3/uL Final    Glucose 07/01/2024 95  74 - 99 mg/dL Final    Sodium 07/01/2024 139  136 - 145 mmol/L Final    Potassium 07/01/2024 4.4  3.5 - 5.3 mmol/L Final    Chloride 07/01/2024 103  98 - 107 mmol/L Final    Bicarbonate 07/01/2024 28  21 - 32 mmol/L Final    Anion Gap 07/01/2024 12  10 - 20 mmol/L Final    Urea Nitrogen 07/01/2024 55 (H)  6 - 23 mg/dL Final    Creatinine 07/01/2024 2.40 (H)  0.50 - 1.05 mg/dL Final    eGFR 07/01/2024 19 (L)  >60 mL/min/1.73m*2 Final    Calculations of estimated GFR are performed using the 2021 CKD-EPI Study Refit equation without the race variable for the IDMS-Traceable creatinine methods.  https://jasn.asnjournals.org/content/early/2021/09/22/ASN.5343941694    Calcium 07/01/2024 8.7  8.6 - 10.3 mg/dL Final    Albumin 07/01/2024 3.7  3.4 - 5.0 g/dL Final    Alkaline Phosphatase 07/01/2024 60  33 - 136 U/L Final    Total Protein 07/01/2024 6.5  6.4 - 8.2 g/dL Final    AST 07/01/2024 17  9 - 39 U/L Final    Bilirubin, Total 07/01/2024 0.5  0.0 - 1.2 mg/dL Final    ALT 07/01/2024 11  7 - 45 U/L Final    Patients treated with Sulfasalazine may generate falsely decreased results for ALT.    Magnesium 07/01/2024 2.41 (H)  1.60 - 2.40 mg/dL Final    Ventricular Rate 07/01/2024 76  BPM Preliminary    Atrial Rate 07/01/2024 76  BPM Preliminary    VA Interval 07/01/2024 200  ms Preliminary    QRS Duration 07/01/2024 84  ms Preliminary    QT Interval 07/01/2024 386  ms Preliminary    QTC Calculation(Bazett) 07/01/2024 434  ms Preliminary    P Axis 07/01/2024 74  degrees Preliminary    R Axis 07/01/2024 -50  degrees Preliminary    T Axis 07/01/2024 41  degrees Preliminary    QRS Count 07/01/2024 12  beats  Preliminary    Q Onset 07/01/2024 214  ms Preliminary    P Onset 07/01/2024 114  ms Preliminary    P Offset 07/01/2024 175  ms Preliminary    T Offset 07/01/2024 407  ms Preliminary    QTC Fredericia 07/01/2024 417  ms Preliminary    Protime 07/01/2024 10.8  9.8 - 12.8 seconds Final    INR 07/01/2024 1.0  0.9 - 1.1 Final    aPTT 07/01/2024 38  27 - 38 seconds Final    ABO TYPE 07/01/2024 A   Final    Rh TYPE 07/01/2024 POS   Final    ANTIBODY SCREEN 07/01/2024 NEG   Final    RBC Morphology 07/01/2024 See Below   Final    RBC Fragments 07/01/2024 Few   Final    Ovalocytes 07/01/2024 Few   Final    Panfilo Cells 07/01/2024 Few   Final    WBC 07/02/2024 8.5  4.4 - 11.3 x10*3/uL Final    nRBC 07/02/2024 0.0  0.0 - 0.0 /100 WBCs Final    RBC 07/02/2024 3.95 (L)  4.00 - 5.20 x10*6/uL Final    Hemoglobin 07/02/2024 11.6 (L)  12.0 - 16.0 g/dL Final    Hematocrit 07/02/2024 37.2  36.0 - 46.0 % Final    MCV 07/02/2024 94  80 - 100 fL Final    MCH 07/02/2024 29.4  26.0 - 34.0 pg Final    MCHC 07/02/2024 31.2 (L)  32.0 - 36.0 g/dL Final    RDW 07/02/2024 14.9 (H)  11.5 - 14.5 % Final    Platelets 07/02/2024 82 (L)  150 - 450 x10*3/uL Final    Glucose 07/02/2024 77  74 - 99 mg/dL Final    Sodium 07/02/2024 139  136 - 145 mmol/L Final    Potassium 07/02/2024 5.0  3.5 - 5.3 mmol/L Final    Chloride 07/02/2024 105  98 - 107 mmol/L Final    Bicarbonate 07/02/2024 28  21 - 32 mmol/L Final    Anion Gap 07/02/2024 11  10 - 20 mmol/L Final    Urea Nitrogen 07/02/2024 55 (H)  6 - 23 mg/dL Final    Creatinine 07/02/2024 1.91 (H)  0.50 - 1.05 mg/dL Final    eGFR 07/02/2024 25 (L)  >60 mL/min/1.73m*2 Final    Calculations of estimated GFR are performed using the 2021 CKD-EPI Study Refit equation without the race variable for the IDMS-Traceable creatinine methods.  https://jasn.asnjournals.org/content/early/2021/09/22/ASN.5776373446    Calcium 07/02/2024 8.2 (L)  8.6 - 10.3 mg/dL Final    WBC 07/03/2024 6.5  4.4 - 11.3 x10*3/uL Final    nRBC  07/03/2024 0.0  0.0 - 0.0 /100 WBCs Final    RBC 07/03/2024 3.59 (L)  4.00 - 5.20 x10*6/uL Final    Hemoglobin 07/03/2024 10.7 (L)  12.0 - 16.0 g/dL Final    Hematocrit 07/03/2024 35.0 (L)  36.0 - 46.0 % Final    MCV 07/03/2024 98  80 - 100 fL Final    MCH 07/03/2024 29.8  26.0 - 34.0 pg Final    MCHC 07/03/2024 30.6 (L)  32.0 - 36.0 g/dL Final    RDW 07/03/2024 14.5  11.5 - 14.5 % Final    Platelets 07/03/2024 85 (L)  150 - 450 x10*3/uL Final    Glucose 07/03/2024 194 (H)  74 - 99 mg/dL Final    Sodium 07/03/2024 136  136 - 145 mmol/L Final    Potassium 07/03/2024 5.3  3.5 - 5.3 mmol/L Final    Chloride 07/03/2024 105  98 - 107 mmol/L Final    Bicarbonate 07/03/2024 23  21 - 32 mmol/L Final    Anion Gap 07/03/2024 13  10 - 20 mmol/L Final    Urea Nitrogen 07/03/2024 53 (H)  6 - 23 mg/dL Final    Creatinine 07/03/2024 2.06 (H)  0.50 - 1.05 mg/dL Final    eGFR 07/03/2024 23 (L)  >60 mL/min/1.73m*2 Final    Calculations of estimated GFR are performed using the 2021 CKD-EPI Study Refit equation without the race variable for the IDMS-Traceable creatinine methods.  https://jasn.asnjournals.org/content/early/2021/09/22/ASN.4809522430    Calcium 07/03/2024 7.6 (L)  8.6 - 10.3 mg/dL Final    Magnesium 07/03/2024 2.28  1.60 - 2.40 mg/dL Final    WBC 07/04/2024 6.1  4.4 - 11.3 x10*3/uL Final    nRBC 07/04/2024 0.0  0.0 - 0.0 /100 WBCs Final    RBC 07/04/2024 2.92 (L)  4.00 - 5.20 x10*6/uL Final    Hemoglobin 07/04/2024 8.6 (L)  12.0 - 16.0 g/dL Final    Hematocrit 07/04/2024 27.6 (L)  36.0 - 46.0 % Final    MCV 07/04/2024 95  80 - 100 fL Final    MCH 07/04/2024 29.5  26.0 - 34.0 pg Final    MCHC 07/04/2024 31.2 (L)  32.0 - 36.0 g/dL Final    RDW 07/04/2024 14.6 (H)  11.5 - 14.5 % Final    Platelets 07/04/2024 81 (L)  150 - 450 x10*3/uL Final    Glucose 07/04/2024 89  74 - 99 mg/dL Final    Sodium 07/04/2024 138  136 - 145 mmol/L Final    Potassium 07/04/2024 4.9  3.5 - 5.3 mmol/L Final    Chloride 07/04/2024 106  98 - 107  mmol/L Final    Bicarbonate 07/04/2024 27  21 - 32 mmol/L Final    Anion Gap 07/04/2024 10  10 - 20 mmol/L Final    Urea Nitrogen 07/04/2024 53 (H)  6 - 23 mg/dL Final    Creatinine 07/04/2024 2.00 (H)  0.50 - 1.05 mg/dL Final    eGFR 07/04/2024 23 (L)  >60 mL/min/1.73m*2 Final    Calculations of estimated GFR are performed using the 2021 CKD-EPI Study Refit equation without the race variable for the IDMS-Traceable creatinine methods.  https://jasn.asnjournals.org/content/early/2021/09/22/ASN.0769265132    Calcium 07/04/2024 7.5 (L)  8.6 - 10.3 mg/dL Final    Iron 07/04/2024 28 (L)  35 - 150 ug/dL Final    UIBC 07/04/2024 182  110 - 370 ug/dL Final    TIBC 07/04/2024 210 (L)  240 - 445 ug/dL Final    % Saturation 07/04/2024 13 (L)  25 - 45 % Final    WBC 07/05/2024 6.0  4.4 - 11.3 x10*3/uL Final    nRBC 07/05/2024 0.0  0.0 - 0.0 /100 WBCs Final    RBC 07/05/2024 3.25 (L)  4.00 - 5.20 x10*6/uL Final    Hemoglobin 07/05/2024 9.6 (L)  12.0 - 16.0 g/dL Final    Hematocrit 07/05/2024 31.6 (L)  36.0 - 46.0 % Final    MCV 07/05/2024 97  80 - 100 fL Final    MCH 07/05/2024 29.5  26.0 - 34.0 pg Final    MCHC 07/05/2024 30.4 (L)  32.0 - 36.0 g/dL Final    RDW 07/05/2024 14.6 (H)  11.5 - 14.5 % Final    Platelets 07/05/2024 113 (L)  150 - 450 x10*3/uL Final    Glucose 07/05/2024 84  74 - 99 mg/dL Final    Sodium 07/05/2024 137  136 - 145 mmol/L Final    Potassium 07/05/2024 4.8  3.5 - 5.3 mmol/L Final    Chloride 07/05/2024 107  98 - 107 mmol/L Final    Bicarbonate 07/05/2024 25  21 - 32 mmol/L Final    Anion Gap 07/05/2024 10  10 - 20 mmol/L Final    Urea Nitrogen 07/05/2024 49 (H)  6 - 23 mg/dL Final    Creatinine 07/05/2024 1.69 (H)  0.50 - 1.05 mg/dL Final    eGFR 07/05/2024 29 (L)  >60 mL/min/1.73m*2 Final    Calculations of estimated GFR are performed using the 2021 CKD-EPI Study Refit equation without the race variable for the IDMS-Traceable creatinine  methods.  https://jasn.asnjournals.org/content/early/2021/09/22/ASN.6839679163    Calcium 07/05/2024 7.7 (L)  8.6 - 10.3 mg/dL Final    WBC 07/06/2024 5.1  4.4 - 11.3 x10*3/uL Final    nRBC 07/06/2024 0.0  0.0 - 0.0 /100 WBCs Final    RBC 07/06/2024 3.11 (L)  4.00 - 5.20 x10*6/uL Final    Hemoglobin 07/06/2024 9.1 (L)  12.0 - 16.0 g/dL Final    Hematocrit 07/06/2024 29.0 (L)  36.0 - 46.0 % Final    MCV 07/06/2024 93  80 - 100 fL Final    MCH 07/06/2024 29.3  26.0 - 34.0 pg Final    MCHC 07/06/2024 31.4 (L)  32.0 - 36.0 g/dL Final    RDW 07/06/2024 14.5  11.5 - 14.5 % Final    Platelets 07/06/2024 110 (L)  150 - 450 x10*3/uL Final    Glucose 07/06/2024 86  74 - 99 mg/dL Final    Sodium 07/06/2024 140  136 - 145 mmol/L Final    Potassium 07/06/2024 4.7  3.5 - 5.3 mmol/L Final    Chloride 07/06/2024 106  98 - 107 mmol/L Final    Bicarbonate 07/06/2024 30  21 - 32 mmol/L Final    Anion Gap 07/06/2024 9 (L)  10 - 20 mmol/L Final    Urea Nitrogen 07/06/2024 39 (H)  6 - 23 mg/dL Final    Creatinine 07/06/2024 1.39 (H)  0.50 - 1.05 mg/dL Final    eGFR 07/06/2024 36 (L)  >60 mL/min/1.73m*2 Final    Calculations of estimated GFR are performed using the 2021 CKD-EPI Study Refit equation without the race variable for the IDMS-Traceable creatinine methods.  https://jasn.asnjournals.org/content/early/2021/09/22/ASN.9186887727    Calcium 07/06/2024 7.9 (L)  8.6 - 10.3 mg/dL Final      XR chest 1 view    Result Date: 7/4/2024  Interpreted By:  Heather Hernadez, STUDY: XR CHEST 1 VIEW;  7/4/2024 1:30 pm   INDICATION: Signs/Symptoms:hypoxia.   COMPARISON: 07/01/2024   ACCESSION NUMBER(S): [id number]   ORDERING CLINICIAN: ZENIA SEALS   FINDINGS: Patient's chin obscures part of left lung apex. The heart appears to be enlarged. Prominent dextroscoliosis is seen. A vascular stent is also noted in the abdomen. Calcified granulomas in the left midlung. Diffuse bilateral interstitial prominence is seen. No gross consolidation, pleural  effusion pneumothorax is noted.       Cardiomegaly.   Diffuse bilateral interstitial prominence, which is unchanged may be related to chronic lung changes and/or pulmonary edema.   Old granulomatous disease.       MACRO: None   Signed by: Heather Hernadez 7/4/2024 1:44 PM Dictation workstation:   [id number]    XR pelvis 1-2 views    Result Date: 7/2/2024  Interpreted By:  Abraham Rodrigues, STUDY: XR PELVIS 1-2 VIEWS; ;  7/2/2024 5:36 pm   INDICATION: Signs/Symptoms:Post op hip.   COMPARISON: 07/01/2024   ACCESSION NUMBER(S): [id number]   ORDERING CLINICIAN: DAMARIS LAMBERT   FINDINGS: Pelvis, two views   Interval left hip hemiarthroplasty. No periprosthetic fracture or lucency seen. No dislocation.       No immediate complication of the left hip hemiarthroplasty     MACRO: None   Signed by: Abraham Rodrigues 7/2/2024 6:13 PM Dictation workstation:   [id number]    ECG 12 lead    Result Date: 7/2/2024  Sinus rhythm with Premature atrial complexes Left anterior fascicular block Possible Anterior infarct , age undetermined Abnormal ECG No previous ECGs available    XR hip right with pelvis when performed 2 or 3 views    Result Date: 7/1/2024  STUDY: Pelvis and right Hip Radiographs; 7/1/2024 7:46PM INDICATION: Patient had a mechanical fall forward complaining of left hip and femur pain, also tender to palpation of right knee and right hip. COMPARISON: None available. ACCESSION NUMBER(S): [id number] ORDERING CLINICIAN: JOVITA WOOTEN TECHNIQUE:  AP view of the pelvis and two view(s) of the right hip. FINDINGS:  PELVIS: The pelvic ring is intact.  There is no acute fracture in the pelvis itself but there is a displaced femoral neck fracture on the left.. An aortobiiliac stent is partially visible in the upper pelvis. Right HIP: There is no displaced fracture.  There is mild osteoarthritis in the right hip joint..  No soft tissue abnormality is seen.    There is mild osteoarthritis in the right hip but the right hip  otherwise appears intact.  There is a displaced femoral neck fracture on the left.. Signed by Wilfred Choi MD    XR chest 1 view    Result Date: 7/1/2024  STUDY: Chest Radiograph;  7/1/2024 7:46PM INDICATION: Reported new oxygen demand. COMPARISON: XR chest 12/14/2022 ACCESSION NUMBER(S): [id number] ORDERING CLINICIAN: JOVITA WOOTEN TECHNIQUE:  Frontal chest was obtained at 19:46 hours. FINDINGS: CARDIOMEDIASTINAL SILHOUETTE: Heart is mildly enlarged with mild pulmonary vascular congestion..  LUNGS: Lungs are clear. Calcified granuloma in the left midlung field.  ABDOMEN: Aortic stent graft noted.  BONES: No acute osseous changes. Reverse left shoulder arthroplasty noted.    Mild cardiomegaly and pulmonary vascular congestion. Signed by Chauncey Chu MD    XR tibia fibula left 2 views    Result Date: 7/1/2024  STUDY: Tibia and Fibula Radiographs; 7/1/2024 7:46 PM. INDICATION: Pain and bruising at left tibial plateau.  Evaluate for fracture. COMPARISON: None. ACCESSION NUMBER(S): [id number] ORDERING CLINICIAN: JOVITA WOOTEN TECHNIQUE:  2 view(s) of the left tibia and fibula. FINDINGS:  There is no displaced fracture.  The alignment is anatomic.  No soft tissue abnormality is seen. Degenerative change of the knee and ankle joint noted.    No acute osseous abnormality Signed by Chauncey Chu MD    XR femur left 2+ views    Result Date: 7/1/2024  STUDY: Femur Radiographs; 7/1/2024 7:46PM INDICATION: Patient had a mechanical fall forward complaining of left hip and femur pain. COMPARISON: None available. ACCESSION NUMBER(S): [id number] ORDERING CLINICIAN: JOVITA WOOTEN TECHNIQUE:  Two view(four images) of the left femur. FINDINGS:  There is an acute left subcapital femoral neck fracture with moderate varus angulation.  The alignment is anatomic.  No soft tissue abnormality is seen. Degenerative changes of the hip and knee joint noted.    Acute left subcapital femoral neck fracture. Signed by Chauncey Chu  MD    XR knee right 1-2 views    Result Date: 7/1/2024  STUDY: Knee Radiographs; 7/1/2024 7:46PM INDICATION: Patient had a mechanical fall forward complaining of left hip and femur pain also tender to palpitation of right knee and right hip. COMPARISON: None available. ACCESSION NUMBER(S): [id number] ORDERING CLINICIAN: JOVITA WOOTEN TECHNIQUE:  Two view(s) of the right knee. FINDINGS:  There is no displaced fracture.  The alignment is anatomic.  Diffuse osteopenia is seen.  Chondrocalcinosis is present..  There is no joint effusion.    Osteopenia and chondrocalcinosis.  No acute abnormality.. Signed by Ky Greenberg MD        ASSESSMENT AND PLAN    PALLAVI on CKD, baseline creatinine of 1.2-1.3, was 1.9 on admission and up to 2.06, improving, probably ischemic ATN with documented SBP in the 90s     Hx of HTN, now BP borderline low     Lytes controlled, no acidosis     Volume status - ok     Anemia, acute on chronic     Plan  - resolved PALLAVI, creatinine at baseline encourage po  - give BP meds with holding parameters  - can continue daily lasix  - repeat UA  - ok to dc from renal standpoint, noted NH today     Thank you for the opportunity to assist in the care of this patient, please call with questions  Digna Mccloud MD PhD

## 2024-07-08 NOTE — CARE PLAN
Problem: Pain - Adult  Goal: Verbalizes/displays adequate comfort level or baseline comfort level  7/8/2024 0028 by Yasmeen Coker RN  Outcome: Progressing  7/8/2024 0028 by Yasmeen Coker RN  Outcome: Progressing     Problem: Fall/Injury  Goal: Be free from injury by end of the shift  Outcome: Progressing   The patient's goals for the shift include rest.    The clinical goals for the shift include Pt. will report a decrease in pain with PRN pain medications

## 2024-07-08 NOTE — LETTER
"Patient: Estefanía Contreras  : 1934    Encounter Date: 2024    MRN:[id number]     Subjective  Patient ID: Estefanía Contreras is a 89 y.o. female who presents for Initial SNF visit.  Hospital records reviewed.   89 year female admitted to [facility] on 24 from [facility] after a 7 day inpatient stay for left femoral neck fx s/p bipolar hemiarthroplasty on 24.  Also had procedure for right ingrown toenail on 24 by podiatrist.  Arrived to SNF with toe wound culture results from 24 showing growth of pseudomonas aeruginosa; currently on no ATB.  Resident states that the toenail had been ingrown for 3 years and she finally had it taken care of, but she's still having significant pain there.  \"It hurts more than my hip does.\"    Remaining hx:  HTN, HLD, CKD, PVD, AAA, peripheral neuropathy, ITP in remission.  See ROS. Full code.        Review of Systems   Constitutional:  Negative for chills, fatigue and fever.   HENT:  Negative for rhinorrhea, sore throat and trouble swallowing.    Respiratory:  Negative for cough and shortness of breath.         Does not use home oxygen.   Cardiovascular:  Negative for chest pain and palpitations.   Gastrointestinal:  Positive for constipation. Negative for abdominal distention, abdominal pain, diarrhea, nausea and vomiting.        Reports poor appetite.  Last BM 2 days ago.   Endocrine:        Denies hx of diabetes   Genitourinary:  Negative for difficulty urinating and dysuria.   Skin:  Negative for rash and wound.   Neurological:  Negative for dizziness and weakness.   Psychiatric/Behavioral:          Reports occasional anxiety and depression       Objective    /71   Pulse 67   Temp 36.6 °C (97.8 °F)   Resp 18   Wt 65 kg (143 lb 3.2 oz)   SpO2 93%   BMI 27.97 kg/m²    Physical Exam  Constitutional:       General: She is not in acute distress.     Appearance: She is not ill-appearing or toxic-appearing.      Comments: WD, WN, NAD.  Resting in bed; oxygen on. "   HENT:      Head: Normocephalic and atraumatic.      Mouth/Throat:      Mouth: Mucous membranes are moist.      Pharynx: Oropharynx is clear.   Eyes:      Conjunctiva/sclera: Conjunctivae normal.      Pupils: Pupils are equal, round, and reactive to light.   Neck:      Vascular: No carotid bruit.      Comments: No JVD  Cardiovascular:      Rate and Rhythm: Normal rate and regular rhythm.      Pulses: Normal pulses.   Pulmonary:      Effort: Pulmonary effort is normal.      Breath sounds: Normal breath sounds.   Abdominal:      General: Bowel sounds are normal. There is no distension.      Palpations: Abdomen is soft.      Tenderness: There is no abdominal tenderness. There is no guarding.   Musculoskeletal:      Right lower leg: No edema.      Left lower leg: No edema.      Comments: Wearing a rubber glove on left hand.   Skin:     General: Skin is warm and dry.      Comments: Gray dressing on left hip; dry.  Right great toe with sutures intact; mild erythema, no drainage.   Neurological:      Mental Status: She is alert and oriented to person, place, and time.       Component      Latest Ref Rn 7/6/2024   LEUKOCYTES (10*3/UL) IN BLOOD BY AUTOMATED COUNT, Hungarian      4.4 - 11.3 x10*3/uL 5.1    nRBC      0.0 - 0.0 /100 WBCs 0.0    ERYTHROCYTES (10*6/UL) IN BLOOD BY AUTOMATED COUNT, Hungarian      4.00 - 5.20 x10*6/uL 3.11 (L)    HEMOGLOBIN      12.0 - 16.0 g/dL 9.1 (L)    HEMATOCRIT      36.0 - 46.0 % 29.0 (L)    MCV      80 - 100 fL 93    MCHC      32.0 - 36.0 g/dL 31.4 (L)    PLATELETS (10*3/UL) IN BLOOD AUTOMATED COUNT, Hungarian      150 - 450 x10*3/uL 110 (L)    RED CELL DISTRIBUTION WIDTH      11.5 - 14.5 % 14.5    MCH      26.0 - 34.0 pg 29.3       Legend:  (L) Low  Component      Latest Ref Rng 7/6/2024   GLUCOSE      74 - 99 mg/dL 86    SODIUM      136 - 145 mmol/L 140    POTASSIUM      3.5 - 5.3 mmol/L 4.7    CHLORIDE      98 - 107 mmol/L 106    Bicarbonate      21 - 32 mmol/L 30    Anion Gap      10 - 20  "mmol/L 9 (L)    Blood Urea Nitrogen      6 - 23 mg/dL 39 (H)    Creatinine      0.50 - 1.05 mg/dL 1.39 (H)    Calcium      8.6 - 10.3 mg/dL 7.9 (L)    EGFR      >60 mL/min/1.73m*2 36 (L)       Legend:  (L) Low  (H) High    Assessment/Plan  Problem List Items Addressed This Visit             ICD-10-CM    Closed fracture of neck of left femur with routine healing - Primary S72.002D     Continue Lovenox x 24 days for clot prophylaxis, prn Oxy IR for pain; new Rx written.  PT/OT to maximize safety, strength, and function.         Stage 3b chronic kidney disease (Multi) N18.32     Will trend weekly BMP for now         Thrombocytopenia (CMS-HCC) D69.6     Trend weekly CBC for now.  Hx ITP, listed as being in remission.         HTN (hypertension) I10     Controlled with current doses of Lasix and Cardizem; continue.         Cellulitis of great toe of right foot L03.031     Wound culture report reviewed.  Will start Levaquin x 14 days with probiotic.         Onychocryptosis L60.0     S/p surgical procedure by podiatrist.  Observe.         Anemia D64.9     Will trend weekly CBC for now         Neuropathy G62.9     Left hand.  Wears a rubber glove alternating with \"arthritis glove\", per resident.           HLD (hyperlipidemia) E78.5     Continue statin         Anorexia R63.0     Continue Remeron at 15 mg nightly.  Will change weights to three times weekly to trend.         Constipation K59.00     Continue bid Senna S and daily Miralax.  Will add Colace.         Anxiety F41.9     Will change prn Valium to prn Ativan; Rx written.          Unclear use of pramipexole; continue.  Will refer back to PCP after discharge.  Time Spent  Prep time on day of patient encounter: 15 minutes  Time spent directly with patient, family or caregiver: 10 minutes  Documentation Time: 40 minutes    Time spent:  8006-4422 + 7860-0700      Diagnoses and all orders for this visit:  Closed fracture of neck of left femur with routine healing  Cellulitis " of great toe of right foot  Onychocryptosis  Anemia, unspecified type  Stage 3b chronic kidney disease (Multi)  Hypertension, unspecified type  Neuropathy  Thrombocytopenia (CMS-HCC)  Hyperlipidemia, unspecified hyperlipidemia type  Anorexia  Constipation, unspecified constipation type  Anxiety    Electronically Signed By: CARLEE Collins-CNP, DNP   7/8/24  3:22 PM

## 2024-07-10 LAB
ATRIAL RATE: 76 BPM
P AXIS: 74 DEGREES
P OFFSET: 175 MS
P ONSET: 114 MS
PR INTERVAL: 200 MS
Q ONSET: 214 MS
QRS COUNT: 12 BEATS
QRS DURATION: 84 MS
QT INTERVAL: 386 MS
QTC CALCULATION(BAZETT): 434 MS
QTC FREDERICIA: 417 MS
R AXIS: -50 DEGREES
T AXIS: 41 DEGREES
T OFFSET: 407 MS
VENTRICULAR RATE: 76 BPM

## 2024-07-12 ENCOUNTER — NURSING HOME VISIT (OUTPATIENT)
Dept: POST ACUTE CARE | Facility: EXTERNAL LOCATION | Age: 89
End: 2024-07-12
Payer: MEDICARE

## 2024-07-12 VITALS
SYSTOLIC BLOOD PRESSURE: 120 MMHG | WEIGHT: 143.2 LBS | OXYGEN SATURATION: 96 % | HEART RATE: 82 BPM | BODY MASS INDEX: 27.97 KG/M2 | RESPIRATION RATE: 18 BRPM | DIASTOLIC BLOOD PRESSURE: 64 MMHG | TEMPERATURE: 97.7 F

## 2024-07-12 DIAGNOSIS — L03.031 CELLULITIS OF GREAT TOE OF RIGHT FOOT: ICD-10-CM

## 2024-07-12 DIAGNOSIS — R41.82 ALTERED MENTAL STATUS, UNSPECIFIED ALTERED MENTAL STATUS TYPE: Primary | ICD-10-CM

## 2024-07-12 DIAGNOSIS — S72.002D CLOSED FRACTURE OF NECK OF LEFT FEMUR WITH ROUTINE HEALING: ICD-10-CM

## 2024-07-12 DIAGNOSIS — R63.0 ANOREXIA: ICD-10-CM

## 2024-07-12 DIAGNOSIS — R30.0 DYSURIA: ICD-10-CM

## 2024-07-12 PROCEDURE — 99310 SBSQ NF CARE HIGH MDM 45: CPT | Performed by: NURSE PRACTITIONER

## 2024-07-12 ASSESSMENT — ENCOUNTER SYMPTOMS
PALPITATIONS: 0
WEAKNESS: 1
DIFFICULTY URINATING: 0
CONSTIPATION: 0
CHILLS: 0
SHORTNESS OF BREATH: 0
DIZZINESS: 0
DYSURIA: 1
DIARRHEA: 0
COUGH: 0
FATIGUE: 1
FEVER: 0

## 2024-07-12 NOTE — PROGRESS NOTES
"MRN:[id number]     Subjective   Patient ID: Estefanía Contreras is a 89 y.o. female who presents for change in mental status, anorexia.  89 year female admitted to [facility] on 7-8-24 from [facility] after a 7 day inpatient stay for left femoral neck fx s/p bipolar hemiarthroplasty on 7-2-24.  Also had procedure for right ingrown toenail on 7-1-24 by podiatrist.  Arrived to SNF with toe wound culture results from 7-5-24 showing growth of pseudomonas aeruginosa; currently on no ATB.  Resident states that the toenail had been ingrown for 3 years and she finally had it taken care of, but she's still having significant pain there.  \"It hurts more than my hip does.\"    Remaining hx:  HTN, HLD, CKD, PVD, AAA, peripheral neuropathy, ITP in remission.    Asked to see resident for confusion and continued anorexia.  Son concerned and would like a call.  Resident states she was confused a few days ago, but feels OK now.  See ROS. Annoyed by ROS questions.  Full code.        Review of Systems   Constitutional:  Positive for fatigue. Negative for chills and fever.   Respiratory:  Negative for cough and shortness of breath.    Cardiovascular:  Negative for chest pain and palpitations.   Gastrointestinal:  Negative for constipation and diarrhea.        Reports poor appetite.   Genitourinary:  Positive for dysuria. Negative for difficulty urinating.   Musculoskeletal:         \"Everything hurts\"   Neurological:  Positive for weakness. Negative for dizziness.       Objective     /64   Pulse 82   Temp 36.5 °C (97.7 °F)   Resp 18   Wt 65 kg (143 lb 3.2 oz)   SpO2 96%   BMI 27.97 kg/m²    Physical Exam  Constitutional:       General: She is not in acute distress.     Appearance: She is not ill-appearing or toxic-appearing.   HENT:      Head: Normocephalic and atraumatic.      Mouth/Throat:      Mouth: Mucous membranes are moist.      Pharynx: Oropharynx is clear.   Eyes:      Conjunctiva/sclera: Conjunctivae normal. "   Cardiovascular:      Rate and Rhythm: Normal rate and regular rhythm.      Pulses: Normal pulses.      Heart sounds: Normal heart sounds.   Pulmonary:      Effort: Pulmonary effort is normal.      Breath sounds: Normal breath sounds.   Abdominal:      General: Bowel sounds are normal.      Palpations: Abdomen is soft.   Musculoskeletal:      Right lower leg: No edema.      Left lower leg: No edema.   Skin:     General: Skin is warm and dry.      Comments: Right great toe with sutures intact; no erythema, drainage.   Neurological:      Mental Status: She is alert.      Comments: Fidgety in bed.  Having a difficult time explaining how frustrated she is with the [facility] (she doesn't like how they set her up for breakfast in bed).  Readily knew it was July 12th and that she was at [facility].       No recent labs for review.    Assessment/Plan   Problem List Items Addressed This Visit             ICD-10-CM    Closed fracture of neck of left femur with routine healing S72.002D     Continue PT/OT         Cellulitis of great toe of right foot L03.031     Continue Levaquin.  Awaiting stat labs.         Anorexia R63.0     Not new.  Arrived from the hospital on Remeron 15 mg nightly; continue.  Trend weights.         Change in mental status - Primary R41.82     Will have nursing dip urine, send C & S if positive.  Also check stat CBC with diff, BMP.  Change may be related to prn oxycodone use.  Does not like Tylenol so will not use it.           Dysuria R30.0     Awaiting urine dip          Called son with a medical update at his request.  Reviewed all diagnoses above.  He asked many questions, which were answered to his stated satisfaction.  At his request, medication list reviewed with him.  Time Spent  Prep time on day of patient encounter: 5 minutes  Time spent directly with patient, family or caregiver: 20 minutes  Documentation Time: 20 minutes    Time spent:  1397-3400      Diagnoses and all orders for this  visit:  Altered mental status, unspecified altered mental status type  Dysuria  Anorexia  Closed fracture of neck of left femur with routine healing  Cellulitis of great toe of right foot

## 2024-07-12 NOTE — ASSESSMENT & PLAN NOTE
Will have nursing dip urine, send C & S if positive.  Also check stat CBC with diff, BMP.  Change may be related to prn oxycodone use.  Does not like Tylenol so will not use it.

## 2024-07-12 NOTE — LETTER
"Patient: Estefanía Contreras  : 1934    Encounter Date: 2024    MRN:[id number]     Subjective  Patient ID: Estefanía Contreras is a 89 y.o. female who presents for change in mental status, anorexia.  89 year female admitted to [facility] on 24 from [facility] after a 7 day inpatient stay for left femoral neck fx s/p bipolar hemiarthroplasty on 24.  Also had procedure for right ingrown toenail on 24 by podiatrist.  Arrived to SNF with toe wound culture results from 24 showing growth of pseudomonas aeruginosa; currently on no ATB.  Resident states that the toenail had been ingrown for 3 years and she finally had it taken care of, but she's still having significant pain there.  \"It hurts more than my hip does.\"    Remaining hx:  HTN, HLD, CKD, PVD, AAA, peripheral neuropathy, ITP in remission.    Asked to see resident for confusion and continued anorexia.  Son concerned and would like a call.  Resident states she was confused a few days ago, but feels OK now.  See ROS. Annoyed by ROS questions.  Full code.        Review of Systems   Constitutional:  Positive for fatigue. Negative for chills and fever.   Respiratory:  Negative for cough and shortness of breath.    Cardiovascular:  Negative for chest pain and palpitations.   Gastrointestinal:  Negative for constipation and diarrhea.        Reports poor appetite.   Genitourinary:  Positive for dysuria. Negative for difficulty urinating.   Musculoskeletal:         \"Everything hurts\"   Neurological:  Positive for weakness. Negative for dizziness.       Objective    /64   Pulse 82   Temp 36.5 °C (97.7 °F)   Resp 18   Wt 65 kg (143 lb 3.2 oz)   SpO2 96%   BMI 27.97 kg/m²    Physical Exam  Constitutional:       General: She is not in acute distress.     Appearance: She is not ill-appearing or toxic-appearing.   HENT:      Head: Normocephalic and atraumatic.      Mouth/Throat:      Mouth: Mucous membranes are moist.      Pharynx: Oropharynx is clear. "   Eyes:      Conjunctiva/sclera: Conjunctivae normal.   Cardiovascular:      Rate and Rhythm: Normal rate and regular rhythm.      Pulses: Normal pulses.      Heart sounds: Normal heart sounds.   Pulmonary:      Effort: Pulmonary effort is normal.      Breath sounds: Normal breath sounds.   Abdominal:      General: Bowel sounds are normal.      Palpations: Abdomen is soft.   Musculoskeletal:      Right lower leg: No edema.      Left lower leg: No edema.   Skin:     General: Skin is warm and dry.      Comments: Right great toe with sutures intact; no erythema, drainage.   Neurological:      Mental Status: She is alert.      Comments: Fidgety in bed.  Having a difficult time explaining how frustrated she is with the [facility] (she doesn't like how they set her up for breakfast in bed).  Readily knew it was July 12th and that she was at [facility].       No recent labs for review.    Assessment/Plan  Problem List Items Addressed This Visit             ICD-10-CM    Closed fracture of neck of left femur with routine healing S72.002D     Continue PT/OT         Cellulitis of great toe of right foot L03.031     Continue Levaquin.  Awaiting stat labs.         Anorexia R63.0     Not new.  Arrived from the hospital on Remeron 15 mg nightly; continue.  Trend weights.         Change in mental status - Primary R41.82     Will have nursing dip urine, send C & S if positive.  Also check stat CBC with diff, BMP.  Change may be related to prn oxycodone use.  Does not like Tylenol so will not use it.           Dysuria R30.0     Awaiting urine dip          Called son with a medical update at his request.  Reviewed all diagnoses above.  He asked many questions, which were answered to his stated satisfaction.  At his request, medication list reviewed with him.  Time Spent  Prep time on day of patient encounter: 5 minutes  Time spent directly with patient, family or caregiver: 20 minutes  Documentation Time: 20 minutes    Time spent:   [id number]      Diagnoses and all orders for this visit:  Altered mental status, unspecified altered mental status type  Dysuria  Anorexia  Closed fracture of neck of left femur with routine healing  Cellulitis of great toe of right foot    Electronically Signed By: CARLEE Collins-CNP, DNP   7/12/24 11:20 AM

## 2024-07-15 ENCOUNTER — NURSING HOME VISIT (OUTPATIENT)
Dept: POST ACUTE CARE | Facility: EXTERNAL LOCATION | Age: 89
End: 2024-07-15
Payer: MEDICARE

## 2024-07-15 VITALS
RESPIRATION RATE: 18 BRPM | DIASTOLIC BLOOD PRESSURE: 84 MMHG | BODY MASS INDEX: 28.06 KG/M2 | OXYGEN SATURATION: 94 % | HEART RATE: 73 BPM | TEMPERATURE: 98 F | SYSTOLIC BLOOD PRESSURE: 116 MMHG | WEIGHT: 143.7 LBS

## 2024-07-15 DIAGNOSIS — R41.82 ALTERED MENTAL STATUS, UNSPECIFIED ALTERED MENTAL STATUS TYPE: Primary | ICD-10-CM

## 2024-07-15 PROCEDURE — 99309 SBSQ NF CARE MODERATE MDM 30: CPT | Performed by: NURSE PRACTITIONER

## 2024-07-15 NOTE — ASSESSMENT & PLAN NOTE
Change persists.  Urine dip positive this morning and C & S sent.  Already on Levaquin for cellulitis right great toe.  Will change Levaquin to cephalexin while awaiting C & S.  Repeat CBC with diff, BMP in am.  Reviewed use of prn Oxy IR.  Took it tid on 7-9 and 7-10 but none since.  Will stop prn Oxy IR.  Son reported that resident does not like Tylenol and did not do well on Tramadol.  Will order Norco prn q8h.

## 2024-07-15 NOTE — PROGRESS NOTES
"MRN:[id number]     Subjective   Patient ID: Estefanía Contreras is a 89 y.o. female who presents for follow up change in mental status.  89 year female admitted to [facility] on 7-8-24 from [facility] after a 7 day inpatient stay for left femoral neck fx s/p bipolar hemiarthroplasty on 7-2-24.  Also had procedure for right ingrown toenail on 7-1-24 by podiatrist.  Arrived to SNF with toe wound culture results from 7-5-24 showing growth of pseudomonas aeruginosa; currently on no ATB.  Resident states that the toenail had been ingrown for 3 years and she finally had it taken care of, but she's still having significant pain there.  \"It hurts more than my hip does.\"  Started on Levaquin in SNF 7-8-24 x 14 days.  Remaining hx:  HTN, HLD, CKD, PVD, AAA, peripheral neuropathy, ITP in remission.    Asked to see resident for confusion and continued anorexia on 7-12-24.  Labs and urine ordered.  Urine dip not done until yesterday d/t no lab pickups on the weekend; positive dip.    Attempted ROS today, but speech fairly nonsensical.   Full code.        Review of Systems    Objective     /84   Pulse 73   Temp 36.7 °C (98 °F)   Resp 18   Wt 65.2 kg (143 lb 11.2 oz)   SpO2 94%   BMI 28.06 kg/m²    Physical Exam  Constitutional:       General: She is not in acute distress.     Appearance: She is not ill-appearing or toxic-appearing.      Comments: WD, WN, NAD.  Sitting in a chair in her room.   HENT:      Head: Normocephalic and atraumatic.      Mouth/Throat:      Mouth: Mucous membranes are moist.      Pharynx: Oropharynx is clear.   Eyes:      Conjunctiva/sclera: Conjunctivae normal.   Cardiovascular:      Rate and Rhythm: Normal rate. Rhythm irregular.      Pulses: Normal pulses.      Heart sounds: Normal heart sounds.   Pulmonary:      Effort: Pulmonary effort is normal.      Breath sounds: Normal breath sounds.      Comments: (+) thoracic kyphoscoliosis  Abdominal:      General: Bowel sounds are normal.      Palpations: " Abdomen is soft.   Musculoskeletal:      Right lower leg: No edema.      Left lower leg: No edema.   Skin:     General: Skin is warm and dry.   Neurological:      Mental Status: She is alert.      Comments: Speech mostly nonsensical.  Followed commands.  No abnormal movements.         Results for orders placed or performed in visit on 07/12/24 (from the past 96 hour(s))   CBC and Auto Differential   Result Value Ref Range    WBC 9.1 4.4 - 11.3 x10*3/uL    nRBC 0.0 0.0 - 0.0 /100 WBCs    RBC 3.19 (L) 4.00 - 5.20 x10*6/uL    Hemoglobin 9.4 (L) 12.0 - 16.0 g/dL    Hematocrit 30.0 (L) 36.0 - 46.0 %    MCV 94 80 - 100 fL    MCH 29.5 26.0 - 34.0 pg    MCHC 31.3 (L) 32.0 - 36.0 g/dL    RDW 14.8 (H) 11.5 - 14.5 %    Platelets 234 150 - 450 x10*3/uL    Neutrophils % 79.7 40.0 - 80.0 %    Immature Granulocytes %, Automated 0.6 0.0 - 0.9 %    Lymphocytes % 12.3 13.0 - 44.0 %    Monocytes % 5.7 2.0 - 10.0 %    Eosinophils % 1.0 0.0 - 6.0 %    Basophils % 0.7 0.0 - 2.0 %    Neutrophils Absolute 7.24 (H) 1.60 - 5.50 x10*3/uL    Immature Granulocytes Absolute, Automated 0.05 0.00 - 0.50 x10*3/uL    Lymphocytes Absolute 1.12 0.80 - 3.00 x10*3/uL    Monocytes Absolute 0.52 0.05 - 0.80 x10*3/uL    Eosinophils Absolute 0.09 0.00 - 0.40 x10*3/uL    Basophils Absolute 0.06 0.00 - 0.10 x10*3/uL   Basic Metabolic Panel   Result Value Ref Range    Glucose 153 (H) 74 - 99 mg/dL    Sodium 140 136 - 145 mmol/L    Potassium 3.9 3.5 - 5.3 mmol/L    Chloride 100 98 - 107 mmol/L    Bicarbonate 31 21 - 32 mmol/L    Anion Gap 13 10 - 20 mmol/L    Urea Nitrogen 38 (H) 6 - 23 mg/dL    Creatinine 1.67 (H) 0.50 - 1.05 mg/dL    eGFR 29 (L) >60 mL/min/1.73m*2    Calcium 8.4 (L) 8.6 - 10.3 mg/dL   B-Type Natriuretic Peptide   Result Value Ref Range     (H) 0 - 99 pg/mL         Assessment/Plan   Problem List Items Addressed This Visit             ICD-10-CM    Change in mental status - Primary R41.82     Change persists.  Urine dip positive this  morning and C & S sent.  Already on Levaquin for cellulitis right great toe.  Will change Levaquin to cephalexin while awaiting C & S.  Repeat CBC with diff, BMP in am.  Reviewed use of prn Oxy IR.  Took it tid on 7-9 and 7-10 but none since.  Will stop prn Oxy IR.  Son reported that resident does not like Tylenol and did not do well on Tramadol.  Will order Norco prn q8h.                    Diagnoses and all orders for this visit:  Altered mental status, unspecified altered mental status type

## 2024-07-15 NOTE — LETTER
"Patient: Estefanía Contreras  : 1934    Encounter Date: 07/15/2024    MRN:[id number]     Subjective  Patient ID: Estefanía Contreras is a 89 y.o. female who presents for follow up change in mental status.  89 year female admitted to [facility] on 24 from [facility] after a 7 day inpatient stay for left femoral neck fx s/p bipolar hemiarthroplasty on 24.  Also had procedure for right ingrown toenail on 24 by podiatrist.  Arrived to SNF with toe wound culture results from 24 showing growth of pseudomonas aeruginosa; currently on no ATB.  Resident states that the toenail had been ingrown for 3 years and she finally had it taken care of, but she's still having significant pain there.  \"It hurts more than my hip does.\"  Started on Levaquin in SNF 7-8-24 x 14 days.  Remaining hx:  HTN, HLD, CKD, PVD, AAA, peripheral neuropathy, ITP in remission.    Asked to see resident for confusion and continued anorexia on 24.  Labs and urine ordered.  Urine dip not done until yesterday d/t no lab pickups on the weekend; positive dip.    Attempted ROS today, but speech fairly nonsensical.   Full code.        Review of Systems    Objective    /84   Pulse 73   Temp 36.7 °C (98 °F)   Resp 18   Wt 65.2 kg (143 lb 11.2 oz)   SpO2 94%   BMI 28.06 kg/m²    Physical Exam  Constitutional:       General: She is not in acute distress.     Appearance: She is not ill-appearing or toxic-appearing.      Comments: WD, WN, NAD.  Sitting in a chair in her room.   HENT:      Head: Normocephalic and atraumatic.      Mouth/Throat:      Mouth: Mucous membranes are moist.      Pharynx: Oropharynx is clear.   Eyes:      Conjunctiva/sclera: Conjunctivae normal.   Cardiovascular:      Rate and Rhythm: Normal rate. Rhythm irregular.      Pulses: Normal pulses.      Heart sounds: Normal heart sounds.   Pulmonary:      Effort: Pulmonary effort is normal.      Breath sounds: Normal breath sounds.      Comments: (+) thoracic " kyphoscoliosis  Abdominal:      General: Bowel sounds are normal.      Palpations: Abdomen is soft.   Musculoskeletal:      Right lower leg: No edema.      Left lower leg: No edema.   Skin:     General: Skin is warm and dry.   Neurological:      Mental Status: She is alert.      Comments: Speech mostly nonsensical.  Followed commands.  No abnormal movements.         Results for orders placed or performed in visit on 07/12/24 (from the past 96 hour(s))   CBC and Auto Differential   Result Value Ref Range    WBC 9.1 4.4 - 11.3 x10*3/uL    nRBC 0.0 0.0 - 0.0 /100 WBCs    RBC 3.19 (L) 4.00 - 5.20 x10*6/uL    Hemoglobin 9.4 (L) 12.0 - 16.0 g/dL    Hematocrit 30.0 (L) 36.0 - 46.0 %    MCV 94 80 - 100 fL    MCH 29.5 26.0 - 34.0 pg    MCHC 31.3 (L) 32.0 - 36.0 g/dL    RDW 14.8 (H) 11.5 - 14.5 %    Platelets 234 150 - 450 x10*3/uL    Neutrophils % 79.7 40.0 - 80.0 %    Immature Granulocytes %, Automated 0.6 0.0 - 0.9 %    Lymphocytes % 12.3 13.0 - 44.0 %    Monocytes % 5.7 2.0 - 10.0 %    Eosinophils % 1.0 0.0 - 6.0 %    Basophils % 0.7 0.0 - 2.0 %    Neutrophils Absolute 7.24 (H) 1.60 - 5.50 x10*3/uL    Immature Granulocytes Absolute, Automated 0.05 0.00 - 0.50 x10*3/uL    Lymphocytes Absolute 1.12 0.80 - 3.00 x10*3/uL    Monocytes Absolute 0.52 0.05 - 0.80 x10*3/uL    Eosinophils Absolute 0.09 0.00 - 0.40 x10*3/uL    Basophils Absolute 0.06 0.00 - 0.10 x10*3/uL   Basic Metabolic Panel   Result Value Ref Range    Glucose 153 (H) 74 - 99 mg/dL    Sodium 140 136 - 145 mmol/L    Potassium 3.9 3.5 - 5.3 mmol/L    Chloride 100 98 - 107 mmol/L    Bicarbonate 31 21 - 32 mmol/L    Anion Gap 13 10 - 20 mmol/L    Urea Nitrogen 38 (H) 6 - 23 mg/dL    Creatinine 1.67 (H) 0.50 - 1.05 mg/dL    eGFR 29 (L) >60 mL/min/1.73m*2    Calcium 8.4 (L) 8.6 - 10.3 mg/dL   B-Type Natriuretic Peptide   Result Value Ref Range     (H) 0 - 99 pg/mL         Assessment/Plan  Problem List Items Addressed This Visit             ICD-10-CM    Change in  mental status - Primary R41.82     Change persists.  Urine dip positive this morning and C & S sent.  Already on Levaquin for cellulitis right great toe.  Will change Levaquin to cephalexin while awaiting C & S.  Repeat CBC with diff, BMP in am.  Reviewed use of prn Oxy IR.  Took it tid on 7-9 and 7-10 but none since.  Will stop prn Oxy IR.  Son reported that resident does not like Tylenol and did not do well on Tramadol.  Will order Norco prn q8h.                    Diagnoses and all orders for this visit:  Altered mental status, unspecified altered mental status type      Electronically Signed By: Venessa Dean, CARLEE-CNP, DNP   7/15/24  1:30 PM

## 2024-07-18 LAB
LABORATORY COMMENT REPORT: NORMAL
PATH REPORT.FINAL DX SPEC: NORMAL
PATH REPORT.GROSS SPEC: NORMAL
PATH REPORT.RELEVANT HX SPEC: NORMAL
PATH REPORT.TOTAL CANCER: NORMAL

## 2024-07-24 ENCOUNTER — APPOINTMENT (OUTPATIENT)
Dept: ORTHOPEDIC SURGERY | Facility: CLINIC | Age: 89
End: 2024-07-24
Payer: MEDICARE

## 2024-07-25 ENCOUNTER — TELEPHONE (OUTPATIENT)
Dept: ORTHOPEDIC SURGERY | Facility: CLINIC | Age: 89
End: 2024-07-25
Payer: MEDICARE

## 2024-07-29 ENCOUNTER — APPOINTMENT (OUTPATIENT)
Dept: ORTHOPEDIC SURGERY | Facility: CLINIC | Age: 89
End: 2024-07-29
Payer: MEDICARE

## 2024-07-29 ENCOUNTER — DOCUMENTATION (OUTPATIENT)
Dept: ORTHOPEDIC SURGERY | Facility: CLINIC | Age: 89
End: 2024-07-29

## 2024-07-29 NOTE — PROGRESS NOTES
Patient is about 1 month status post a left hip hemiarthroplasty for a left femoral neck fracture.  She has been weightbearing as tolerated.  X-rays from July 24, 2024 were reviewed.  They show adequate lamina of the left hip hemiarthroplasty prosthesis.  There is no evidence of any fracture, dislocation or loosening.  The patient will remain weightbearing as tolerated in her left leg.  She will continue with physical therapy for gait training.  Dislocation precautions are still in effect.  Repeat AP and lateral left hip x-rays can be obtained in 1 month.

## 2024-10-02 ENCOUNTER — HOSPITAL ENCOUNTER (INPATIENT)
Facility: HOSPITAL | Age: 89
End: 2024-10-02
Attending: EMERGENCY MEDICINE | Admitting: INTERNAL MEDICINE
Payer: MEDICARE

## 2024-10-02 ENCOUNTER — APPOINTMENT (OUTPATIENT)
Dept: RADIOLOGY | Facility: HOSPITAL | Age: 89
DRG: 175 | End: 2024-10-02
Payer: MEDICARE

## 2024-10-02 ENCOUNTER — APPOINTMENT (OUTPATIENT)
Dept: CARDIOLOGY | Facility: HOSPITAL | Age: 89
DRG: 175 | End: 2024-10-02
Payer: MEDICARE

## 2024-10-02 DIAGNOSIS — J96.01 ACUTE RESPIRATORY FAILURE WITH HYPOXIA (MULTI): ICD-10-CM

## 2024-10-02 DIAGNOSIS — I26.99 PULMONARY EMBOLISM, OTHER, UNSPECIFIED CHRONICITY, UNSPECIFIED WHETHER ACUTE COR PULMONALE PRESENT (MULTI): Primary | ICD-10-CM

## 2024-10-02 DIAGNOSIS — I82.623: ICD-10-CM

## 2024-10-02 LAB
ALBUMIN SERPL BCP-MCNC: 3.7 G/DL (ref 3.4–5)
ALP SERPL-CCNC: 117 U/L (ref 33–136)
ALT SERPL W P-5'-P-CCNC: 8 U/L (ref 7–45)
ANION GAP SERPL CALC-SCNC: 12 MMOL/L (ref 10–20)
AST SERPL W P-5'-P-CCNC: 16 U/L (ref 9–39)
BASOPHILS # BLD AUTO: 0.06 X10*3/UL (ref 0–0.1)
BASOPHILS NFR BLD AUTO: 1 %
BILIRUB SERPL-MCNC: 0.3 MG/DL (ref 0–1.2)
BNP SERPL-MCNC: 604 PG/ML (ref 0–99)
BUN SERPL-MCNC: 26 MG/DL (ref 6–23)
CALCIUM SERPL-MCNC: 8.6 MG/DL (ref 8.6–10.3)
CARDIAC TROPONIN I PNL SERPL HS: 27 NG/L (ref 0–13)
CARDIAC TROPONIN I PNL SERPL HS: 37 NG/L (ref 0–13)
CHLORIDE SERPL-SCNC: 105 MMOL/L (ref 98–107)
CO2 SERPL-SCNC: 28 MMOL/L (ref 21–32)
CREAT SERPL-MCNC: 1.48 MG/DL (ref 0.5–1.05)
D DIMER PPP FEU-MCNC: ABNORMAL NG/ML FEU
EGFRCR SERPLBLD CKD-EPI 2021: 34 ML/MIN/1.73M*2
EOSINOPHIL # BLD AUTO: 0.1 X10*3/UL (ref 0–0.4)
EOSINOPHIL NFR BLD AUTO: 1.7 %
ERYTHROCYTE [DISTWIDTH] IN BLOOD BY AUTOMATED COUNT: 14.5 % (ref 11.5–14.5)
GLUCOSE SERPL-MCNC: 82 MG/DL (ref 74–99)
HCT VFR BLD AUTO: 38.1 % (ref 36–46)
HGB BLD-MCNC: 11.8 G/DL (ref 12–16)
IMM GRANULOCYTES # BLD AUTO: 0.01 X10*3/UL (ref 0–0.5)
IMM GRANULOCYTES NFR BLD AUTO: 0.2 % (ref 0–0.9)
LYMPHOCYTES # BLD AUTO: 0.96 X10*3/UL (ref 0.8–3)
LYMPHOCYTES NFR BLD AUTO: 16.1 %
MAGNESIUM SERPL-MCNC: 2.42 MG/DL (ref 1.6–2.4)
MCH RBC QN AUTO: 28.2 PG (ref 26–34)
MCHC RBC AUTO-ENTMCNC: 31 G/DL (ref 32–36)
MCV RBC AUTO: 91 FL (ref 80–100)
MONOCYTES # BLD AUTO: 0.5 X10*3/UL (ref 0.05–0.8)
MONOCYTES NFR BLD AUTO: 8.4 %
NEUTROPHILS # BLD AUTO: 4.34 X10*3/UL (ref 1.6–5.5)
NEUTROPHILS NFR BLD AUTO: 72.6 %
NRBC BLD-RTO: 0 /100 WBCS (ref 0–0)
PLATELET # BLD AUTO: 146 X10*3/UL (ref 150–450)
POTASSIUM SERPL-SCNC: 4.9 MMOL/L (ref 3.5–5.3)
PROT SERPL-MCNC: 6.9 G/DL (ref 6.4–8.2)
RBC # BLD AUTO: 4.18 X10*6/UL (ref 4–5.2)
SARS-COV-2 RNA RESP QL NAA+PROBE: NOT DETECTED
SODIUM SERPL-SCNC: 140 MMOL/L (ref 136–145)
UFH PPP CHRO-ACNC: 0.5 IU/ML
UFH PPP CHRO-ACNC: 1.2 IU/ML
WBC # BLD AUTO: 6 X10*3/UL (ref 4.4–11.3)

## 2024-10-02 PROCEDURE — 84484 ASSAY OF TROPONIN QUANT: CPT

## 2024-10-02 PROCEDURE — 84484 ASSAY OF TROPONIN QUANT: CPT | Performed by: EMERGENCY MEDICINE

## 2024-10-02 PROCEDURE — 36415 COLL VENOUS BLD VENIPUNCTURE: CPT | Performed by: EMERGENCY MEDICINE

## 2024-10-02 PROCEDURE — 2500000001 HC RX 250 WO HCPCS SELF ADMINISTERED DRUGS (ALT 637 FOR MEDICARE OP)

## 2024-10-02 PROCEDURE — 93970 EXTREMITY STUDY: CPT

## 2024-10-02 PROCEDURE — 2500000004 HC RX 250 GENERAL PHARMACY W/ HCPCS (ALT 636 FOR OP/ED)

## 2024-10-02 PROCEDURE — 83735 ASSAY OF MAGNESIUM: CPT

## 2024-10-02 PROCEDURE — 71046 X-RAY EXAM CHEST 2 VIEWS: CPT

## 2024-10-02 PROCEDURE — 85379 FIBRIN DEGRADATION QUANT: CPT | Performed by: EMERGENCY MEDICINE

## 2024-10-02 PROCEDURE — 96374 THER/PROPH/DIAG INJ IV PUSH: CPT

## 2024-10-02 PROCEDURE — 85025 COMPLETE CBC W/AUTO DIFF WBC: CPT

## 2024-10-02 PROCEDURE — 2500000004 HC RX 250 GENERAL PHARMACY W/ HCPCS (ALT 636 FOR OP/ED): Performed by: EMERGENCY MEDICINE

## 2024-10-02 PROCEDURE — 93005 ELECTROCARDIOGRAM TRACING: CPT

## 2024-10-02 PROCEDURE — 2500000001 HC RX 250 WO HCPCS SELF ADMINISTERED DRUGS (ALT 637 FOR MEDICARE OP): Performed by: INTERNAL MEDICINE

## 2024-10-02 PROCEDURE — 71046 X-RAY EXAM CHEST 2 VIEWS: CPT | Performed by: RADIOLOGY

## 2024-10-02 PROCEDURE — 93970 EXTREMITY STUDY: CPT | Performed by: RADIOLOGY

## 2024-10-02 PROCEDURE — 2500000002 HC RX 250 W HCPCS SELF ADMINISTERED DRUGS (ALT 637 FOR MEDICARE OP, ALT 636 FOR OP/ED)

## 2024-10-02 PROCEDURE — 99223 1ST HOSP IP/OBS HIGH 75: CPT

## 2024-10-02 PROCEDURE — 36415 COLL VENOUS BLD VENIPUNCTURE: CPT

## 2024-10-02 PROCEDURE — 2060000001 HC INTERMEDIATE ICU ROOM DAILY

## 2024-10-02 PROCEDURE — 71275 CT ANGIOGRAPHY CHEST: CPT

## 2024-10-02 PROCEDURE — 83880 ASSAY OF NATRIURETIC PEPTIDE: CPT

## 2024-10-02 PROCEDURE — 2500000004 HC RX 250 GENERAL PHARMACY W/ HCPCS (ALT 636 FOR OP/ED): Performed by: INTERNAL MEDICINE

## 2024-10-02 PROCEDURE — 2550000001 HC RX 255 CONTRASTS

## 2024-10-02 PROCEDURE — 80053 COMPREHEN METABOLIC PANEL: CPT

## 2024-10-02 PROCEDURE — 99291 CRITICAL CARE FIRST HOUR: CPT | Mod: 25 | Performed by: EMERGENCY MEDICINE

## 2024-10-02 PROCEDURE — 85520 HEPARIN ASSAY: CPT | Performed by: INTERNAL MEDICINE

## 2024-10-02 PROCEDURE — 87635 SARS-COV-2 COVID-19 AMP PRB: CPT

## 2024-10-02 RX ORDER — HEPARIN SODIUM 5000 [USP'U]/ML
2000-4000 INJECTION, SOLUTION INTRAVENOUS; SUBCUTANEOUS EVERY 4 HOURS PRN
Status: ACTIVE | OUTPATIENT
Start: 2024-10-02 | End: 2024-10-03

## 2024-10-02 RX ORDER — SIMVASTATIN 20 MG/1
20 TABLET, FILM COATED ORAL NIGHTLY
Status: DISPENSED | OUTPATIENT
Start: 2024-10-02

## 2024-10-02 RX ORDER — MIRTAZAPINE 30 MG/1
30 TABLET, FILM COATED ORAL NIGHTLY
Status: ON HOLD | COMMUNITY
Start: 2024-08-29

## 2024-10-02 RX ORDER — ACETAMINOPHEN 500 MG
5 TABLET ORAL NIGHTLY PRN
Status: DISPENSED | OUTPATIENT
Start: 2024-10-02

## 2024-10-02 RX ORDER — DULOXETIN HYDROCHLORIDE 30 MG/1
30 CAPSULE, DELAYED RELEASE ORAL DAILY
Status: DISPENSED | OUTPATIENT
Start: 2024-10-02

## 2024-10-02 RX ORDER — ACETAMINOPHEN 650 MG/1
650 SUPPOSITORY RECTAL EVERY 4 HOURS PRN
Status: ACTIVE | OUTPATIENT
Start: 2024-10-02

## 2024-10-02 RX ORDER — POTASSIUM CHLORIDE 20 MEQ/1
20 TABLET, EXTENDED RELEASE ORAL DAILY
Status: ON HOLD | COMMUNITY
Start: 2024-09-22

## 2024-10-02 RX ORDER — PRAMIPEXOLE DIHYDROCHLORIDE 1 MG/1
1 TABLET ORAL NIGHTLY
Status: DISPENSED | OUTPATIENT
Start: 2024-10-02

## 2024-10-02 RX ORDER — DILTIAZEM HYDROCHLORIDE 180 MG/1
180 CAPSULE, COATED, EXTENDED RELEASE ORAL DAILY
Status: DISPENSED | OUTPATIENT
Start: 2024-10-02

## 2024-10-02 RX ORDER — ACETAMINOPHEN 160 MG/5ML
650 SOLUTION ORAL EVERY 4 HOURS PRN
Status: ACTIVE | OUTPATIENT
Start: 2024-10-02

## 2024-10-02 RX ORDER — FUROSEMIDE 20 MG/1
20 TABLET ORAL DAILY
Status: DISPENSED | OUTPATIENT
Start: 2024-10-02

## 2024-10-02 RX ORDER — MIRTAZAPINE 15 MG/1
30 TABLET, FILM COATED ORAL NIGHTLY
Status: DISPENSED | OUTPATIENT
Start: 2024-10-02

## 2024-10-02 RX ORDER — DULOXETIN HYDROCHLORIDE 30 MG/1
30 CAPSULE, DELAYED RELEASE ORAL DAILY
Status: ON HOLD | COMMUNITY
Start: 2024-08-29

## 2024-10-02 RX ORDER — ACETAMINOPHEN 325 MG/1
650 TABLET ORAL EVERY 4 HOURS PRN
Status: DISPENSED | OUTPATIENT
Start: 2024-10-02

## 2024-10-02 RX ORDER — HYDROXYZINE PAMOATE 50 MG/1
50 CAPSULE ORAL NIGHTLY PRN
Status: ON HOLD | COMMUNITY
Start: 2024-09-23 | End: 2024-10-22

## 2024-10-02 RX ORDER — HYDRALAZINE HYDROCHLORIDE 20 MG/ML
10 INJECTION INTRAMUSCULAR; INTRAVENOUS ONCE
Status: COMPLETED | OUTPATIENT
Start: 2024-10-02 | End: 2024-10-02

## 2024-10-02 RX ORDER — HEPARIN SODIUM 5000 [USP'U]/ML
80 INJECTION, SOLUTION INTRAVENOUS; SUBCUTANEOUS ONCE
Status: COMPLETED | OUTPATIENT
Start: 2024-10-02 | End: 2024-10-02

## 2024-10-02 RX ORDER — HEPARIN SODIUM 10000 [USP'U]/100ML
0-4500 INJECTION, SOLUTION INTRAVENOUS CONTINUOUS
Status: DISPENSED | OUTPATIENT
Start: 2024-10-02 | End: 2024-10-03

## 2024-10-02 SDOH — SOCIAL STABILITY: SOCIAL INSECURITY: WERE YOU ABLE TO COMPLETE ALL THE BEHAVIORAL HEALTH SCREENINGS?: YES

## 2024-10-02 SDOH — SOCIAL STABILITY: SOCIAL INSECURITY: HAVE YOU HAD ANY THOUGHTS OF HARMING ANYONE ELSE?: NO

## 2024-10-02 SDOH — SOCIAL STABILITY: SOCIAL INSECURITY: HAVE YOU HAD THOUGHTS OF HARMING ANYONE ELSE?: NO

## 2024-10-02 SDOH — SOCIAL STABILITY: SOCIAL INSECURITY: DO YOU FEEL ANYONE HAS EXPLOITED OR TAKEN ADVANTAGE OF YOU FINANCIALLY OR OF YOUR PERSONAL PROPERTY?: NO

## 2024-10-02 SDOH — SOCIAL STABILITY: SOCIAL INSECURITY: ARE YOU OR HAVE YOU BEEN THREATENED OR ABUSED PHYSICALLY, EMOTIONALLY, OR SEXUALLY BY ANYONE?: NO

## 2024-10-02 SDOH — SOCIAL STABILITY: SOCIAL INSECURITY: HAS ANYONE EVER THREATENED TO HURT YOUR FAMILY OR YOUR PETS?: NO

## 2024-10-02 SDOH — SOCIAL STABILITY: SOCIAL INSECURITY: DOES ANYONE TRY TO KEEP YOU FROM HAVING/CONTACTING OTHER FRIENDS OR DOING THINGS OUTSIDE YOUR HOME?: NO

## 2024-10-02 SDOH — SOCIAL STABILITY: SOCIAL INSECURITY: ARE THERE ANY APPARENT SIGNS OF INJURIES/BEHAVIORS THAT COULD BE RELATED TO ABUSE/NEGLECT?: NO

## 2024-10-02 SDOH — SOCIAL STABILITY: SOCIAL INSECURITY: ABUSE: ADULT

## 2024-10-02 SDOH — SOCIAL STABILITY: SOCIAL INSECURITY: DO YOU FEEL UNSAFE GOING BACK TO THE PLACE WHERE YOU ARE LIVING?: NO

## 2024-10-02 ASSESSMENT — COGNITIVE AND FUNCTIONAL STATUS - GENERAL
WALKING IN HOSPITAL ROOM: A LITTLE
PATIENT BASELINE BEDBOUND: NO
MOVING TO AND FROM BED TO CHAIR: A LITTLE
CLIMB 3 TO 5 STEPS WITH RAILING: A LOT
STANDING UP FROM CHAIR USING ARMS: A LITTLE
WALKING IN HOSPITAL ROOM: A LITTLE
TURNING FROM BACK TO SIDE WHILE IN FLAT BAD: A LITTLE
DAILY ACTIVITIY SCORE: 22
HELP NEEDED FOR BATHING: A LITTLE
TURNING FROM BACK TO SIDE WHILE IN FLAT BAD: A LITTLE
HELP NEEDED FOR BATHING: A LITTLE
MOBILITY SCORE: 18
CLIMB 3 TO 5 STEPS WITH RAILING: A LOT
STANDING UP FROM CHAIR USING ARMS: A LITTLE
TOILETING: A LITTLE
MOBILITY SCORE: 18
TOILETING: A LITTLE
DAILY ACTIVITIY SCORE: 22
MOVING TO AND FROM BED TO CHAIR: A LITTLE

## 2024-10-02 ASSESSMENT — PATIENT HEALTH QUESTIONNAIRE - PHQ9
1. LITTLE INTEREST OR PLEASURE IN DOING THINGS: NOT AT ALL
SUM OF ALL RESPONSES TO PHQ9 QUESTIONS 1 & 2: 0
2. FEELING DOWN, DEPRESSED OR HOPELESS: NOT AT ALL

## 2024-10-02 ASSESSMENT — ACTIVITIES OF DAILY LIVING (ADL)
ASSISTIVE_DEVICE: WALKER
HEARING - LEFT EAR: FUNCTIONAL
PATIENT'S MEMORY ADEQUATE TO SAFELY COMPLETE DAILY ACTIVITIES?: YES
BATHING: NEEDS ASSISTANCE
DRESSING YOURSELF: INDEPENDENT
LACK_OF_TRANSPORTATION: NO
TOILETING: NEEDS ASSISTANCE
HEARING - RIGHT EAR: FUNCTIONAL
JUDGMENT_ADEQUATE_SAFELY_COMPLETE_DAILY_ACTIVITIES: YES
ADEQUATE_TO_COMPLETE_ADL: YES
GROOMING: INDEPENDENT
FEEDING YOURSELF: INDEPENDENT
WALKS IN HOME: NEEDS ASSISTANCE

## 2024-10-02 ASSESSMENT — LIFESTYLE VARIABLES
EVER HAD A DRINK FIRST THING IN THE MORNING TO STEADY YOUR NERVES TO GET RID OF A HANGOVER: NO
AUDIT-C TOTAL SCORE: 0
EVER FELT BAD OR GUILTY ABOUT YOUR DRINKING: NO
AUDIT-C TOTAL SCORE: 0
SKIP TO QUESTIONS 9-10: 1
HAVE PEOPLE ANNOYED YOU BY CRITICIZING YOUR DRINKING: NO
SUBSTANCE_ABUSE_PAST_12_MONTHS: NO
HOW MANY STANDARD DRINKS CONTAINING ALCOHOL DO YOU HAVE ON A TYPICAL DAY: PATIENT DOES NOT DRINK
HOW OFTEN DO YOU HAVE 6 OR MORE DRINKS ON ONE OCCASION: NEVER
HAVE YOU EVER FELT YOU SHOULD CUT DOWN ON YOUR DRINKING: NO
PRESCIPTION_ABUSE_PAST_12_MONTHS: NO
TOTAL SCORE: 0
HOW OFTEN DO YOU HAVE A DRINK CONTAINING ALCOHOL: NEVER

## 2024-10-02 ASSESSMENT — PAIN - FUNCTIONAL ASSESSMENT
PAIN_FUNCTIONAL_ASSESSMENT: 0-10
PAIN_FUNCTIONAL_ASSESSMENT: 0-10

## 2024-10-02 ASSESSMENT — COLUMBIA-SUICIDE SEVERITY RATING SCALE - C-SSRS
6. HAVE YOU EVER DONE ANYTHING, STARTED TO DO ANYTHING, OR PREPARED TO DO ANYTHING TO END YOUR LIFE?: NO
1. IN THE PAST MONTH, HAVE YOU WISHED YOU WERE DEAD OR WISHED YOU COULD GO TO SLEEP AND NOT WAKE UP?: NO
1. IN THE PAST MONTH, HAVE YOU WISHED YOU WERE DEAD OR WISHED YOU COULD GO TO SLEEP AND NOT WAKE UP?: NO
2. HAVE YOU ACTUALLY HAD ANY THOUGHTS OF KILLING YOURSELF?: NO
6. HAVE YOU EVER DONE ANYTHING, STARTED TO DO ANYTHING, OR PREPARED TO DO ANYTHING TO END YOUR LIFE?: NO
2. HAVE YOU ACTUALLY HAD ANY THOUGHTS OF KILLING YOURSELF?: NO

## 2024-10-02 ASSESSMENT — PAIN SCALES - GENERAL
PAINLEVEL_OUTOF10: 0 - NO PAIN
PAINLEVEL_OUTOF10: 0 - NO PAIN

## 2024-10-02 NOTE — ED PROVIDER NOTES
HPI   Chief Complaint   Patient presents with    Shortness of Breath     PT. C/O SOB STARTING MONDAY. PT.  HAS ONLY SLIGHT COUGH. PT.  WAS IN REHAB IN JULY AFTER HIP FX, OXYGEN LEVELS WERE AROUND 92-93% WHILE IN REHAB. O2 SAT. 86% ON RA IN TRIAGE.        This is a 89-year-old female with medical history of hypertension, abdominal aneurysm, hyper tension anemia who presents to Emergency Department with a chief complaint of shortness of breath x 2 days.  Patient history says shortness of breath the last 2 days.  She denies any exertional onset or worsening of her shortness of breath.  She denies any associated chest pain.  She denies cough.  She denies fevers or chills denies abdominal pain denies numbness or tingling.  Denies headache.                  Patient History   Past Medical History:   Diagnosis Date    H/O heart artery stent     H/O shoulder surgery      History reviewed. No pertinent surgical history.  No family history on file.  Social History     Tobacco Use    Smoking status: Never     Passive exposure: Never    Smokeless tobacco: Never   Vaping Use    Vaping status: Never Used   Substance Use Topics    Alcohol use: Not Currently    Drug use: Never       Physical Exam   ED Triage Vitals [10/02/24 1008]   Temperature Heart Rate Respirations BP   37 °C (98.6 °F) 71 20 139/60      Pulse Ox Temp Source Heart Rate Source Patient Position   (!) 86 % Temporal Monitor Sitting      BP Location FiO2 (%)     Left arm --       Physical Exam  Vitals and nursing note reviewed.   Constitutional:       General: She is not in acute distress.     Appearance: She is well-developed. She is not ill-appearing, toxic-appearing or diaphoretic.   HENT:      Head: Normocephalic and atraumatic.   Eyes:      Conjunctiva/sclera: Conjunctivae normal.   Cardiovascular:      Rate and Rhythm: Normal rate and regular rhythm.      Heart sounds: No murmur heard.  Pulmonary:      Effort: Pulmonary effort is normal. No tachypnea,  bradypnea, accessory muscle usage or respiratory distress.      Breath sounds: Normal breath sounds. No decreased breath sounds, wheezing, rhonchi or rales.   Abdominal:      Palpations: Abdomen is soft.      Tenderness: There is no abdominal tenderness.   Musculoskeletal:         General: No swelling. Normal range of motion.      Cervical back: Neck supple.      Right lower leg: No tenderness. No edema.      Left lower leg: No tenderness. No edema.   Skin:     General: Skin is warm and dry.      Capillary Refill: Capillary refill takes less than 2 seconds.   Neurological:      General: No focal deficit present.      Mental Status: She is alert and oriented to person, place, and time.      Cranial Nerves: No cranial nerve deficit.      Motor: No weakness.   Psychiatric:         Mood and Affect: Mood normal.           ED Course & MDM   Diagnoses as of 10/02/24 1558   Pulmonary embolism, other, unspecified chronicity, unspecified whether acute cor pulmonale present (Multi)   Acute respiratory failure with hypoxia (Multi)   This is a 89-year-old female with medical history of hypertension, abdominal aneurysm, hyper tension anemia who presents to Emergency Department with a chief complaint of shortness of breath x 2 days.  Patient history says shortness of breath the last 2 days.  She denies any exertional onset or worsening of her shortness of breath.  She denies any associated chest pain.  She denies cough.  She denies fevers or chills denies abdominal pain denies numbness or tingling.  Denies headache.    Vitals remarkable for hypoxia.  Patient nontoxic-appearing.  Patient placed on 3 L nasal, doing well.  Never in respiratory distress.  Concern for PE given her history.  She has a slight elevation of troponin.  Her CT scan shows pulm anabolism with right heart strain.  Spoke with the PERT team and spoke with Dr. Burgos and they do not believe patient requires transfer and could be on heparin admitted here.  I spoke  with the patient as well as her relative in the understandable plan.  Patient be admitted here for further workup management.  Patient doing well on her nasal cannula.  Heparin was ordered.  I informed charge nurse as well as the nurse several times the patient required IV heparin immediately.                  No data recorded     Tiara Coma Scale Score: 15 (10/02/24 1230 : Rene Talavera RN)                           Medical Decision Making      Procedure  Critical Care    Performed by: Juan Carlos Jacobo DO  Authorized by: Juan Carlos Jacobo DO    Critical care provider statement:     Critical care time (minutes):  40    Critical care time was exclusive of:  Separately billable procedures and treating other patients and teaching time    Critical care was necessary to treat or prevent imminent or life-threatening deterioration of the following conditions:  Respiratory failure    Critical care was time spent personally by me on the following activities:  Development of treatment plan with patient or surrogate, discussions with consultants, review of old charts, re-evaluation of patient's condition, pulse oximetry, ordering and review of radiographic studies and ordering and performing treatments and interventions    Care discussed with: admitting provider         Juan Carlos Jacobo DO  10/02/24 9609

## 2024-10-02 NOTE — ED TRIAGE NOTES
The patient was seen and examined in triage.     History of Present Illness: The patient is a 89-year-old female with a past medical history of CKD, hypertension, hyperlipidemia, PAD, ITP, and recent left hip hemiarthroplasty in July 2024 presenting to the emergency department from home with shortness of breath.  Patient states that she started feel short of breath on Sunday, 9/29/2024.  She states that her shortness of breath started out of nowhere.  She denies any exacerbating event.  Patient does not have any pleuritic pain or chest pain.  Patient says that she is a dry cough but no hemoptysis.  She is also feeling slightly nauseous.  Patient has worn oxygen in the past, but does not have a oxygen requirement typically.  Upon presentation the emergency room and she was 86% on room air and placed on 3 L nasal cannula.  No abdominal pain, fever, chills.    Brief Physical Exam: Exam is limited by the patient sitting in a chair in triage.  Heart: Regular rate and rhythm.   Lungs: Clear to auscultation bilaterally.  New oxygen requirement of 3 L nasal cannula.  Abdomen: Soft, nondistended, normoactive bowel sounds, nontender    Plan: Appropriate labs and diagnostic imaging were ordered.     For the remainder of the patient's workup and ED course, please refer to the main ED provider note. We discussed need for diagnostic testing including laboratory studies and imaging.  We also discussed that they may be asked to wait in the waiting room while these tests are pending.  They understand that if they choose to leave without having the testing completed or resulted that we cannot rule out acute life threatening illnesses and the risks involved could lead to worsening condition, permanent disability or even death.      Disclaimer: This note was dictated by speech recognition. Minor errors in transcription may be present. Please call if questions.

## 2024-10-02 NOTE — PROGRESS NOTES
Pharmacy Medication History Review    Estefanía Contreras is a 89 y.o. female admitted for Pulmonary embolism, other, unspecified chronicity, unspecified whether acute cor pulmonale present (Multi). Pharmacy reviewed the patient's [id number] medications and allergies for accuracy.    The list below reflectives the updated PTA list. Please review each medication in order reconciliation for additional clarification and justification.  Prior to Admission medications    Medication Sig Start Date End Date Taking? Authorizing Provider   acetaminophen (Tylenol) 325 mg tablet Take 1 tablet (325 mg) by mouth every 4 hours if needed for mild pain (1 - 3).   Yes Historical Provider, MD   dilTIAZem LA (Cardizem LA) 180 mg 24 hr tablet Take 1 tablet (180 mg) by mouth once daily. 5/1/23  Yes Historical Provider, MD   DULoxetine (Cymbalta) 30 mg DR capsule Take 1 capsule (30 mg) by mouth once daily. 8/29/24  No Historical Provider, MD   furosemide (Lasix) 20 mg tablet Take 1 tablet (20 mg) by mouth once daily.   Yes Historical Provider, MD   hydrOXYzine pamoate (Vistaril) 50 mg capsule Take 1 capsule (50 mg) by mouth as needed at bedtime (insomnia). 9/23/24 10/22/24 Yes Historical Provider, MD   mirtazapine (Remeron) 30 mg tablet Take 1 tablet (30 mg) by mouth once daily at bedtime. 8/29/24  Yes Historical Provider, MD   potassium chloride CR 20 mEq ER tablet Take 1 tablet (20 mEq) by mouth once daily. 9/22/24  Yes Historical Provider, MD   pramipexole (Mirapex) 1 mg tablet Take 1 tablet (1 mg) by mouth once daily at bedtime.   Yes Historical Provider, MD   simvastatin (Zocor) 20 mg tablet Take 1 tablet (20 mg) by mouth once daily at bedtime.   Yes Historical Provider, MD   vit C/E/Zn/coppr/lutein/zeaxan (PRESERVISION AREDS-2 ORAL) Take 1 tablet by mouth twice a day.   Yes Historical Provider, MD   LORazepam (Ativan) 2 mg tablet Take 1 tablet (2 mg) by mouth as needed at bedtime for sedation. Discontinued by Rehab   no Historical  Provider, MD   oxyCODONE (Roxicodone) 5 mg immediate release tablet Take 1 tablet (5 mg) by mouth every 6 hours if needed for moderate pain (4 - 6) or severe pain (7 - 10).  Patient not taking: Reported on 10/2/2024 7/8/24  no Travon Leong DO   polyethylene glycol (Glycolax, Miralax) 17 gram packet Take 17 g by mouth once daily.  Patient not taking: Reported on 10/2/2024 7/7/24  no Travon Leong DO   mirtazapine (Remeron) 7.5 mg tablet Take 2 tablets (15 mg) by mouth once daily at bedtime. 7/11/23 10/2/24 no Historical Provider, MD        The list below reflectives the updated allergy list. Please review each documented allergy for additional clarification and justification.  Allergies  Reviewed by Michelle Douglas on 10/2/2024        Severity Reactions Comments    Morphine High Anaphylaxis Patient denies            Below are additional concerns with the patient's PTA list.      Michelle Douglas     Regular with Thin, single cup sips

## 2024-10-02 NOTE — PROGRESS NOTES
Ms. Contreras shared about her jackeline. She is supported by her son at her bedside. She has another son and dtr. Per her request, I prayed after this visit. I am available as needed. This visit offered active listening, a non-anxious presence and a emotional, spiritual support that addressed the body, mind and spirit.  Total time (min.):  20 min.  [facility] Dept. Contact #: [phone]

## 2024-10-02 NOTE — SIGNIFICANT EVENT
"PULMONARY EMBOLISM RESPONSE TEAM (PERT) NOTE    This note represents a summary of a virtual evaluation by the pulmonary embolism response team requested by Dr. Jacobo.  Suggestions and impression are summarized from the initial virtual encounter and discussion with the referring medical team. These suggestions supplement but should not be a substitute for bedside evaluation and management by the attending of record.     PERT Members on the Call:  Critical Care Attending: Dr. Whipple  Critical Care Fellow: Dr. Aubrey Szymanski PERT team not paged out due to stability of patient and distal clot burden    Brief Clinical Summary:  Estefanía Contreras is a 89 y.o. female presenting with PMH CKD, HTN, DLD, AAA s/p repair, PAD, ITP, hip arthroplasty 7/2024, presenting with SOB for 3 days and was found to have a PE in the R segmental and subsegmental arteries. RV/LV called as right heart strain but measuring about 0.8/1 with enlarged RA which points to possible chronic nature of RV dilation.    Vital Signs  /68   Pulse 57   Temp 37 °C (98.6 °F) (Temporal)   Resp 18   Ht 1.575 m (5' 2\")   Wt 57.2 kg (126 lb)   SpO2 95%   BMI 23.05 kg/m²      Laboratory  Recent Labs     10/02/24  1231 07/12/24  1551 12/14/22  1438   TROPHS 37*  --  6   * 269* 162*         PESI Score Conner BRADSHAW Et al. Arch Intern Med. 2010;[id number].           PESI Score:  109, consistent with JLPESIRISK: Class IV, or High risk (4-11.4% 30-day mortality risk) PE.    CT Scan reviewed:  Clot burden moderate, present in R segmental and subsegmental  RV/LV ratio 0.8/1 by CT scan      Initial Impressions:  ERS/ESC Pulmonary Embolism Risk Category: JLPECLASS: Intermediate-high risk.  Subsegmental PE    Initial Suggestions/Plans:  Admit to telemetry unit at [facility]  Initial therapy suggested: heparin gtt.  Additional testing recommended: TTE STAT, LE DVT US bilaterally   Consults suggested: cardiology.  Additional suggestions for " re-evaluation/reconference or retesting: if worsening hemodynamics or oxygenation.    To re conference the PERT team please call the  Transfer Center at [phone].

## 2024-10-02 NOTE — H&P
History Of Present Illness  Estefanía Contreras is a 89 y.o. female with a past medical history of CKD, HTN, DLD, AAA s/p repair, peripheral arterial disease, ITP, hip arthroplasty 7/2024, presented to hospital with gradual onset shortness of breath that began 2 days ago and is gotten progressively worse.  She also had some vomiting and nausea yesterday.  She was brought to the ED by her son.  CT angio chest was performed immediately which showed a multiple right-sided pulmonary emboli with signs of right heart strain.  There is also emphysematous changes with left chest infiltrates or atelectasis with possible fissural thickening and volume loss/mediastinal deviation.  Pulmonary embolism response team was called and due to patient's stability with the Pasi score 119 classified as class IV or high risk mortality from PE today decided to admit to telemetry unit and start treatment with heparin GTT.  She is not a candidate for thrombectomy.  Patient states that she has never had the symptoms before.  She does not usually get short of breath on exertion or at rest.  She denies any cough, fever, chills, abdominal pain, nausea, diarrhea.  She did have some symptoms of nausea and vomiting yesterday.  She did say that she has some leg pain.  She did not notice any significant swelling of her legs.  She is a former smoker and does not drink alcohol.  She lives independently on her own at home with her son living across from her.  She did state that she had a recent hip arthroplasty in July 2024 and she was placed on anticoagulation for a month thereafter while she was at rehab.  She has not been on any anticoagulation over the last month.    On arrival to the ED, /68.  HR 57.  RR 18.  95% SpO2 on 6 L nasal cannula oxygen.  Afebrile.  Significant labs showed a creatinine 1.48 with baseline 1.4-1.6.  CT chest showed moderate clot burden in the right segmental and subsegmental arteries.  CT scan shows 0.8/1 RV/LV ratio.  There  was some evidence of right heart strain on CT scan.  CXR showed cardiomegaly and COPD without acute infiltrates.  She was started on heparin GTT for treatment of PE.  She is being admitted to medicine team for further management of acute PE.    CODE STATUS: DNR/DNI     Past Medical History  She has a past medical history of H/O heart artery stent and H/O shoulder surgery.    Surgical History  She has no past surgical history on file.     Social History  She reports that she has never smoked. She has never been exposed to tobacco smoke. She has never used smokeless tobacco. She reports that she does not currently use alcohol. She reports that she does not use drugs.    Family History  No family history on file.     Allergies  Morphine    Review of Systems   A 12 point review of systems was performed and otherwise negative except as stated in the HPI.   Physical Exam   Physical Exam:  General: Pleasant and cooperative.  Frail.  HEENT:  Normocephalic, atraumatic, mucus membranes moist.   Neck:  Trachea midline.  No JVD.    Chest:  Clear to auscultation bilaterally. No wheezes, rales, or rhonchi.  CV:  Regular rate and rhythm.  Positive S1/S2.   Abdomen: Bowel sounds present in all four quadrants, abdomen is soft, non-tender, non-distended.  Extremities: 1+ pitting edema bilateral lower extremities.  Slight swelling of bilateral lower extremities.  No redness erythema of lower legs.  No lower extremity edema or cyanosis.   Neurological:  AAOx3. No focal deficits.  Skin:  Warm and dry.   Last Recorded Vitals  /68   Pulse 57   Temp 37 °C (98.6 °F) (Temporal)   Resp 18   Wt 57.2 kg (126 lb)   SpO2 95%     Relevant Results  All labs and imaging reviewed by myself.     Assessment/Plan   Estefanía Contreras is an 89-year-old female with past medical history of CKD, HTN, DLD, AAA s/p repair, peripheral arterial disease, HP, hip arthroplasty 7/2024, presented to hospital gradual onset shortness of breath that began 2 days ago  that has gotten progressively worse.  CT angio chest showed evidence of multiple right-sided pulmonary emboli with signs of right heart strain.  She was started heparin GTT after PERT team was called.  She had elevated calcium score of 109 classified as high risk mortality.  She has been admitted to medicine team for further management of acute PE.    # Acute Right-sided PE  - This is a patient who has been admitted with an acute right-sided PE.  She has no symptoms of right heart failure.  No elevated JVD, no hepatojugular reflux.  There is 1+ pitting edema in the bilateral lower extremities however no significant swelling.  She does have some pain on palpation of bilateral lower extremities.  She did have a recent hip arthroplasty in July 2024 and then was placed on anticoagulation with Lovenox for about a month thereafter however she has not been on any anticoagulation for the last month.  PERT team was called and due to her hemodynamic stability she was placed on heparin GTT for treatment.  - We will continue heparin GTT  - We will obtain a echo to determine if there is any right heart strain.  PERT team did determine from the CT that the RV/LV ratio is 0.8-1 with an enlarged right atrium which could point to a possible chronic nature of RV dilation.  - We will obtain a duplex ultrasound of bilateral lower extremities determine if there are any DVTs which may be the source of the PE.  - Will consult cardiology for further recommendations as well as recommendation on future anticoagulation  - Monitor vital signs closely.  Patient is hypertensive on admission.  We will determine if there is presence of right heart strain and that will allow us to decide if patient should receive her home diltiazem or if we should give IV hydralazine as inpatient to control BP.    # CKD  # HTN, DLD  # AAA s/p repair  # Peripheral arterial disease  # Hip arthroplasty 7/2024  - Continue home Cymbalta, mirtazapine, pramipexole,  simvastatin    - DVT PPx: On heparin gtt.  - IVF: None  - PT/OT    Vishal Ulloa MD  PGY 2 Internal Medicine

## 2024-10-02 NOTE — ED TRIAGE NOTES
PT. C/O SOB STARTING MONDAY. PT.  HAS ONLY SLIGHT COUGH. PT.  WAS IN REHAB IN JULY AFTER HIP FX, OXYGEN LEVELS WERE AROUND 92-93% WHILE IN REHAB. O2 SAT. 86% ON RA IN TRIAGE.

## 2024-10-03 ENCOUNTER — APPOINTMENT (OUTPATIENT)
Dept: CARDIOLOGY | Facility: HOSPITAL | Age: 89
End: 2024-10-03
Payer: MEDICARE

## 2024-10-03 LAB
ALBUMIN SERPL BCP-MCNC: 3.1 G/DL (ref 3.4–5)
ANION GAP SERPL CALC-SCNC: 12 MMOL/L (ref 10–20)
AORTIC VALVE MEAN GRADIENT: 4 MMHG
AORTIC VALVE PEAK VELOCITY: 1.42 M/S
APTT PPP: 136 SECONDS (ref 27–38)
ATRIAL RATE: 73 BPM
AV PEAK GRADIENT: 8.1 MMHG
AVA (PEAK VEL): 3.11 CM2
AVA (VTI): 3.08 CM2
BUN SERPL-MCNC: 47 MG/DL (ref 6–23)
CALCIUM SERPL-MCNC: 7.8 MG/DL (ref 8.6–10.3)
CHLORIDE SERPL-SCNC: 106 MMOL/L (ref 98–107)
CO2 SERPL-SCNC: 27 MMOL/L (ref 21–32)
CREAT SERPL-MCNC: 1.51 MG/DL (ref 0.5–1.05)
EGFRCR SERPLBLD CKD-EPI 2021: 33 ML/MIN/1.73M*2
EJECTION FRACTION APICAL 4 CHAMBER: 78.1
EJECTION FRACTION: 63 %
ERYTHROCYTE [DISTWIDTH] IN BLOOD BY AUTOMATED COUNT: 14.6 % (ref 11.5–14.5)
GLUCOSE SERPL-MCNC: 84 MG/DL (ref 74–99)
HCT VFR BLD AUTO: 34.8 % (ref 36–46)
HGB BLD-MCNC: 10.4 G/DL (ref 12–16)
INR PPP: 1.2 (ref 0.9–1.1)
LEFT ATRIUM VOLUME AREA LENGTH INDEX BSA: 35.2 ML/M2
LEFT VENTRICLE INTERNAL DIMENSION DIASTOLE: 4 CM (ref 3.5–6)
LEFT VENTRICULAR OUTFLOW TRACT DIAMETER: 2.2 CM
MCH RBC QN AUTO: 28.2 PG (ref 26–34)
MCHC RBC AUTO-ENTMCNC: 29.9 G/DL (ref 32–36)
MCV RBC AUTO: 94 FL (ref 80–100)
MITRAL VALVE E/A RATIO: 0.95
NRBC BLD-RTO: 0 /100 WBCS (ref 0–0)
P AXIS: 83 DEGREES
PHOSPHATE SERPL-MCNC: 4.5 MG/DL (ref 2.5–4.9)
PLATELET # BLD AUTO: 147 X10*3/UL (ref 150–450)
POTASSIUM SERPL-SCNC: 5.3 MMOL/L (ref 3.5–5.3)
PR INTERVAL: 195 MS
PROTHROMBIN TIME: 13.5 SECONDS (ref 9.8–12.8)
Q ONSET: 253 MS
QRS COUNT: 12 BEATS
QRS DURATION: 88 MS
QT INTERVAL: 387 MS
QTC CALCULATION(BAZETT): 427 MS
QTC FREDERICIA: 413 MS
R AXIS: -57 DEGREES
RBC # BLD AUTO: 3.69 X10*6/UL (ref 4–5.2)
RIGHT VENTRICLE FREE WALL PEAK S': 10.8 CM/S
RIGHT VENTRICLE PEAK SYSTOLIC PRESSURE: 18 MMHG
SODIUM SERPL-SCNC: 140 MMOL/L (ref 136–145)
T AXIS: 4 DEGREES
T OFFSET: 446 MS
TRICUSPID ANNULAR PLANE SYSTOLIC EXCURSION: 2.2 CM
UFH PPP CHRO-ACNC: 0.5 IU/ML
UFH PPP CHRO-ACNC: 0.6 IU/ML
VENTRICULAR RATE: 73 BPM
WBC # BLD AUTO: 5.3 X10*3/UL (ref 4.4–11.3)

## 2024-10-03 PROCEDURE — 2060000001 HC INTERMEDIATE ICU ROOM DAILY

## 2024-10-03 PROCEDURE — 93306 TTE W/DOPPLER COMPLETE: CPT

## 2024-10-03 PROCEDURE — 85520 HEPARIN ASSAY: CPT | Performed by: INTERNAL MEDICINE

## 2024-10-03 PROCEDURE — 36415 COLL VENOUS BLD VENIPUNCTURE: CPT

## 2024-10-03 PROCEDURE — 2500000005 HC RX 250 GENERAL PHARMACY W/O HCPCS: Performed by: INTERNAL MEDICINE

## 2024-10-03 PROCEDURE — 36415 COLL VENOUS BLD VENIPUNCTURE: CPT | Performed by: INTERNAL MEDICINE

## 2024-10-03 PROCEDURE — 99223 1ST HOSP IP/OBS HIGH 75: CPT | Performed by: INTERNAL MEDICINE

## 2024-10-03 PROCEDURE — 2500000001 HC RX 250 WO HCPCS SELF ADMINISTERED DRUGS (ALT 637 FOR MEDICARE OP)

## 2024-10-03 PROCEDURE — 2500000002 HC RX 250 W HCPCS SELF ADMINISTERED DRUGS (ALT 637 FOR MEDICARE OP, ALT 636 FOR OP/ED)

## 2024-10-03 PROCEDURE — 2500000001 HC RX 250 WO HCPCS SELF ADMINISTERED DRUGS (ALT 637 FOR MEDICARE OP): Performed by: INTERNAL MEDICINE

## 2024-10-03 PROCEDURE — 80069 RENAL FUNCTION PANEL: CPT

## 2024-10-03 PROCEDURE — 93306 TTE W/DOPPLER COMPLETE: CPT | Performed by: INTERNAL MEDICINE

## 2024-10-03 PROCEDURE — 85610 PROTHROMBIN TIME: CPT | Performed by: EMERGENCY MEDICINE

## 2024-10-03 PROCEDURE — 85027 COMPLETE CBC AUTOMATED: CPT

## 2024-10-03 PROCEDURE — 85730 THROMBOPLASTIN TIME PARTIAL: CPT

## 2024-10-03 PROCEDURE — 99232 SBSQ HOSP IP/OBS MODERATE 35: CPT

## 2024-10-03 PROCEDURE — 2500000004 HC RX 250 GENERAL PHARMACY W/ HCPCS (ALT 636 FOR OP/ED)

## 2024-10-03 RX ORDER — HYDROXYZINE PAMOATE 25 MG/1
50 CAPSULE ORAL NIGHTLY PRN
Status: DISPENSED | OUTPATIENT
Start: 2024-10-03

## 2024-10-03 SDOH — SOCIAL STABILITY: SOCIAL INSECURITY: WITHIN THE LAST YEAR, HAVE YOU BEEN AFRAID OF YOUR PARTNER OR EX-PARTNER?: NO

## 2024-10-03 SDOH — ECONOMIC STABILITY: FOOD INSECURITY: WITHIN THE PAST 12 MONTHS, YOU WORRIED THAT YOUR FOOD WOULD RUN OUT BEFORE YOU GOT MONEY TO BUY MORE.: NEVER TRUE

## 2024-10-03 SDOH — ECONOMIC STABILITY: INCOME INSECURITY: IN THE PAST 12 MONTHS, HAS THE ELECTRIC, GAS, OIL, OR WATER COMPANY THREATENED TO SHUT OFF SERVICE IN YOUR HOME?: NO

## 2024-10-03 SDOH — ECONOMIC STABILITY: FOOD INSECURITY: WITHIN THE PAST 12 MONTHS, THE FOOD YOU BOUGHT JUST DIDN'T LAST AND YOU DIDN'T HAVE MONEY TO GET MORE.: NEVER TRUE

## 2024-10-03 SDOH — SOCIAL STABILITY: SOCIAL INSECURITY: WITHIN THE LAST YEAR, HAVE YOU BEEN HUMILIATED OR EMOTIONALLY ABUSED IN OTHER WAYS BY YOUR PARTNER OR EX-PARTNER?: NO

## 2024-10-03 ASSESSMENT — COGNITIVE AND FUNCTIONAL STATUS - GENERAL
TURNING FROM BACK TO SIDE WHILE IN FLAT BAD: A LITTLE
MOVING TO AND FROM BED TO CHAIR: A LITTLE
DAILY ACTIVITIY SCORE: 22
CLIMB 3 TO 5 STEPS WITH RAILING: A LOT
MOBILITY SCORE: 18
WALKING IN HOSPITAL ROOM: A LITTLE
HELP NEEDED FOR BATHING: A LITTLE
TOILETING: A LITTLE
STANDING UP FROM CHAIR USING ARMS: A LITTLE

## 2024-10-03 ASSESSMENT — PAIN - FUNCTIONAL ASSESSMENT: PAIN_FUNCTIONAL_ASSESSMENT: 0-10

## 2024-10-03 ASSESSMENT — ACTIVITIES OF DAILY LIVING (ADL): LACK_OF_TRANSPORTATION: NO

## 2024-10-03 ASSESSMENT — PAIN SCALES - GENERAL: PAINLEVEL_OUTOF10: 0 - NO PAIN

## 2024-10-03 NOTE — PROGRESS NOTES
Physical Therapy                 Therapy Communication Note    Patient Name: Estefanía Contreras  MRN: [id number]  Department: [facility] 8  Room: [facility]850  Today's Date: 10/3/2024     Discipline: Physical Therapy    Missed Visit Reason:  (Chart reviewed and case conference with RN:  patient on hold; receiving heparin drip to address PE. will continue to attempt as able/appropriate.)

## 2024-10-03 NOTE — PROGRESS NOTES
10/03/24 1230   Discharge Planning   Living Arrangements Alone   Support Systems Family members   Assistance Needed Independent   Type of Residence Private residence   Number of Stairs to Enter Residence 7   Number of Stairs Within Residence 0   Do you have animals or pets at home? No   Who is requesting discharge planning? Provider   Home or Post Acute Services Post acute facilities (Rehab/SNF/etc);In home services   Type of Post Acute Facility Services Skilled nursing   Type of Home Care Services Home OT;Home PT   Expected Discharge Disposition Home H   Does the patient need discharge transport arranged? Yes   RoundTrip coordination needed? Yes   Has discharge transport been arranged? No   Financial Resource Strain   How hard is it for you to pay for the very basics like food, housing, medical care, and heating? Not hard   Housing Stability   In the last 12 months, was there a time when you were not able to pay the mortgage or rent on time? N   In the past 12 months, how many times have you moved where you were living? 0   At any time in the past 12 months, were you homeless or living in a shelter (including now)? N   Transportation Needs   In the past 12 months, has lack of transportation kept you from medical appointments or from getting medications? no   In the past 12 months, has lack of transportation kept you from meetings, work, or from getting things needed for daily living? No   Patient Choice   Provider Choice list and CMS website (https://medicare.gov/care-compare#search) for post-acute Quality and Resource Measure Data were provided and reviewed with: Patient   Patient / Family choosing to utilize agency / facility established prior to hospitalization Yes     Care transitions assessment completed via bedside with patient. TCC introduced self and explained role. Demographics verified. Patient from home alone, son lives across the street.  Independent PTA. Uses walker . Denies SW needs at this time.  Patient states was at [facility] in July after hip surgery, and would prefer to go there if SNF recommended. Dispo pending hospital course, await PT/OT evals. Patient's family to transport home at time of discharge. TCC to continue to follow for discharge planning needs.      PCP: Ki Martinez Last seen: 08/2024  Pharmacy: [facility] [address]   Insurance: [employer] Medicare, [facility]   Dispo: PT/OT pending, [facility] if SNF recommended    CELINA MONROY, RN TCC

## 2024-10-03 NOTE — PROGRESS NOTES
Sean García is a 89 y.o. female on day 1 of admission presenting with Pulmonary embolism, other, unspecified chronicity, unspecified whether acute cor pulmonale present (Multi).      Subjective   Patient seen and examined at bedside.  Patient feels well overall.  No chest pain or shortness of breath.  On 5 L nasal cannula oxygen.  No cough.  Patient is no longer hypertensive.       Objective     Last Recorded Vitals  /61   Pulse 68   Temp 37.3 °C (99.1 °F) (Temporal)   Resp 14   Wt 57.2 kg (126 lb)   SpO2 95%   Intake/Output last 3 Shifts:    Intake/Output Summary (Last 24 hours) at 10/3/2024 1504  Last data filed at 10/2/2024 2205  Gross per 24 hour   Intake 28.83 ml   Output --   Net 28.83 ml       Admission Weight  Weight: 57.2 kg (126 lb) (10/02/24 1008)    Daily Weight  10/02/24 : 57.2 kg (126 lb)    Image Results  Transthoracic Echo (TTE) [place] [id number]            Tel [phone] and Fax [phone]    TRANSTHORACIC ECHOCARDIOGRAM REPORT       Patient Name:      SEAN GARCÍA          Reading Physician:    [id number] Nate Perez MD  Study Date:        10/3/2024            Ordering Provider:    [id number] RISA NAVARRO  MRN/PID:           [id number]             Fellow:  Accession#:        [id number]         Nurse:                Radha Starr RN  Date of Birth/Age: 12/11/1934 / 89      Sonographer:          Adriana lieberman RDCS  Gender:            F                    Additional Staff:  Height:            157.00 cm            Admit Date:           10/2/2024  Weight:            57.00 kg             Admission Status:     Inpatient - STAT  BSA / BMI:         1.57 m2 / 23.12      Encounter#:            [id number]                     kg/m2  Blood Pressure:    113/56 mmHg          Department Location:  [address]                                                                [facility]    Study Type:    TRANSTHORACIC ECHO (TTE) COMPLETE  Diagnosis/ICD: Other pulmonary embolism without acute cor pulmonale-I26.99  Indication:    Pulmonary embolism, other, unspecified chronicity, unspecified                 whether acute cor pulmonale present ;  CPT Code:      Echo Complete w Full [id number]    Patient History:  Pertinent History: PMH CKD, HTN, DLD, AAA s/p repair, peripheral arterial                     disease, ITP, hip arthroplasty 7/2024, presented to hospital                     with gradual onset shortness of breath that began 2 days ago                     and is gotten progressively worse.    Study Detail: The following Echo studies were performed: 2D, M-Mode, Doppler,                color flow and 3D. Technically challenging study due to body                habitus and prominent lung artifact. Definity used as a contrast                agent for endocardial border definition. Total contrast used for                this procedure was 2 mL via IV push.       PHYSICIAN INTERPRETATION:  Left Ventricle: The left ventricular systolic function is normal, with a visually estimated ejection fraction of 60-65%. There are no regional left ventricular wall motion abnormalities. The left ventricular cavity size is normal. Spectral Doppler shows an impaired relaxation pattern of left ventricular diastolic filling.  Left Atrium: The left atrium is normal in size.  Right Ventricle: The right ventricle is normal in size. There is mildly reduced right ventricular systolic function. Stahl's sign is present, suggestive of pulmonary embolism.  Right Atrium: The right atrium is normal in size.  Aortic Valve: The aortic valve is probably trileaflet. The aortic valve dimensionless index is 0.81. There is no evidence  of aortic valve regurgitation. The peak instantaneous gradient of the aortic valve is 8.1 mmHg. The mean gradient of the aortic valve is 4.0 mmHg.  Mitral Valve: The mitral valve is mildly thickened. There is no evidence of mitral valve regurgitation.  Tricuspid Valve: The tricuspid valve is structurally normal. There is trace tricuspid regurgitation. The Doppler estimated RVSP is within normal limits at 18.0 mmHg.  Pulmonic Valve: The pulmonic valve is not well visualized. There is no indication of pulmonic valve regurgitation.  Pericardium: There is no pericardial effusion noted. There is a pericardial fat pad present.  Aorta: The aortic root is normal.  In comparison to the previous echocardiogram(s): There are no prior studies on this patient for comparison purposes.       CONCLUSIONS:   1. The left ventricular systolic function is normal, with a visually estimated ejection fraction of 60-65%.   2. Spectral Doppler shows an impaired relaxation pattern of left ventricular diastolic filling.   3. There is mildly reduced right ventricular systolic function.   4. Stahl's sign is present, suggestive of pulmonary embolism.   5. Right ventricular systolic pressure is within normal limits.    QUANTITATIVE DATA SUMMARY:     2D MEASUREMENTS:          Normal Ranges:  LAs:             2.80 cm  (2.7-4.0cm)  IVSd:            1.10 cm  (0.6-1.1cm)  LVPWd:           0.60 cm  (0.6-1.1cm)  LVIDd:           4.00 cm  (3.9-5.9cm)  LVIDs:           3.20 cm  LV Mass Index:   65 g/m2  LVEDV Index:     52 ml/m2  LV % FS          20.0 %       LA VOLUME:                    Normal Ranges:  LA Vol A4C:        53.0 ml    (22+/-6mL/m2)  LA Vol A2C:        51.8 ml  LA Vol BP:         55.1 ml  LA Vol Index A4C:  33.9ml/m2  LA Vol Index A2C:  33.1 ml/m2  LA Vol Index BP:   35.2 ml/m2  LA Area A4C:       19.6 cm2  LA Area A2C:       18.4 cm2  LA Major Axis A4C: 6.2 cm  LA Major Axis A2C: 5.6 cm  LA Volume Index:   35.2 ml/m2       RA VOLUME BY  A/L METHOD:            Normal Ranges:  RA Vol A4C:              43.1 ml    (8.3-19.5ml)  RA Vol Index A4C:        27.5 ml/m2  RA Area A4C:             16.3 cm2  RA Major Axis A4C:       5.2 cm       M-MODE MEASUREMENTS:         Normal Ranges:  Ao Root:             2.70 cm (2.0-3.7cm)  LAs:                 3.00 cm (2.7-4.0cm)       AORTA MEASUREMENTS:         Normal Ranges:  Ao Sinus, d:        2.90 cm (2.1-3.5cm)  Ao STJ, d:          2.60 cm (1.7-3.4cm)  Asc Ao, d:          2.90 cm (2.1-3.4cm)       LV SYSTOLIC FUNCTION BY 2D PLANIMETRY (MOD):                       Normal Ranges:  EF-A4C View:    78 % (>=55%)  EF-A2C View:    80 %  EF-Biplane:     79 %  EF-Visual:      63 %  LV EF Reported: 63 %       LV DIASTOLIC FUNCTION:             Normal Ranges:  MV Peak E:             1.02 m/s    (0.7-1.2 m/s)  MV Peak A:             1.07 m/s    (0.42-0.7 m/s)  E/A Ratio:             0.95        (1.0-2.2)  MV e'                  0.072 m/s   (>8.0)  MV lateral e'          0.07 m/s  MV medial e'           0.07 m/s  MV A Dur:              127.00 msec  E/e' Ratio:            14.10       (<8.0)  PulmV Sys Rusty:         28.70 cm/s  PulmV Jefferson Rusty:        19.40 cm/s  PulmV S/D Rusty:         1.50  PulmV A Revs Rusty:      28.40 cm/s  PulmV A Revs Dur:      140.00 msec       MITRAL VALVE:          Normal Ranges:  MV Vmax:      1.10 m/s (<=1.3m/s)  MV peak P.8 mmHg (<5mmHg)  MV mean P.0 mmHg (<2mmHg)  MV DT:        327 msec (150-240msec)       AORTIC VALVE:                     Normal Ranges:  AoV Vmax:                1.42 m/s (<=1.7m/s)  AoV Peak P.1 mmHg (<20mmHg)  AoV Mean P.0 mmHg (1.7-11.5mmHg)  LVOT Max Rusty:            1.16 m/s (<=1.1m/s)  AoV VTI:                 27.40 cm (18-25cm)  LVOT VTI:                22.20 cm  LVOT Diameter:           2.20 cm  (1.8-2.4cm)  AoV Area, VTI:           3.08 cm2 (2.5-5.5cm2)  AoV Area,Vmax:           3.11 cm2 (2.5-4.5cm2)  AoV Dimensionless Index: 0.81        RIGHT VENTRICLE:  RV Basal 3.70 cm  RV Mid   3.10 cm  RV Major 6.9 cm  TAPSE:   22.4 mm  RV s'    0.11 m/s       TRICUSPID VALVE/RVSP:          Normal Ranges:  Peak TR Velocity:     1.58 m/s  Est. RA Pressure:     8 mmHg  RV Syst Pressure:     18 mmHg  (< 30mmHg)  IVC Diam:             2.40 cm       PULMONIC VALVE:          Normal Ranges:  PV Max Rusty:     0.8 m/s  (0.6-0.9m/s)  PV Max P.8 mmHg       Pulmonary Veins:  PulmV A Revs Dur: 140.00 msec  PulmV A Revs Rusty: 28.40 cm/s  PulmV Jefferson Rusty:   19.40 cm/s  PulmV S/D Rusty:    1.50  PulmV Sys Rusty:    28.70 cm/s       [id number] Nate Perez MD  Electronically signed on 10/3/2024 at 10:13:54 AM       ** Final **  ECG 12 lead  Sinus rhythm  Left anterior fascicular block  Borderline T abnormalities, anterior leads      Physical Exam  General: Pleasant and cooperative.  Frail.  HEENT:  Normocephalic, atraumatic, mucus membranes moist.   Neck:  Trachea midline.  No JVD.    Chest:  Clear to auscultation bilaterally. No wheezes, rales, or rhonchi.  CV:  Regular rate and rhythm.  Positive S1/S2.   Abdomen: Bowel sounds present in all four quadrants, abdomen is soft, non-tender, non-distended.  Extremities: 1+ pitting edema bilateral lower extremities.  Slight swelling of bilateral lower extremities.  No redness erythema of lower legs.  No lower extremity edema or cyanosis.   Neurological:  AAOx3. No focal deficits.  Skin:  Warm and dry.     Relevant Results  All imaging reviewed by myself.    Assessment/Plan   Estefanía Contreras is an 89-year-old female with past medical history of CKD, HTN, DLD, AAA s/p repair, peripheral arterial disease, HP, hip arthroplasty 2024, presented to hospital gradual onset shortness of breath that began 2 days ago that has gotten progressively worse.  CT angio chest showed evidence of multiple right-sided pulmonary emboli with signs of right heart strain.  She was started heparin GTT after PERT team was called.  She had elevated calcium score  of 109 classified as high risk mortality.  She has been admitted to medicine team for further management of acute PE.    Daily progress:  10/3: Continue heparin GTT for treatment of acute PE.  Creatinine 1.51 from 1.48 yesterday.  Echo shows normal EF of 60 to 65%.  Stahl sign is present suggestive of PE.  RVSP is normal.  Duplex ultrasound of bilateral lower extremities is negative.  Cardiology team advise for transition to oral DOAC in the near future.  Pharmacy will assist with dosage due to patient's advanced age and low body weight.  PT/OT.     # Acute Right-sided PE-unprovoked  - This is a patient who has been admitted with an acute right-sided PE.  She has no symptoms of right heart failure.  No elevated JVD, no hepatojugular reflux.  There is 1+ pitting edema in the bilateral lower extremities however no significant swelling.  She does have some pain on palpation of bilateral lower extremities.  She did have a recent hip arthroplasty in July 2024 and then was placed on anticoagulation with Lovenox for about a month thereafter however she has not been on any anticoagulation for the last month.  PERT team was called and due to her hemodynamic stability she was placed on heparin GTT for treatment.  - We will continue heparin GTT  - Echo 10/3: Normal EF of 60 to 65%.  Stahl sign is present just of PE.  Normal RVSP.  - History proximal ultrasound of bilateral lower extremities is negative for any acute DVT  - Cardiology consult: Will transition to oral DOAC in the near future.     # CKD  # HTN, DLD  # AAA s/p repair  # Peripheral arterial disease  # Hip arthroplasty 7/2024  - Continue home Cymbalta, mirtazapine, pramipexole, simvastatin     - DVT PPx: On heparin gtt.  - IVF: None  - PT/OT    Vishal Ulloa MD  PGY 2 internal medicine

## 2024-10-03 NOTE — PROGRESS NOTES
Occupational Therapy                 Therapy Communication Note    Patient Name: Estefanía Contreras  MRN: [id number]  Department: [facility] 8  Room: [facility]850  Today's Date: 10/3/2024     Discipline: Occupational Therapy    Missed Visit Reason: Missed Visit Reason:  (Chart reviewed and case conference with PT who spoke with RN:  patient on hold; receiving heparin drip to address PE.   Plan:  Continue to attempt as appropriate.)    Missed Time: Attempt

## 2024-10-03 NOTE — CONSULTS
Consults  History Of Present Illness:    Estefanía Contreras is a 89 y.o. female presenting with SOB.    Patient is an 89-year-old female with a history of hypertension, status Zentz with sudden onset of shortness of breath on Monday.  Patient continued to have symptoms and presented for further evaluation.  She denies any chest discomfort.  She denies any palpitations.  She does admit to little lightheadedness.  She denies any syncope.  She denies any orthopnea or PND.  She denies any lower extremity edema.  CT scan of the chest demonstrates right subsegmental PE.  PESI score 119.    10/2/2024 CT of the chest:  Multiple right-sided pulmonary emboli with signs of right heart  strain.      Emphysematous changes. Left chest infiltrates or atelectasis with  possible fissural thickening and volume loss/mediastinal deviation.      Partially imaged hypodense lesion in the left lobe of the liver,  possible cyst although not fully characterized on single phase exam.      Additional findings as described above.    10/2/2024 venous ultrasound lower extremities:  Negative study.  No deep venous thrombosis of the  bilateral lower  extremities.     Last Recorded Vitals:  Vitals:    10/02/24 2058 10/02/24 2344 10/03/24 0334 10/03/24 0746   BP: 136/63 113/56 122/58 140/63   BP Location:       Patient Position:       Pulse: 76 74 69 68   Resp: 20 18 18 18   Temp: 36.3 °C (97.3 °F) 36.1 °C (97 °F) 36.1 °C (97 °F) 36.6 °C (97.9 °F)   TempSrc: Temporal Temporal Temporal Temporal   SpO2: 97% 93% 96% 96%   Weight:       Height:           Last Labs:  CBC - 10/2/2024: 12:31 PM  6.0 11.8 146    38.1      CMP - 10/3/2024:  5:43 AM  7.8 6.9 16 --- 0.3   4.5 3.1 8 117      PTT - 7/1/2024:  8:19 PM  1.0   10.8 38     Troponin I, High Sensitivity   Date/Time Value Ref Range Status   10/02/2024 06:17 PM 27 (H) 0 - 13 ng/L Final   10/02/2024 12:31 PM 37 (H) 0 - 13 ng/L Final     Troponin I   Date/Time Value Ref Range Status   12/14/2022 02:38 PM 6 0 - 13  "ng/L Final     Comment:     .  Less than 99th percentile of normal range cutoff-  Female and children under 18 years old <14 ng/L; Male <21 ng/L: Negative  Repeat testing should be performed if clinically indicated.   .  Female and children under 18 years old 14-50 ng/L; Male 21-50 ng/L:  Consistent with possible cardiac damage and possible increased clinical   risk. Serial measurements may help to assess extent of myocardial damage.   .  >50 ng/L: Consistent with cardiac damage, increased clinical risk and  myocardial infarction. Serial measurements may help assess extent of   myocardial damage.   .   NOTE: Children less than 1 year old may have higher baseline troponin   levels and results should be interpreted in conjunction with the overall   clinical context.   .  NOTE: Troponin I testing is performed using a different   testing methodology at [facility] than at other   [facility]. Direct result comparisons should only   be made within the same method.       BNP   Date/Time Value Ref Range Status   10/02/2024 12:31  (H) 0 - 99 pg/mL Final   07/12/2024 03:51  (H) 0 - 99 pg/mL Corrected     Comment:     Corrected result: Previously reported as 711 pg/mL (reference range: 0-99 pg/mL) on 7/12/2024 at 1756 EDT.      Last I/O:  I/O last 3 completed shifts:  In: 28.8 (0.5 mL/kg) [I.V.:28.8 (0.5 mL/kg)]  Out: - (0 mL/kg)   Weight: 57.2 kg     Past Cardiology Tests (Last 3 Years):  EKG:  ECG 12 lead 07/01/2024    Echo:  No results found for this or any previous visit from the past 1095 days.    Ejection Fractions:  No results found for: \"EF\"  Cath:  No results found for this or any previous visit from the past 1095 days.    Stress Test:  No results found for this or any previous visit from the past 1095 days.    Cardiac Imaging:  No results found for this or any previous visit from the past 1095 days.      Past Medical History:  She has a past medical history of H/O heart artery stent and " H/O shoulder surgery.    Past Surgical History:  She has no past surgical history on file.      Social History:  She reports that she has never smoked. She has never been exposed to tobacco smoke. She has never used smokeless tobacco. She reports that she does not currently use alcohol. She reports that she does not use drugs.    Family History:  No family history on file.     Allergies:  Morphine    Inpatient Medications:  Scheduled medications   Medication Dose Route Frequency    [Held by provider] dilTIAZem CD  180 mg oral Daily    DULoxetine  30 mg oral Daily    [Held by provider] furosemide  20 mg oral Daily    mirtazapine  30 mg oral Nightly    oxygen   inhalation Continuous - Inhalation    perflutren lipid microspheres  0.5-10 mL of dilution intravenous Once in imaging    perflutren protein A microsphere  0.5 mL intravenous Once in imaging    pramipexole  1 mg oral Nightly    simvastatin  20 mg oral Nightly    sulfur hexafluoride microsphr  2 mL intravenous Once in imaging     PRN medications   Medication    acetaminophen    Or    acetaminophen    Or    acetaminophen    heparin    melatonin     Continuous Medications   Medication Dose Last Rate    heparin  0-4,500 Units/hr Stopped (10/02/24 1913)     Outpatient Medications:  Current Outpatient Medications   Medication Instructions    acetaminophen (TYLENOL) 325 mg, oral, Every 4 hours PRN    dilTIAZem LA (CARDIZEM LA) 180 mg, oral, Daily    DULoxetine (CYMBALTA) 30 mg, oral, Daily    furosemide (Lasix) 20 mg tablet 1 tablet, oral, Daily    hydrOXYzine pamoate (VISTARIL) 50 mg, oral, Nightly PRN    LORazepam (ATIVAN) 2 mg, oral, Nightly PRN, Discontinued by Rehab    mirtazapine (REMERON) 30 mg, oral, Nightly    oxyCODONE (ROXICODONE) 5 mg, oral, Every 6 hours PRN    polyethylene glycol (GLYCOLAX, MIRALAX) 17 g, oral, Daily    potassium chloride CR 20 mEq ER tablet 20 mEq, oral, Daily    pramipexole (MIRAPEX) 1 mg, oral, Nightly    simvastatin (ZOCOR) 20 mg,  oral, Nightly    vit C/E/Zn/coppr/lutein/zeaxan (PRESERVISION AREDS-2 ORAL) 1 tablet, oral, 2 times daily       Physical Exam:  Physical Exam  Vitals reviewed.   Constitutional:       Appearance: Normal appearance.   HENT:      Head: Normocephalic and atraumatic.      Mouth/Throat:      Mouth: Mucous membranes are moist.      Pharynx: Oropharynx is clear.   Eyes:      Extraocular Movements: Extraocular movements intact.      Conjunctiva/sclera: Conjunctivae normal.   Cardiovascular:      Rate and Rhythm: Normal rate and regular rhythm.      Pulses: Normal pulses.      Heart sounds: Normal heart sounds.   Pulmonary:      Effort: Pulmonary effort is normal.      Breath sounds: Normal breath sounds.   Abdominal:      General: Bowel sounds are normal.      Palpations: Abdomen is soft.   Musculoskeletal:         General: No swelling.      Cervical back: Neck supple.   Skin:     General: Skin is warm and dry.   Neurological:      General: No focal deficit present.      Mental Status: She is alert.   Psychiatric:         Mood and Affect: Mood normal.         Behavior: Behavior normal.           Assessment/Plan   10/3/2024  1.  Pulmonary embolus: Multiple filling defects of the lobar segmental and subsegmental branches of the right pulmonary artery.  As far as I can tell, unprovoked PE.  Patient states she was at home doing her ADLs and getting around with a walker.  Currently on heparin drip.  Echocardiogram is pending.  Will transition to oral DOAC in the near future.  Given the patient's renal function, advanced age and low body weight, would likely need pharmacy to assist us with dosage.  2.  Hypertension  3.  AAA status post repair  4.  PAD  5.  CKD    Peripheral IV 10/02/24 18 G Left Antecubital (Active)   Site Assessment Clean;Dry;Intact 10/03/24 0806   Dressing Status Clean;Dry 10/03/24 0806   Number of days: 1       Code Status:  DNR and No Intubation    Nate Perez MD

## 2024-10-04 ENCOUNTER — APPOINTMENT (OUTPATIENT)
Dept: RADIOLOGY | Facility: HOSPITAL | Age: 89
DRG: 175 | End: 2024-10-04
Payer: MEDICARE

## 2024-10-04 LAB
ALBUMIN SERPL BCP-MCNC: 3 G/DL (ref 3.4–5)
ANION GAP SERPL CALC-SCNC: 9 MMOL/L (ref 10–20)
BUN SERPL-MCNC: 45 MG/DL (ref 6–23)
CALCIUM SERPL-MCNC: 8 MG/DL (ref 8.6–10.3)
CHLORIDE SERPL-SCNC: 108 MMOL/L (ref 98–107)
CO2 SERPL-SCNC: 29 MMOL/L (ref 21–32)
CREAT SERPL-MCNC: 1.33 MG/DL (ref 0.5–1.05)
EGFRCR SERPLBLD CKD-EPI 2021: 38 ML/MIN/1.73M*2
ERYTHROCYTE [DISTWIDTH] IN BLOOD BY AUTOMATED COUNT: 14.4 % (ref 11.5–14.5)
ERYTHROCYTE [DISTWIDTH] IN BLOOD BY AUTOMATED COUNT: 14.4 % (ref 11.5–14.5)
GLUCOSE SERPL-MCNC: 82 MG/DL (ref 74–99)
HCT VFR BLD AUTO: 31 % (ref 36–46)
HCT VFR BLD AUTO: 31.4 % (ref 36–46)
HGB BLD-MCNC: 9.4 G/DL (ref 12–16)
HGB BLD-MCNC: 9.6 G/DL (ref 12–16)
MCH RBC QN AUTO: 28 PG (ref 26–34)
MCH RBC QN AUTO: 28.2 PG (ref 26–34)
MCHC RBC AUTO-ENTMCNC: 30.3 G/DL (ref 32–36)
MCHC RBC AUTO-ENTMCNC: 30.6 G/DL (ref 32–36)
MCV RBC AUTO: 92 FL (ref 80–100)
MCV RBC AUTO: 92 FL (ref 80–100)
NRBC BLD-RTO: 0 /100 WBCS (ref 0–0)
NRBC BLD-RTO: 0 /100 WBCS (ref 0–0)
PHOSPHATE SERPL-MCNC: 4.2 MG/DL (ref 2.5–4.9)
PLATELET # BLD AUTO: 142 X10*3/UL (ref 150–450)
PLATELET # BLD AUTO: 144 X10*3/UL (ref 150–450)
POTASSIUM SERPL-SCNC: 4.8 MMOL/L (ref 3.5–5.3)
RBC # BLD AUTO: 3.36 X10*6/UL (ref 4–5.2)
RBC # BLD AUTO: 3.41 X10*6/UL (ref 4–5.2)
SODIUM SERPL-SCNC: 141 MMOL/L (ref 136–145)
UFH PPP CHRO-ACNC: >2 IU/ML
WBC # BLD AUTO: 5.6 X10*3/UL (ref 4.4–11.3)
WBC # BLD AUTO: 5.7 X10*3/UL (ref 4.4–11.3)

## 2024-10-04 PROCEDURE — 99232 SBSQ HOSP IP/OBS MODERATE 35: CPT | Performed by: INTERNAL MEDICINE

## 2024-10-04 PROCEDURE — 2500000002 HC RX 250 W HCPCS SELF ADMINISTERED DRUGS (ALT 637 FOR MEDICARE OP, ALT 636 FOR OP/ED): Performed by: INTERNAL MEDICINE

## 2024-10-04 PROCEDURE — 71045 X-RAY EXAM CHEST 1 VIEW: CPT

## 2024-10-04 PROCEDURE — 71045 X-RAY EXAM CHEST 1 VIEW: CPT | Performed by: RADIOLOGY

## 2024-10-04 PROCEDURE — 97165 OT EVAL LOW COMPLEX 30 MIN: CPT | Mod: GO

## 2024-10-04 PROCEDURE — 85520 HEPARIN ASSAY: CPT | Performed by: INTERNAL MEDICINE

## 2024-10-04 PROCEDURE — 2060000001 HC INTERMEDIATE ICU ROOM DAILY

## 2024-10-04 PROCEDURE — 99233 SBSQ HOSP IP/OBS HIGH 50: CPT

## 2024-10-04 PROCEDURE — 2500000002 HC RX 250 W HCPCS SELF ADMINISTERED DRUGS (ALT 637 FOR MEDICARE OP, ALT 636 FOR OP/ED)

## 2024-10-04 PROCEDURE — 80069 RENAL FUNCTION PANEL: CPT

## 2024-10-04 PROCEDURE — 85027 COMPLETE CBC AUTOMATED: CPT

## 2024-10-04 PROCEDURE — 36415 COLL VENOUS BLD VENIPUNCTURE: CPT | Performed by: INTERNAL MEDICINE

## 2024-10-04 PROCEDURE — 2500000004 HC RX 250 GENERAL PHARMACY W/ HCPCS (ALT 636 FOR OP/ED)

## 2024-10-04 PROCEDURE — 2500000001 HC RX 250 WO HCPCS SELF ADMINISTERED DRUGS (ALT 637 FOR MEDICARE OP)

## 2024-10-04 PROCEDURE — 2500000005 HC RX 250 GENERAL PHARMACY W/O HCPCS: Performed by: INTERNAL MEDICINE

## 2024-10-04 PROCEDURE — 97161 PT EVAL LOW COMPLEX 20 MIN: CPT | Mod: GP

## 2024-10-04 PROCEDURE — 36415 COLL VENOUS BLD VENIPUNCTURE: CPT | Performed by: EMERGENCY MEDICINE

## 2024-10-04 PROCEDURE — 94640 AIRWAY INHALATION TREATMENT: CPT

## 2024-10-04 PROCEDURE — 85027 COMPLETE CBC AUTOMATED: CPT | Performed by: EMERGENCY MEDICINE

## 2024-10-04 PROCEDURE — 2500000004 HC RX 250 GENERAL PHARMACY W/ HCPCS (ALT 636 FOR OP/ED): Performed by: INTERNAL MEDICINE

## 2024-10-04 PROCEDURE — 2500000001 HC RX 250 WO HCPCS SELF ADMINISTERED DRUGS (ALT 637 FOR MEDICARE OP): Performed by: INTERNAL MEDICINE

## 2024-10-04 RX ORDER — IPRATROPIUM BROMIDE AND ALBUTEROL SULFATE 2.5; .5 MG/3ML; MG/3ML
3 SOLUTION RESPIRATORY (INHALATION) EVERY 2 HOUR PRN
Status: ACTIVE | OUTPATIENT
Start: 2024-10-04

## 2024-10-04 RX ORDER — FUROSEMIDE 10 MG/ML
INJECTION INTRAMUSCULAR; INTRAVENOUS
Status: COMPLETED
Start: 2024-10-04 | End: 2024-10-04

## 2024-10-04 RX ORDER — IPRATROPIUM BROMIDE AND ALBUTEROL SULFATE 2.5; .5 MG/3ML; MG/3ML
3 SOLUTION RESPIRATORY (INHALATION)
Status: DISCONTINUED | OUTPATIENT
Start: 2024-10-04 | End: 2024-10-04

## 2024-10-04 RX ORDER — FUROSEMIDE 10 MG/ML
40 INJECTION INTRAMUSCULAR; INTRAVENOUS ONCE
Status: COMPLETED | OUTPATIENT
Start: 2024-10-04 | End: 2024-10-04

## 2024-10-04 ASSESSMENT — PAIN - FUNCTIONAL ASSESSMENT
PAIN_FUNCTIONAL_ASSESSMENT: 0-10

## 2024-10-04 ASSESSMENT — COGNITIVE AND FUNCTIONAL STATUS - GENERAL
MOBILITY SCORE: 18
TOILETING: A LOT
TURNING FROM BACK TO SIDE WHILE IN FLAT BAD: A LITTLE
WALKING IN HOSPITAL ROOM: A LITTLE
HELP NEEDED FOR BATHING: A LITTLE
STANDING UP FROM CHAIR USING ARMS: A LITTLE
CLIMB 3 TO 5 STEPS WITH RAILING: A LOT
MOBILITY SCORE: 18
WALKING IN HOSPITAL ROOM: A LITTLE
TURNING FROM BACK TO SIDE WHILE IN FLAT BAD: A LITTLE
DRESSING REGULAR UPPER BODY CLOTHING: A LITTLE
STANDING UP FROM CHAIR USING ARMS: A LITTLE
DAILY ACTIVITIY SCORE: 22
MOVING TO AND FROM BED TO CHAIR: A LITTLE
MOBILITY SCORE: 19
TOILETING: A LITTLE
MOVING TO AND FROM BED TO CHAIR: A LITTLE
PERSONAL GROOMING: A LITTLE
DAILY ACTIVITIY SCORE: 16
CLIMB 3 TO 5 STEPS WITH RAILING: A LOT
DRESSING REGULAR LOWER BODY CLOTHING: A LOT
HELP NEEDED FOR BATHING: A LOT
WALKING IN HOSPITAL ROOM: A LITTLE
CLIMB 3 TO 5 STEPS WITH RAILING: A LITTLE
TURNING FROM BACK TO SIDE WHILE IN FLAT BAD: A LITTLE
HELP NEEDED FOR BATHING: A LITTLE
TOILETING: A LITTLE
STANDING UP FROM CHAIR USING ARMS: A LITTLE
MOVING TO AND FROM BED TO CHAIR: A LITTLE
DAILY ACTIVITIY SCORE: 22

## 2024-10-04 ASSESSMENT — PAIN SCALES - GENERAL
PAINLEVEL_OUTOF10: 0 - NO PAIN
PAINLEVEL_OUTOF10: 4

## 2024-10-04 NOTE — PROGRESS NOTES
PT/OT recommending [facility]/HHC. Spoke to patient at bedside and states she had therapy in the home recently but discharged from their services. Patient unsure of name of agency. HHC list provided and freedom of choice explained. 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 Referral to [facility], awaiting acceptance. Primary team updated need for external home care orders. TCC to continue to follow for discharge planning.     UPDATE 1453: [facility] able to accept.     CELINA MONROY, RN TCC

## 2024-10-04 NOTE — CARE PLAN
The patient's goals for the shift include      The clinical goals for the shift include reamin HDS      Problem: Skin  Goal: Participates in plan/prevention/treatment measures  Outcome: Progressing  Goal: Prevent/manage excess moisture  Outcome: Progressing  Goal: Prevent/minimize sheer/friction injuries  Outcome: Progressing     Problem: Pain - Adult  Goal: Verbalizes/displays adequate comfort level or baseline comfort level  Outcome: Progressing     Problem: Safety - Adult  Goal: Free from fall injury  Outcome: Progressing     Problem: Discharge Planning  Goal: Discharge to home or other facility with appropriate resources  Outcome: Progressing     Problem: Chronic Conditions and Co-morbidities  Goal: Patient's chronic conditions and co-morbidity symptoms are monitored and maintained or improved  Outcome: Progressing       Over the shift, the patient did not make progress toward the following goals. Barriers to progression include

## 2024-10-04 NOTE — CARE PLAN
The patient's goals for the shift include      The clinical goals for the shift include patient respiratory status will remain stable and she will refrain from further complications    Over the shift, the patient will maintain comfort and salety

## 2024-10-04 NOTE — PROGRESS NOTES
Occupational Therapy    Evaluation    Patient Name: Estefanía Contreras  MRN: [id number]  Department: [facility]  Room: [address] [place] [address]  Today's Date: 10/4/2024  Time Calculation  Start Time: 1006  Stop Time: 1021  Time Calculation (min): 15 min        Assessment:  End of Session Communication: Bedside nurse  End of Session Patient Position: Bed, 3 rail up, Alarm on (All needs in reach and no complaints noted)  OT Assessment Results: Decreased ADL status, Decreased upper extremity strength, Decreased endurance, Decreased functional mobility  Strengths: Living arrangement secure  Barriers to Participation: Comorbidities  Plan:  Treatment Interventions: ADL retraining, UE strengthening/ROM, Functional transfer training, Endurance training, Equipment evaluation/education, Patient/family training, Compensatory technique education  OT Frequency: 3 times per week  OT Discharge Recommendations: Low intensity level of continued care (with 24 hr support)  OT - OK to Discharge: Yes (once medically appropriate to next level of care)  Treatment Interventions: ADL retraining, UE strengthening/ROM, Functional transfer training, Endurance training, Equipment evaluation/education, Patient/family training, Compensatory technique education    Subjective   Current Problem:  1. Pulmonary embolism, other, unspecified chronicity, unspecified whether acute cor pulmonale present (Multi)  Transthoracic Echo (TTE) Complete    Transthoracic Echo (TTE) Complete    Vascular US lower extremity venous duplex bilateral    Vascular US lower extremity venous duplex bilateral    CANCELED: Vascular US lower extremity venous duplex bilateral    CANCELED: Vascular US lower extremity venous duplex bilateral      2. Acute respiratory failure with hypoxia (Multi)        3. Acute bilateral deep vein thrombosis (DVT) of upper extremities (Multi)  Vascular US lower extremity venous duplex bilateral    Vascular US lower extremity venous duplex bilateral    CANCELED: Vascular US  lower extremity venous duplex bilateral    CANCELED: Vascular US lower extremity venous duplex bilateral        General:  General  Reason for Referral: OT eval and tx; ADLs. dx: Acute Right-sided PE . chest x-ray: Cardiomegaly and COPD without acute infiltrates. CTA chest for PE: Multiple right-sided pulmonary emboli with signs of right heart  strain. DUPLEX; Negative study.  No deep venous thrombosis of the  bilateral lower  extremities  Referred By: Lobito  Past Medical History Relevant to Rehab: 89-year-old female with past medical history of CKD, HTN, DLD, AAA s/p repair, peripheral arterial disease, HP, hip arthroplasty 7/2024, presented to hospital gradual onset shortness of breath that began 2 days ago that has gotten progressively worse.  CT angio chest showed evidence of multiple right-sided pulmonary emboli with signs of right heart strain.  Co-Treatment: PT  Co-Treatment Reason: for safety and to maximize therapeutic performance  Prior to Session Communication: Bedside nurse  Patient Position Received: Bed, 3 rail up, Alarm on  General Comment: patient pleasant and cooperative to participate  Precautions:  Medical Precautions:  (Fall precautions, 2LPM, purewick, tele, wears glove on L hand from home- states from previous injury)              Pain:  Pain Assessment  Pain Assessment: 0-10  0-10 (Numeric) Pain Score: 0 - No pain    Objective   Cognition:  Overall Cognitive Status: Within Functional Limits           Home Living:  Type of Home:  (per chart review and patient report; patient lives alone in a senior apartment. [address] with HR. patient lives on 1st floor)  Bathroom Shower/Tub:  (tub shower, grab bar, handheld shower)  Bathroom Toilet:  (standard toilet with 3:1 frame)  Prior Function:  Level of [place]:  (independent with dressing/toileting; use of WW. son lives across the street. son does driving and shopping. patient reports can cook light meals. HHA 4x/week for 4 hours a day to  assist with bathing and IADLs.)    ADL:  LE Dressing Assistance: Maximal (seated to thread BLE through mesh panties and standing with WW to jacquelyn/doff over waist/hips. overall, MAX A to complete)       Bed Mobility/Transfers: Bed Mobility  Bed Mobility:  (completed supine <-->sit with MIN A)    Transfers  Transfer:  (completed STS with CGA with WW)      Functional Mobility:  Functional Mobility  Functional Mobility Performed:  (engaged in side steps with CGA with WW)     Sensation:  Light Touch: No apparent deficits  Strength:  Strength Comments: BUE ROM/MMT WFL for patient age      Outcome Measures:[facility] Daily Activity  Putting on and taking off regular lower body clothing: A lot  Bathing (including washing, rinsing, drying): A lot  Putting on and taking off regular upper body clothing: A little  Toileting, which includes using toilet, bedpan or urinal: A lot  Taking care of personal grooming such as brushing teeth: A little  Eating Meals: None  Daily Activity - Total Score: 16        Education Documentation  Body Mechanics, taught by Michelle Kaminski OT at 10/4/2024  1:00 PM.  Learner: Patient  Readiness: Acceptance  Method: Explanation  Response: Verbalizes Understanding, Needs Reinforcement    ADL Training, taught by Michelle Kaminski OT at 10/4/2024  1:00 PM.  Learner: Patient  Readiness: Acceptance  Method: Explanation  Response: Verbalizes Understanding, Needs Reinforcement    Education Comments  No comments found.        OP EDUCATION:       Goals:  Encounter Problems       Encounter Problems (Active)       OT Goals       STG- patient will complete LB dressing at [facility] with use of ae/ad/dme prn (Progressing)       Start:  10/04/24    Expected End:  10/18/24            STG- patient will complete toileting at [facility] with use of ae/ad/dme prn (Progressing)       Start:  10/04/24    Expected End:  10/18/24            STG- patient will complete transfers to/from bed, chair, commode at [facility] with use of ae/ad/dme prn  (Progressing)       Start:  10/04/24    Expected End:  10/18/24            STG- patient will complete simple mobility with CGA with use of ae/ad/dme prn (Progressing)       Start:  10/04/24    Expected End:  10/18/24            STG- patient will complete grooming with supervision with use of ae/ad/dme prn (Progressing)       Start:  10/04/24    Expected End:  10/18/24

## 2024-10-04 NOTE — PROGRESS NOTES
Physical Therapy    Physical Therapy Evaluation    Patient Name: Estefanía Contreras  MRN: [id number]  Today's Date: 10/4/2024   Time Calculation  Start Time: 1007  Stop Time: 1021  Time Calculation (min): 14 min  850/850-A    Assessment/Plan   PT Assessment  PT Assessment Results: Decreased endurance, Impaired balance, Decreased mobility  End of Session Communication: Bedside nurse  End of Session Patient Position: Bed, 3 rail up, Alarm on (All needs in reach and no complaints noted)  IP OR SWING BED PT PLAN  Inpatient or Swing Bed: Inpatient  PT Plan  Treatment/Interventions: Bed mobility, Transfer training, Gait training, Stair training  PT Plan: Ongoing PT  PT Frequency: 4 times per week  PT Discharge Recommendations: Low intensity level of continued care (with 24hr SUP)  PT - OK to Discharge: Yes    Subjective     Current Problem:  1. Pulmonary embolism, other, unspecified chronicity, unspecified whether acute cor pulmonale present (Multi)  Transthoracic Echo (TTE) Complete    Transthoracic Echo (TTE) Complete    Vascular US lower extremity venous duplex bilateral    Vascular US lower extremity venous duplex bilateral    CANCELED: Vascular US lower extremity venous duplex bilateral    CANCELED: Vascular US lower extremity venous duplex bilateral      2. Acute respiratory failure with hypoxia (Multi)        3. Acute bilateral deep vein thrombosis (DVT) of upper extremities (Multi)  Vascular US lower extremity venous duplex bilateral    Vascular US lower extremity venous duplex bilateral    CANCELED: Vascular US lower extremity venous duplex bilateral    CANCELED: Vascular US lower extremity venous duplex bilateral        Patient Active Problem List   Diagnosis    Closed fracture of neck of left femur with routine healing    Aneurysm of abdominal vessel (CMS-HCC)    Stage 3b chronic kidney disease (Multi)    Status post reverse arthroplasty of left shoulder    Thrombocytopenia (CMS-HCC)    HTN (hypertension)    Peripheral  vascular disease (CMS-HCC)    Cellulitis of great toe of right foot    Onychocryptosis    Anemia    Neuropathy    HLD (hyperlipidemia)    Anorexia    Constipation    Anxiety    Change in mental status    Dysuria    Pulmonary embolism, other, unspecified chronicity, unspecified whether acute cor pulmonale present (Multi)     General Visit Information:  General  Reason for Referral: PT Eval and Treat  Referred By: Leanne Robin DO  Past Medical History Relevant to Rehab: 89-year-old female with past medical history of CKD, HTN, DLD, AAA s/p repair, peripheral arterial disease, HP, hip arthroplasty 7/2024, presented to hospital gradual onset shortness of breath that began 2 days ago that has gotten progressively worse.  CT angio chest showed evidence of multiple right-sided pulmonary emboli with signs of right heart strain.  Co-Treatment: OT  Co-Treatment Reason: maximize safety and functional mobility  Prior to Session Communication: Bedside nurse  Patient Position Received: Alarm on, Bed, 3 rail up (Agreeable to PT)    Home Living:  Home Living  Home Living Comments: Pt lives alone in an apt with [address] with B HR. Bathroom has a tub/shower with seat, grab bar, and HHSH, and a raised toilet seat with hand rails.    Prior Level of Function:  Prior Function Per Pt/Caregiver Report  Level of [place]: Independent with ADLs and functional transfers (Pt amb with RW, is independent with dressing, has a HHA 4x/week who assists with showers and IADLs, son lives across the street and he does shopping and driving)    Precautions:  Precautions  Precautions Comment: Fall precautions, 2LPM    Vital Signs:  Vital Signs  Vitals Session:  (VSS)    Objective     Pain:  Pain Assessment  Pain Assessment:  (0/10)    Cognition:  Cognition  Overall Cognitive Status: Within Functional Limits    General Assessments:  Sensation  Light Touch: No apparent deficits  Strength  Strength Comments: B LE ROM and strength WFL  Dynamic Standing  Balance  Dynamic Standing-Comments: Fair- with RW and CGA    Functional Assessments:  Bed Mobility  Bed Mobility:  (supine <> sitting: min A)  Transfers  Transfer:  (STS from EOB x 3: CGA)  Ambulation/Gait Training  Ambulation/Gait Training Performed:  (Pt amb 3' using RW with CGA. Amb was limited due to pt feeling slight dizzy. Sx slightly improved with DBE however did not entirely resolve until back in supine. VSS throughout.)     Outcome Measures:  [facility] Basic Mobility  Turning from your back to your side while in a flat bed without using bedrails: None  Moving from lying on your back to sitting on the side of a flat bed without using bedrails: A little  Moving to and from bed to chair (including a wheelchair): A little  Standing up from a chair using your arms (e.g. wheelchair or bedside chair): A little  To walk in hospital room: A little  Climbing 3-5 steps with railing: A little  Basic Mobility - Total Score: 19    Goals:  Encounter Problems       Encounter Problems (Active)       PT Problem       STG - Pt will transition supine <> sitting with SUP  (Progressing)       Start:  10/04/24    Expected End:  10/18/24            STG - Pt will transfer STS with SUP  (Progressing)       Start:  10/04/24    Expected End:  10/18/24            STG - Pt will amb 30' using RW with SUP  (Progressing)       Start:  10/04/24    Expected End:  10/18/24            STG -  Pt will navigate 4 stairs using B HR with CGA  (Progressing)       Start:  10/04/24    Expected End:  10/18/24               Pain - Adult            Education Documentation  Precautions, taught by Jagruti Vega PT at 10/4/2024 11:45 AM.  Learner: Patient  Readiness: Acceptance  Method: Explanation  Response: Verbalizes Understanding    Mobility Training, taught by Jagruti Vega PT at 10/4/2024 11:45 AM.  Learner: Patient  Readiness: Acceptance  Method: Explanation  Response: Verbalizes Understanding    Education Comments  No comments found.

## 2024-10-04 NOTE — PROGRESS NOTES
Subjective Data:  Patient denies any chest discomfort or shortness of breath.    Overnight Events:    On 2 L of oxygen by nasal cannula.     Objective Data:  Last Recorded Vitals:  Vitals:    10/03/24 2047 10/04/24 0347 10/04/24 0816 10/04/24 1126   BP:  131/60 93/51 129/85   Pulse:  76 82 70   Resp:  18 20 16   Temp:  36.8 °C (98.2 °F) 36.8 °C (98.2 °F) 36.4 °C (97.5 °F)   TempSrc:   Temporal Temporal   SpO2: 98% 98% 95% 94%   Weight:       Height:           Last Labs:  CBC - 10/4/2024:  5:55 AM  5.7 9.4 142    31.0      CMP - 10/4/2024:  5:55 AM  8.0 6.9 16 --- 0.3   4.2 3.0 8 117      PTT - 10/3/2024: 11:11 AM  1.2   13.5 136     TROPHS   Date/Time Value Ref Range Status   10/02/2024 06:17 PM 27 0 - 13 ng/L Final   10/02/2024 12:31 PM 37 0 - 13 ng/L Final   12/14/2022 02:38 PM 6 0 - 13 ng/L Final     Comment:     .  Less than 99th percentile of normal range cutoff-  Female and children under 18 years old <14 ng/L; Male <21 ng/L: Negative  Repeat testing should be performed if clinically indicated.   .  Female and children under 18 years old 14-50 ng/L; Male 21-50 ng/L:  Consistent with possible cardiac damage and possible increased clinical   risk. Serial measurements may help to assess extent of myocardial damage.   .  >50 ng/L: Consistent with cardiac damage, increased clinical risk and  myocardial infarction. Serial measurements may help assess extent of   myocardial damage.   .   NOTE: Children less than 1 year old may have higher baseline troponin   levels and results should be interpreted in conjunction with the overall   clinical context.   .  NOTE: Troponin I testing is performed using a different   testing methodology at [facility] than at other   [facility]. Direct result comparisons should only   be made within the same method.       BNP   Date/Time Value Ref Range Status   10/02/2024 12:31  0 - 99 pg/mL Final   07/12/2024 03:51  0 - 99 pg/mL Corrected     Comment:      Corrected result: Previously reported as 711 pg/mL (reference range: 0-99 pg/mL) on 7/12/2024 at 1756 EDT.      Last I/O:  I/O last 3 completed shifts:  In: 28.8 (0.5 mL/kg) [I.V.:28.8 (0.5 mL/kg)]  Out: 600 (10.5 mL/kg) [Urine:600 (0.3 mL/kg/hr)]  Weight: 57.2 kg     Past Cardiology Tests (Last 3 Years):  EKG:  ECG 12 lead 10/02/2024      ECG 12 lead 07/01/2024    Echo:  Transthoracic Echo (TTE) Complete 10/03/2024  1. The left ventricular systolic function is normal, with a visually estimated ejection fraction of 60-65%.   2. Spectral Doppler shows an impaired relaxation pattern of left ventricular diastolic filling.   3. There is mildly reduced right ventricular systolic function.   4. Stahl's sign is present, suggestive of pulmonary embolism.   5. Right ventricular systolic pressure is within normal limits.    Ejection Fractions:  EF   Date/Time Value Ref Range Status   10/03/2024 10:08 AM 63 %      Cath:  No results found for this or any previous visit from the past 1095 days.    Stress Test:  No results found for this or any previous visit from the past 1095 days.    Cardiac Imaging:  No results found for this or any previous visit from the past 1095 days.      Inpatient Medications:  Scheduled medications   Medication Dose Route Frequency    apixaban  10 mg oral BID    Followed by    [START ON 10/10/2024] apixaban  5 mg oral BID    [Held by provider] dilTIAZem CD  180 mg oral Daily    DULoxetine  30 mg oral Daily    [Held by provider] furosemide  20 mg oral Daily    mirtazapine  30 mg oral Nightly    oxygen   inhalation Continuous - Inhalation    perflutren protein A microsphere  0.5 mL intravenous Once in imaging    pramipexole  1 mg oral Nightly    simvastatin  20 mg oral Nightly    sodium chloride  1,000 mL intravenous Once    sulfur hexafluoride microsphr  2 mL intravenous Once in imaging     PRN medications   Medication    acetaminophen    Or    acetaminophen    Or    acetaminophen    hydrOXYzine  pamoate    melatonin     Continuous Medications   Medication Dose Last Rate       Physical Exam:  Physical Exam  Vitals reviewed.   Constitutional:       Appearance: Normal appearance.   HENT:      Head: Normocephalic and atraumatic.      Mouth/Throat:      Mouth: Mucous membranes are moist.      Pharynx: Oropharynx is clear.   Eyes:      Extraocular Movements: Extraocular movements intact.      Conjunctiva/sclera: Conjunctivae normal.   Cardiovascular:      Rate and Rhythm: Normal rate and regular rhythm.      Pulses: Normal pulses.      Heart sounds: Normal heart sounds.   Pulmonary:      Effort: Pulmonary effort is normal.      Breath sounds: Normal breath sounds.   Abdominal:      General: Bowel sounds are normal.      Palpations: Abdomen is soft.   Musculoskeletal:         General: No swelling.      Cervical back: Neck supple.   Skin:     General: Skin is warm and dry.   Neurological:      General: No focal deficit present.      Mental Status: She is alert.   Psychiatric:         Mood and Affect: Mood normal.         Behavior: Behavior normal.           Assessment/Plan   10/3/2024  1.  Pulmonary embolus: Multiple filling defects of the lobar segmental and subsegmental branches of the right pulmonary artery.  As far as I can tell, unprovoked PE.  Patient states she was at home doing her ADLs and getting around with a walker.  Currently on heparin drip.  Echocardiogram is pending.  Will transition to oral DOAC in the near future.  Given the patient's renal function, advanced age and low body weight, would likely need pharmacy to assist us with dosage.  2.  Hypertension  3.  AAA status post repair  4.  PAD  5.  CKD    10/4/2024  1.  PE: Switched from heparin drip to Eliquis yesterday.  Appreciate pharmacy assistance with dosage.  2.  Hypertension  3.  AAA status post repair  4.  PAD  5.  CKD  6.  Disposition: Patient is otherwise stable for discharge from a cardiac standpoint.  She can follow-up with [facility]  outpatient.    Peripheral IV 10/02/24 18 G Left Antecubital (Active)   Site Assessment Clean;Dry;Intact 10/04/24 0733   Dressing Status Dry;Clean 10/04/24 0733   Number of days: 2       Code Status:  DNR and No Intubation    Nate Perez MD

## 2024-10-04 NOTE — CONSULTS
"Nutrition Initial Assessment:   Nutrition Assessment    Reason for Assessment: Provider consult order    Patient is a 89 y.o. female presenting with pulmonary embolism.       Nutrition History:  Energy Intake: Good > 75 %  Food and Nutrient History: Pt reports she is eating well but had a poor appetite and intake while she was at acute rehab recently. Says she hardly ate anything there and lost ~30#. Drinks boost at home. Says she has been eating more fast food than normal because she doesn't always want to cook. Usually eats three meals per day atlhough they aren't always big. Denies N/V/D/C and issues chewing/swallowing.  Vitamin/Herbal Supplement Use: preservision AREDS-2 per home med list  Food Allergies/Intolerances:  None  GI Symptoms: None  Oral Problems: None       Anthropometrics:  Height: 157.5 cm (5' 2\")   Weight: 57.2 kg (126 lb)   BMI (Calculated): 23.04  IBW/kg (Dietitian Calculated): 50 kg          Weight History:   Wt Readings from Last 10 Encounters:   10/02/24 57.2 kg (126 lb)   07/15/24 65.2 kg (143 lb 11.2 oz)   07/12/24 65 kg (143 lb 3.2 oz)   07/08/24 65 kg (143 lb 3.2 oz)   07/01/24 65.8 kg (145 lb)      Weight Change %:  Weight History / % Weight Change: Pt reports 30# wt loss while in acute rehab. Based on available wt records, pt with 8 kg (12%) wt loss x 3 months. Wt this admission is documented as stated and may not be accurate, therefore % wt change may also not be accurate.  Significant Weight Loss: Yes  Interpretation of Weight Loss: >7.5% in 3 months    Nutrition Focused Physical Exam Findings:    Subcutaneous Fat Loss:   Orbital Fat Pads: Well nourished (slightly bulging fat pads)  Buccal Fat Pads: Well nourished (full, rounded cheeks)  Triceps: Well nourished (ample fat tissue)  Muscle Wasting:  Temporalis: Mild-Moderate (slight depression)  Pectoralis (Clavicular Region): Mild-Moderate (some protrusion of clavicle)  Deltoid/Trapezius: Mild-Moderate (slight protrusion of acromion " process)  Edema:  Edema Location: non-pitting BLE per nursing assessment  Physical Findings:  Skin: Negative    Nutrition Significant Labs:    Reviewed   Nutrition Specific Medications:  Reviewed     I/O:    ;      Dietary Orders (From admission, onward)       Start     Ordered    10/04/24 1259  Oral nutritional supplements  Until discontinued        Comments: chocolate   Question Answer Comment   Deliver with Lunch    Deliver with Dinner    Select supplement: Ensure Plus High Protein        10/04/24 1258    10/02/24 1755  Adult diet Regular  Diet effective now        Question:  Diet type  Answer:  Regular    10/02/24 7814                     Estimated Needs:   Total Energy Estimated Needs (kCal): 1430 kCal  Method for Estimating Needs: 25 kcal/kg ABW  Total Protein Estimated Needs (g): 46 g  Method for Estimating Needs: 0.8 g/kg ABW  Total Fluid Estimated Needs (mL): 1430 mL  Method for Estimating Needs: 1 ml/kcal or per MD        Nutrition Diagnosis   Malnutrition Diagnosis  Patient has Malnutrition Diagnosis: Yes  Diagnosis Status: New  Malnutrition Diagnosis: Moderate malnutrition related to acute disease or injury  As Evidenced by: mild muscle wasting; 12% wt loss x 3 months; reports of poor appetite and PO intake while in acute rehab            Nutrition Interventions/Recommendations         Nutrition Prescription:  Individualized Nutrition Prescription Provided for : Regular diet with ONS        Nutrition Interventions:   Interventions: Meals and snacks, Medical food supplement  Meals and Snacks: General healthful diet  Goal: Consumes 3 meals per day  Medical Food Supplement: Commercial beverage  Goal: Ensure Plus High Protein BID chocolate (provides 350 kcal, 20 g protein per serving).         Nutrition Education:   Discussed increasing intake of boost at home to help facilitate wt gain. Pt's goal is to regain 15#.        Nutrition Monitoring and Evaluation   Food/Nutrient Related History  Monitoring  Monitoring and Evaluation Plan: Energy intake, Amount of food, Fluid intake  Energy Intake: Estimated energy intake  Criteria: Pt meets >75% of estimated energy needs  Fluid Intake: Estimated fluid intake  Criteria: Meets >75% of estimated fluid needs  Amount of Food: Estimated amout of food, Medical food intake  Criteria: Pt consumes >75% of meals and supplements    Body Composition/Growth/Weight History  Monitoring and Evaluation Plan: Weight  Weight: Measured weight  Criteria: Maintains stable weight                   Time Spent (min): 45 minutes

## 2024-10-04 NOTE — PROGRESS NOTES
Sean García is a 89 y.o. female on day 2 of admission presenting with Pulmonary embolism, other, unspecified chronicity, unspecified whether acute cor pulmonale present (Multi).      Subjective   Patient seen and examined at bedside.  Patient feels well overall.  No respiratory distress.  Eating and drinking okay.       Objective     Last Recorded Vitals  /85   Pulse 70   Temp 36.4 °C (97.5 °F) (Temporal)   Resp 16   Wt 57.2 kg (126 lb)   SpO2 94%   Intake/Output last 3 Shifts:    Intake/Output Summary (Last 24 hours) at 10/4/2024 1333  Last data filed at 10/3/2024 2300  Gross per 24 hour   Intake 0 ml   Output 600 ml   Net -600 ml       Admission Weight  Weight: 57.2 kg (126 lb) (10/02/24 1008)    Daily Weight  10/02/24 : 57.2 kg (126 lb)    Image Results  ECG 12 lead  Sinus rhythm  Left anterior fascicular block  Borderline T abnormalities, anterior leads    See ED provider note for full interpretation and clinical correlation  Confirmed by Ashley Mcqueen ([id number]) on 10/3/2024 6:38:46 PM  Transthoracic Echo (TTE) [facility], [address] [place] [address]            Tel [phone] and Fax [phone]    TRANSTHORACIC ECHOCARDIOGRAM REPORT       Patient Name:      SEAN GARCÍA          Reading Physician:    [id number] Nate Perez MD  Study Date:        10/3/2024            Ordering Provider:    [id number] RISA NAVARRO  MRN/PID:           [id number]             Fellow:  Accession#:        [id number]         Nurse:                Radha Starr RN  Date of Birth/Age: 12/11/1934 / 89      Sonographer:          Adriana lieberman RDCS  Gender:            F                    Additional Staff:  Height:            157.00 cm             Admit Date:           10/2/2024  Weight:            57.00 kg             Admission Status:     Inpatient - STAT  BSA / BMI:         1.57 m2 / 23.12      Encounter#:           [id number]                     kg/m2  Blood Pressure:    113/56 mmHg          Department Location:  [facility]                                                                [facility]    Study Type:    TRANSTHORACIC ECHO (TTE) COMPLETE  Diagnosis/ICD: Other pulmonary embolism without acute cor pulmonale-I26.99  Indication:    Pulmonary embolism, other, unspecified chronicity, unspecified                 whether acute cor pulmonale present ;  CPT Code:      Echo Complete w Full Doppler-[id number]    Patient History:  Pertinent History: PMH CKD, HTN, DLD, AAA s/p repair, peripheral arterial                     disease, ITP, hip arthroplasty 7/2024, presented to hospital                     with gradual onset shortness of breath that began 2 days ago                     and is gotten progressively worse.    Study Detail: The following Echo studies were performed: 2D, M-Mode, Doppler,                color flow and 3D. Technically challenging study due to body                habitus and prominent lung artifact. Definity used as a contrast                agent for endocardial border definition. Total contrast used for                this procedure was 2 mL via IV push.       PHYSICIAN INTERPRETATION:  Left Ventricle: The left ventricular systolic function is normal, with a visually estimated ejection fraction of 60-65%. There are no regional left ventricular wall motion abnormalities. The left ventricular cavity size is normal. Spectral Doppler shows an impaired relaxation pattern of left ventricular diastolic filling.  Left Atrium: The left atrium is normal in size.  Right Ventricle: The right ventricle is normal in size. There is mildly reduced right ventricular systolic function. Stahl's sign is present, suggestive of pulmonary  embolism.  Right Atrium: The right atrium is normal in size.  Aortic Valve: The aortic valve is probably trileaflet. The aortic valve dimensionless index is 0.81. There is no evidence of aortic valve regurgitation. The peak instantaneous gradient of the aortic valve is 8.1 mmHg. The mean gradient of the aortic valve is 4.0 mmHg.  Mitral Valve: The mitral valve is mildly thickened. There is no evidence of mitral valve regurgitation.  Tricuspid Valve: The tricuspid valve is structurally normal. There is trace tricuspid regurgitation. The Doppler estimated RVSP is within normal limits at 18.0 mmHg.  Pulmonic Valve: The pulmonic valve is not well visualized. There is no indication of pulmonic valve regurgitation.  Pericardium: There is no pericardial effusion noted. There is a pericardial fat pad present.  Aorta: The aortic root is normal.  In comparison to the previous echocardiogram(s): There are no prior studies on this patient for comparison purposes.       CONCLUSIONS:   1. The left ventricular systolic function is normal, with a visually estimated ejection fraction of 60-65%.   2. Spectral Doppler shows an impaired relaxation pattern of left ventricular diastolic filling.   3. There is mildly reduced right ventricular systolic function.   4. Stahl's sign is present, suggestive of pulmonary embolism.   5. Right ventricular systolic pressure is within normal limits.    QUANTITATIVE DATA SUMMARY:     2D MEASUREMENTS:          Normal Ranges:  LAs:             2.80 cm  (2.7-4.0cm)  IVSd:            1.10 cm  (0.6-1.1cm)  LVPWd:           0.60 cm  (0.6-1.1cm)  LVIDd:           4.00 cm  (3.9-5.9cm)  LVIDs:           3.20 cm  LV Mass Index:   65 g/m2  LVEDV Index:     52 ml/m2  LV % FS          20.0 %       LA VOLUME:                    Normal Ranges:  LA Vol A4C:        53.0 ml    (22+/-6mL/m2)  LA Vol A2C:        51.8 ml  LA Vol BP:         55.1 ml  LA Vol Index A4C:  33.9ml/m2  LA Vol Index A2C:  33.1 ml/m2  LA Vol  Index BP:   35.2 ml/m2  LA Area A4C:       19.6 cm2  LA Area A2C:       18.4 cm2  LA Major Axis A4C: 6.2 cm  LA Major Axis A2C: 5.6 cm  LA Volume Index:   35.2 ml/m2       RA VOLUME BY A/L METHOD:            Normal Ranges:  RA Vol A4C:              43.1 ml    (8.3-19.5ml)  RA Vol Index A4C:        27.5 ml/m2  RA Area A4C:             16.3 cm2  RA Major Axis A4C:       5.2 cm       M-MODE MEASUREMENTS:         Normal Ranges:  Ao Root:             2.70 cm (2.0-3.7cm)  LAs:                 3.00 cm (2.7-4.0cm)       AORTA MEASUREMENTS:         Normal Ranges:  Ao Sinus, d:        2.90 cm (2.1-3.5cm)  Ao STJ, d:          2.60 cm (1.7-3.4cm)  Asc Ao, d:          2.90 cm (2.1-3.4cm)       LV SYSTOLIC FUNCTION BY 2D PLANIMETRY (MOD):                       Normal Ranges:  EF-A4C View:    78 % (>=55%)  EF-A2C View:    80 %  EF-Biplane:     79 %  EF-Visual:      63 %  LV EF Reported: 63 %       LV DIASTOLIC FUNCTION:             Normal Ranges:  MV Peak E:             1.02 m/s    (0.7-1.2 m/s)  MV Peak A:             1.07 m/s    (0.42-0.7 m/s)  E/A Ratio:             0.95        (1.0-2.2)  MV e'                  0.072 m/s   (>8.0)  MV lateral e'          0.07 m/s  MV medial e'           0.07 m/s  MV A Dur:              127.00 msec  E/e' Ratio:            14.10       (<8.0)  PulmV Sys Rusty:         28.70 cm/s  PulmV Jefferson Rusty:        19.40 cm/s  PulmV S/D Rusty:         1.50  PulmV A Revs Rusty:      28.40 cm/s  PulmV A Revs Dur:      140.00 msec       MITRAL VALVE:          Normal Ranges:  MV Vmax:      1.10 m/s (<=1.3m/s)  MV peak P.8 mmHg (<5mmHg)  MV mean P.0 mmHg (<2mmHg)  MV DT:        327 msec (150-240msec)       AORTIC VALVE:                     Normal Ranges:  AoV Vmax:                1.42 m/s (<=1.7m/s)  AoV Peak P.1 mmHg (<20mmHg)  AoV Mean P.0 mmHg (1.7-11.5mmHg)  LVOT Max Rusty:            1.16 m/s (<=1.1m/s)  AoV VTI:                 27.40 cm (18-25cm)  LVOT VTI:                 22.20 cm  LVOT Diameter:           2.20 cm  (1.8-2.4cm)  AoV Area, VTI:           3.08 cm2 (2.5-5.5cm2)  AoV Area,Vmax:           3.11 cm2 (2.5-4.5cm2)  AoV Dimensionless Index: 0.81       RIGHT VENTRICLE:  RV Basal 3.70 cm  RV Mid   3.10 cm  RV Major 6.9 cm  TAPSE:   22.4 mm  RV s'    0.11 m/s       TRICUSPID VALVE/RVSP:          Normal Ranges:  Peak TR Velocity:     1.58 m/s  Est. RA Pressure:     8 mmHg  RV Syst Pressure:     18 mmHg  (< 30mmHg)  IVC Diam:             2.40 cm       PULMONIC VALVE:          Normal Ranges:  PV Max Rusty:     0.8 m/s  (0.6-0.9m/s)  PV Max P.8 mmHg       Pulmonary Veins:  PulmV A Revs Dur: 140.00 msec  PulmV A Revs Rusty: 28.40 cm/s  PulmV Jefferson Rusty:   19.40 cm/s  PulmV S/D Rusty:    1.50  PulmV Sys Rusty:    28.70 cm/s       [id number] Nate Perez MD  Electronically signed on 10/3/2024 at 10:13:54 AM       ** Final **      Physical Exam  General: Pleasant and cooperative.  Frail.  HEENT:  Normocephalic, atraumatic, mucus membranes moist.   Neck:  Trachea midline.  No JVD.    Chest:  Clear to auscultation bilaterally. No wheezes, rales, or rhonchi.  CV:  Regular rate and rhythm.  Positive S1/S2.   Abdomen: Bowel sounds present in all four quadrants, abdomen is soft, non-tender, non-distended.  Extremities: 1+ pitting edema bilateral lower extremities.  Slight swelling of bilateral lower extremities.  No redness erythema of lower legs.  No lower extremity edema or cyanosis.   Neurological:  AAOx3. No focal deficits.  Skin:  Warm and dry.     Relevant Results  All imaging reviewed by myself.    Assessment/Plan   Estefanía Contreras is an 89-year-old female with past medical history of CKD, HTN, DLD, AAA s/p repair, peripheral arterial disease, HP, hip arthroplasty 2024, presented to hospital gradual onset shortness of breath that began 2 days ago that has gotten progressively worse.  CT angio chest showed evidence of multiple right-sided pulmonary emboli with signs of right heart strain.  She  was started heparin GTT after PERT team was called.  She had elevated calcium score of 109 classified as high risk mortality.  She has been admitted to medicine team for further management of acute PE.    Daily progress:  10/4: Heparin GTT was stopped yesterday.  She was transitioned to Eliquis.  She will take Eliquis 10 mg twice daily for 7 days then switch to 5 mg twice daily thereafter.  AM-PAC score is 19.  Patient is stable for discharge however she would like to prefer to go home tomorrow morning.     # Acute Right-sided PE-unprovoked  - This is a patient who has been admitted with an acute right-sided PE.  She has no symptoms of right heart failure.  No elevated JVD, no hepatojugular reflux.  There is 1+ pitting edema in the bilateral lower extremities however no significant swelling.  She does have some pain on palpation of bilateral lower extremities.  She did have a recent hip arthroplasty in July 2024 and then was placed on anticoagulation with Lovenox for about a month thereafter however she has not been on any anticoagulation for the last month.  PERT team was called and due to her hemodynamic stability she was placed on heparin GTT for treatment.  - Stop gtt. and transitioned to Eliquis.  Patient will take Eliquis 10 mg twice daily for 7 days then 5 mg twice daily thereafter.  - Echo 10/3: Normal EF of 60 to 65%.  Stahl sign is present just of PE.  Normal RVSP.  - History proximal ultrasound of bilateral lower extremities is negative for any acute DVT  - Cardiology consult: Will transition to oral DOAC in the near future.     # CKD  # HTN, DLD  # AAA s/p repair  # Peripheral arterial disease  # Hip arthroplasty 7/2024  - Continue home Cymbalta, mirtazapine, pramipexole, simvastatin     - DVT PPx: Eliquis  - IVF: None  - PT/OT: AM-PAC 19    Vishal Ulloa MD  PGY 2 internal medicine

## 2024-10-05 LAB
ALBUMIN SERPL BCP-MCNC: 3.1 G/DL (ref 3.4–5)
ANION GAP SERPL CALC-SCNC: 9 MMOL/L (ref 10–20)
BUN SERPL-MCNC: 45 MG/DL (ref 6–23)
CALCIUM SERPL-MCNC: 7.9 MG/DL (ref 8.6–10.3)
CHLORIDE SERPL-SCNC: 106 MMOL/L (ref 98–107)
CO2 SERPL-SCNC: 32 MMOL/L (ref 21–32)
CREAT SERPL-MCNC: 1.73 MG/DL (ref 0.5–1.05)
EGFRCR SERPLBLD CKD-EPI 2021: 28 ML/MIN/1.73M*2
ERYTHROCYTE [DISTWIDTH] IN BLOOD BY AUTOMATED COUNT: 14.5 % (ref 11.5–14.5)
GLUCOSE SERPL-MCNC: 82 MG/DL (ref 74–99)
HCT VFR BLD AUTO: 28.3 % (ref 36–46)
HGB BLD-MCNC: 8.6 G/DL (ref 12–16)
MCH RBC QN AUTO: 28.2 PG (ref 26–34)
MCHC RBC AUTO-ENTMCNC: 30.4 G/DL (ref 32–36)
MCV RBC AUTO: 93 FL (ref 80–100)
NRBC BLD-RTO: 0 /100 WBCS (ref 0–0)
PHOSPHATE SERPL-MCNC: 4.7 MG/DL (ref 2.5–4.9)
PLATELET # BLD AUTO: 141 X10*3/UL (ref 150–450)
POTASSIUM SERPL-SCNC: 4.8 MMOL/L (ref 3.5–5.3)
RBC # BLD AUTO: 3.05 X10*6/UL (ref 4–5.2)
SODIUM SERPL-SCNC: 142 MMOL/L (ref 136–145)
WBC # BLD AUTO: 5.2 X10*3/UL (ref 4.4–11.3)

## 2024-10-05 PROCEDURE — 99232 SBSQ HOSP IP/OBS MODERATE 35: CPT | Performed by: INTERNAL MEDICINE

## 2024-10-05 PROCEDURE — 2060000001 HC INTERMEDIATE ICU ROOM DAILY

## 2024-10-05 PROCEDURE — 2500000005 HC RX 250 GENERAL PHARMACY W/O HCPCS: Performed by: INTERNAL MEDICINE

## 2024-10-05 PROCEDURE — 80069 RENAL FUNCTION PANEL: CPT

## 2024-10-05 PROCEDURE — 2500000001 HC RX 250 WO HCPCS SELF ADMINISTERED DRUGS (ALT 637 FOR MEDICARE OP)

## 2024-10-05 PROCEDURE — 2500000002 HC RX 250 W HCPCS SELF ADMINISTERED DRUGS (ALT 637 FOR MEDICARE OP, ALT 636 FOR OP/ED)

## 2024-10-05 PROCEDURE — 36415 COLL VENOUS BLD VENIPUNCTURE: CPT

## 2024-10-05 PROCEDURE — 2500000001 HC RX 250 WO HCPCS SELF ADMINISTERED DRUGS (ALT 637 FOR MEDICARE OP): Performed by: INTERNAL MEDICINE

## 2024-10-05 PROCEDURE — 85027 COMPLETE CBC AUTOMATED: CPT

## 2024-10-05 ASSESSMENT — COGNITIVE AND FUNCTIONAL STATUS - GENERAL
WALKING IN HOSPITAL ROOM: A LITTLE
CLIMB 3 TO 5 STEPS WITH RAILING: A LOT
DAILY ACTIVITIY SCORE: 22
TOILETING: A LITTLE
DAILY ACTIVITIY SCORE: 22
STANDING UP FROM CHAIR USING ARMS: A LITTLE
CLIMB 3 TO 5 STEPS WITH RAILING: A LOT
MOVING TO AND FROM BED TO CHAIR: A LITTLE
STANDING UP FROM CHAIR USING ARMS: A LITTLE
MOBILITY SCORE: 18
MOVING TO AND FROM BED TO CHAIR: A LITTLE
TOILETING: A LITTLE
WALKING IN HOSPITAL ROOM: A LITTLE
HELP NEEDED FOR BATHING: A LITTLE
HELP NEEDED FOR BATHING: A LITTLE
MOBILITY SCORE: 18
TURNING FROM BACK TO SIDE WHILE IN FLAT BAD: A LITTLE
TURNING FROM BACK TO SIDE WHILE IN FLAT BAD: A LITTLE

## 2024-10-05 ASSESSMENT — PAIN - FUNCTIONAL ASSESSMENT
PAIN_FUNCTIONAL_ASSESSMENT: 0-10

## 2024-10-05 ASSESSMENT — PAIN SCALES - GENERAL
PAINLEVEL_OUTOF10: 0 - NO PAIN
PAINLEVEL_OUTOF10: 1
PAINLEVEL_OUTOF10: 1
PAINLEVEL_OUTOF10: 3
PAINLEVEL_OUTOF10: 0 - NO PAIN

## 2024-10-05 ASSESSMENT — PAIN DESCRIPTION - LOCATION: LOCATION: HAND

## 2024-10-05 ASSESSMENT — PAIN DESCRIPTION - ORIENTATION: ORIENTATION: LEFT

## 2024-10-05 NOTE — CARE PLAN
The patient's goals for the shift include      The clinical goals for the shift include comfort and rest    Over the shift, the patient did not make progress toward the following goals. Barriers to progression include   Problem: Skin  Goal: Participates in plan/prevention/treatment measures  Outcome: Progressing  Goal: Prevent/manage excess moisture  Outcome: Progressing  Goal: Prevent/minimize sheer/friction injuries  Outcome: Progressing     Problem: Pain - Adult  Goal: Verbalizes/displays adequate comfort level or baseline comfort level  Outcome: Progressing     Problem: Safety - Adult  Goal: Free from fall injury  Outcome: Progressing     Problem: Discharge Planning  Goal: Discharge to home or other facility with appropriate resources  Outcome: Progressing     Problem: Chronic Conditions and Co-morbidities  Goal: Patient's chronic conditions and co-morbidity symptoms are monitored and maintained or improved  Outcome: Progressing   . Recommendations to address these barriers include hourly rounding

## 2024-10-05 NOTE — NURSING NOTE
Ambulated in hallway with patient, patient tolerated fair. Short of breath with minimal exertion and frequent stops to rest and catch breath. Encouraged deep breathing and to rest. Assisted back to chair in room.

## 2024-10-05 NOTE — PROGRESS NOTES
Subjective Data:  Patient denies any chest discomfort or shortness of breath.    Overnight Events:    On 2 L of oxygen by nasal cannula.     Objective Data:  Last Recorded Vitals:  Vitals:    10/04/24 2157 10/04/24 2350 10/05/24 0325 10/05/24 0736   BP:  (!) 112/45 (!) 102/49 (!) 114/49   BP Location:  Right arm Right arm Right arm   Patient Position:  Lying Lying Lying   Pulse:  80 66 81   Resp:  18 20 16   Temp:  36.6 °C (97.9 °F) 36 °C (96.8 °F) 36.1 °C (97 °F)   TempSrc:  Temporal Temporal Temporal   SpO2: 93% 92% 98% 94%   Weight:       Height:           Last Labs:  CBC - 10/5/2024:  5:38 AM  5.2 8.6 141    28.3      CMP - 10/5/2024:  5:38 AM  7.9 6.9 16 --- 0.3   4.7 3.1 8 117      PTT - 10/3/2024: 11:11 AM  1.2   13.5 136     TROPHS   Date/Time Value Ref Range Status   10/02/2024 06:17 PM 27 0 - 13 ng/L Final   10/02/2024 12:31 PM 37 0 - 13 ng/L Final   12/14/2022 02:38 PM 6 0 - 13 ng/L Final     Comment:     .  Less than 99th percentile of normal range cutoff-  Female and children under 18 years old <14 ng/L; Male <21 ng/L: Negative  Repeat testing should be performed if clinically indicated.   .  Female and children under 18 years old 14-50 ng/L; Male 21-50 ng/L:  Consistent with possible cardiac damage and possible increased clinical   risk. Serial measurements may help to assess extent of myocardial damage.   .  >50 ng/L: Consistent with cardiac damage, increased clinical risk and  myocardial infarction. Serial measurements may help assess extent of   myocardial damage.   .   NOTE: Children less than 1 year old may have higher baseline troponin   levels and results should be interpreted in conjunction with the overall   clinical context.   .  NOTE: Troponin I testing is performed using a different   testing methodology at [facility] than at other   [facility] hospitals. Direct result comparisons should only   be made within the same method.       BNP   Date/Time Value Ref Range Status    10/02/2024 12:31  0 - 99 pg/mL Final   07/12/2024 03:51  0 - 99 pg/mL Corrected     Comment:     Corrected result: Previously reported as 711 pg/mL (reference range: 0-99 pg/mL) on 7/12/2024 at 1756 EDT.      Last I/O:  I/O last 3 completed shifts:  In: 858.3 (15 mL/kg) [IV Piggyback:858.3]  Out: 1300 (22.7 mL/kg) [Urine:1300 (0.6 mL/kg/hr)]  Weight: 57.2 kg     Past Cardiology Tests (Last 3 Years):  EKG:  ECG 12 lead 10/02/2024      ECG 12 lead 07/01/2024    Echo:  Transthoracic Echo (TTE) Complete 10/03/2024  1. The left ventricular systolic function is normal, with a visually estimated ejection fraction of 60-65%.   2. Spectral Doppler shows an impaired relaxation pattern of left ventricular diastolic filling.   3. There is mildly reduced right ventricular systolic function.   4. Stahl's sign is present, suggestive of pulmonary embolism.   5. Right ventricular systolic pressure is within normal limits.    Ejection Fractions:  EF   Date/Time Value Ref Range Status   10/03/2024 10:08 AM 63 %      Cath:  No results found for this or any previous visit from the past 1095 days.    Stress Test:  No results found for this or any previous visit from the past 1095 days.    Cardiac Imaging:  No results found for this or any previous visit from the past 1095 days.      Inpatient Medications:  Scheduled medications   Medication Dose Route Frequency    apixaban  10 mg oral BID    Followed by    [START ON 10/10/2024] apixaban  5 mg oral BID    [Held by provider] dilTIAZem CD  180 mg oral Daily    DULoxetine  30 mg oral Daily    [Held by provider] furosemide  20 mg oral Daily    mirtazapine  30 mg oral Nightly    oxygen   inhalation Continuous - Inhalation    perflutren protein A microsphere  0.5 mL intravenous Once in imaging    pramipexole  1 mg oral Nightly    simvastatin  20 mg oral Nightly    sulfur hexafluoride microsphr  2 mL intravenous Once in imaging     PRN medications   Medication    acetaminophen     Or    acetaminophen    Or    acetaminophen    hydrOXYzine pamoate    ipratropium-albuteroL    melatonin     Continuous Medications   Medication Dose Last Rate       Physical Exam:  Physical Exam  Vitals reviewed.   Constitutional:       Appearance: Normal appearance.   HENT:      Head: Normocephalic and atraumatic.      Mouth/Throat:      Mouth: Mucous membranes are moist.      Pharynx: Oropharynx is clear.   Eyes:      Extraocular Movements: Extraocular movements intact.      Conjunctiva/sclera: Conjunctivae normal.   Cardiovascular:      Rate and Rhythm: Normal rate and regular rhythm.      Pulses: Normal pulses.      Heart sounds: Normal heart sounds.   Pulmonary:      Effort: Pulmonary effort is normal.      Breath sounds: Normal breath sounds.   Abdominal:      General: Bowel sounds are normal.      Palpations: Abdomen is soft.   Musculoskeletal:         General: No swelling.      Cervical back: Neck supple.   Skin:     General: Skin is warm and dry.   Neurological:      General: No focal deficit present.      Mental Status: She is alert.   Psychiatric:         Mood and Affect: Mood normal.         Behavior: Behavior normal.           Assessment/Plan   10/3/2024  1.  Pulmonary embolus: Multiple filling defects of the lobar segmental and subsegmental branches of the right pulmonary artery.  As far as I can tell, unprovoked PE.  Patient states she was at home doing her ADLs and getting around with a walker.  Currently on heparin drip.  Echocardiogram is pending.  Will transition to oral DOAC in the near future.  Given the patient's renal function, advanced age and low body weight, would likely need pharmacy to assist us with dosage.  2.  Hypertension  3.  AAA status post repair  4.  PAD  5.  CKD    10/4/2024  1.  PE: Switched from heparin drip to Eliquis yesterday.  Appreciate pharmacy assistance with dosage.  2.  Hypertension  3.  AAA status post repair  4.  PAD  5.  CKD  6.  Disposition: Patient is otherwise  stable for discharge from a cardiac standpoint.  She can follow-up with [facility] as an outpatient.    10/5/2024  1.  PE: Continue Eliquis.  2.  Hypertension  3.  AAA status post repair  4.  PAD  5.  CKD  6.  Disposition: Patient is otherwise stable for discharge from a cardiac standpoint.  She can follow-up with [facility] as an outpatient.    Peripheral IV 10/02/24 18 G Left Antecubital (Active)   Site Assessment Clean;Dry;Intact 10/04/24 0733   Dressing Status Dry;Clean 10/04/24 0733   Number of days: 2       Code Status:  DNR and No Intubation    Nate Perez MD

## 2024-10-06 VITALS
OXYGEN SATURATION: 93 % | HEIGHT: 62 IN | BODY MASS INDEX: 23.19 KG/M2 | DIASTOLIC BLOOD PRESSURE: 55 MMHG | HEART RATE: 80 BPM | RESPIRATION RATE: 18 BRPM | WEIGHT: 126 LBS | SYSTOLIC BLOOD PRESSURE: 148 MMHG | TEMPERATURE: 97.3 F

## 2024-10-06 LAB
ALBUMIN SERPL BCP-MCNC: 3 G/DL (ref 3.4–5)
ANION GAP SERPL CALC-SCNC: 8 MMOL/L (ref 10–20)
BUN SERPL-MCNC: 46 MG/DL (ref 6–23)
CALCIUM SERPL-MCNC: 8 MG/DL (ref 8.6–10.3)
CHLORIDE SERPL-SCNC: 105 MMOL/L (ref 98–107)
CO2 SERPL-SCNC: 32 MMOL/L (ref 21–32)
CREAT SERPL-MCNC: 1.48 MG/DL (ref 0.5–1.05)
EGFRCR SERPLBLD CKD-EPI 2021: 34 ML/MIN/1.73M*2
ERYTHROCYTE [DISTWIDTH] IN BLOOD BY AUTOMATED COUNT: 14.5 % (ref 11.5–14.5)
ERYTHROCYTE [DISTWIDTH] IN BLOOD BY AUTOMATED COUNT: 14.5 % (ref 11.5–14.5)
GLUCOSE SERPL-MCNC: 93 MG/DL (ref 74–99)
HCT VFR BLD AUTO: 28.5 % (ref 36–46)
HCT VFR BLD AUTO: 28.9 % (ref 36–46)
HGB BLD-MCNC: 8.7 G/DL (ref 12–16)
HGB BLD-MCNC: 9 G/DL (ref 12–16)
HOLD SPECIMEN: NORMAL
MCH RBC QN AUTO: 28.2 PG (ref 26–34)
MCH RBC QN AUTO: 28.3 PG (ref 26–34)
MCHC RBC AUTO-ENTMCNC: 30.5 G/DL (ref 32–36)
MCHC RBC AUTO-ENTMCNC: 31.1 G/DL (ref 32–36)
MCV RBC AUTO: 91 FL (ref 80–100)
MCV RBC AUTO: 92 FL (ref 80–100)
NRBC BLD-RTO: 0 /100 WBCS (ref 0–0)
NRBC BLD-RTO: 0 /100 WBCS (ref 0–0)
PHOSPHATE SERPL-MCNC: 4.1 MG/DL (ref 2.5–4.9)
PLATELET # BLD AUTO: 155 X10*3/UL (ref 150–450)
PLATELET # BLD AUTO: 156 X10*3/UL (ref 150–450)
POTASSIUM SERPL-SCNC: 5.1 MMOL/L (ref 3.5–5.3)
RBC # BLD AUTO: 3.09 X10*6/UL (ref 4–5.2)
RBC # BLD AUTO: 3.18 X10*6/UL (ref 4–5.2)
SODIUM SERPL-SCNC: 140 MMOL/L (ref 136–145)
WBC # BLD AUTO: 5.1 X10*3/UL (ref 4.4–11.3)
WBC # BLD AUTO: 5.7 X10*3/UL (ref 4.4–11.3)

## 2024-10-06 PROCEDURE — 36415 COLL VENOUS BLD VENIPUNCTURE: CPT | Performed by: EMERGENCY MEDICINE

## 2024-10-06 PROCEDURE — 99232 SBSQ HOSP IP/OBS MODERATE 35: CPT | Performed by: INTERNAL MEDICINE

## 2024-10-06 PROCEDURE — 2500000001 HC RX 250 WO HCPCS SELF ADMINISTERED DRUGS (ALT 637 FOR MEDICARE OP): Performed by: INTERNAL MEDICINE

## 2024-10-06 PROCEDURE — 36415 COLL VENOUS BLD VENIPUNCTURE: CPT | Performed by: INTERNAL MEDICINE

## 2024-10-06 PROCEDURE — 2060000001 HC INTERMEDIATE ICU ROOM DAILY

## 2024-10-06 PROCEDURE — 85027 COMPLETE CBC AUTOMATED: CPT | Performed by: INTERNAL MEDICINE

## 2024-10-06 PROCEDURE — 2500000005 HC RX 250 GENERAL PHARMACY W/O HCPCS: Performed by: INTERNAL MEDICINE

## 2024-10-06 PROCEDURE — 80069 RENAL FUNCTION PANEL: CPT | Performed by: INTERNAL MEDICINE

## 2024-10-06 PROCEDURE — 2500000001 HC RX 250 WO HCPCS SELF ADMINISTERED DRUGS (ALT 637 FOR MEDICARE OP)

## 2024-10-06 PROCEDURE — 85027 COMPLETE CBC AUTOMATED: CPT | Performed by: EMERGENCY MEDICINE

## 2024-10-06 PROCEDURE — 2500000002 HC RX 250 W HCPCS SELF ADMINISTERED DRUGS (ALT 637 FOR MEDICARE OP, ALT 636 FOR OP/ED)

## 2024-10-06 ASSESSMENT — PAIN - FUNCTIONAL ASSESSMENT: PAIN_FUNCTIONAL_ASSESSMENT: 0-10

## 2024-10-06 ASSESSMENT — PAIN SCALES - GENERAL
PAINLEVEL_OUTOF10: 0 - NO PAIN
PAINLEVEL_OUTOF10: 10 - WORST POSSIBLE PAIN

## 2024-10-06 ASSESSMENT — PAIN SCALES - PAIN ASSESSMENT IN ADVANCED DEMENTIA (PAINAD): TOTALSCORE: MEDICATION (SEE MAR)

## 2024-10-06 NOTE — PROGRESS NOTES
Estefanía Contreras is a 89 y.o. female on day 3 of admission presenting with Pulmonary embolism, other, unspecified chronicity, unspecified whether acute cor pulmonale present (Multi).      Subjective   Patient seen and examined at bedside.  Patient feels well overall.  No respiratory distress.  Eating and drinking okay.       Objective     Last Recorded Vitals  BP (!) 187/72 (BP Location: Left arm, Patient Position: Lying)   Pulse 82   Temp 37.1 °C (98.8 °F) (Temporal)   Resp 20   Wt 57.2 kg (126 lb)   SpO2 97%   Intake/Output last 3 Shifts:    Intake/Output Summary (Last 24 hours) at 10/5/2024 2124  Last data filed at 10/5/2024 0053  Gross per 24 hour   Intake 858.33 ml   Output 700 ml   Net 158.33 ml       Admission Weight  Weight: 57.2 kg (126 lb) (10/02/24 1008)    Daily Weight  10/02/24 : 57.2 kg (126 lb)    Image Results  XR chest 1 view  Narrative: Interpreted By:  Nate Zafar,   STUDY:  XR CHEST 1 VIEW;  10/4/2024 6:08 pm      INDICATION:  Signs/Symptoms:Worsening respiratory failure.      COMPARISON:  10/02/2024      ACCESSION NUMBER(S):  [id number]      ORDERING CLINICIAN:  RISA NAVARRO      FINDINGS:  CARDIOMEDIASTINAL SILHOUETTE AND VASCULATURE:      Cardiac size:  Within normal limits.  Aortic shadow:  Within normal limits.      Mediastinal contours: Within normal limits.      Pulmonary vasculature:  The central vasculature is unremarkable      LUNGS:  Lungs are clear.      ABDOMEN AND OTHER FINDINGS:  No remarkable upper abdominal findings.      BONES:  No acute osseous changes.      Impression: 1.  No active cardiopulmonary disease.  There has not been  significant interval change from the prior exam.      Signed by: Nate Zafar 10/4/2024 6:23 PM  Dictation workstation:   [id number]      Physical Exam  General: Pleasant and cooperative.  Frail.  HEENT:  Normocephalic, atraumatic, mucus membranes moist.   Neck:  Trachea midline.  No JVD.    Chest:  Clear to auscultation bilaterally. No wheezes,  rales, or rhonchi.  CV:  Regular rate and rhythm.  Positive S1/S2.   Abdomen: Bowel sounds present in all four quadrants, abdomen is soft, non-tender, non-distended.  Extremities: 1+ pitting edema bilateral lower extremities.  Slight swelling of bilateral lower extremities.  No redness erythema of lower legs.  No lower extremity edema or cyanosis.   Neurological:  AAOx3. No focal deficits.  Skin:  Warm and dry.     Relevant Results  All imaging reviewed by myself.    Assessment/Plan   Estefanía Contreras is an 89-year-old female with past medical history of CKD, HTN, DLD, AAA s/p repair, peripheral arterial disease, HP, hip arthroplasty 7/2024, presented to hospital gradual onset shortness of breath that began 2 days ago that has gotten progressively worse.  CT angio chest showed evidence of multiple right-sided pulmonary emboli with signs of right heart strain.  She was started heparin GTT after PERT team was called.  She had elevated calcium score of 109 classified as high risk mortality.  She has been admitted to medicine team for further management of acute PE.    Daily progress:     # Acute Right-sided PE-unprovoked  - This is a patient who has been admitted with an acute right-sided PE.  She has no symptoms of right heart failure.  No elevated JVD, no hepatojugular reflux.  There is 1+ pitting edema in the bilateral lower extremities however no significant swelling.  She does have some pain on palpation of bilateral lower extremities.  She did have a recent hip arthroplasty in July 2024 and then was placed on anticoagulation with Lovenox for about a month thereafter however she has not been on any anticoagulation for the last month.  PERT team was called and due to her hemodynamic stability she was placed on heparin GTT for treatment.  - Stop gtt. and transitioned to Eliquis.  Patient will take Eliquis 10 mg twice daily for 7 days then 5 mg twice daily thereafter.  - Echo 10/3: Normal EF of 60 to 65%.  Stahl sign is  present just of PE.  Normal RVSP.  - History proximal ultrasound of bilateral lower extremities is negative for any acute DVT  - Cardiology consult: transitioned to oral DOAC, cleared for discharge.  -Patient shortness of breath improved since yesterday after giving Lasix, we deferred giving Lasix today as the blood pressure was soft and the patient was not in acute distress.  -Patient still on 2 L nasal cannula and walking pulse ox showed SpO2 drops to the 80s, patient qualifies for sending her with oxygen at home.     # CKD  # HTN, DLD  # AAA s/p repair  # Peripheral arterial disease  # Hip arthroplasty 7/2024  - Continue home Cymbalta, mirtazapine, pramipexole, simvastatin     - DVT PPx: Eliquis  - IVF: None  - PT/OT: AM-PAC 19    Leanne Robin DO   Heather ELIZALDE

## 2024-10-06 NOTE — PROGRESS NOTES
Estefanía Contreras is a 89 y.o. female on day 4 of admission presenting with Pulmonary embolism, other, unspecified chronicity, unspecified whether acute cor pulmonale present (Multi).      Subjective   Patient seen and examined at bedside.  Patient feels well overall.  Her respiratory distress resolved, and she is on 3 L NC. Denies any f/c, n/v, new onset headaches, vision changes, chest pain, dyspnea, abdominal pain, changes in BM, or urinary changes.         Objective     Last Recorded Vitals  /57 (BP Location: Right arm, Patient Position: Lying)   Pulse 66   Temp 36.5 °C (97.7 °F) (Temporal)   Resp 18   Wt 57.2 kg (126 lb)   SpO2 93%   Intake/Output last 3 Shifts:    Intake/Output Summary (Last 24 hours) at 10/6/2024 1500  Last data filed at 10/6/2024 0628  Gross per 24 hour   Intake --   Output 800 ml   Net -800 ml       Admission Weight  Weight: 57.2 kg (126 lb) (10/02/24 1008)    Daily Weight  10/02/24 : 57.2 kg (126 lb)    Image Results  XR chest 1 view  Narrative: Interpreted By:  Nate Zafar,   STUDY:  XR CHEST 1 VIEW;  10/4/2024 6:08 pm      INDICATION:  Signs/Symptoms:Worsening respiratory failure.      COMPARISON:  10/02/2024      ACCESSION NUMBER(S):  [id number]      ORDERING CLINICIAN:  RISA NAVARRO      FINDINGS:  CARDIOMEDIASTINAL SILHOUETTE AND VASCULATURE:      Cardiac size:  Within normal limits.  Aortic shadow:  Within normal limits.      Mediastinal contours: Within normal limits.      Pulmonary vasculature:  The central vasculature is unremarkable      LUNGS:  Lungs are clear.      ABDOMEN AND OTHER FINDINGS:  No remarkable upper abdominal findings.      BONES:  No acute osseous changes.      Impression: 1.  No active cardiopulmonary disease.  There has not been  significant interval change from the prior exam.      Signed by: Nate Zafar 10/4/2024 6:23 PM  Dictation workstation:   [id number]      Physical Exam  General: Pleasant and cooperative.  Frail.  HEENT:  Normocephalic,  atraumatic, mucus membranes moist.   Neck:  Trachea midline.  No JVD.    Chest:  Clear to auscultation bilaterally. No wheezes, rales, or rhonchi.  CV:  Regular rate and rhythm.  Positive S1/S2.   Abdomen: Bowel sounds present in all four quadrants, abdomen is soft, non-tender, non-distended.  Extremities: 1+ pitting edema bilateral lower extremities.  Slight swelling of bilateral lower extremities.  No redness erythema of lower legs.  No lower extremity edema or cyanosis.   Neurological:  AAOx3. No focal deficits.  Skin:  Warm and dry.     Relevant Results  All imaging reviewed by myself.    Assessment/Plan   Estefanía Contreras is an 89-year-old female with past medical history of CKD, HTN, DLD, AAA s/p repair, peripheral arterial disease, HP, hip arthroplasty 7/2024, presented to hospital gradual onset shortness of breath that began 2 days ago that has gotten progressively worse.  CT angio chest showed evidence of multiple right-sided pulmonary emboli with signs of right heart strain.  She was started heparin GTT after PERT team was called.  She had elevated calcium score of 109 classified as high risk mortality.  She has been admitted to medicine team for further management of acute PE.       # Acute Right-sided PE-unprovoked  - This is a patient who has been admitted with an acute right-sided PE.  She has no symptoms of right heart failure.  No elevated JVD, no hepatojugular reflux.  There is 1+ pitting edema in the bilateral lower extremities however no significant swelling.  She does have some pain on palpation of bilateral lower extremities.  She did have a recent hip arthroplasty in July 2024 and then was placed on anticoagulation with Lovenox for about a month thereafter however she has not been on any anticoagulation for the last month.  PERT team was called and due to her hemodynamic stability she was placed on heparin GTT for treatment.  - Stop gtt. and transitioned to Eliquis.  Patient will take Eliquis 10 mg  twice daily for 7 days then 5 mg twice daily thereafter.  - Echo 10/3: Normal EF of 60 to 65%.  Stahl sign is present just of PE.  Normal RVSP.  - History proximal ultrasound of bilateral lower extremities is negative for any acute DVT  - Cardiology consult: transitioned to oral DOAC, cleared for discharge.  -Patient shortness of breath improved after giving Lasix, we deferred giving more Lasix as the blood pressure was soft and the patient was not in acute distress.  -Patient still on 2-3 L nasal cannula and walking pulse ox showed SpO2 drops to the 80s, patient qualifies for sending her with home oxygen.     # CKD  # HTN, DLD  # AAA s/p repair  # Peripheral arterial disease  # Hip arthroplasty 7/2024  - Continue home Cymbalta, mirtazapine, pramipexole, simvastatin     - DVT PPx: Eliquis  - IVF: None  - PT/OT: AM-PAC 19    Dispo: Possible discharge today when the after discharge planning setup is completed.    Leanne Robin DO   Heather ELIZALDE

## 2024-10-06 NOTE — CARE PLAN
The patient's goals for the shift include      The clinical goals for the shift include comfort and rest    Over the shift, the patient will maintain comfort, have decreased pain and refrain from further complications

## 2024-10-07 ENCOUNTER — PHARMACY VISIT (OUTPATIENT)
Dept: PHARMACY | Facility: CLINIC | Age: 89
End: 2024-10-07
Payer: MEDICARE

## 2024-10-07 VITALS
OXYGEN SATURATION: 99 % | SYSTOLIC BLOOD PRESSURE: 176 MMHG | BODY MASS INDEX: 23.19 KG/M2 | DIASTOLIC BLOOD PRESSURE: 129 MMHG | WEIGHT: 126 LBS | HEIGHT: 62 IN | TEMPERATURE: 96.8 F | RESPIRATION RATE: 16 BRPM | HEART RATE: 75 BPM

## 2024-10-07 PROCEDURE — 2500000001 HC RX 250 WO HCPCS SELF ADMINISTERED DRUGS (ALT 637 FOR MEDICARE OP)

## 2024-10-07 PROCEDURE — 2500000005 HC RX 250 GENERAL PHARMACY W/O HCPCS: Performed by: INTERNAL MEDICINE

## 2024-10-07 PROCEDURE — 2500000001 HC RX 250 WO HCPCS SELF ADMINISTERED DRUGS (ALT 637 FOR MEDICARE OP): Performed by: INTERNAL MEDICINE

## 2024-10-07 PROCEDURE — 99239 HOSP IP/OBS DSCHRG MGMT >30: CPT

## 2024-10-07 PROCEDURE — RXMED WILLOW AMBULATORY MEDICATION CHARGE

## 2024-10-07 ASSESSMENT — COGNITIVE AND FUNCTIONAL STATUS - GENERAL
WALKING IN HOSPITAL ROOM: A LITTLE
MOVING FROM LYING ON BACK TO SITTING ON SIDE OF FLAT BED WITH BEDRAILS: A LITTLE
STANDING UP FROM CHAIR USING ARMS: A LITTLE
MOVING TO AND FROM BED TO CHAIR: A LITTLE
TURNING FROM BACK TO SIDE WHILE IN FLAT BAD: A LITTLE
STANDING UP FROM CHAIR USING ARMS: A LITTLE
HELP NEEDED FOR BATHING: A LITTLE
WALKING IN HOSPITAL ROOM: A LITTLE
CLIMB 3 TO 5 STEPS WITH RAILING: A LITTLE
MOVING TO AND FROM BED TO CHAIR: A LITTLE
DAILY ACTIVITIY SCORE: 22
TOILETING: A LITTLE
TURNING FROM BACK TO SIDE WHILE IN FLAT BAD: A LITTLE
TOILETING: A LITTLE
MOBILITY SCORE: 18
DAILY ACTIVITIY SCORE: 22
CLIMB 3 TO 5 STEPS WITH RAILING: A LOT
HELP NEEDED FOR BATHING: A LITTLE
MOBILITY SCORE: 18

## 2024-10-07 ASSESSMENT — PAIN SCALES - GENERAL
PAINLEVEL_OUTOF10: 0 - NO PAIN
PAINLEVEL_OUTOF10: 0 - NO PAIN

## 2024-10-07 ASSESSMENT — PAIN - FUNCTIONAL ASSESSMENT: PAIN_FUNCTIONAL_ASSESSMENT: 0-10

## 2024-10-07 NOTE — DISCHARGE INSTRUCTIONS
- Start taking Eliquis 10 mg twice daily for 3 more days and the 5 mg twice daily for three months and then re-evaluate.  - You may resume your home medications including Cardizem for blood pressure.   - You may resume Lasix 20 mg daily as you blood pressure is higher, or you can take it as needed for shortness of breath, swelling in your legs or  if you gain >3 pounds in 2 days.  - A referral to [facility] at home was made for you for close monitoring in the afterdischarge period.  - A referral to [facility] for physical rehab is made as well.  - Follow up with your PCP and Cardiologist as outpatient.

## 2024-10-07 NOTE — CARE PLAN
The patient's goals for the shift include      The clinical goals for the shift include safety and comfort / set up oxygen for homeging

## 2024-10-07 NOTE — PROGRESS NOTES
10/07/24 1012   Discharge Planning   Home or Post Acute Services In home services   Expected Discharge Disposition Home H     Met with patient at bedside to follow up on discharge plan. Patient confirms plan to return home with [facility]. Patient states her son lives across the street and is able to assist her as needed. Patients son to transport patient home at discharge. IMM letter provided to patient.     Addendum 1437: Patient has written discharge order. Home care orders sent to [facility] in careport and updated on discharge for today.

## 2024-10-07 NOTE — DISCHARGE SUMMARY
Discharge Diagnosis  Pulmonary embolism, other, unspecified chronicity, unspecified whether acute cor pulmonale present (Multi)-unprovoked  CKD  HTN, DLD  AAA s/p repair  Peripheral arterial disease  Hip arthroplasty July 2024      Issues Requiring Follow-Up  Follow-up with cardiologist outpatient  Follow-up with PCP as outpatient    Discharge Meds     Medication List      START taking these medications     apixaban 5 mg tablet; Commonly known as: Eliquis; Take 2 tablets (10 mg)   by mouth 2 times a day for 3 days, THEN 1 tablet (5 mg) 2 times a day.;   Start taking on: October 7, 2024     CONTINUE taking these medications     acetaminophen 325 mg tablet; Commonly known as: Tylenol   dilTIAZem  mg 24 hr tablet; Commonly known as: Cardizem LA   furosemide 20 mg tablet; Commonly known as: Lasix   hydrOXYzine pamoate 50 mg capsule; Commonly known as: Vistaril   mirtazapine 30 mg tablet; Commonly known as: Remeron   potassium chloride CR 20 mEq ER tablet; Commonly known as: Klor-Con M20   pramipexole 1 mg tablet; Commonly known as: Mirapex   PRESERVISION AREDS-2 ORAL   simvastatin 20 mg tablet; Commonly known as: Zocor     STOP taking these medications     DULoxetine 30 mg DR capsule; Commonly known as: Cymbalta   LORazepam 2 mg tablet; Commonly known as: Ativan   oxyCODONE 5 mg immediate release tablet; Commonly known as: Roxicodone   polyethylene glycol 17 gram packet; Commonly known as: Glycolax, Miralax       Test Results Pending At Discharge  Pending Labs       No current pending labs.            Hospital Course  Estefanía Mosquera-year-old female with Carina history of CKD, HTN, DLD, AAA s/p repair, peripheral arterial disease, hip arthroplasty 7/2024, presented to hospital gradual onset shortness of breath began and got progressively worse.  She came to the hospital, CT angio chest showed evidence of multiple right-sided pulmonary emboli with signs of right heart strain.  She was started heparin GTT after PERT team  was called.  She had elevated PERT score of 109 which is classified as high risk mortality.  Endovascular PERT team decided to place her on heparin GTT.  There was no other surgical intervention planned.  Echo did not show any right heart strain.  Echo showed a normal EF of 60 to 65% Stahl sign present indicating the PE.  He was consulted and they advised to transition her to oral DOAC.  We started on Eliquis 10 mg twice daily for 7 days and 5 mg twice daily thereafter.  She was initially on 67 L of nasal cannula oxygen when she came in however she was weaned down to 2 to 3 L nasal cannula.  She is to take oxygen at home with her.  She is feeling much better and is not in any respiratory distress.  We will discharge her home.  She is medically fit for discharge.    Pertinent Physical Exam At Time of Discharge  Physical Exam  General: Pleasant and cooperative.  Frail.  HEENT:  Normocephalic, atraumatic, mucus membranes moist.   Neck:  Trachea midline.  No JVD.    Chest:  Clear to auscultation bilaterally. No wheezes, rales, or rhonchi.  CV:  Regular rate and rhythm.  Positive S1/S2.   Abdomen: Bowel sounds present in all four quadrants, abdomen is soft, non-tender, non-distended.  Extremities: 1+ pitting edema bilateral lower extremities.  Slight swelling of bilateral lower extremities.  No redness erythema of lower legs.  No lower extremity edema or cyanosis.   Neurological:  AAOx3. No focal deficits.  Skin:  Warm and dry.   Outpatient Follow-Up  Follow-up with PCP as outpatient  Follow-up with cardiology as outpatient      Vishal Ulloa MD  PGY 2 internal medicine

## 2024-10-07 NOTE — CARE PLAN
Problem: Skin  Goal: Participates in plan/prevention/treatment measures  Outcome: Progressing  Flowsheets (Taken 10/7/2024 0114)  Participates in plan/prevention/treatment measures: Increase activity/out of bed for meals  Goal: Prevent/manage excess moisture  Outcome: Progressing  Flowsheets (Taken 10/7/2024 0114)  Prevent/manage excess moisture: Moisturize dry skin     Problem: Pain - Adult  Goal: Verbalizes/displays adequate comfort level or baseline comfort level  Outcome: Progressing     Problem: Safety - Adult  Goal: Free from fall injury  Outcome: Progressing     Problem: Discharge Planning  Goal: Discharge to home or other facility with appropriate resources  Outcome: Progressing     Problem: Chronic Conditions and Co-morbidities  Goal: Patient's chronic conditions and co-morbidity symptoms are monitored and maintained or improved  Outcome: Progressing

## 2024-10-07 NOTE — NURSING NOTE
Patient pulse ox 88% on room air at rest.    Pt called asking for an RN call to discuss new prescription for Cialis. Pt states it was prescribed by his MD at [facility] and was asked not to take until discussing with Dr. Elliott.     Please adivsed. Pt can be called at [phone]

## 2024-10-09 ENCOUNTER — PATIENT OUTREACH (OUTPATIENT)
Dept: HOME HEALTH SERVICES | Age: 89
End: 2024-10-09
Payer: MEDICARE

## 2024-10-09 SDOH — ECONOMIC STABILITY: INCOME INSECURITY: IN THE LAST 12 MONTHS, WAS THERE A TIME WHEN YOU WERE NOT ABLE TO PAY THE MORTGAGE OR RENT ON TIME?: NO

## 2024-10-09 SDOH — HEALTH STABILITY: MENTAL HEALTH: HOW OFTEN DO YOU HAVE 6 OR MORE DRINKS ON ONE OCCASION?: NEVER

## 2024-10-09 SDOH — SOCIAL STABILITY: SOCIAL NETWORK: HOW OFTEN DO YOU GET TOGETHER WITH FRIENDS OR RELATIVES?: MORE THAN THREE TIMES A WEEK

## 2024-10-09 SDOH — SOCIAL STABILITY: SOCIAL INSECURITY: WITHIN THE LAST YEAR, HAVE YOU BEEN AFRAID OF YOUR PARTNER OR EX-PARTNER?: NO

## 2024-10-09 SDOH — HEALTH STABILITY: MENTAL HEALTH: HOW MANY STANDARD DRINKS CONTAINING ALCOHOL DO YOU HAVE ON A TYPICAL DAY?: PATIENT DOES NOT DRINK

## 2024-10-09 SDOH — SOCIAL STABILITY: SOCIAL INSECURITY: WITHIN THE LAST YEAR, HAVE YOU BEEN HUMILIATED OR EMOTIONALLY ABUSED IN OTHER WAYS BY YOUR PARTNER OR EX-PARTNER?: NO

## 2024-10-09 SDOH — ECONOMIC STABILITY: INCOME INSECURITY: IN THE PAST 12 MONTHS, HAS THE ELECTRIC, GAS, OIL, OR WATER COMPANY THREATENED TO SHUT OFF SERVICE IN YOUR HOME?: NO

## 2024-10-09 SDOH — ECONOMIC STABILITY: FOOD INSECURITY: WITHIN THE PAST 12 MONTHS, THE FOOD YOU BOUGHT JUST DIDN'T LAST AND YOU DIDN'T HAVE MONEY TO GET MORE.: NEVER TRUE

## 2024-10-09 SDOH — ECONOMIC STABILITY: HOUSING INSECURITY: IN THE PAST 12 MONTHS, HOW MANY TIMES HAVE YOU MOVED WHERE YOU WERE LIVING?: 0

## 2024-10-09 SDOH — ECONOMIC STABILITY: FOOD INSECURITY: WITHIN THE PAST 12 MONTHS, YOU WORRIED THAT YOUR FOOD WOULD RUN OUT BEFORE YOU GOT MONEY TO BUY MORE.: NEVER TRUE

## 2024-10-09 SDOH — HEALTH STABILITY: MENTAL HEALTH: HOW OFTEN DO YOU HAVE A DRINK CONTAINING ALCOHOL?: NEVER

## 2024-10-09 SDOH — ECONOMIC STABILITY: INCOME INSECURITY: HOW HARD IS IT FOR YOU TO PAY FOR THE VERY BASICS LIKE FOOD, HOUSING, MEDICAL CARE, AND HEATING?: NOT HARD AT ALL

## 2024-10-09 SDOH — HEALTH STABILITY: PHYSICAL HEALTH: ON AVERAGE, HOW MANY MINUTES DO YOU ENGAGE IN EXERCISE AT THIS LEVEL?: 30 MIN

## 2024-10-09 SDOH — SOCIAL STABILITY: SOCIAL NETWORK: ARE YOU MARRIED, WIDOWED, DIVORCED, SEPARATED, NEVER MARRIED, OR LIVING WITH A PARTNER?: WIDOWED

## 2024-10-09 SDOH — SOCIAL STABILITY: SOCIAL NETWORK: HOW OFTEN DO YOU ATTENT MEETINGS OF THE CLUB OR ORGANIZATION YOU BELONG TO?: NEVER

## 2024-10-09 SDOH — SOCIAL STABILITY: SOCIAL NETWORK: IN A TYPICAL WEEK, HOW MANY TIMES DO YOU TALK ON THE PHONE WITH FAMILY, FRIENDS, OR NEIGHBORS?: THREE TIMES A WEEK

## 2024-10-09 SDOH — SOCIAL STABILITY: SOCIAL NETWORK: HOW OFTEN DO YOU ATTEND CHURCH OR RELIGIOUS SERVICES?: MORE THAN 4 TIMES PER YEAR

## 2024-10-09 SDOH — HEALTH STABILITY: PHYSICAL HEALTH: ON AVERAGE, HOW MANY DAYS PER WEEK DO YOU ENGAGE IN MODERATE TO STRENUOUS EXERCISE (LIKE A BRISK WALK)?: 3 DAYS

## 2024-10-09 ASSESSMENT — LIFESTYLE VARIABLES
AUDIT-C TOTAL SCORE: 0
SKIP TO QUESTIONS 9-10: 1

## 2024-10-09 NOTE — PROGRESS NOTES
Enrollment call  Patient agreeable to [facility] ([facility]). Enrollment call completed and program overview explained to patient, with appropriate numbers/info given. Initial visit is 10/10 @ 2035.       PMHx of CKD, HTN, DLD, AAA s/p repair, peripheral arterial disease, hip arthroplasty 7/2024, presented to hospital gradual onset shortness of breath and got progressively worse    Discharge Diagnosis  Pulmonary embolism, other, unspecified chronicity, unspecified whether acute cor pulmonale present (Multi)-unprovoked  Started on Eliquis      New O2 2-3Lpm [facility]  for PT  Follow up  PCP Dr Ki Martinez  Cardiology

## 2024-10-10 ENCOUNTER — PATIENT OUTREACH (OUTPATIENT)
Dept: HOME HEALTH SERVICES | Age: 89
End: 2024-10-10

## 2024-10-10 ENCOUNTER — TELEMEDICINE CLINICAL SUPPORT (OUTPATIENT)
Dept: CARE COORDINATION | Age: 89
End: 2024-10-10
Payer: MEDICARE

## 2024-10-10 DIAGNOSIS — I26.99 PULMONARY EMBOLISM, OTHER, UNSPECIFIED CHRONICITY, UNSPECIFIED WHETHER ACUTE COR PULMONALE PRESENT (MULTI): Primary | ICD-10-CM

## 2024-10-10 DIAGNOSIS — H35.30 MACULAR DEGENERATION, UNSPECIFIED LATERALITY, UNSPECIFIED TYPE: ICD-10-CM

## 2024-10-10 RX ORDER — VIT C/E/ZN/COPPR/LUTEIN/ZEAXAN 250MG-90MG
1 CAPSULE ORAL 2 TIMES DAILY
Qty: 180 CAPSULE | Refills: 0 | Status: SHIPPED | OUTPATIENT
Start: 2024-10-10

## 2024-10-11 ENCOUNTER — PATIENT OUTREACH (OUTPATIENT)
Dept: HOME HEALTH SERVICES | Age: 89
End: 2024-10-11
Payer: MEDICARE

## 2024-10-11 NOTE — PROGRESS NOTES
Daily call completed. Pt states she is doing well. Becka is currently at her house for medic visit. Denies any new symptoms or concerns. Denies need for med refills at this time. Pt declined being set up on masimo, instead left with a portable pulse ox to spot check her o2. Denies any further questions/concerns/needs at this time. Aware of upcoming appts. [facility] 10/15 @ 1400.     Patient's Address:   [address] [place] [address] [address]  [facility] [address]  **  If this is not the address patient will receive services - alert team and address in EMR**       Patient Contacts:  Extended Emergency Contact Information  Primary Emergency Contact: LEONIDAS GARCÍA  Mobile Phone: [phone]  Relation: Child                                Patient's Preferred Phone: [phone]  Patient's E-mail: No e-mail address on record
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
## 2024-10-12 ENCOUNTER — PATIENT OUTREACH (OUTPATIENT)
Dept: HOME HEALTH SERVICES | Age: 89
End: 2024-10-12
Payer: MEDICARE

## 2024-10-12 NOTE — PROGRESS NOTES
Daily Call Note:   LVM.        Last 3 Weights:  Wt Readings from Last 7 Encounters:   10/02/24 57.2 kg (126 lb)   07/15/24 65.2 kg (143 lb 11.2 oz)   07/12/24 65 kg (143 lb 3.2 oz)   07/08/24 65 kg (143 lb 3.2 oz)   07/01/24 65.8 kg (145 lb)       Masimo Device: No       Virtual Visits--Scheduled (Most Recent Date at Top)  Follow up Appointments  Recent Visits  No visits were found meeting these conditions.  Showing recent visits within past 30 days and meeting all other requirements  Future Appointments  No visits were found meeting these conditions.  Showing future appointments within next 90 days and meeting all other requirements       Frequency of RN Calls & Virtual Visits per Team Agreement: [facility] Frequency: Bi-Weekly

## 2024-10-14 DIAGNOSIS — I10 HYPERTENSION, UNSPECIFIED TYPE: ICD-10-CM

## 2024-10-14 DIAGNOSIS — I26.99 PULMONARY EMBOLISM, OTHER, UNSPECIFIED CHRONICITY, UNSPECIFIED WHETHER ACUTE COR PULMONALE PRESENT (MULTI): Primary | ICD-10-CM

## 2024-10-15 ENCOUNTER — APPOINTMENT (OUTPATIENT)
Dept: CARE COORDINATION | Age: 89
End: 2024-10-15
Payer: MEDICARE

## 2024-10-15 ENCOUNTER — TELEMEDICINE (OUTPATIENT)
Dept: PHARMACY | Facility: HOSPITAL | Age: 89
End: 2024-10-15
Payer: MEDICARE

## 2024-10-15 ENCOUNTER — PATIENT OUTREACH (OUTPATIENT)
Dept: HOME HEALTH SERVICES | Age: 89
End: 2024-10-15

## 2024-10-15 DIAGNOSIS — I26.99 PULMONARY EMBOLISM, OTHER, UNSPECIFIED CHRONICITY, UNSPECIFIED WHETHER ACUTE COR PULMONALE PRESENT (MULTI): ICD-10-CM

## 2024-10-15 DIAGNOSIS — I10 HYPERTENSION, UNSPECIFIED TYPE: ICD-10-CM

## 2024-10-15 NOTE — PROGRESS NOTES
"[facility]    Estefanía Contreras is a 89 y.o. female was referred to Clinical Pharmacy Team to complete a post-discharge medication optimization and monitoring visit.  The patient was referred for close monitoring while in [facility]. Today was our second visit.       Referring Provider: Elpidio Jon*  PCP: Ki Martinez MD       Subjective   Allergies   Allergen Reactions    Morphine Anaphylaxis     Patient denies       [facility] - [place], [place] - [address]  [address]  [facility] [address]  Phone: [phone] Fax: [phone]      Medication System Management:  Affordability/Accessibility: $0 co pays  Adherence/Organization: no issues       Social History     Social History Narrative    Not on file          HPI      Review of Systems        Objective     There were no vitals taken for this visit.   BP Readings from Last 4 Encounters:   10/11/24 118/60   10/07/24 (!) 176/129   07/15/24 116/84   07/12/24 120/64      There were no vitals filed for this visit.     LAB  Lab Results   Component Value Date    BILITOT 0.3 10/02/2024    CALCIUM 8.0 (L) 10/06/2024    CO2 32 10/06/2024     10/06/2024    CREATININE 1.48 (H) 10/06/2024    GLUCOSE 93 10/06/2024    ALKPHOS 117 10/02/2024    K 5.1 10/06/2024    PROT 6.9 10/02/2024     10/06/2024    AST 16 10/02/2024    ALT 8 10/02/2024    BUN 46 (H) 10/06/2024    ANIONGAP 8 (L) 10/06/2024    MG 2.42 (H) 10/02/2024    PHOS 4.1 10/06/2024    ALBUMIN 3.0 (L) 10/06/2024    GFRF 36 (A) 05/03/2023     No results found for: \"TRIG\", \"CHOL\", \"LDLCALC\", \"HDL\"  No results found for: \"HGBA1C\"      Current Outpatient Medications   Medication Instructions    acetaminophen (TYLENOL) 325 mg, oral, Every 4 hours PRN    apixaban (Eliquis) 5 mg tablet Take 2 tablets (10 mg) by mouth 2 times a day for 3 days, THEN 1 tablet (5 mg) 2 times a day.    dilTIAZem LA (CARDIZEM LA) 180 mg, oral, Daily    furosemide (Lasix) 20 mg tablet 1 tablet, oral, Daily    potassium " chloride CR 20 mEq ER tablet 20 mEq, oral, Daily    pramipexole (MIRAPEX) 1 mg, oral, Nightly    simvastatin (ZOCOR) 20 mg, oral, Nightly    vit C,H-Fm-fsvar-lutein-zeaxan (PreserVision AREDS-2) 250-90-40-1 mg capsule 1 capsule, oral, 2 times daily          Assessment/Plan   Problem List Items Addressed This Visit       HTN (hypertension)    Pulmonary embolism, other, unspecified chronicity, unspecified whether acute cor pulmonale present (Multi)       -feels like breathing is improved  -currently wearing 3L   -she does have her own pulse ox and has been checking on her own and has been above 95%  -going to try and set up on jojo though so nursing can monitor her 24/7 to start weaning her oxygen down   -worried about falling with all her oxygen tubing   -has nurse aid that comes out 4 days a week       Medications Changes/Concerns:  -confirmed she is taking maintenance dose of eliquis now       Refills Needed:  -none needed today       Plan/To Do:  -have medics go out to set up on jojo and also help with her oxygen tubing   -continue nursing calls        PAP Status:  -n/a  -has $0 co pays, including her eliquis       Time Spent with Patient and [facility] Team - Dr. Uribe and Joy, RN via phone call: 20 minutes      Follow Up: 1 week     Continue all meds under the continuation of care with the referring provider and clinical pharmacy team.    Akhil Dao, David     Verbal consent to manage patient's drug therapy was obtained from the patient. They were informed they may decline to participate or withdraw from participation in pharmacy services at any time.

## 2024-10-15 NOTE — PROGRESS NOTES
Weekly [facility] audio call completed with Akhil Leonard pharmacist, Joy RN and myself. Wearing 3LNC, spo2 98%. Interested in weaning oxygen, interested in masimo. Takes oxygen off for a few minutes occasionally, and notices dyspnea. Advised not to take off oxygen but instead to wean down. Concerned with oxygen tubing being a fall risk due to excess tubing. Medic to address oxygen tube length when setting up masimo. Planning medic visit Friday. Patient has HHA visiting 4x/week and would like medic to visit when HHA visits if possible. Next [facility] 10/23 @ 1400. All questions answered to patient satisfaction.

## 2024-10-16 ENCOUNTER — PATIENT OUTREACH (OUTPATIENT)
Dept: HOME HEALTH SERVICES | Age: 89
End: 2024-10-16
Payer: MEDICARE

## 2024-10-16 NOTE — PROGRESS NOTES
Daily call completed. Pt states she is doing okay today just slightly tired. Denies any new symptoms or concerns. Denies need for med refills at this time. Medic visit reviewed. Denies any further questions/concerns/needs at this time. Aware of upcoming appts. [facility] 10/23 @ 1400.     Patient's Address:   [address] [place] [address] [address]  [facility] [address]  **  If this is not the address patient will receive services - alert team and address in EMR**       Patient Contacts:  Extended Emergency Contact Information  Primary Emergency Contact: LEONIDAS GARCÍA  Mobile Phone: [phone]  Relation: Child                                Patient's Preferred Phone: [phone]  Patient's E-mail: No e-mail address on record

## 2024-10-18 ENCOUNTER — PATIENT OUTREACH (OUTPATIENT)
Dept: HOME HEALTH SERVICES | Age: 89
End: 2024-10-18
Payer: MEDICARE

## 2024-10-19 ENCOUNTER — PATIENT OUTREACH (OUTPATIENT)
Dept: HOME HEALTH SERVICES | Age: 89
End: 2024-10-19
Payer: MEDICARE

## 2024-10-19 NOTE — PROGRESS NOTES
Daily call completed patient states that she has been doing ok hving some challenges coordinating the walker and the o2 but ok otherwise no SOB o2 has been between  % she had not checked her BP yet no medication needs questions and concerns addressed next call Monday encouraged to call before then with any needs

## 2024-10-21 ENCOUNTER — PATIENT OUTREACH (OUTPATIENT)
Dept: HOME HEALTH SERVICES | Age: 89
End: 2024-10-21
Payer: MEDICARE

## 2024-10-21 NOTE — PROGRESS NOTES
Daily Call Note: Patient stated that she had an episode of nausea with dry heaves, during which time she felt very weak -  She stated this is the first time in many years she has experienced nausea. She is currently without nausea or continued weakness. She has taken her medications and is familiar with them. She stated the only problem she has is with the potassium tablet which is too large for her. She will discuss the possibility of taking it in another form during the [facility] call. She stated that the office of Dr. Pito Henao, cardiologist has contacted her and will be calling her back today to schedule an appointment for follow up. She was instructed on why she needed follow up with a cardiologist post PE with her verbalizing understanding. She continues to wear supplemental O2 3L/NC - denies cough, SOB or chest pain. Her oxygen level by pulse oximeter is 96%, and ranges between 96 and 98. She confirms that she does not check her blood pressure even though she has a monitor. She finds it too difficult.  The patient has a HHA that assists her 4 days a week. She has [employer] prepared meals delivered, and is eating well. She sees her son daily (he lives across the street). She maintains that she is able to function independently in her care needs.       Pt demonstrates clear understanding: Yes    Daily Weight:  There were no vitals filed for this visit.   Last 3 Weights:  Wt Readings from Last 7 Encounters:   10/02/24 57.2 kg (126 lb)   07/15/24 65.2 kg (143 lb 11.2 oz)   07/12/24 65 kg (143 lb 3.2 oz)   07/08/24 65 kg (143 lb 3.2 oz)   07/01/24 65.8 kg (145 lb)       Virtual Visits--Scheduled (Most Recent Date at Top)  Follow up Appointments  Recent Visits  No visits were found meeting these conditions.  Showing recent visits within past 30 days and meeting all other requirements  Future Appointments  No visits were found meeting these conditions.  Showing future appointments within next 90 days and meeting all other  requirements       Frequency of RN Calls & Virtual Visits per Team Agreement: Healthy at Home Frequency: Daily

## 2024-10-22 ENCOUNTER — PATIENT OUTREACH (OUTPATIENT)
Dept: HOME HEALTH SERVICES | Age: 89
End: 2024-10-22
Payer: MEDICARE

## 2024-10-22 VITALS — OXYGEN SATURATION: 97 %

## 2024-10-22 DIAGNOSIS — I10 HYPERTENSION, UNSPECIFIED TYPE: ICD-10-CM

## 2024-10-22 DIAGNOSIS — I26.99 PULMONARY EMBOLISM, OTHER, UNSPECIFIED CHRONICITY, UNSPECIFIED WHETHER ACUTE COR PULMONALE PRESENT (MULTI): Primary | ICD-10-CM

## 2024-10-22 RX ORDER — MELOXICAM 15 MG/1
15 TABLET ORAL DAILY
COMMUNITY
Start: 2024-10-21

## 2024-10-22 NOTE — PROGRESS NOTES
Daily Call Note:   Spoke to patient, she reports feeling good. Denies any new symptoms and states breathing feels fine. Reports pulse ox between 96-98% on O2.   Can't read bp cuff so she doesn't take.   Has follow up with cardio but wants to know why they didn't place referral for pulm for Pe's.   Put to discuss on tomorrows [facility], if referral needed, place at time of call and schedule.   Next call reviewed.     Pt Education: POC  Barriers: na  Topics for Daily Review: POC  Pt demonstrates clear understanding: Yes    Daily Weight:  There were no vitals filed for this visit.   Last 3 Weights:  Wt Readings from Last 7 Encounters:   10/02/24 57.2 kg (126 lb)   07/15/24 65.2 kg (143 lb 11.2 oz)   07/12/24 65 kg (143 lb 3.2 oz)   07/08/24 65 kg (143 lb 3.2 oz)   07/01/24 65.8 kg (145 lb)       Masimo Device: No   Masimo Clinical Impression: na    Virtual Visits--Scheduled (Most Recent Date at Top)  Follow up Appointments  Recent Visits  No visits were found meeting these conditions.  Showing recent visits within past 30 days and meeting all other requirements  Future Appointments  No visits were found meeting these conditions.  Showing future appointments within next 90 days and meeting all other requirements       Frequency of RN Calls & Virtual Visits per Team Agreement: Healthy at Home Frequency: Daily    Medication issues Addressed (what was done): na    Follow up appointments scheduled by [facility] Staff: na  Referrals made by [facility] staff: edson

## 2024-10-23 ENCOUNTER — TELEMEDICINE (OUTPATIENT)
Dept: PHARMACY | Facility: HOSPITAL | Age: 89
End: 2024-10-23
Payer: MEDICARE

## 2024-10-23 ENCOUNTER — APPOINTMENT (OUTPATIENT)
Dept: CARE COORDINATION | Age: 89
End: 2024-10-23
Payer: MEDICARE

## 2024-10-23 ENCOUNTER — PATIENT OUTREACH (OUTPATIENT)
Dept: HOME HEALTH SERVICES | Age: 89
End: 2024-10-23

## 2024-10-23 VITALS — OXYGEN SATURATION: 99 %

## 2024-10-23 DIAGNOSIS — I10 HYPERTENSION, UNSPECIFIED TYPE: ICD-10-CM

## 2024-10-23 DIAGNOSIS — I26.99 PULMONARY EMBOLISM, OTHER, UNSPECIFIED CHRONICITY, UNSPECIFIED WHETHER ACUTE COR PULMONALE PRESENT (MULTI): ICD-10-CM

## 2024-10-23 NOTE — PROGRESS NOTES
[facility] Call Note:   Spoke to patient, she reports feeling OK. States breathing feels Ok on O2. Patient verbalizes concern if she should follow up with Pulmonary.  Provider discussed no need for Pulm follow up at this time.   O2 at 3L, pulse ox reported 97-99%. Has some SOB when she takes shower off o2, not often.   Provider discussed weaning to 2L, patient unclear about how to turn machine down. Son came to phone, patient agreed to wear masimo (previously declined) for couple days to attempt O2 wean. EMS scheduled for tomorrow.   Next [facility] scheduled.       Pt Education: POC  Barriers: na  Topics for Daily Review: POC  Pt demonstrates clear understanding: Yes    Daily Weight:  There were no vitals filed for this visit.   Last 3 Weights:  Wt Readings from Last 7 Encounters:   10/02/24 57.2 kg (126 lb)   07/15/24 65.2 kg (143 lb 11.2 oz)   07/12/24 65 kg (143 lb 3.2 oz)   07/08/24 65 kg (143 lb 3.2 oz)   07/01/24 65.8 kg (145 lb)       Masimo Device: No   Masimo Clinical Impression: na    Virtual Visits--Scheduled (Most Recent Date at Top)  Follow up Appointments  Recent Visits  No visits were found meeting these conditions.  Showing recent visits within past 30 days and meeting all other requirements  Future Appointments  No visits were found meeting these conditions.  Showing future appointments within next 90 days and meeting all other requirements       Frequency of RN Calls & Virtual Visits per Team Agreement: Healthy at Home Frequency: Daily    Medication issues Addressed (what was done): na    Follow up appointments scheduled by [facility] Staff: edson  Referrals made by [facility] staff: edson

## 2024-10-23 NOTE — PROGRESS NOTES
"[facility] Home Virtual Clinic    Estefanía Contreras is a 89 y.o. female was referred to Clinical Pharmacy Team to complete a post-discharge medication optimization and monitoring visit.  The patient was referred for multiple concerns while in [facility]. Today was our third visit.       Referring Provider: Kermit Pool MD  PCP: Ki Martinez MD       Subjective   Allergies   Allergen Reactions    Morphine Anaphylaxis     Patient denies       [facility] - [place], [place] - [address]  [address]  [place] [address]  Phone: [phone] Fax: [phone]      Medication System Management:  Affordability/Accessibility: $0 co pays   Adherence/Organization: no issues       Social History     Social History Narrative    Not on file          HPI      Review of Systems        Objective     There were no vitals taken for this visit.   BP Readings from Last 4 Encounters:   10/18/24 130/72   10/07/24 (!) 176/129   07/15/24 116/84   07/12/24 120/64      There were no vitals filed for this visit.     LAB  Lab Results   Component Value Date    BILITOT 0.3 10/02/2024    CALCIUM 8.0 (L) 10/06/2024    CO2 32 10/06/2024     10/06/2024    CREATININE 1.48 (H) 10/06/2024    GLUCOSE 93 10/06/2024    ALKPHOS 117 10/02/2024    K 5.1 10/06/2024    PROT 6.9 10/02/2024     10/06/2024    AST 16 10/02/2024    ALT 8 10/02/2024    BUN 46 (H) 10/06/2024    ANIONGAP 8 (L) 10/06/2024    MG 2.42 (H) 10/02/2024    PHOS 4.1 10/06/2024    ALBUMIN 3.0 (L) 10/06/2024    GFRF 36 (A) 05/03/2023     No results found for: \"TRIG\", \"CHOL\", \"LDLCALC\", \"HDL\"  No results found for: \"HGBA1C\"      Current Outpatient Medications   Medication Instructions    acetaminophen (TYLENOL) 325 mg, oral, Every 4 hours PRN    apixaban (Eliquis) 5 mg tablet Take 2 tablets (10 mg) by mouth 2 times a day for 3 days, THEN 1 tablet (5 mg) 2 times a day.    dilTIAZem LA (CARDIZEM LA) 180 mg, oral, Daily    furosemide (Lasix) 20 mg tablet 1 tablet, oral, Daily    meloxicam (MOBIC) 15 " mg, Daily    potassium chloride CR 20 mEq ER tablet 20 mEq, oral, Daily    pramipexole (MIRAPEX) 1 mg, oral, Nightly    simvastatin (ZOCOR) 20 mg, oral, Nightly    vit C,F-Vk-nbtfa-lutein-zeaxan (PreserVision AREDS-2) 250-90-40-1 mg capsule 1 capsule, oral, 2 times daily          Assessment/Plan   Problem List Items Addressed This Visit       HTN (hypertension)    Pulmonary embolism, other, unspecified chronicity, unspecified whether acute cor pulmonale present (Multi)       -feels like breathing is improved  -currently wearing 3L   -she does have her own pulse ox and has been checking on her own and has been above 95%  -tried setting up on jojo but for some reason didn't when medics went out to try  -not having any SOB  -has nurse aid that comes out 4 days a week   -discussed not really necessary to follow up with pulmonologist but should with cardio still      Medications Changes/Concerns:  -none      Refills Needed:  -none needed today       Plan/To Do:  -try to set up on jojo again and wean down to 2L   -nursing to continue with calls       [facility] PAP Status:  -n/a  -has $0 co pays       Time Spent with Patient and [facility] Team - Dr. Pool and Catherine, RN via phone call: 25 minutes      Follow Up: 1 week     Continue all meds under the continuation of care with the referring provider and clinical pharmacy team.    Akhil Dao, David     Verbal consent to manage patient's drug therapy was obtained from the patient. They were informed they may decline to participate or withdraw from participation in pharmacy services at any time.

## 2024-10-24 ENCOUNTER — PATIENT OUTREACH (OUTPATIENT)
Dept: HOME HEALTH SERVICES | Age: 89
End: 2024-10-24
Payer: MEDICARE

## 2024-10-24 NOTE — PROGRESS NOTES
Daily Call Note: [facility] daily call complete.  Denies CP/SOB/ edema  Awaiting Paramedic visit, will connect to Masimo  Pt saw PCP  Dr Martinez recently, he is a [facility] provider  All questions/ concerns addressed  Verified upcoming weekly [facility] call    Pt Education: POC   Barriers:   Topics for Daily Review:   Pt demonstrates clear understanding: Yes    Daily Weight:  There were no vitals filed for this visit.   Last 3 Weights:  Wt Readings from Last 7 Encounters:   10/02/24 57.2 kg (126 lb)   07/15/24 65.2 kg (143 lb 11.2 oz)   07/12/24 65 kg (143 lb 3.2 oz)   07/08/24 65 kg (143 lb 3.2 oz)   07/01/24 65.8 kg (145 lb)       Masimo Device: No   Masimo Clinical Impression:     Virtual Visits--Scheduled (Most Recent Date at Top)  Follow up Appointments  Recent Visits  No visits were found meeting these conditions.  Showing recent visits within past 30 days and meeting all other requirements  Future Appointments  No visits were found meeting these conditions.  Showing future appointments within next 90 days and meeting all other requirements       Frequency of RN Calls & Virtual Visits per Team Agreement: Healthy at Home Frequency: Daily    Medication issues Addressed (what was done):     Follow up appointments scheduled by [facility] Staff:   Referrals made by [facility] staff:

## 2024-10-25 ENCOUNTER — PATIENT OUTREACH (OUTPATIENT)
Dept: HOME HEALTH SERVICES | Age: 89
End: 2024-10-25
Payer: MEDICARE

## 2024-10-25 ENCOUNTER — DOCUMENTATION (OUTPATIENT)
Dept: HOME HEALTH SERVICES | Age: 89
End: 2024-10-25
Payer: MEDICARE

## 2024-10-25 VITALS — OXYGEN SATURATION: 98 % | SYSTOLIC BLOOD PRESSURE: 110 MMHG | DIASTOLIC BLOOD PRESSURE: 58 MMHG | HEART RATE: 62 BPM

## 2024-10-25 NOTE — PROGRESS NOTES
Daily Call Note:     Ms. Contreras stated she is doing well.  A medic visit is scheduled today to help her replace Masimo wrist bands.      Secure chat sent to Medic for a full set of vitals.  Patient stated it is too difficult to check her vitals.     Patient has no questions, concerns, or needs at this time.      Patient is on 3 liters of oxygen via nasal canula.       Pt Education:   Barriers:   Topics for Daily Review:   Pt demonstrates clear understanding:     Daily Weight:  There were no vitals filed for this visit.   Last 3 Weights:  Wt Readings from Last 7 Encounters:   10/02/24 57.2 kg (126 lb)   07/15/24 65.2 kg (143 lb 11.2 oz)   07/12/24 65 kg (143 lb 3.2 oz)   07/08/24 65 kg (143 lb 3.2 oz)   07/01/24 65.8 kg (145 lb)       Masimo Device:    Masimo Clinical Impression:     Virtual Visits--Scheduled (Most Recent Date at Top)  Follow up Appointments  Recent Visits  No visits were found meeting these conditions.  Showing recent visits within past 30 days and meeting all other requirements  Future Appointments  No visits were found meeting these conditions.  Showing future appointments within next 90 days and meeting all other requirements       Frequency of RN Calls & Virtual Visits per Team Agreement:     Medication issues Addressed (what was done):     Follow up appointments scheduled by [facility] Staff:   Referrals made by [facility] staff:

## 2024-10-25 NOTE — PROGRESS NOTES
Nursing Note: Masimo Monitor Not Transmittin    Called patient as her Masimo Monitor is not transmitting  She noted that the finger probe got wet, she forgot that it cannot get wet and washed dishes  1015: She has an SPO2 monitor and it measured: SPO2: 97%, HR: 62  She will call when she is going to sleep and when she rises to use the restroom: to report the SPO2 and heart rate  No distress  She noted her son could assist her in reapplying the Masimo apparatus tomorrow and if not RN can advise day shift RN to check if a medic could visit    3: Patient called and reported vital signs: SPO2: 98%; HR 68  She requested a medic visit to assist her to reapply the Masimo Monitoring apparatus  Tentatively scheduled a medic visit and communicated to the medic and nursing team  Medic will call to advise when she will arrive    0452:   Patient called and reported her vitals:   Hr: 74, SPO2: 97%  No issues or complaints

## 2024-10-25 NOTE — PROGRESS NOTES
[facility] Transitions Assessment    Patient's Address:   [address] [place] [address] [address]  [facility] [address]  **  If this is not the address patient will receive services - alert team and address in EMR**       Patient Contacts:  Extended Emergency Contact Information  Primary Emergency Contact: LEONIDAS GARCÍA  Mobile Phone: [phone]  Relation: Child                                Patient's Preferred Phone: [phone]  Patient's E-mail: No e-mail address on record       Arrived on scene to troubleshoot pts masimo. Pt was not wearing band. Sensor still active, battery still functional, just needed placement on pts wrist and finger. Vital signs obtained while I was there. Reported to team prior to clearing scene.  (Also showed aide who was present on scene how to apply bracelet on pt)

## 2024-10-26 ENCOUNTER — PATIENT OUTREACH (OUTPATIENT)
Dept: HOME HEALTH SERVICES | Age: 89
End: 2024-10-26
Payer: MEDICARE

## 2024-10-26 NOTE — PROGRESS NOTES
Healthy at Home Nursing Note: 0354    Called patient as her vital signs are not transmitting  Left a voice mail message    0707:  Called patient no answer  Called son and no answer  Her vital signs have been stable the past 48 hours  Day Shift RN will connect for a daily RN outreach

## 2024-10-26 NOTE — PROGRESS NOTES
Pt called into [facility], she was returning phone call form last night. I explained to pt she has been off Masimo device since 0300. Pt states she thinks the cord got all twisted up and could not get it to work. She voiced that the device is very much a nuisance to her and said she would like to take Masimo device off. Pt says she has a portable pulse ox at home she can use to check intermittently.     [facility] Transitions Assessment    Patient's Address:   [address] [place] [address] [address]  [facility] [address]  **  If this is not the address patient will receive services - alert team and address in EMR**       Patient Contacts:  Extended Emergency Contact Information  Primary Emergency Contact: LEONIDAS GARCÍA  Mobile Phone: [phone]  Relation: Child                                Patient's Preferred Phone: [phone]  Patient's E-mail: No e-mail address on record

## 2024-10-27 ENCOUNTER — PATIENT OUTREACH (OUTPATIENT)
Dept: HOME HEALTH SERVICES | Age: 89
End: 2024-10-27
Payer: MEDICARE

## 2024-10-27 DIAGNOSIS — I26.99 PULMONARY EMBOLISM, OTHER, UNSPECIFIED CHRONICITY, UNSPECIFIED WHETHER ACUTE COR PULMONALE PRESENT (MULTI): Primary | ICD-10-CM

## 2024-10-27 DIAGNOSIS — J96.01 ACUTE RESPIRATORY FAILURE WITH HYPOXIA (MULTI): ICD-10-CM

## 2024-10-27 NOTE — PROGRESS NOTES
Daily Call Note:   Masimo monitoring discontinued.  Per patient's request.  Notified Medics via secure Chat and Medic task.   I advised Ms. Contreras that we are unable to wean patient off oxygen  successfully without remote monitoring so that would need to be managed by her PCP.      I advised Ms. Contreras to contact her PCP ([facility] provider) for oxygen requirement management.    Ms. Contreras stated her pcp is not reliable when it comes to scheduling and keeping appointment.  I offered to find patient a new Primary Care Appointment with a  provider and patient agreed with plan.    Patient also asked if I could schedule an appointment for a son with the same provider.  I advised Ms. Contreras that we will give her the new PCP name and telephone number (Provider:Lakia Johnson [phone]) so her son can schedule his appointment.      THE [facility] PLACED A REFERRAL FOR A NEW PCP and SCHEDULE APPOINTMENT.   PRIMARY CARE NEW PATIENT at 10:00 AM (30 min)  Wednesday November 6, 2024  Appointment Provider:Lakia Johnson MD   [phone]  The [facility] team will notify Ms. Contreras on 10/28.  Task Generated.      Patient has a portable pulse ox and stated she checks her oxygen frequently.       Patient oxygen tubing is long which can result in decreased oxygen supply via nasal canula.  Patient is aware.        Pt Education:   Barriers:   Topics for Daily Review:   Pt demonstrates clear understanding:     Daily Weight:  There were no vitals filed for this visit.   Last 3 Weights:  Wt Readings from Last 7 Encounters:   10/02/24 57.2 kg (126 lb)   07/15/24 65.2 kg (143 lb 11.2 oz)   07/12/24 65 kg (143 lb 3.2 oz)   07/08/24 65 kg (143 lb 3.2 oz)   07/01/24 65.8 kg (145 lb)       Masimo Device:    Masimo Clinical Impression:     Virtual Visits--Scheduled (Most Recent Date at Top)  Follow up Appointments  Recent Visits  No visits were found meeting these conditions.  Showing recent visits within past 30 days and meeting all other requirements  Future  Appointments  No visits were found meeting these conditions.  Showing future appointments within next 90 days and meeting all other requirements       Frequency of RN Calls & Virtual Visits per Team Agreement:     Medication issues Addressed (what was done):     Follow up appointments scheduled by [facility] Staff:   Referrals made by [facility] staff:

## 2024-10-28 ENCOUNTER — PATIENT OUTREACH (OUTPATIENT)
Dept: HOME HEALTH SERVICES | Age: 89
End: 2024-10-28
Payer: MEDICARE

## 2024-10-30 ENCOUNTER — APPOINTMENT (OUTPATIENT)
Dept: CARE COORDINATION | Age: 89
End: 2024-10-30
Payer: MEDICARE

## 2024-10-30 ENCOUNTER — APPOINTMENT (OUTPATIENT)
Dept: PHARMACY | Facility: HOSPITAL | Age: 89
End: 2024-10-30
Payer: MEDICARE

## 2024-11-01 ENCOUNTER — PATIENT OUTREACH (OUTPATIENT)
Dept: HOME HEALTH SERVICES | Age: 89
End: 2024-11-01
Payer: MEDICARE

## 2024-11-04 ENCOUNTER — PATIENT OUTREACH (OUTPATIENT)
Dept: HOME HEALTH SERVICES | Age: 89
End: 2024-11-04

## 2024-11-04 VITALS — BODY MASS INDEX: 22.75 KG/M2 | WEIGHT: 124.4 LBS

## 2024-11-04 NOTE — PROGRESS NOTES
Daily call completed. Pt states she is not doing very well. Pt states both of her legs are very swollen and it is painful to walk. Pt states the swelling has been since she came home from the hospital. Has gotten much worse since she was told to stop the daily lasix and only take it every other day. Pt inquired if she should go back to taking it daily- pt instructed to hold off at this time, was scheduled for an [facility] this evening. Pt endorsing int. SOB but states her oxygen is stable at 95-98% on 3L. Denies need for med refills at this time. Denies any further questions/concerns/needs at this time. Aware of upcoming appts. PCP appt rescheduled to 11/6 at 1430. Address and provider verbalized for pt, verbalizes understanding. [facility] 11/4 @ 2030.     124.4lb  97% 3L      Patient's Address:   [address]  [facility] [address]  **  If this is not the address patient will receive services - alert team and address in EMR**       Patient Contacts:  Extended Emergency Contact Information  Primary Emergency Contact: LEONIDAS GARCÍA  Mobile Phone: [phone]  Relation: Child                                Patient's Preferred Phone: [phone]  Patient's E-mail: No e-mail address on record

## 2024-11-05 ENCOUNTER — DOCUMENTATION (OUTPATIENT)
Dept: HOME HEALTH SERVICES | Age: 89
End: 2024-11-05

## 2024-11-05 ENCOUNTER — OFFICE VISIT (OUTPATIENT)
Dept: PRIMARY CARE | Facility: CLINIC | Age: 89
End: 2024-11-05
Payer: MEDICARE

## 2024-11-05 VITALS
HEART RATE: 75 BPM | BODY MASS INDEX: 23.78 KG/M2 | TEMPERATURE: 97.3 F | DIASTOLIC BLOOD PRESSURE: 62 MMHG | WEIGHT: 130 LBS | SYSTOLIC BLOOD PRESSURE: 136 MMHG | OXYGEN SATURATION: 98 %

## 2024-11-05 DIAGNOSIS — R54 FRAIL ELDERLY: ICD-10-CM

## 2024-11-05 DIAGNOSIS — J96.01 ACUTE RESPIRATORY FAILURE WITH HYPOXIA (MULTI): ICD-10-CM

## 2024-11-05 DIAGNOSIS — N18.32 STAGE 3B CHRONIC KIDNEY DISEASE (MULTI): ICD-10-CM

## 2024-11-05 DIAGNOSIS — I26.99 PULMONARY EMBOLISM, OTHER, UNSPECIFIED CHRONICITY, UNSPECIFIED WHETHER ACUTE COR PULMONALE PRESENT (MULTI): Primary | ICD-10-CM

## 2024-11-05 DIAGNOSIS — D64.9 ANEMIA, UNSPECIFIED TYPE: ICD-10-CM

## 2024-11-05 DIAGNOSIS — I10 HYPERTENSION, UNSPECIFIED TYPE: ICD-10-CM

## 2024-11-05 PROCEDURE — 99204 OFFICE O/P NEW MOD 45 MIN: CPT | Performed by: INTERNAL MEDICINE

## 2024-11-05 PROCEDURE — 1111F DSCHRG MED/CURRENT MED MERGE: CPT | Performed by: INTERNAL MEDICINE

## 2024-11-05 PROCEDURE — 1160F RVW MEDS BY RX/DR IN RCRD: CPT | Performed by: INTERNAL MEDICINE

## 2024-11-05 PROCEDURE — G2211 COMPLEX E/M VISIT ADD ON: HCPCS | Performed by: INTERNAL MEDICINE

## 2024-11-05 PROCEDURE — 1036F TOBACCO NON-USER: CPT | Performed by: INTERNAL MEDICINE

## 2024-11-05 PROCEDURE — 3075F SYST BP GE 130 - 139MM HG: CPT | Performed by: INTERNAL MEDICINE

## 2024-11-05 PROCEDURE — 3078F DIAST BP <80 MM HG: CPT | Performed by: INTERNAL MEDICINE

## 2024-11-05 PROCEDURE — 1159F MED LIST DOCD IN RCRD: CPT | Performed by: INTERNAL MEDICINE

## 2024-11-05 RX ORDER — FERROUS SULFATE 325(65) MG
325 TABLET, DELAYED RELEASE (ENTERIC COATED) ORAL
Qty: 30 TABLET | Refills: 1 | Status: SHIPPED | OUTPATIENT
Start: 2024-11-05 | End: 2025-11-05

## 2024-11-05 RX ORDER — MELOXICAM 15 MG/1
15 TABLET ORAL DAILY
Qty: 90 TABLET | Refills: 3 | Status: SHIPPED | OUTPATIENT
Start: 2024-11-05

## 2024-11-05 RX ORDER — DILTIAZEM HYDROCHLORIDE EXTENDED-RELEASE TABLETS 180 MG/1
180 TABLET, EXTENDED RELEASE ORAL DAILY
Qty: 90 TABLET | Refills: 3 | Status: SHIPPED | OUTPATIENT
Start: 2024-11-05

## 2024-11-05 ASSESSMENT — ENCOUNTER SYMPTOMS
BLOOD IN STOOL: 0
TREMORS: 0
SHORTNESS OF BREATH: 0
COUGH: 0
ABDOMINAL PAIN: 0
BACK PAIN: 0
CONFUSION: 0
HEMATURIA: 0
VOMITING: 0
UNEXPECTED WEIGHT CHANGE: 0
FLANK PAIN: 0
TROUBLE SWALLOWING: 0
EYE PAIN: 0
APPETITE CHANGE: 0
HEADACHES: 0
CHILLS: 0
FREQUENCY: 0
SLEEP DISTURBANCE: 1
PALPITATIONS: 0
WHEEZING: 0
EYE DISCHARGE: 0
NUMBNESS: 0
DIZZINESS: 0
DIARRHEA: 0
SEIZURES: 0
DYSURIA: 0
CONSTIPATION: 0
NAUSEA: 0
FEVER: 0
NERVOUS/ANXIOUS: 0
WOUND: 0
SORE THROAT: 0
WEAKNESS: 0
JOINT SWELLING: 0

## 2024-11-05 ASSESSMENT — PATIENT HEALTH QUESTIONNAIRE - PHQ9
2. FEELING DOWN, DEPRESSED OR HOPELESS: NOT AT ALL
1. LITTLE INTEREST OR PLEASURE IN DOING THINGS: NOT AT ALL
SUM OF ALL RESPONSES TO PHQ9 QUESTIONS 1 AND 2: 0

## 2024-11-05 NOTE — ASSESSMENT & PLAN NOTE
- acute, Dx Oct 2024, see above background info  - f/u with virtual TCM team - weekly  - on NOAC BID , no reported ADR   Orders:    Referral to Primary Care    Referral to Palliative Care; Future

## 2024-11-05 NOTE — PROGRESS NOTES
"Subjective   Patient ID: Estefanía Contreras is a 89 y.o. female who presents for Establish Care (swelling) and Foot Swelling (Is currently taking lasix for everyday./Pain in the feet.).    HPI   NEW PT   Here to establish .  Chart reviewed, PMHX, meds, Social HX- updated at visit   Labs( oct 6/24) and imaging( 10/2/24 ct angio chest: Multiple right-sided pulmonary emboli with signs of right heart  strain.)- reviewed. Duplex US- neg DVT LE     Recent hospitalization for PE - started on Eliquis and home O2 for Acute respiratory failure. \" past medical history of CKD, HTN, DLD, AAA s/p repair, peripheral arterial disease, hip arthroplasty who was admitted from 10/2-10/7 for multiple right sided pulmonary emboli with signs of right heart strain. \"  \" Echo did not show any right heart strain. Echo showed a normal EF of 60 to 65% . She was started on Eliquis 10 mg twice daily for 7 days and 5 mg twice daily thereafter. She was initially on 6-7 L of nasal cannula oxygen when she came in however she was weaned down to 2-3 L nasal cannula. She was discharged home with oxygen.     Recent admit for hip sx July 2024. PE was presumed provoked by recent surgery.        Pt is f/u with Virtual hospitalist team who instructed her yesterday to increase Lasix to 20 mg Daily ( from Q other day). F/u labs ordered.  Compression stockings advised and available.    Pt is DNR .  Has HHA   Nobody talked with her about palliative care (?)    Here with son who lives across the street.    Has lift chair, and rollator.       12/5/2024 9:00 AM (30 min)  [facility]   CARDIO NEW PT   Authorization not required   [id number] ([place])   Pito Henao MD         Review of Systems   Constitutional:  Negative for appetite change, chills, fever and unexpected weight change.   HENT:  Negative for congestion, ear pain, sneezing, sore throat and trouble swallowing.    Eyes:  Negative for pain, discharge and visual disturbance.   Respiratory:  Negative for cough, shortness of " breath and wheezing.    Cardiovascular:  Positive for leg swelling. Negative for chest pain and palpitations.   Gastrointestinal:  Negative for abdominal pain, blood in stool, constipation, diarrhea, nausea and vomiting.   Genitourinary:  Negative for dysuria, flank pain, frequency, hematuria and urgency.   Musculoskeletal:  Negative for back pain, gait problem and joint swelling.   Skin:  Negative for rash and wound.        Lt sided lump on scalp - cyst    Neurological:  Negative for dizziness, tremors, seizures, syncope, weakness, numbness and headaches.   Psychiatric/Behavioral:  Positive for sleep disturbance (awakes at 3 pm). Negative for confusion and suicidal ideas. The patient is not nervous/anxious.        Objective   /62   Pulse 75   Temp 36.3 °C (97.3 °F)   Wt 59 kg (130 lb)   SpO2 98%   BMI 23.78 kg/m²   Patient Health Questionnaire-2 Score: 0      Physical Exam  Vitals and nursing note reviewed.   Constitutional:       General: She is not in acute distress.     Appearance: Normal appearance.   HENT:      Head: Normocephalic and atraumatic.      Nose: Nose normal.      Mouth/Throat:      Mouth: Mucous membranes are moist.      Pharynx: Oropharynx is clear.   Eyes:      Extraocular Movements: Extraocular movements intact.      Conjunctiva/sclera: Conjunctivae normal.      Pupils: Pupils are equal, round, and reactive to light.   Cardiovascular:      Rate and Rhythm: Normal rate and regular rhythm.      Heart sounds: Murmur (MAXIMUS 3/6) heard.   Pulmonary:      Effort: Pulmonary effort is normal. No respiratory distress.      Breath sounds: Normal breath sounds. No wheezing or rhonchi.      Comments: Home O2 by NC 3 L   Abdominal:      General: Bowel sounds are normal.      Palpations: Abdomen is soft.      Tenderness: There is no abdominal tenderness.   Musculoskeletal:         General: Normal range of motion.      Cervical back: Neck supple.      Thoracic back: Scoliosis (kyposcolioisis) present.       Right lower leg: 3+ Edema present.      Left lower leg: 3+ Edema present.      Comments: Walks with walker with wheels.   Skin:     General: Skin is warm and dry.      Findings: Lesion present. No bruising or rash.      Comments: Lt sided soft lump on scalp - cyst - non tender to palpation   Neurological:      General: No focal deficit present.      Mental Status: She is alert and oriented to person, place, and time.      Motor: No weakness.      Gait: Gait normal.   Psychiatric:         Mood and Affect: Mood normal.         Behavior: Behavior normal.         Assessment/Plan   Assessment & Plan  Pulmonary embolism, other, unspecified chronicity, unspecified whether acute cor pulmonale present (Multi)  - acute, Dx Oct 2024, see above background info  - f/u with virtual TCM team - weekly  - on NOAC BID , no reported ADR   Orders:    Referral to Primary Care    Referral to Palliative Care; Future    Acute respiratory failure with hypoxia (Multi)  - currently on 3 L home O2 concentrator/NC  - plans to wean off, as tolerates   Orders:    Referral to Primary Care    Referral to Palliative Care; Future    Hypertension, unspecified type  - chronic, controlled  - appt with cardiology Dec 5th   -ECHO as above = EF 60%  Orders:    dilTIAZem LA (Cardizem LA) 180 mg 24 hr tablet; Take 1 tablet (180 mg) by mouth once daily.    meloxicam (Mobic) 15 mg tablet; Take 1 tablet (15 mg) by mouth once daily.    Referral to Palliative Care; Future    Stage 3b chronic kidney disease (Multi)  - stay well hydrated.  - F/U BMP NEXT WEEK   Orders:    Referral to Palliative Care; Future    Anemia, unspecified type  - chronic, not on iron, Hg 9.0  - rx daily iron today x 2-3 months  Orders:    Referral to Palliative Care; Future    ferrous sulfate 325 (65 Fe) MG EC tablet; Take 1 tablet by mouth once daily with breakfast. Do not crush, chew, or split.    Frail elderly    Orders:    Referral to Palliative Care; Future      Take Lasix 20 mg  daily in the morning.  Use compression stockings daily.  Keep legs elevated at home.  Take Tylenol PM in the evening to help you sleep.  Do Not drink fluids 2-3 hours before bedtime.  Stay well hydrated during DAYTIME.  Palliative care services requested.     F/u 2 months and PRN

## 2024-11-05 NOTE — ASSESSMENT & PLAN NOTE
- chronic, not on iron, Hg 9.0  - rx daily iron today x 2-3 months  Orders:    Referral to Palliative Care; Future    ferrous sulfate 325 (65 Fe) MG EC tablet; Take 1 tablet by mouth once daily with breakfast. Do not crush, chew, or split.

## 2024-11-05 NOTE — ASSESSMENT & PLAN NOTE
- chronic, controlled  - appt with cardiology Dec 5th   -ECHO as above = EF 60%  Orders:    dilTIAZem LA (Cardizem LA) 180 mg 24 hr tablet; Take 1 tablet (180 mg) by mouth once daily.    meloxicam (Mobic) 15 mg tablet; Take 1 tablet (15 mg) by mouth once daily.    Referral to Palliative Care; Future

## 2024-11-05 NOTE — PROGRESS NOTES
Daily Call Note:   Pt [facility] call complete with Dr Uribe. Pt reports she took her second dose of lasix. Dr. Uribe instructed pt to start taking lasix daily again. Pt verbalized understanding   Pt Education: continue to take daily lasix  Barriers: none  Topics for Daily Review: status of BLE  Pt demonstrates clear understanding: Yes    Daily Weight:  There were no vitals filed for this visit.   Last 3 Weights:  Wt Readings from Last 7 Encounters:   11/04/24 56.4 kg (124 lb 6.4 oz)   10/02/24 57.2 kg (126 lb)   07/15/24 65.2 kg (143 lb 11.2 oz)   07/12/24 65 kg (143 lb 3.2 oz)   07/08/24 65 kg (143 lb 3.2 oz)   07/01/24 65.8 kg (145 lb)       Masimo Device: No   Masimo Clinical Impression: none    Virtual Visits--Scheduled (Most Recent Date at Top)  Follow up Appointments  Recent Visits  No visits were found meeting these conditions.  Showing recent visits within past 30 days and meeting all other requirements  Today's Visits  Date Type Provider Dept   11/05/24 Appointment Lakia Johnson MD Do [id number] [facility]   Showing today's visits and meeting all other requirements  Future Appointments  No visits were found meeting these conditions.  Showing future appointments within next 90 days and meeting all other requirements       Frequency of RN Calls & Virtual Visits per Team Agreement: Healthy at Home Frequency: Bi-Weekly    Medication issues Addressed (what was done): restart daily lasix    Follow up appointments scheduled by [facility] Staff: none  Referrals made by [facility] staff: none

## 2024-11-05 NOTE — PATIENT INSTRUCTIONS
Take Lasix 20 mg daily in the morning.  Use compression stockings daily.  Keep legs elevated at home.  Take Tylenol PM in the evening to help you sleep.  Do Not drink fluids 2-3 hours before bedtime.  Stay well hydrated during DAYTIME.  Palliative care services requested.

## 2024-11-06 ENCOUNTER — APPOINTMENT (OUTPATIENT)
Dept: PRIMARY CARE | Facility: CLINIC | Age: 89
End: 2024-11-06
Payer: MEDICARE

## 2024-11-06 ENCOUNTER — PATIENT OUTREACH (OUTPATIENT)
Dept: HOME HEALTH SERVICES | Age: 89
End: 2024-11-06

## 2024-11-06 NOTE — PROGRESS NOTES
[facility] Transitions Assessment    Patient's Address:   [address] [place] [address] [address]  [facility] [address]  **  If this is not the address patient will receive services - alert team and address in EMR**       Patient Contacts:  Extended Emergency Contact Information  Primary Emergency Contact: LEONIDAS GARCÍA  Mobile Phone: [phone]  Relation: Child                                Patient's Preferred Phone: [phone]  Patient's E-mail: No e-mail address on record     Picked up equipment from patient today. Pt knows how to use her POX and given diabetic socks to help with lower extremity edema. Declined compression stockings as she isnt able to get them on and off. Pt has a drs appt with a new primary care at two pm today. Vital signs WNL,although her blood pressure is slightly higher than normal. All reported to team prior to clearing scene.

## 2024-11-06 NOTE — PROGRESS NOTES
Daily call completed. Pt states she is doing okay today. Denies any new symptoms or concerns. Saw her PCP yesterday and states she was given a lot of information that she didn't understand all of. RN offered to help answer any questions, pt states they are going to be doing a conference call so that will help her understand but verbalized that she appreciated the offer. Denies need for med refills at this time. Denies any further questions/concerns/needs at this time. Aware of upcoming appts. [facility] 11/11 @ 2030.     Patient's Address:   [address] [place] [address] [address]  [facility] [address]  **  If this is not the address patient will receive services - alert team and address in EMR**       Patient Contacts:  Extended Emergency Contact Information  Primary Emergency Contact: LEONIDAS GACRÍA  Mobile Phone: [phone]  Relation: Child                                Patient's Preferred Phone: [phone]  Patient's E-mail: No e-mail address on record

## 2024-11-08 ENCOUNTER — PATIENT OUTREACH (OUTPATIENT)
Dept: HOME HEALTH SERVICES | Age: 89
End: 2024-11-08
Payer: MEDICARE

## 2024-11-08 VITALS — OXYGEN SATURATION: 96 % | HEART RATE: 70 BPM

## 2024-11-08 NOTE — PROGRESS NOTES
Daily call completed patient states she is doing well she is sitting with her o2 off her POX is 96 and her HR 70 she states she does this several times a day she will put her o2 on is she feels SOB or her o2 drops. No medication needs questions and concerns addressed next call is Monday encouraged to call over the weekend with any needs

## 2024-11-11 ENCOUNTER — APPOINTMENT (OUTPATIENT)
Dept: CARE COORDINATION | Age: 89
End: 2024-11-11
Payer: MEDICARE

## 2024-11-11 ENCOUNTER — PATIENT OUTREACH (OUTPATIENT)
Dept: HOME HEALTH SERVICES | Age: 89
End: 2024-11-11

## 2024-11-11 PROBLEM — M80.022D: Status: ACTIVE | Noted: 2021-05-07

## 2024-11-11 PROBLEM — H02.834 DERMATOCHALASIS OF LEFT UPPER EYELID: Status: ACTIVE | Noted: 2024-02-13

## 2024-11-11 PROBLEM — Z96.612 PRESENCE OF LEFT ARTIFICIAL SHOULDER JOINT: Status: ACTIVE | Noted: 2021-07-02

## 2024-11-11 PROBLEM — G57.92 NEUROPATHY OF LEFT FOOT: Status: ACTIVE | Noted: 2023-05-22

## 2024-11-11 PROBLEM — I71.40 ABDOMINAL AORTIC ANEURYSM WITHOUT RUPTURE (CMS-HCC): Status: ACTIVE | Noted: 2021-07-02

## 2024-11-12 ENCOUNTER — TELEPHONE (OUTPATIENT)
Dept: PRIMARY CARE | Facility: CLINIC | Age: 89
End: 2024-11-12
Payer: MEDICARE

## 2024-11-12 DIAGNOSIS — R11.0 NAUSEA: Primary | ICD-10-CM

## 2024-11-12 RX ORDER — ONDANSETRON 4 MG/1
4 TABLET, ORALLY DISINTEGRATING ORAL EVERY 8 HOURS PRN
Qty: 20 TABLET | Refills: 0 | Status: SHIPPED | OUTPATIENT
Start: 2024-11-12 | End: 2024-11-19

## 2024-11-12 NOTE — PROGRESS NOTES
Daily Call Note: 11/11@ 20:30-[facility] call completed with SMILEY COLEY-NP and Zoë BOLES. Pt identified. Pt states she is doing OK, feeling tired easily. Pt states she tries to do to much. Pt denies any SOB, CP, Swelling or pain. Pt did c/o frequent coughing. Pt was referred to palliative care. Pt feels like she does not need it yet, nurses aides come 2-3 times a week and help her with her laundry and taking showers. When they don't come her sister comes over and helps her. Pt updated on need to get a BMP drawn-pt states she will go get it done. Pt on 3 liters of O2. Takes it off occ and will reapply when she starts feeling SOB. Pulse ox has been in the range 94-98%.  Pulse 69. Pt would like her O2 tubing to be shorter. Pt could not tell me what supply company she uses for her O2 needs. Pt also did not take her BP. Pt states she does not know how to use it, tired to assist her over the phone but pt does not understand. Will send a medic out to educate pt on how to use her BP cuff and to let us know the name on her O2 tank so we can help get her shorter tubing. Pt did not weigh herself today, states Saturday it was 129 Ibs. Encouraged pt to weigh herself daily and keep a log. Next [facility] call 11/19 @ 1300.    Pt Education: y  Barriers: n  Topics for Daily Review: daily weights, labs needed.   Pt demonstrates clear understanding: Yes    Daily Weight:  There were no vitals filed for this visit.   Last 3 Weights:  Wt Readings from Last 7 Encounters:   11/05/24 59 kg (130 lb)   11/04/24 56.4 kg (124 lb 6.4 oz)   10/02/24 57.2 kg (126 lb)   07/15/24 65.2 kg (143 lb 11.2 oz)   07/12/24 65 kg (143 lb 3.2 oz)   07/08/24 65 kg (143 lb 3.2 oz)   07/01/24 65.8 kg (145 lb)       Masimo Device: No   Masimo Clinical Impression:     Virtual Visits--Scheduled (Most Recent Date at Top)  Follow up Appointments  Recent Visits  Date Type Provider Dept   11/05/24 Office Visit Lakia Johnson MD Do [id number] [facility]   Showing recent visits  within past 30 days and meeting all other requirements  Future Appointments  Date Type Provider Dept   01/07/25 Appointment [facility] [id number]   Showing future appointments within next 90 days and meeting all other requirements       Frequency of RN Calls & Virtual Visits per Team Agreement: Healthy at Home Frequency: Bi-Weekly    Medication issues Addressed (what was done): n    Follow up appointments scheduled by [facility] Staff: n  Referrals made by [facility] staff: n

## 2024-11-16 ENCOUNTER — PATIENT OUTREACH (OUTPATIENT)
Dept: HOME HEALTH SERVICES | Age: 89
End: 2024-11-16
Payer: MEDICARE

## 2024-11-17 NOTE — PROGRESS NOTES
11/16 @ 19:27- pt called, states her O2 concentrator is not working. States it was working on and off and then shut off and she can not get it back on. Pt is on 3 liters. Denies any SOB at present. Pt anxious. Pt has a portable tank but does not know how to turn it on and is to anxious and does not want to try. Pt gave me the name [employer] that was on her concentrator with a phone number. Tried to call that number but only was open on business hours. Called [employer] and talked with Henrietta. Patient is not one of their clients and she said [employer] was just the company that makes the equipment. I told pt to call 911 if she started having any SOB while I tried to get someone out to check  her tank. Henrietta was looking to see if she can find out the company, meanwhile I called pt back and had her look to see if she can find any other numbers on her tank. Pt was able to find a different number. I called that number and they are going to send someone out to check her equipment. Meanwhile I have made multiple calls to check on pt. Pt still does not want to try to turn her portable tank on. Pt still denies any SOB but is anxious about her equipment not working. Reassurance given and encouraged pt to try to relax and not exert herself until they arrive. [facility] changed out her equipment. Patient doing good.

## 2024-11-18 ENCOUNTER — PATIENT OUTREACH (OUTPATIENT)
Dept: HOME HEALTH SERVICES | Age: 89
End: 2024-11-18
Payer: MEDICARE

## 2024-11-18 DIAGNOSIS — I26.99 PULMONARY EMBOLISM, OTHER, UNSPECIFIED CHRONICITY, UNSPECIFIED WHETHER ACUTE COR PULMONALE PRESENT (MULTI): Primary | ICD-10-CM

## 2024-11-18 DIAGNOSIS — I10 HYPERTENSION, UNSPECIFIED TYPE: ICD-10-CM

## 2024-11-18 NOTE — PROGRESS NOTES
Daily Call Note:     Ms. Contreras states she was not aware of need for lab draw.  Patient states she will not be able to get labs drawn until next week.  Her son is out of town.      The [facility] team will reach out to our Medic team for assistance with lab draw.   UPDATE:  Medic visit scheduled 11/19 approx 10    Ms Contreras stated she was having trouble with her oxygen over the weekend but it resolved at this time.      Patient has no questions, concerns, or needs at this time.        Pt Education:   Barriers:   Topics for Daily Review:   Pt demonstrates clear understanding:     Daily Weight:  There were no vitals filed for this visit.   Last 3 Weights:  Wt Readings from Last 7 Encounters:   11/05/24 59 kg (130 lb)   11/04/24 56.4 kg (124 lb 6.4 oz)   10/02/24 57.2 kg (126 lb)   07/15/24 65.2 kg (143 lb 11.2 oz)   07/12/24 65 kg (143 lb 3.2 oz)   07/08/24 65 kg (143 lb 3.2 oz)   07/01/24 65.8 kg (145 lb)       Masimo Device:    Masimo Clinical Impression:     Virtual Visits--Scheduled (Most Recent Date at Top)  Follow up Appointments  Recent Visits  Date Type Provider Dept   11/05/24 Office Visit Lakia Johnson MD Do [id number] [facility]   Showing recent visits within past 30 days and meeting all other requirements  Future Appointments  Date Type Provider Dept   01/07/25 Appointment Lakia Johnson MD Do [id number] [facility]   Showing future appointments within next 90 days and meeting all other requirements       Frequency of RN Calls & Virtual Visits per Team Agreement:     Medication issues Addressed (what was done):     Follow up appointments scheduled by [facility] Staff:   Referrals made by [facility] staff:

## 2024-11-19 ENCOUNTER — APPOINTMENT (OUTPATIENT)
Dept: CARE COORDINATION | Age: 89
End: 2024-11-19
Payer: MEDICARE

## 2024-11-19 ENCOUNTER — PATIENT OUTREACH (OUTPATIENT)
Dept: HOME HEALTH SERVICES | Age: 89
End: 2024-11-19

## 2024-11-19 ENCOUNTER — TELEMEDICINE (OUTPATIENT)
Dept: PHARMACY | Facility: HOSPITAL | Age: 89
End: 2024-11-19
Payer: MEDICARE

## 2024-11-19 ENCOUNTER — LAB (OUTPATIENT)
Dept: LAB | Facility: LAB | Age: 89
End: 2024-11-19
Payer: MEDICARE

## 2024-11-19 DIAGNOSIS — I26.99 PULMONARY EMBOLISM, OTHER, UNSPECIFIED CHRONICITY, UNSPECIFIED WHETHER ACUTE COR PULMONALE PRESENT (MULTI): ICD-10-CM

## 2024-11-19 DIAGNOSIS — I10 HYPERTENSION, UNSPECIFIED TYPE: ICD-10-CM

## 2024-11-19 DIAGNOSIS — N18.31 STAGE 3A CHRONIC KIDNEY DISEASE (MULTI): ICD-10-CM

## 2024-11-19 LAB
ANION GAP SERPL CALC-SCNC: 9 MMOL/L (ref 10–20)
BUN SERPL-MCNC: 36 MG/DL (ref 6–23)
CALCIUM SERPL-MCNC: 8.1 MG/DL (ref 8.6–10.3)
CHLORIDE SERPL-SCNC: 110 MMOL/L (ref 98–107)
CO2 SERPL-SCNC: 29 MMOL/L (ref 21–32)
CREAT SERPL-MCNC: 1.59 MG/DL (ref 0.5–1.05)
EGFRCR SERPLBLD CKD-EPI 2021: 31 ML/MIN/1.73M*2
GLUCOSE SERPL-MCNC: 92 MG/DL (ref 74–99)
POTASSIUM SERPL-SCNC: 5.2 MMOL/L (ref 3.5–5.3)
SODIUM SERPL-SCNC: 143 MMOL/L (ref 136–145)

## 2024-11-19 PROCEDURE — 36415 COLL VENOUS BLD VENIPUNCTURE: CPT

## 2024-11-19 PROCEDURE — 80048 BASIC METABOLIC PNL TOTAL CA: CPT

## 2024-11-19 NOTE — PROGRESS NOTES
"[facility]    Estefanía Contreras is a 89 y.o. female was referred to Clinical Pharmacy Team to complete a post-discharge medication optimization and monitoring visit.  The patient was referred for multiple concerns while in [facility]. Today was our sixth visit.       Referring Provider: Jorge Bhandari MD  PCP: Lakia Johnson MD - last visit: 11/5, next visit: 1/7/2025      Subjective   Allergies   Allergen Reactions    Morphine Anaphylaxis     Patient denies       [facility] - [place], [place] - [address]  [address]  [facility] [address]  Phone: [phone] Fax: [phone]      Medication System Management:  Affordability/Accessibility: $0 co pays  Adherence/Organization: no issues       Social History     Social History Narrative    Not on file          HPI      Review of Systems        Objective     There were no vitals taken for this visit.   BP Readings from Last 4 Encounters:   11/05/24 136/62   10/25/24 110/58   11/12/24 143/71   10/07/24 (!) 176/129      There were no vitals filed for this visit.     LAB  Lab Results   Component Value Date    BILITOT 0.3 10/02/2024    CALCIUM 8.1 (L) 11/19/2024    CO2 29 11/19/2024     (H) 11/19/2024    CREATININE 1.59 (H) 11/19/2024    GLUCOSE 92 11/19/2024    ALKPHOS 117 10/02/2024    K 5.2 11/19/2024    PROT 6.9 10/02/2024     11/19/2024    AST 16 10/02/2024    ALT 8 10/02/2024    BUN 36 (H) 11/19/2024    ANIONGAP 9 (L) 11/19/2024    MG 2.42 (H) 10/02/2024    PHOS 4.1 10/06/2024    ALBUMIN 3.0 (L) 10/06/2024    GFRF 36 (A) 05/03/2023     No results found for: \"TRIG\", \"CHOL\", \"LDLCALC\", \"HDL\"  No results found for: \"HGBA1C\"      Current Outpatient Medications   Medication Instructions    acetaminophen (TYLENOL) 325 mg, Every 4 hours PRN    apixaban (Eliquis) 5 mg tablet Take 2 tablets (10 mg) by mouth 2 times a day for 3 days, THEN 1 tablet (5 mg) 2 times a day.    dilTIAZem LA (CARDIZEM LA) 180 mg, oral, Daily    ferrous sulfate 325 (65 Fe) " MG EC tablet 1 tablet, oral, Daily with breakfast, Do not crush, chew, or split.    furosemide (Lasix) 20 mg tablet 1 tablet, Daily    meloxicam (MOBIC) 15 mg, oral, Daily    ondansetron ODT (ZOFRAN-ODT) 4 mg, oral, Every 8 hours PRN    potassium chloride CR 20 mEq ER tablet 20 mEq, Daily    pramipexole (MIRAPEX) 1 mg, Nightly    simvastatin (ZOCOR) 20 mg, Nightly    vit C,A-Qf-fcaiq-lutein-zeaxan (PreserVision AREDS-2) 250-90-40-1 mg capsule 1 capsule, oral, 2 times daily          Assessment/Plan   Problem List Items Addressed This Visit       HTN (hypertension)    Pulmonary embolism, other, unspecified chronicity, unspecified whether acute cor pulmonale present (Multi)       -feels like breathing is improved  -currently wearing 3L   -she does have her own pulse ox and has been checking on her own and has been above 95%  -has taken oxygen off intermittently but has not checked her saturations while doing this  -discussed that as long as she is above 90% then that is good for her   -has nurse aid that comes out 4 days a week         Medications Changes/Concerns:  -will need to continue Eliquis at least for 3 months until mid-January       Refills Needed:  -none needed today       Plan/To Do:  -continue to monitor weights and BP's  -will graduate from [facility] today        PAP Status:  -n/a  -has $0 co pays       Time Spent with Patient and [facility] Team - Dr. Bhandari and Shelia, RN via phone call: 15 minutes      Follow Up: as needed     Continue all meds under the continuation of care with the referring provider and clinical pharmacy team.    Akhil Schmid, David     Verbal consent to manage patient's drug therapy was obtained from the patient. They were informed they may decline to participate or withdraw from participation in pharmacy services at any time.

## 2024-11-19 NOTE — PROGRESS NOTES
Daily Call Note: [facility] Completed with Dr. Jorge Bhandari and Akhil.    Ms. Contreras states her breathing is pretty good, at times she get sob.  Patient states her oxygen is about 94-95 % on 2 liters.     Dr. Bhandari discussed medications and follow up appointments.     BMP drawn resulted today.  Lab work stable for graduation.    Patient agrees with plan.  Notified Dr. Alonzo via secure chat.         Pt Education:   Barriers:   Topics for Daily Review:   Pt demonstrates clear understanding:     Daily Weight:  There were no vitals filed for this visit.   Last 3 Weights:  Wt Readings from Last 7 Encounters:   11/05/24 59 kg (130 lb)   11/04/24 56.4 kg (124 lb 6.4 oz)   10/02/24 57.2 kg (126 lb)   07/15/24 65.2 kg (143 lb 11.2 oz)   07/12/24 65 kg (143 lb 3.2 oz)   07/08/24 65 kg (143 lb 3.2 oz)   07/01/24 65.8 kg (145 lb)       Masimo Device:    Masimo Clinical Impression:     Virtual Visits--Scheduled (Most Recent Date at Top)  Follow up Appointments  Recent Visits  Date Type Provider Dept   11/05/24 Office Visit Lakia Johnson MD Do [id number] [facility]   Showing recent visits within past 30 days and meeting all other requirements  Future Appointments  Date Type Provider Dept   01/07/25 Appointment Lakia Johnson MD Do [id number] [facility]   Showing future appointments within next 90 days and meeting all other requirements       Frequency of RN Calls & Virtual Visits per Team Agreement:     Medication issues Addressed (what was done):     Follow up appointments scheduled by [facility] Staff:   Referrals made by [facility] staff:

## 2024-11-21 ENCOUNTER — TELEPHONE (OUTPATIENT)
Dept: PRIMARY CARE | Facility: CLINIC | Age: 89
End: 2024-11-21
Payer: MEDICARE

## 2024-11-21 DIAGNOSIS — J00 ACUTE RHINITIS: Primary | ICD-10-CM

## 2024-11-21 RX ORDER — AZELASTINE 1 MG/ML
2 SPRAY, METERED NASAL 2 TIMES DAILY
Qty: 30 ML | Refills: 2 | Status: SHIPPED | OUTPATIENT
Start: 2024-11-21 | End: 2025-11-21

## 2024-11-21 NOTE — TELEPHONE ENCOUNTER
Spoke to [facility] pharmacy they will make special trip to deliver medication to patient before 4:00pm today

## 2024-11-21 NOTE — TELEPHONE ENCOUNTER
Patient has had a runny nose since yesterday morning and slight cough. Patient wears oxygen and is having trouble wearing the oxygen because of the runny nose. Patient can't come in wants to know if there is something that can be prescibed?    Please advise  Tanya Coles

## 2024-12-05 ENCOUNTER — OFFICE VISIT (OUTPATIENT)
Dept: CARDIOLOGY | Facility: CLINIC | Age: 89
End: 2024-12-05
Payer: MEDICARE

## 2024-12-05 VITALS
HEART RATE: 71 BPM | WEIGHT: 128.8 LBS | OXYGEN SATURATION: 93 % | SYSTOLIC BLOOD PRESSURE: 112 MMHG | BODY MASS INDEX: 23.56 KG/M2 | DIASTOLIC BLOOD PRESSURE: 58 MMHG

## 2024-12-05 DIAGNOSIS — Z86.79 STATUS POST ENDOVASCULAR ANEURYSM REPAIR (EVAR): ICD-10-CM

## 2024-12-05 DIAGNOSIS — I26.99 PULMONARY EMBOLISM, OTHER, UNSPECIFIED CHRONICITY, UNSPECIFIED WHETHER ACUTE COR PULMONALE PRESENT (MULTI): Primary | ICD-10-CM

## 2024-12-05 DIAGNOSIS — I71.21 ANEURYSM OF ASCENDING AORTA WITHOUT RUPTURE (CMS-HCC): ICD-10-CM

## 2024-12-05 DIAGNOSIS — I65.29 CAROTID ATHEROSCLEROSIS, UNSPECIFIED LATERALITY: ICD-10-CM

## 2024-12-05 DIAGNOSIS — Z98.890 STATUS POST ENDOVASCULAR ANEURYSM REPAIR (EVAR): ICD-10-CM

## 2024-12-05 DIAGNOSIS — I65.23 CAROTID ATHEROSCLEROSIS, BILATERAL: ICD-10-CM

## 2024-12-05 PROCEDURE — G2211 COMPLEX E/M VISIT ADD ON: HCPCS | Performed by: INTERNAL MEDICINE

## 2024-12-05 PROCEDURE — 99215 OFFICE O/P EST HI 40 MIN: CPT | Performed by: INTERNAL MEDICINE

## 2024-12-05 PROCEDURE — 3074F SYST BP LT 130 MM HG: CPT | Performed by: INTERNAL MEDICINE

## 2024-12-05 PROCEDURE — 3078F DIAST BP <80 MM HG: CPT | Performed by: INTERNAL MEDICINE

## 2024-12-05 PROCEDURE — 1159F MED LIST DOCD IN RCRD: CPT | Performed by: INTERNAL MEDICINE

## 2024-12-05 NOTE — LETTER
"December 6, 2024     Lakia Johnson MD  [address] [address]    Patient: Estefanía Contreras   YOB: 1934   Date of Visit: 12/5/2024       Dear Dr. Lakia Johnson MD:    Thank you for referring Estefanía Contreras to me for evaluation. Below are my notes for this consultation.  If you have questions, please do not hesitate to call me. I look forward to following your patient along with you.       Sincerely,     Pito Henao MD      CC: No Recipients  ______________________________________________________________________________________    PCP: Lakia Johnson MD  VS: Jaylan  Podiatrist: Isiah Barker  Estefanía Contreras is a 89 y.o. female who is here to discuss PE 10/2/24 (right PA with segmental and subsegmental thrombi), DVT scan was negative .    Patient is present at the visit with her son.  She remains on nasal cannula oxygen for \"comfort\".  Oxygen is a new treatment for her after her PE.  She likely has component of COPD, has a smoking history of until 20 years ago.     As for her PE, thought to be d/t reversible risk factor.  Patient reports that she had a fall and broke her hip in July.  She was at [facility] with therapy for about 1 to 2 months.  She does report being on Lovenox during that time.    Review of Systems:  Otherwise, limited cardiovascular review of systems is negative.    Past Medical History:  She has a past medical history of H/O heart artery stent and H/O shoulder surgery.    Surgical History:   She has no past surgical history on file.    Family History:   No family history on file.    Social History:   Tobacco Use: Medium Risk (12/5/2024)    Patient History    • Smoking Tobacco Use: Former    • Smokeless Tobacco Use: Never    • Passive Exposure: Never     Smoked long time ago, quit 20 years ago.    Outpatient Medications:    Current Outpatient Medications:   •  acetaminophen (Tylenol) 325 mg tablet, Take 1 tablet (325 mg) by mouth every 4 hours if needed for mild pain (1 " - 3)., Disp: , Rfl:   •  apixaban (Eliquis) 5 mg tablet, Take 1 tablet (5 mg) by mouth 2 times a day., Disp: 60 tablet, Rfl: 1  •  azelastine (Astelin) 137 mcg (0.1 %) nasal spray, Administer 2 sprays into each nostril 2 times a day. Use in each nostril as directed, Disp: 30 mL, Rfl: 2  •  dilTIAZem LA (Cardizem LA) 180 mg 24 hr tablet, Take 1 tablet (180 mg) by mouth once daily., Disp: 90 tablet, Rfl: 3  •  ferrous sulfate 325 (65 Fe) MG EC tablet, Take 1 tablet by mouth once daily with breakfast. Do not crush, chew, or split., Disp: 30 tablet, Rfl: 1  •  furosemide (Lasix) 20 mg tablet, Take 1 tablet (20 mg) by mouth once daily., Disp: , Rfl:   •  meloxicam (Mobic) 15 mg tablet, Take 1 tablet (15 mg) by mouth once daily., Disp: 90 tablet, Rfl: 3  •  potassium chloride CR 20 mEq ER tablet, Take 1 tablet (20 mEq) by mouth once daily., Disp: , Rfl:   •  pramipexole (Mirapex) 1 mg tablet, Take 1 tablet (1 mg) by mouth once daily at bedtime., Disp: , Rfl:   •  simvastatin (Zocor) 20 mg tablet, Take 1 tablet (20 mg) by mouth once daily at bedtime., Disp: , Rfl:   •  vit C,U-Uz-qvgpw-lutein-zeaxan (PreserVision AREDS-2) 250-90-40-1 mg capsule, Take 1 capsule by mouth 2 times a day., Disp: 180 capsule, Rfl: 0     Allergies:  Morphine       Objective  Vital Signs:  /58 (BP Location: Right arm, Patient Position: Sitting, BP Cuff Size: Adult)   Pulse 71   Wt 58.4 kg (128 lb 12.8 oz)   SpO2 93%   BMI 23.56 kg/m²     Physical Exam:  General: no acute distress  HEENT: EOMI, no scleral icterus.  Lungs: Clear to auscultation bilaterally without wheezing, rales, or rhonchi.  Cardiovascular: Regular rhythm and rate. Normal S1 and S2. No murmurs, rubs, or gallops are appreciated. JVP normal.  no bruits  Abdomen: Soft, nontender, nondistended. Bowel sounds present.  Extremities: 1+ edema BLE and warm to touch; pulses are diminished but present.  Neurologic: Alert and oriented x3.    Pertinent Recent Cardiovascular Studies  (personally reviewed):  CT scan 10/2/24: R PA segmental and subsegmental PE.  RV/LV ratio 1.3.    Echocardiogram 10/3/24:  Minimally enlarged RV size, preserved function.      Laboratory values:  CMP:  Recent Labs     11/19/24  1144 10/06/24  0521 10/05/24  0538 10/04/24  0555 10/03/24  0543 10/02/24  1231 07/12/24  1551 07/06/24  0621 07/04/24  0605 07/03/24  0614 07/02/24 0623 07/01/24 1953 05/03/23  1413    140 142 141 140 140 140 140   < > 136   < > 139 141   K 5.2 5.1 4.8 4.8 5.3 4.9 3.9 4.7   < > 5.3   < > 4.4 4.8   * 105 106 108* 106 105 100 106   < > 105   < > 103 106   CO2 29 32 32 29 27 28 31 30   < > 23   < > 28 28   ANIONGAP 9* 8* 9* 9* 12 12 13 9*   < > 13   < > 12 12   BUN 36* 46* 45* 45* 47* 26* 38* 39*   < > 53*   < > 55* 30*   CREATININE 1.59* 1.48* 1.73* 1.33* 1.51* 1.48* 1.67* 1.39*   < > 2.06*   < > 2.40* 1.39*   EGFR 31* 34* 28* 38* 33* 34* 29* 36*   < > 23*   < > 19*  --    MG  --   --   --   --   --  2.42*  --   --   --  2.28  --  2.41* 2.63*    < > = values in this interval not displayed.     Recent Labs     10/06/24  0521 10/05/24  0538 10/04/24  0555 10/03/24  0543 10/02/24  1231 07/01/24 1953 07/01/24 1953 05/03/23  1413 12/14/22  1438   ALBUMIN 3.0* 3.1* 3.0* 3.1* 3.7   < > 3.7 4.0 4.0   ALKPHOS  --   --   --   --  117  --  60 100 76   ALT  --   --   --   --  8  --  11 9 9   AST  --   --   --   --  16  --  17 21 16   BILITOT  --   --   --   --  0.3  --  0.5 0.4 0.4    < > = values in this interval not displayed.     CBC:  Recent Labs     10/06/24  0957 10/06/24  0521 10/05/24  0538 10/04/24  0555 10/04/24  0001 10/03/24  0543 10/02/24  1231 07/12/24  1551   WBC 5.7 5.1 5.2 5.7 5.6 5.3 6.0 9.1   HGB 9.0* 8.7* 8.6* 9.4* 9.6* 10.4* 11.8* 9.4*   HCT 28.9* 28.5* 28.3* 31.0* 31.4* 34.8* 38.1 30.0*    155 141* 142* 144* 147* 146* 234   MCV 91 92 93 92 92 94 91 94     COAG:   Recent Labs     10/04/24  0556 10/03/24  1111 10/03/24  0543 10/03/24  0300 10/02/24  2222  10/02/24  1817 07/01/24  2019   INR  --  1.2*  --   --   --   --  1.0   HAUF >2.0*  --  0.6 0.5 0.5 1.2*  --      ABO:   Recent Labs     07/01/24 2019   ABO A     HEME/ENDO:  Recent Labs     07/04/24  0605   IRONSAT 13*      CARDIAC:   Recent Labs     10/02/24  1817 10/02/24  1231 07/12/24  1551 12/14/22  1438   TROPHS 27* 37*  --  6   BNP  --  604* 269* 162*     Lipid NA     I have personally reviewed most recent PCP, cardiology, vascular, and/or podiatry documentation.      Assessment/Plan  89 y.o. female with  PE due to reversible risk factor  in the background of dyslipidemia, hypertension, and CKD, AAA s/p repair, carotid disease per remote documentation, PAD, ITP .    Plan:  I agree with a short duration of anticoagulation due to only subsegmental and segmental PE.  Reasonable to stop after the first 3 months.  Repeat limited echocardiogram to reevaluate RV.  BLE venous edema - elevation (states cannot do compression d/t limited mobility of the hands).  Pulmonary referral for COPD optimization such that patient can come off oxygen.  Carotid ultrasound to check stenosis based on charting of prior atherosclerosis documented.    AAA evaluation to track progress of EVAR.  Plan for follow-up in 6 months.         Pito Henao MD, FACC, FSCAI, RPVI  Co-Director, Vascular Center, and  Co-Director, Pulmonary Embolism Response Team,   [facility]                                 of Medicine,                                                                 [employer] [employer]

## 2024-12-05 NOTE — PROGRESS NOTES
"PCP: Lakia Johnson MD  VS: Jaylan  Podiatrist: Isiah Barker   Estefanía Contreras is a 89 y.o. female who is here to discuss PE 10/2/24 (right PA with segmental and subsegmental thrombi), DVT scan was negative .    Patient is present at the visit with her son.  She remains on nasal cannula oxygen for \"comfort\".  Oxygen is a new treatment for her after her PE.  She likely has component of COPD, has a smoking history of until 20 years ago.     As for her PE, thought to be d/t reversible risk factor.  Patient reports that she had a fall and broke her hip in July.  She was at [facility] with therapy for about 1 to 2 months.  She does report being on Lovenox during that time.    Review of Systems:  Otherwise, limited cardiovascular review of systems is negative.    Past Medical History:  She has a past medical history of H/O heart artery stent and H/O shoulder surgery.    Surgical History:   She has no past surgical history on file.    Family History:   No family history on file.    Social History:   Tobacco Use: Medium Risk (12/5/2024)    Patient History     Smoking Tobacco Use: Former     Smokeless Tobacco Use: Never     Passive Exposure: Never     Smoked long time ago, quit 20 years ago.    Outpatient Medications:    Current Outpatient Medications:     acetaminophen (Tylenol) 325 mg tablet, Take 1 tablet (325 mg) by mouth every 4 hours if needed for mild pain (1 - 3)., Disp: , Rfl:     apixaban (Eliquis) 5 mg tablet, Take 1 tablet (5 mg) by mouth 2 times a day., Disp: 60 tablet, Rfl: 1    azelastine (Astelin) 137 mcg (0.1 %) nasal spray, Administer 2 sprays into each nostril 2 times a day. Use in each nostril as directed, Disp: 30 mL, Rfl: 2    dilTIAZem LA (Cardizem LA) 180 mg 24 hr tablet, Take 1 tablet (180 mg) by mouth once daily., Disp: 90 tablet, Rfl: 3    ferrous sulfate 325 (65 Fe) MG EC tablet, Take 1 tablet by mouth once daily with breakfast. Do not crush, chew, or split., Disp: 30 tablet, Rfl: 1    " furosemide (Lasix) 20 mg tablet, Take 1 tablet (20 mg) by mouth once daily., Disp: , Rfl:     meloxicam (Mobic) 15 mg tablet, Take 1 tablet (15 mg) by mouth once daily., Disp: 90 tablet, Rfl: 3    potassium chloride CR 20 mEq ER tablet, Take 1 tablet (20 mEq) by mouth once daily., Disp: , Rfl:     pramipexole (Mirapex) 1 mg tablet, Take 1 tablet (1 mg) by mouth once daily at bedtime., Disp: , Rfl:     simvastatin (Zocor) 20 mg tablet, Take 1 tablet (20 mg) by mouth once daily at bedtime., Disp: , Rfl:     vit C,A-Nc-hrgfo-lutein-zeaxan (PreserVision AREDS-2) 250-90-40-1 mg capsule, Take 1 capsule by mouth 2 times a day., Disp: 180 capsule, Rfl: 0     Allergies:  Morphine       Objective   Vital Signs:  /58 (BP Location: Right arm, Patient Position: Sitting, BP Cuff Size: Adult)   Pulse 71   Wt 58.4 kg (128 lb 12.8 oz)   SpO2 93%   BMI 23.56 kg/m²     Physical Exam:  General: no acute distress  HEENT: EOMI, no scleral icterus.  Lungs: Clear to auscultation bilaterally without wheezing, rales, or rhonchi.  Cardiovascular: Regular rhythm and rate. Normal S1 and S2. No murmurs, rubs, or gallops are appreciated. JVP normal.  no bruits  Abdomen: Soft, nontender, nondistended. Bowel sounds present.  Extremities: 1+ edema BLE and warm to touch; pulses are diminished but present.  Neurologic: Alert and oriented x3.    Pertinent Recent Cardiovascular Studies (personally reviewed):  CT scan 10/2/24: R PA segmental and subsegmental PE.  RV/LV ratio 1.3.    Echocardiogram 10/3/24:  Minimally enlarged RV size, preserved function.      Laboratory values:  CMP:  Recent Labs     11/19/24  1144 10/06/24  0521 10/05/24  0538 10/04/24  0555 10/03/24  0543 10/02/24  1231 07/12/24  1551 07/06/24  0621 07/04/24  0605 07/03/24  0614 07/02/24 0623 07/01/24 1953 05/03/23  1413    140 142 141 140 140 140 140   < > 136   < > 139 141   K 5.2 5.1 4.8 4.8 5.3 4.9 3.9 4.7   < > 5.3   < > 4.4 4.8   * 105 106 108* 106 105 100  106   < > 105   < > 103 106   CO2 29 32 32 29 27 28 31 30   < > 23   < > 28 28   ANIONGAP 9* 8* 9* 9* 12 12 13 9*   < > 13   < > 12 12   BUN 36* 46* 45* 45* 47* 26* 38* 39*   < > 53*   < > 55* 30*   CREATININE 1.59* 1.48* 1.73* 1.33* 1.51* 1.48* 1.67* 1.39*   < > 2.06*   < > 2.40* 1.39*   EGFR 31* 34* 28* 38* 33* 34* 29* 36*   < > 23*   < > 19*  --    MG  --   --   --   --   --  2.42*  --   --   --  2.28  --  2.41* 2.63*    < > = values in this interval not displayed.     Recent Labs     10/06/24  0521 10/05/24  0538 10/04/24  0555 10/03/24  0543 10/02/24  1231 07/01/24  1953 07/01/24  1953 05/03/23  1413 12/14/22  1438   ALBUMIN 3.0* 3.1* 3.0* 3.1* 3.7   < > 3.7 4.0 4.0   ALKPHOS  --   --   --   --  117  --  60 100 76   ALT  --   --   --   --  8  --  11 9 9   AST  --   --   --   --  16  --  17 21 16   BILITOT  --   --   --   --  0.3  --  0.5 0.4 0.4    < > = values in this interval not displayed.     CBC:  Recent Labs     10/06/24  0957 10/06/24  0521 10/05/24  0538 10/04/24  0555 10/04/24  0001 10/03/24  0543 10/02/24  1231 07/12/24  1551   WBC 5.7 5.1 5.2 5.7 5.6 5.3 6.0 9.1   HGB 9.0* 8.7* 8.6* 9.4* 9.6* 10.4* 11.8* 9.4*   HCT 28.9* 28.5* 28.3* 31.0* 31.4* 34.8* 38.1 30.0*    155 141* 142* 144* 147* 146* 234   MCV 91 92 93 92 92 94 91 94     COAG:   Recent Labs     10/04/24  0556 10/03/24  1111 10/03/24  0543 10/03/24  0300 10/02/24  2222 10/02/24  1817 07/01/24 2019   INR  --  1.2*  --   --   --   --  1.0   HAUF >2.0*  --  0.6 0.5 0.5 1.2*  --      ABO:   Recent Labs     07/01/24  2019   ABO A     HEME/ENDO:  Recent Labs     07/04/24  0605   IRONSAT 13*      CARDIAC:   Recent Labs     10/02/24  1817 10/02/24  1231 07/12/24  1551 12/14/22  1438   TROPHS 27* 37*  --  6   BNP  --  604* 269* 162*     Lipid NA     I have personally reviewed most recent PCP, cardiology, vascular, and/or podiatry documentation.      Assessment/Plan   89 y.o. female with  PE due to reversible risk factor  in the background of  dyslipidemia, hypertension, and CKD, AAA s/p repair, carotid disease per remote documentation, PAD, ITP .    Plan:  I agree with a short duration of anticoagulation due to only subsegmental and segmental PE.  Reasonable to stop after the first 3 months.  Repeat limited echocardiogram to reevaluate RV.  BLE venous edema - elevation (states cannot do compression d/t limited mobility of the hands).  Pulmonary referral for COPD optimization such that patient can come off oxygen.  Carotid ultrasound to check stenosis based on charting of prior atherosclerosis documented.    AAA evaluation to track progress of EVAR.  Plan for follow-up in 6 months.         Pito Henao MD, FACC, FSCAI, RPVI  Co-Director, Vascular Center, and  Co-Director, Pulmonary Embolism Response Team,   [facility] [place]                                 of Medicine,                                                                 [facility] [employer]

## 2024-12-05 NOTE — PATIENT INSTRUCTIONS
We will get a ultrasound of your heart to track the right ventricle function.  Will get an ultrasound of your abdominal aorta to check prior stent graft.  We will also obtain an ultrasound of your neck arteries to track your carotid artery.    Evaluation with pulmonary doctors to see if you can come off oxygen.  Complete your anticoagulation unless if instructed otherwise.    Elevate the legs when sitting - preferably above the heart.    Follow up 6 months.

## 2024-12-10 DIAGNOSIS — I10 HYPERTENSION, UNSPECIFIED TYPE: ICD-10-CM

## 2024-12-10 DIAGNOSIS — G57.92 NEUROPATHY OF LEFT FOOT: Primary | ICD-10-CM

## 2024-12-16 RX ORDER — FUROSEMIDE 20 MG/1
20 TABLET ORAL DAILY
Qty: 90 TABLET | Refills: 1 | Status: SHIPPED | OUTPATIENT
Start: 2024-12-16 | End: 2025-12-16

## 2024-12-16 RX ORDER — MELOXICAM 15 MG/1
15 TABLET ORAL DAILY
Qty: 90 TABLET | Refills: 3 | Status: SHIPPED | OUTPATIENT
Start: 2024-12-16

## 2024-12-16 RX ORDER — PRAMIPEXOLE DIHYDROCHLORIDE 1 MG/1
1 TABLET ORAL NIGHTLY
Qty: 90 TABLET | Refills: 1 | Status: SHIPPED | OUTPATIENT
Start: 2024-12-16 | End: 2025-12-16

## 2024-12-16 RX ORDER — SIMVASTATIN 20 MG/1
20 TABLET, FILM COATED ORAL NIGHTLY
Qty: 90 TABLET | Refills: 0 | Status: SHIPPED | OUTPATIENT
Start: 2024-12-16 | End: 2025-12-16

## 2024-12-20 ENCOUNTER — TELEPHONE (OUTPATIENT)
Dept: PRIMARY CARE | Facility: CLINIC | Age: 89
End: 2024-12-20
Payer: MEDICARE

## 2024-12-20 DIAGNOSIS — G47.9 SLEEP TROUBLE: Primary | ICD-10-CM

## 2024-12-20 RX ORDER — TRAZODONE HYDROCHLORIDE 50 MG/1
50 TABLET ORAL NIGHTLY PRN
Qty: 30 TABLET | Refills: 0 | Status: SHIPPED | OUTPATIENT
Start: 2024-12-20 | End: 2025-12-20

## 2024-12-20 NOTE — TELEPHONE ENCOUNTER
Patient called x2 regarding sleeping medication would like to speak to you or have you prescribe something .Pt said she can't sleep.Please advise

## 2025-01-03 ENCOUNTER — HOSPITAL ENCOUNTER (OUTPATIENT)
Dept: CARDIOLOGY | Facility: CLINIC | Age: OVER 89
Discharge: HOME | End: 2025-01-03
Payer: MEDICARE

## 2025-01-03 ENCOUNTER — HOSPITAL ENCOUNTER (OUTPATIENT)
Dept: VASCULAR MEDICINE | Facility: CLINIC | Age: OVER 89
Discharge: HOME | End: 2025-01-03
Payer: MEDICARE

## 2025-01-03 DIAGNOSIS — Z98.890 STATUS POST ENDOVASCULAR ANEURYSM REPAIR (EVAR): ICD-10-CM

## 2025-01-03 DIAGNOSIS — I26.99 PULMONARY EMBOLISM, OTHER, UNSPECIFIED CHRONICITY, UNSPECIFIED WHETHER ACUTE COR PULMONALE PRESENT (MULTI): ICD-10-CM

## 2025-01-03 DIAGNOSIS — I71.40 ABDOMINAL AORTIC ANEURYSM, WITHOUT RUPTURE, UNSPECIFIED (CMS-HCC): ICD-10-CM

## 2025-01-03 DIAGNOSIS — I65.23 CAROTID ATHEROSCLEROSIS, BILATERAL: ICD-10-CM

## 2025-01-03 DIAGNOSIS — Z86.79 STATUS POST ENDOVASCULAR ANEURYSM REPAIR (EVAR): ICD-10-CM

## 2025-01-03 DIAGNOSIS — I71.21 ANEURYSM OF ASCENDING AORTA WITHOUT RUPTURE (CMS-HCC): ICD-10-CM

## 2025-01-03 DIAGNOSIS — I65.29 CAROTID ATHEROSCLEROSIS, UNSPECIFIED LATERALITY: ICD-10-CM

## 2025-01-03 PROCEDURE — 93880 EXTRACRANIAL BILAT STUDY: CPT

## 2025-01-03 PROCEDURE — 93978 VASCULAR STUDY: CPT | Performed by: INTERNAL MEDICINE

## 2025-01-03 PROCEDURE — 2500000004 HC RX 250 GENERAL PHARMACY W/ HCPCS (ALT 636 FOR OP/ED): Performed by: INTERNAL MEDICINE

## 2025-01-03 PROCEDURE — 93978 VASCULAR STUDY: CPT

## 2025-01-03 PROCEDURE — 93880 EXTRACRANIAL BILAT STUDY: CPT | Performed by: INTERNAL MEDICINE

## 2025-01-03 PROCEDURE — C8924 2D TTE W OR W/O FOL W/CON,FU: HCPCS

## 2025-01-03 RX ADMIN — PERFLUTREN 2 ML OF DILUTION: 6.52 INJECTION, SUSPENSION INTRAVENOUS at 11:01

## 2025-01-06 LAB
AORTIC VALVE MEAN GRADIENT: 4 MMHG
AORTIC VALVE PEAK VELOCITY: 1.42 M/S
AV PEAK GRADIENT: 8 MMHG
AVA (PEAK VEL): 2.53 CM2
AVA (VTI): 2.4 CM2
EJECTION FRACTION APICAL 4 CHAMBER: 64.5
EJECTION FRACTION: 62 %
LEFT ATRIUM VOLUME AREA LENGTH INDEX BSA: 36.2 ML/M2
LEFT VENTRICLE INTERNAL DIMENSION DIASTOLE: 3.79 CM (ref 3.5–6)
LEFT VENTRICULAR OUTFLOW TRACT DIAMETER: 1.99 CM
MITRAL VALVE E/A RATIO: 0.61
RIGHT VENTRICLE FREE WALL PEAK S': 0.1 CM/S
RIGHT VENTRICLE PEAK SYSTOLIC PRESSURE: 50.3 MMHG
TRICUSPID ANNULAR PLANE SYSTOLIC EXCURSION: 1.9 CM

## 2025-01-07 ENCOUNTER — APPOINTMENT (OUTPATIENT)
Dept: RADIOLOGY | Facility: HOSPITAL | Age: OVER 89
End: 2025-01-07
Payer: MEDICARE

## 2025-01-07 ENCOUNTER — HOSPITAL ENCOUNTER (INPATIENT)
Facility: HOSPITAL | Age: OVER 89
LOS: 1 days | Discharge: HOME HEALTH | End: 2025-01-09
Attending: STUDENT IN AN ORGANIZED HEALTH CARE EDUCATION/TRAINING PROGRAM | Admitting: INTERNAL MEDICINE
Payer: MEDICARE

## 2025-01-07 ENCOUNTER — APPOINTMENT (OUTPATIENT)
Dept: CARDIOLOGY | Facility: HOSPITAL | Age: OVER 89
End: 2025-01-07
Payer: MEDICARE

## 2025-01-07 ENCOUNTER — APPOINTMENT (OUTPATIENT)
Dept: VASCULAR MEDICINE | Facility: HOSPITAL | Age: OVER 89
End: 2025-01-07
Payer: MEDICARE

## 2025-01-07 ENCOUNTER — APPOINTMENT (OUTPATIENT)
Dept: PRIMARY CARE | Facility: CLINIC | Age: OVER 89
End: 2025-01-07
Payer: MEDICARE

## 2025-01-07 DIAGNOSIS — S72.002D CLOSED FRACTURE OF NECK OF LEFT FEMUR WITH ROUTINE HEALING: ICD-10-CM

## 2025-01-07 DIAGNOSIS — R60.0 LOCALIZED EDEMA: ICD-10-CM

## 2025-01-07 DIAGNOSIS — G57.92 NEUROPATHY OF LEFT FOOT: ICD-10-CM

## 2025-01-07 DIAGNOSIS — J18.9 PNEUMONIA DUE TO INFECTIOUS ORGANISM, UNSPECIFIED LATERALITY, UNSPECIFIED PART OF LUNG: ICD-10-CM

## 2025-01-07 DIAGNOSIS — L03.119 CELLULITIS OF LOWER EXTREMITY, UNSPECIFIED LATERALITY: ICD-10-CM

## 2025-01-07 DIAGNOSIS — G47.9 SLEEP TROUBLE: ICD-10-CM

## 2025-01-07 DIAGNOSIS — E87.70 HYPERVOLEMIA, UNSPECIFIED HYPERVOLEMIA TYPE: Primary | ICD-10-CM

## 2025-01-07 LAB
ALBUMIN SERPL BCP-MCNC: 3.7 G/DL (ref 3.4–5)
ALP SERPL-CCNC: 74 U/L (ref 33–136)
ALT SERPL W P-5'-P-CCNC: 11 U/L (ref 7–45)
ANION GAP SERPL CALC-SCNC: 11 MMOL/L (ref 10–20)
AST SERPL W P-5'-P-CCNC: 17 U/L (ref 9–39)
BASOPHILS # BLD AUTO: 0.03 X10*3/UL (ref 0–0.1)
BASOPHILS NFR BLD AUTO: 0.5 %
BILIRUB SERPL-MCNC: 0.3 MG/DL (ref 0–1.2)
BNP SERPL-MCNC: 290 PG/ML (ref 0–99)
BUN SERPL-MCNC: 35 MG/DL (ref 6–23)
CALCIUM SERPL-MCNC: 8.6 MG/DL (ref 8.6–10.3)
CARDIAC TROPONIN I PNL SERPL HS: 9 NG/L (ref 0–13)
CARDIAC TROPONIN I PNL SERPL HS: 9 NG/L (ref 0–13)
CHLORIDE SERPL-SCNC: 107 MMOL/L (ref 98–107)
CO2 SERPL-SCNC: 28 MMOL/L (ref 21–32)
CREAT SERPL-MCNC: 1.59 MG/DL (ref 0.5–1.05)
EGFRCR SERPLBLD CKD-EPI 2021: 31 ML/MIN/1.73M*2
EOSINOPHIL # BLD AUTO: 0.13 X10*3/UL (ref 0–0.4)
EOSINOPHIL NFR BLD AUTO: 2.1 %
ERYTHROCYTE [DISTWIDTH] IN BLOOD BY AUTOMATED COUNT: 19.2 % (ref 11.5–14.5)
FLUAV RNA RESP QL NAA+PROBE: NOT DETECTED
FLUBV RNA RESP QL NAA+PROBE: NOT DETECTED
GLUCOSE SERPL-MCNC: 88 MG/DL (ref 74–99)
HCT VFR BLD AUTO: 30.8 % (ref 36–46)
HGB BLD-MCNC: 9.1 G/DL (ref 12–16)
IMM GRANULOCYTES # BLD AUTO: 0.02 X10*3/UL (ref 0–0.5)
IMM GRANULOCYTES NFR BLD AUTO: 0.3 % (ref 0–0.9)
LYMPHOCYTES # BLD AUTO: 1.05 X10*3/UL (ref 0.8–3)
LYMPHOCYTES NFR BLD AUTO: 17.3 %
MAGNESIUM SERPL-MCNC: 2.85 MG/DL (ref 1.6–2.4)
MCH RBC QN AUTO: 28.5 PG (ref 26–34)
MCHC RBC AUTO-ENTMCNC: 29.5 G/DL (ref 32–36)
MCV RBC AUTO: 97 FL (ref 80–100)
MONOCYTES # BLD AUTO: 0.47 X10*3/UL (ref 0.05–0.8)
MONOCYTES NFR BLD AUTO: 7.8 %
NEUTROPHILS # BLD AUTO: 4.36 X10*3/UL (ref 1.6–5.5)
NEUTROPHILS NFR BLD AUTO: 72 %
NRBC BLD-RTO: 0 /100 WBCS (ref 0–0)
PLATELET # BLD AUTO: 154 X10*3/UL (ref 150–450)
POTASSIUM SERPL-SCNC: 5.2 MMOL/L (ref 3.5–5.3)
PROT SERPL-MCNC: 6.4 G/DL (ref 6.4–8.2)
RBC # BLD AUTO: 3.19 X10*6/UL (ref 4–5.2)
RSV RNA RESP QL NAA+PROBE: NOT DETECTED
SARS-COV-2 RNA RESP QL NAA+PROBE: NOT DETECTED
SODIUM SERPL-SCNC: 141 MMOL/L (ref 136–145)
WBC # BLD AUTO: 6.1 X10*3/UL (ref 4.4–11.3)

## 2025-01-07 PROCEDURE — 83880 ASSAY OF NATRIURETIC PEPTIDE: CPT | Performed by: NURSE PRACTITIONER

## 2025-01-07 PROCEDURE — 93970 EXTREMITY STUDY: CPT | Performed by: SURGERY

## 2025-01-07 PROCEDURE — 93005 ELECTROCARDIOGRAM TRACING: CPT

## 2025-01-07 PROCEDURE — 2550000001 HC RX 255 CONTRASTS

## 2025-01-07 PROCEDURE — 2500000001 HC RX 250 WO HCPCS SELF ADMINISTERED DRUGS (ALT 637 FOR MEDICARE OP)

## 2025-01-07 PROCEDURE — 80053 COMPREHEN METABOLIC PANEL: CPT | Performed by: NURSE PRACTITIONER

## 2025-01-07 PROCEDURE — 73600 X-RAY EXAM OF ANKLE: CPT | Mod: 50

## 2025-01-07 PROCEDURE — 36415 COLL VENOUS BLD VENIPUNCTURE: CPT

## 2025-01-07 PROCEDURE — 73600 X-RAY EXAM OF ANKLE: CPT | Mod: BILATERAL PROCEDURE | Performed by: STUDENT IN AN ORGANIZED HEALTH CARE EDUCATION/TRAINING PROGRAM

## 2025-01-07 PROCEDURE — 85025 COMPLETE CBC W/AUTO DIFF WBC: CPT | Performed by: NURSE PRACTITIONER

## 2025-01-07 PROCEDURE — 36415 COLL VENOUS BLD VENIPUNCTURE: CPT | Performed by: NURSE PRACTITIONER

## 2025-01-07 PROCEDURE — 71275 CT ANGIOGRAPHY CHEST: CPT | Mod: FOREIGN READ | Performed by: RADIOLOGY

## 2025-01-07 PROCEDURE — 87637 SARSCOV2&INF A&B&RSV AMP PRB: CPT

## 2025-01-07 PROCEDURE — 99285 EMERGENCY DEPT VISIT HI MDM: CPT | Mod: 25 | Performed by: STUDENT IN AN ORGANIZED HEALTH CARE EDUCATION/TRAINING PROGRAM

## 2025-01-07 PROCEDURE — 83735 ASSAY OF MAGNESIUM: CPT | Performed by: NURSE PRACTITIONER

## 2025-01-07 PROCEDURE — G0378 HOSPITAL OBSERVATION PER HR: HCPCS

## 2025-01-07 PROCEDURE — 96372 THER/PROPH/DIAG INJ SC/IM: CPT

## 2025-01-07 PROCEDURE — 2500000004 HC RX 250 GENERAL PHARMACY W/ HCPCS (ALT 636 FOR OP/ED)

## 2025-01-07 PROCEDURE — 71046 X-RAY EXAM CHEST 2 VIEWS: CPT

## 2025-01-07 PROCEDURE — 96367 TX/PROPH/DG ADDL SEQ IV INF: CPT

## 2025-01-07 PROCEDURE — 96375 TX/PRO/DX INJ NEW DRUG ADDON: CPT

## 2025-01-07 PROCEDURE — 84484 ASSAY OF TROPONIN QUANT: CPT | Performed by: NURSE PRACTITIONER

## 2025-01-07 PROCEDURE — 84484 ASSAY OF TROPONIN QUANT: CPT

## 2025-01-07 PROCEDURE — 96365 THER/PROPH/DIAG IV INF INIT: CPT | Mod: 59

## 2025-01-07 PROCEDURE — 93970 EXTREMITY STUDY: CPT

## 2025-01-07 PROCEDURE — 84075 ASSAY ALKALINE PHOSPHATASE: CPT | Performed by: NURSE PRACTITIONER

## 2025-01-07 PROCEDURE — 71046 X-RAY EXAM CHEST 2 VIEWS: CPT | Performed by: STUDENT IN AN ORGANIZED HEALTH CARE EDUCATION/TRAINING PROGRAM

## 2025-01-07 PROCEDURE — 2500000002 HC RX 250 W HCPCS SELF ADMINISTERED DRUGS (ALT 637 FOR MEDICARE OP, ALT 636 FOR OP/ED)

## 2025-01-07 PROCEDURE — 2500000001 HC RX 250 WO HCPCS SELF ADMINISTERED DRUGS (ALT 637 FOR MEDICARE OP): Performed by: STUDENT IN AN ORGANIZED HEALTH CARE EDUCATION/TRAINING PROGRAM

## 2025-01-07 PROCEDURE — 71275 CT ANGIOGRAPHY CHEST: CPT

## 2025-01-07 RX ORDER — ACETAMINOPHEN 650 MG/1
650 SUPPOSITORY RECTAL EVERY 4 HOURS PRN
Status: DISCONTINUED | OUTPATIENT
Start: 2025-01-07 | End: 2025-01-09 | Stop reason: HOSPADM

## 2025-01-07 RX ORDER — PRAMIPEXOLE DIHYDROCHLORIDE 1 MG/1
1 TABLET ORAL NIGHTLY
Status: DISCONTINUED | OUTPATIENT
Start: 2025-01-07 | End: 2025-01-09 | Stop reason: HOSPADM

## 2025-01-07 RX ORDER — MORPHINE SULFATE 4 MG/ML
4 INJECTION, SOLUTION INTRAMUSCULAR; INTRAVENOUS ONCE
Status: COMPLETED | OUTPATIENT
Start: 2025-01-07 | End: 2025-01-07

## 2025-01-07 RX ORDER — ACETAMINOPHEN 160 MG/5ML
650 SOLUTION ORAL EVERY 4 HOURS PRN
Status: DISCONTINUED | OUTPATIENT
Start: 2025-01-07 | End: 2025-01-09 | Stop reason: HOSPADM

## 2025-01-07 RX ORDER — ONDANSETRON 4 MG/1
4 TABLET, FILM COATED ORAL EVERY 8 HOURS PRN
Status: DISCONTINUED | OUTPATIENT
Start: 2025-01-07 | End: 2025-01-09 | Stop reason: HOSPADM

## 2025-01-07 RX ORDER — GABAPENTIN 100 MG/1
100 CAPSULE ORAL 2 TIMES DAILY
Status: DISCONTINUED | OUTPATIENT
Start: 2025-01-07 | End: 2025-01-09 | Stop reason: HOSPADM

## 2025-01-07 RX ORDER — GUAIFENESIN 600 MG/1
600 TABLET, EXTENDED RELEASE ORAL EVERY 12 HOURS PRN
Status: DISCONTINUED | OUTPATIENT
Start: 2025-01-07 | End: 2025-01-09 | Stop reason: HOSPADM

## 2025-01-07 RX ORDER — ACETAMINOPHEN 325 MG/1
650 TABLET ORAL EVERY 4 HOURS PRN
Status: DISCONTINUED | OUTPATIENT
Start: 2025-01-07 | End: 2025-01-09 | Stop reason: HOSPADM

## 2025-01-07 RX ORDER — VANCOMYCIN HYDROCHLORIDE 1 G/20ML
INJECTION, POWDER, LYOPHILIZED, FOR SOLUTION INTRAVENOUS DAILY PRN
Status: DISCONTINUED | OUTPATIENT
Start: 2025-01-07 | End: 2025-01-07

## 2025-01-07 RX ORDER — IPRATROPIUM BROMIDE AND ALBUTEROL SULFATE 2.5; .5 MG/3ML; MG/3ML
3 SOLUTION RESPIRATORY (INHALATION) EVERY 6 HOURS PRN
Status: DISCONTINUED | OUTPATIENT
Start: 2025-01-07 | End: 2025-01-08

## 2025-01-07 RX ORDER — SIMVASTATIN 20 MG/1
20 TABLET, FILM COATED ORAL NIGHTLY
Status: DISCONTINUED | OUTPATIENT
Start: 2025-01-07 | End: 2025-01-09 | Stop reason: HOSPADM

## 2025-01-07 RX ORDER — TRAZODONE HYDROCHLORIDE 50 MG/1
50 TABLET ORAL NIGHTLY
Status: DISCONTINUED | OUTPATIENT
Start: 2025-01-07 | End: 2025-01-09 | Stop reason: HOSPADM

## 2025-01-07 RX ORDER — ONDANSETRON HYDROCHLORIDE 2 MG/ML
4 INJECTION, SOLUTION INTRAVENOUS EVERY 8 HOURS PRN
Status: DISCONTINUED | OUTPATIENT
Start: 2025-01-07 | End: 2025-01-09 | Stop reason: HOSPADM

## 2025-01-07 RX ORDER — TRAMADOL HYDROCHLORIDE 50 MG/1
50 TABLET ORAL EVERY 8 HOURS PRN
Status: DISCONTINUED | OUTPATIENT
Start: 2025-01-07 | End: 2025-01-09 | Stop reason: HOSPADM

## 2025-01-07 RX ORDER — POLYETHYLENE GLYCOL 3350 17 G/17G
17 POWDER, FOR SOLUTION ORAL DAILY
Status: DISCONTINUED | OUTPATIENT
Start: 2025-01-07 | End: 2025-01-09 | Stop reason: HOSPADM

## 2025-01-07 RX ORDER — HEPARIN SODIUM 5000 [USP'U]/ML
5000 INJECTION, SOLUTION INTRAVENOUS; SUBCUTANEOUS EVERY 8 HOURS
Status: DISCONTINUED | OUTPATIENT
Start: 2025-01-07 | End: 2025-01-09 | Stop reason: HOSPADM

## 2025-01-07 RX ORDER — FUROSEMIDE 10 MG/ML
40 INJECTION INTRAMUSCULAR; INTRAVENOUS ONCE
Status: COMPLETED | OUTPATIENT
Start: 2025-01-07 | End: 2025-01-07

## 2025-01-07 RX ORDER — CEFTRIAXONE 1 G/50ML
1 INJECTION, SOLUTION INTRAVENOUS EVERY 24 HOURS
Status: DISCONTINUED | OUTPATIENT
Start: 2025-01-07 | End: 2025-01-09 | Stop reason: HOSPADM

## 2025-01-07 RX ORDER — ACETAMINOPHEN 500 MG
5 TABLET ORAL NIGHTLY PRN
Status: DISCONTINUED | OUTPATIENT
Start: 2025-01-07 | End: 2025-01-09 | Stop reason: HOSPADM

## 2025-01-07 RX ORDER — DILTIAZEM HYDROCHLORIDE 180 MG/1
180 CAPSULE, COATED, EXTENDED RELEASE ORAL DAILY
Status: DISCONTINUED | OUTPATIENT
Start: 2025-01-07 | End: 2025-01-09 | Stop reason: HOSPADM

## 2025-01-07 RX ORDER — MELOXICAM 15 MG/1
15 TABLET ORAL DAILY
Status: DISCONTINUED | OUTPATIENT
Start: 2025-01-07 | End: 2025-01-09 | Stop reason: HOSPADM

## 2025-01-07 RX ORDER — GUAIFENESIN/DEXTROMETHORPHAN 100-10MG/5
5 SYRUP ORAL EVERY 4 HOURS PRN
Status: DISCONTINUED | OUTPATIENT
Start: 2025-01-07 | End: 2025-01-09 | Stop reason: HOSPADM

## 2025-01-07 RX ORDER — FUROSEMIDE 10 MG/ML
20 INJECTION INTRAMUSCULAR; INTRAVENOUS DAILY
Status: DISCONTINUED | OUTPATIENT
Start: 2025-01-07 | End: 2025-01-09 | Stop reason: HOSPADM

## 2025-01-07 RX ORDER — VANCOMYCIN HYDROCHLORIDE 1 G/200ML
1000 INJECTION, SOLUTION INTRAVENOUS ONCE
Status: COMPLETED | OUTPATIENT
Start: 2025-01-07 | End: 2025-01-07

## 2025-01-07 RX ADMIN — GABAPENTIN 100 MG: 100 CAPSULE ORAL at 21:53

## 2025-01-07 RX ADMIN — PRAMIPEXOLE DIHYDROCHLORIDE 1 MG: 1 TABLET ORAL at 21:53

## 2025-01-07 RX ADMIN — FUROSEMIDE 40 MG: 10 INJECTION, SOLUTION INTRAMUSCULAR; INTRAVENOUS at 14:59

## 2025-01-07 RX ADMIN — HEPARIN SODIUM 5000 UNITS: 5000 INJECTION INTRAVENOUS; SUBCUTANEOUS at 22:55

## 2025-01-07 RX ADMIN — IOHEXOL 55 ML: 350 INJECTION, SOLUTION INTRAVENOUS at 17:22

## 2025-01-07 RX ADMIN — MORPHINE SULFATE 4 MG: 4 INJECTION, SOLUTION INTRAMUSCULAR; INTRAVENOUS at 14:58

## 2025-01-07 RX ADMIN — TRAZODONE HYDROCHLORIDE 50 MG: 50 TABLET ORAL at 21:53

## 2025-01-07 RX ADMIN — VANCOMYCIN HYDROCHLORIDE 1000 MG: 1 INJECTION, SOLUTION INTRAVENOUS at 15:12

## 2025-01-07 RX ADMIN — TRAMADOL HYDROCHLORIDE 50 MG: 50 TABLET, COATED ORAL at 22:55

## 2025-01-07 RX ADMIN — CEFTRIAXONE SODIUM 1 G: 1 INJECTION, SOLUTION INTRAVENOUS at 21:54

## 2025-01-07 RX ADMIN — Medication 5 MG: at 21:53

## 2025-01-07 RX ADMIN — SIMVASTATIN 20 MG: 20 TABLET, FILM COATED ORAL at 21:53

## 2025-01-07 ASSESSMENT — PAIN DESCRIPTION - DESCRIPTORS: DESCRIPTORS: SHARP;ACHING

## 2025-01-07 ASSESSMENT — PAIN SCALES - GENERAL
PAINLEVEL_OUTOF10: 10 - WORST POSSIBLE PAIN
PAINLEVEL_OUTOF10: 0 - NO PAIN

## 2025-01-07 ASSESSMENT — PAIN - FUNCTIONAL ASSESSMENT
PAIN_FUNCTIONAL_ASSESSMENT: 0-10
PAIN_FUNCTIONAL_ASSESSMENT: 0-10

## 2025-01-07 ASSESSMENT — PAIN DESCRIPTION - ORIENTATION
ORIENTATION: RIGHT;LEFT
ORIENTATION: LEFT

## 2025-01-07 ASSESSMENT — PAIN DESCRIPTION - LOCATION
LOCATION: LEG
LOCATION: LEG

## 2025-01-07 ASSESSMENT — PAIN DESCRIPTION - PAIN TYPE
TYPE: ACUTE PAIN
TYPE: ACUTE PAIN

## 2025-01-07 NOTE — ED PROVIDER NOTES
CC: Leg Swelling     HPI:  Patient is a 89-year-old female with a past medical history of DLD, HTN, CKD, AAA s/p repair, carotid disease, PAD, ITP, acute respiratory failure with hypoxia and 2-3 L nasal cannula as needed, and provoked PE s/p Eliquis for long-term anticoagulation who presents to the ED for bilateral lower extremity edema.  Patient notes chronic bilateral lower extremity edema.  She notes pain as well as ecchymosis of her left ankle recently.  Patient does not identify specific time.  For her symptoms.  Notes that she recently discontinued her Eliquis.  Notes compliance with home Lasix.  Noted DC at primary care office today and they recommended ED presentation given ecchymosis of the left ankle.  Patient has noted worsening shortness of breath over the past 2 days.  She notes that she typically is intermittently on 2 to 3 days liter nasal cannula and has required it over the past couple days.  Notes cough and congestion over the past couple days as well.  Notes that she has had difficulty ambulating secondary to bilateral lower extremity edema.  Denied trauma, falls, chest pain, abdominal pain, neck pain, back pain, dysuria, and abnormal bowel movements.    Limitations to history: None  Independent historian(s): Patient  Records Reviewed: Recent available ED and inpatient notes reviewed in EMR.    PMHx/PSHx:  Per HPI.   - has a past medical history of H/O heart artery stent, H/O shoulder surgery, and PE (pulmonary thromboembolism) (Multi).  - has no past surgical history on file.    Medications:  Reviewed in EMR. See EMR for complete list of medications and doses.    Allergies:  Patient has no known allergies.    Social History:  - Tobacco:  reports that she has quit smoking. Her smoking use included cigarettes. She has never been exposed to tobacco smoke. She has never used smokeless tobacco.   - Alcohol:  reports that she does not currently use alcohol.   - Illicit Drugs:  reports no history of  drug use.     ROS:  Per HPI.       ???????????????????????????????????????????????????????????????  Triage Vitals:  T 36.7 °C (98.1 °F)  HR 68  /66  RR 20  O2 98 %      Physical Exam  Vitals and nursing note reviewed.   Constitutional:       General: She is not in acute distress.  HENT:      Head: Normocephalic and atraumatic.      Nose: Nose normal.      Mouth/Throat:      Mouth: Mucous membranes are moist.   Eyes:      Conjunctiva/sclera: Conjunctivae normal.   Cardiovascular:      Rate and Rhythm: Normal rate and regular rhythm.      Pulses: Normal pulses.   Pulmonary:      Effort: Pulmonary effort is normal. No respiratory distress.      Breath sounds: Normal breath sounds.   Abdominal:      Palpations: Abdomen is soft.      Tenderness: There is no abdominal tenderness.   Skin:     General: Skin is warm.      Comments: Bilateral lower extremity edema with erythema and TTP distal to the knees.  No TTP of the thighs or pelvis.  TTP most significant at the bilateral ankles.  Ecchymosis of the medial left ankle.   Neurological:      General: No focal deficit present.      Mental Status: She is alert.         ???????????????????????????????????????????????????????????????  Labs:   Labs Reviewed   CBC WITH AUTO DIFFERENTIAL - Abnormal       Result Value    WBC 6.1      nRBC 0.0      RBC 3.19 (*)     Hemoglobin 9.1 (*)     Hematocrit 30.8 (*)     MCV 97      MCH 28.5      MCHC 29.5 (*)     RDW 19.2 (*)     Platelets 154      Neutrophils % 72.0      Immature Granulocytes %, Automated 0.3      Lymphocytes % 17.3      Monocytes % 7.8      Eosinophils % 2.1      Basophils % 0.5      Neutrophils Absolute 4.36      Immature Granulocytes Absolute, Automated 0.02      Lymphocytes Absolute 1.05      Monocytes Absolute 0.47      Eosinophils Absolute 0.13      Basophils Absolute 0.03     MAGNESIUM - Abnormal    Magnesium 2.85 (*)    COMPREHENSIVE METABOLIC PANEL - Abnormal    Glucose 88      Sodium 141      Potassium  5.2      Chloride 107      Bicarbonate 28      Anion Gap 11      Urea Nitrogen 35 (*)     Creatinine 1.59 (*)     eGFR 31 (*)     Calcium 8.6      Albumin 3.7      Alkaline Phosphatase 74      Total Protein 6.4      AST 17      Bilirubin, Total 0.3      ALT 11     B-TYPE NATRIURETIC PEPTIDE - Abnormal     (*)     Narrative:        <100 pg/mL - Heart failure unlikely  100-299 pg/mL - Intermediate probability of acute heart                  failure exacerbation. Correlate with clinical                  context and patient history.    >=300 pg/mL - Heart Failure likely. Correlate with clinical                  context and patient history.    BNP testing is performed using different testing methodology at [facility] than at other [facility]. Direct result comparisons should only be made within the same method.      CBC - Abnormal    WBC 4.6      nRBC 0.0      RBC 3.08 (*)     Hemoglobin 8.8 (*)     Hematocrit 30.3 (*)     MCV 98      MCH 28.6      MCHC 29.0 (*)     RDW 19.3 (*)     Platelets 143 (*)    BASIC METABOLIC PANEL - Abnormal    Glucose 69 (*)     Sodium 139      Potassium 5.6 (*)     Chloride 105      Bicarbonate 28      Anion Gap 12      Urea Nitrogen 34 (*)     Creatinine 1.78 (*)     eGFR 27 (*)     Calcium 7.8 (*)    BASIC METABOLIC PANEL - Abnormal    Glucose 107 (*)     Sodium 138      Potassium 4.8      Chloride 101      Bicarbonate 31      Anion Gap 11      Urea Nitrogen 37 (*)     Creatinine 1.65 (*)     eGFR 29 (*)     Calcium 8.0 (*)    CBC - Abnormal    WBC 5.6      nRBC 0.0      RBC 3.11 (*)     Hemoglobin 8.8 (*)     Hematocrit 29.8 (*)     MCV 96      MCH 28.3      MCHC 29.5 (*)     RDW 18.3 (*)     Platelets 137 (*)    LEGIONELLA ANTIGEN, URINE - Normal    L. pneumophila Urine Ag Negative     STREPTOCOCCUS PNEUMONIAE ANTIGEN, URINE - Normal    Streptococcus pneumoniae Ag, Urine Negative     TROPONIN I, HIGH SENSITIVITY - Normal    Troponin I, High Sensitivity 9       Narrative:     Less than 99th percentile of normal range cutoff-  Female and children under 18 years old <14 ng/L; Male <21 ng/L: Negative  Repeat testing should be performed if clinically indicated.     Female and children under 18 years old 14-50 ng/L; Male 21-50 ng/L:  Consistent with possible cardiac damage and possible increased clinical   risk. Serial measurements may help to assess extent of myocardial damage.     >50 ng/L: Consistent with cardiac damage, increased clinical risk and  myocardial infarction. Serial measurements may help assess extent of   myocardial damage.      NOTE: Children less than 1 year old may have higher baseline troponin   levels and results should be interpreted in conjunction with the overall   clinical context.     NOTE: Troponin I testing is performed using a different   testing methodology at [facility] than at other   [facility]. Direct result comparisons should only   be made within the same method.   TROPONIN I, HIGH SENSITIVITY - Normal    Troponin I, High Sensitivity 9      Narrative:     Less than 99th percentile of normal range cutoff-  Female and children under 18 years old <14 ng/L; Male <21 ng/L: Negative  Repeat testing should be performed if clinically indicated.     Female and children under 18 years old 14-50 ng/L; Male 21-50 ng/L:  Consistent with possible cardiac damage and possible increased clinical   risk. Serial measurements may help to assess extent of myocardial damage.     >50 ng/L: Consistent with cardiac damage, increased clinical risk and  myocardial infarction. Serial measurements may help assess extent of   myocardial damage.      NOTE: Children less than 1 year old may have higher baseline troponin   levels and results should be interpreted in conjunction with the overall   clinical context.     NOTE: Troponin I testing is performed using a different   testing methodology at [facility] than at other   [facility]  [facility]. Direct result comparisons should only   be made within the same method.   SARS-COV-2 AND INFLUENZA A/B PCR - Normal    Flu A Result Not Detected      Flu B Result Not Detected      Coronavirus 2019, PCR Not Detected      Narrative:     This assay has received FDA Emergency Use Authorization (EUA) and  is only authorized for the duration of time that circumstances exist to justify the authorization of the emergency use of in vitro diagnostic tests for the detection of SARS-CoV-2 virus and/or diagnosis of COVID-19 infection under section 564(b)(1) of the Act, 21 U.S.C. 360bbb-3(b)(1). Testing for SARS-CoV-2 is only recommended for patients who meet current clinical and/or epidemiological criteria as defined by federal, state, or local public health directives. This assay is an in vitro diagnostic nucleic acid amplification test for the qualitative detection of SARS-CoV-2, Influenza A, and Influenza B from nasopharyngeal specimens and has been validated for use at [facility]. Negative results do not preclude COVID-19 infections or Influenza A/B infections, and should not be used as the sole basis for diagnosis, treatment, or other management decisions. If Influenza A/B and RSV PCR results are negative, testing for Parainfluenza virus, Adenovirus and Metapneumovirus is routinely performed for [facility] [place] and intensive care inpatients, and is available on other patients by placing an add-on request.    RSV PCR - Normal    RSV PCR Not Detected      Narrative:     This assay is an FDA-cleared, in vitro diagnostic nucleic acid amplification test for the detection of RSV from nasopharyngeal specimens, and has been validated for use at [facility]. Negative results do not preclude RSV infections, and should not be used as the sole basis for diagnosis, treatment, or other management decisions. If Influenza A/B and RSV PCR results are negative, testing for  Parainfluenza virus, Adenovirus and Metapneumovirus is routinely performed for pediatric oncology and intensive care inpatients at [facility], and is available on other patients by placing an add-on request.       PROCALCITONIN - Normal    Procalcitonin 0.03      Narrative:     Procalcitonin (PCT) results measured serially can  aid in decision-making for antibiotic discontinuation in  patients with suspected or confirmed sepsis in conjunction  with additional clinical information. Antibiotic  discontinuation may be considered with a change in PCT of  >80% from the peak result or when PCT falls below 0.50 ng/mL.    Procalcitonin results should not be used in isolation but  should be interpreted in conjunction with additional clinical  and laboratory findings. Procalcitonin results should not be  used to guide the initiation of antibiotic therapy.    Falsely low PCT values in the presence of bacterial infection  may occur in early infection, with atypical pathogens,  localized infections, and subacute infectious endocarditis.    Falsely elevated results outside of severe bacterial  infection/sepsis may be seen in patients with renal failure  or insufficiency, severe trauma or burns, recent major  abdominal/cardiac surgery, acute multi-organ failure, rarely  in patients with medullary thyroid carcinoma and rare  neuroendocrine tumors, and non-specific interfering antibodies  (heterophile antibodies, rheumatoid factor, human anti-mouse  antibodies (HAMA), etc).    Performance of the PCT test in pediatric patients (<19yo),  pregnant women, immunocompromised patients, and patients on  immunomodulatory medications has not been evaluated.   MYCOPLASMA PNEUMONIAE ANTIBODY, IGM    Mycoplasma pneumoniae IgM 0.05          Imaging:   CT angio chest for pulmonary embolism   Final Result   1. No evidence for acute pulmonary embolism or pneumonia.   2. There is atelectasis in the lingula and left lung base.   3. Left renal pelvis appears  distended. Correlate with ultrasound or   CT if clinically indicated.   Signed by Sam Coley MD      Vascular US lower extremity venous duplex bilateral   Final Result      XR chest 2 views   Final Result   No acute pulmonary process.        MACRO   None        Signed by: Chelsey Merino 1/7/2025 1:19 PM   Dictation workstation:   [id number]      XR ankle bilateral 2 views   Final Result   Bilateral calf edema without acute osseous abnormality.        MACRO   None        Signed by: Chelsey Merino 1/7/2025 1:23 PM   Dictation workstation:   [id number]             MDM:  Patient is a 89-year-old female with a past medical history of DLD, HTN, CKD, AAA s/p repair, carotid disease, PAD, ITP, acute respiratory failure with hypoxia and 2-3 L nasal cannula as needed, and provoked PE s/p Eliquis for long-term anticoagulation who presents to the ED for bilateral lower extremity edema.  Patient presented HDS satting high 90s on 3 L nasal cannula.  Physical exam findings significant for bilateral lower extremity edema with erythema and TTP distal to the knee as well as ecchymosis overlying the medial left ankle.  Low clinical concern for sepsis and acute traumatic process.     ED Course:  ED Course as of 01/18/25 1628   Tue Jan 07, 2025   1253 CBC and Auto Differential(!)  CBC without leukocytosis and chronic anemia with a hemoglobin of 9.1. [MH]   1308 Comprehensive metabolic panel(!)  Metabolic panel with renal function near baseline. [MH]   1309 Magnesium(!)  Magnesium elevated [MH]   1327 EKG: Rate is 61, sinus rhythm, normal axis, no interval prolongation, no st elevation or depression.  When compared to EKG on 10/2/2024 review of EKG does not show any signs of STEMI, complete heart block, asystole, V-fib. [MH]   1328 Low clinical concern for ACS with initial normal troponin 9, no ischemic findings on EKG, and BNP elevated at 290 near baseline. [MH]   1328 XR ankle bilateral 2 views  No acute osseous process. [MH]    1328 XR chest 2 views  Patient noted to have bilateral blunted costophrenic angles.  I am in disagreement with radiology interpretation of chest x-ray.  Patient has findings concerning for fluid overload on CXR. [MH]   1421 POCUS of the bilateral lower extremities after for cobblestoning.  [MH]   1540 Troponin I, High Sensitivity  Delta troponin is normal [MH]   1557 COVID, flu, and RSV testing negative. [MH]   1653 Dr. Carey approved contrast study given low GFR. [MH]   1750 Vascular US lower extremity venous duplex bilateral  CONCLUSIONS:  Right Lower Venous: No evidence of acute deep vein thrombus visualized in the right lower extremity. Cannot rule out thrombus in non-visualized Peroneal and posterior tibial veins due to edema.  Left Lower Venous: No evidence of acute deep vein thrombus visualized in the left lower extremity. Cannot rule out thrombus in non-visualized posterior tibial and peroneal veins due to edema.   [MH]   1755 CT angio chest for pulmonary embolism  IMPRESSION:  1. No evidence for acute pulmonary embolism or pneumonia.  2. There is atelectasis in the lingula and left lung base.  3. Left renal pelvis appears distended. Correlate with ultrasound or CT if clinically indicated.   [MH]      ED Course User Index  [] Jorge Kenny MD         Diagnoses as of 01/18/25 1628   Hypervolemia, unspecified hypervolemia type   Cellulitis of lower extremity, unspecified laterality   Pneumonia due to infectious organism, unspecified laterality, unspecified part of lung   Neuropathy of left foot   Closed fracture of neck of left femur with routine healing       Social Determinants Limiting Care:  None identified    Disposition:  Patient not noted to have any flank pain or dysuria.  Low clinical concern for an acute renal process.  Discussed ED findings and admission with patient and family.  They stated understanding and agreement with the plan.  All questions were answered.  Discussed patient  presentation with admitting team.  Patient admitted to medicine in stable condition.    Jorge Kenny MD   Emergency Medicine PGY-3  [facility]    Comment: Please note this report has been produced using speech recognition software and may contain errors related to that system including errors in grammar, punctuation, and spelling as well as words and phrases that may be inappropriate.  If there are any questions or concerns please feel free to contact the dictating provider for clarification.    Procedures ? SmartLinks last updated 1/18/2025 4:28 PM        Jorge Kenny MD  Resident  01/08/25 [id number]       John Fagan MD  01/18/25 [id number]

## 2025-01-07 NOTE — H&P
Estefanía Contreras is a 89 y.o. female on day 0 of admission presenting with No Principal Problem: There is no principal problem currently on the Problem List. Please update the Problem List and refresh..      Subjective/History     Patient is a 89-year-old female with a past medical history of HFpEF, DLD, HTN, CKD, AAA s/p repair, carotid disease, PAD, ITP, acute respiratory failure with hypoxia and 2-3 L nasal cannula as needed, and provoked PE s/p Eliquis for long-term anticoagulation who presents to the ED for bilateral lower extremity edema/pain, cough, congestion, and shortness of breath over the past few days.  Patient notes she has been compliant with her home Lasix.  Patient notes that she just recently finished 3-month course of Eliquis for provoked PE.  Patient admits to worsening shortness of breath over the past 2 days and that she has required 2 to 3 L via nasal cannula intermittently over the past few days.  Patient also notes difficulty ambulating due to lower extremity pain and edema.  Patient also has new onset of ecchymosis of left ankle which came about this morning.  Patient denies any trauma to the area. Denied falls, chest pain, abdominal pain, neck pain, back pain, dysuria, and abnormal bowel movements.     In the ED patient's vitals were unremarkable.  Flu A, B, COVID, RSV negative.  Troponin unremarkable.  BNP elevated at 290.  Magnesium elevated at 2.85. creatinine elevated at 1.59.  CBC remarkable for hemoglobin of 9.1.  CTA chest for pulmonary embolism did not reveal evidence of acute pulmonary embolism or pneumonia.  No evidence of DVT in bilateral lower extremity duplex ultrasound.  X-ray of the ankles bilaterally showed no acute osseous abnormality, did reveal bilateral calf edema.  Chest x-ray  Was unremarkable.  Echo showed EF of 62%.  Patient was given a dose of 40 IV Lasix, vancomycin, 4 mg morphine injection.    Past Medical History:   Diagnosis Date    H/O heart artery stent     H/O  "shoulder surgery        No past surgical history on file.    family history is not on file.     reports that she has quit smoking. Her smoking use included cigarettes. She has never been exposed to tobacco smoke. She has never used smokeless tobacco. She reports that she does not currently use alcohol. She reports that she does not use drugs.      Objective       Physical Exam:  General:  Pleasant and cooperative. No apparent distress.  HEENT:  Normocephalic, atraumatic  Chest:  Clear to auscultation bilaterally. No wheezes, rales, or rhonchi.  CV:  Regular rate and rhythm. No murmurs    Abdomen: Abdomen is soft, non-tender, non-distended. BS +   Extremities:  BL lower extremity edema.  Lower extremities tender to palpation  Neurological:  AAOx3. No focal deficits.  Skin:  Warm and dry.    Last Recorded Vitals  Blood pressure 167/72, pulse 63, temperature 36.7 °C (98.1 °F), resp. rate 17, height 1.6 m (5' 3\"), weight 57.2 kg (126 lb), SpO2 100%.  Intake/Output last 3 Shifts:  No intake/output data recorded.    Last CBC & BMP  Lab Results   Component Value Date    GLUCOSE 88 01/07/2025    CALCIUM 8.6 01/07/2025     01/07/2025    K 5.2 01/07/2025    CO2 28 01/07/2025     01/07/2025    BUN 35 (H) 01/07/2025    CREATININE 1.59 (H) 01/07/2025     Lab Results   Component Value Date    WBC 6.1 01/07/2025    HGB 9.1 (L) 01/07/2025    HCT 30.8 (L) 01/07/2025    MCV 97 01/07/2025     01/07/2025         Assessment / Plan      Patient is a 89-year-old female with a past medical history of HFpEF, DLD, HTN, CKD, AAA s/p repair, carotid disease, PAD, ITP, acute respiratory failure with hypoxia and 2-3 L nasal cannula as needed, and provoked PE s/p Eliquis for long-term anticoagulation who presents to the ED for bilateral lower extremity edema , cough, congestion, and shortness of breath over the past few days.      # Suspected bilateral lower extremity cellulitis  # HFpEF exacerbation  #PALLAVI on CKD  -Patient " reports compliance with her home Lasix 20 mg oral daily  -Patient reported bilateral LE edema and pain, cough, congestion, shortness of breath for the past few days  -Chest x-ray, CTA chest for pulmonary embolism, bilateral lower extremity duplex ultrasound all relatively unremarkable  -COVID, flu, RSV negative  -Echo this admission shows EF of 60%  Plan:  -40 IV Lasix given in the ED  -Started  20 IV Lasix daily  -Patient given dose of Vanco in the ED  -Rocephin started 1/7.  -Monitor kidney function closely  -Strict I's and O's  -Daily weights  -As needed Mucinex, Robitussin, DuoNebs  -Continuous pulse ox  -PT OT    #History of pulmonary emboli   -Admitted from 10/2-10/7 for multiple right sided pulmonary emboli with signs of right heart strain  -Provoked due to recent hip surgery  Plan:  -Patient just completed 3 months of anticoagulation with Eliquis  -Echo upon admission did not show evidence of right heart strain, did show EF of 60%  - CTA chest did not reveal evidence of pulmonary emboli    Chronic Medical conditions  Hypertension  CKD  AAA status post repair  Carotid disease  PAD  ITP  Dyslipidemia  Neuropathic pain  -Continue home medications  -Restarted gabapentin as patient is complaining of neuropathic pain    DVT: Heparin  Diet: Regular  Code: DNR/DNI         Nii Norwood DO   PGY-1, Internal Medicine  This is a preliminary note, please await attending attestation for final A/P

## 2025-01-07 NOTE — PROGRESS NOTES
Pharmacy Medication History Review    Estefanía Contreras is a 89 y.o. female admitted for No Principal Problem: There is no principal problem currently on the Problem List. Please update the Problem List and refresh.. Pharmacy reviewed the patient's [id number] medications and allergies for accuracy.    The list below reflectives the updated PTA list. Please review each medication in order reconciliation for additional clarification and justification.  Prior to Admission medications    Medication Sig Start Date End Date Authorizing Provider   dilTIAZem LA (Cardizem LA) 180 mg 24 hr tablet Take 1 tablet (180 mg) by mouth once daily.   Lakia Johnson MD   ferrous sulfate 325 (65 Fe) MG EC tablet Take 1 tablet by mouth once daily with breakfast. Do not crush, chew, or split.   Lakia Johnson MD   furosemide (Lasix) 20 mg tablet Take 1 tablet (20 mg) by mouth once daily.   Lakia Johnson MD   meloxicam (Mobic) 15 mg tablet Take 1 tablet (15 mg) by mouth once daily.   Lakia Johnson MD   potassium chloride CR 20 mEq ER tablet Take 1 tablet (20 mEq) by mouth once daily.   Historical Provider, MD   pramipexole (Mirapex) 1 mg tablet Take 1 tablet (1 mg) by mouth once daily at bedtime.   Lakia Johnson MD   simvastatin (Zocor) 20 mg tablet Take 1 tablet (20 mg) by mouth once daily at bedtime.   Lakia Johnson MD   traZODone (Desyrel) 50 mg tablet Take 1 tablet (50 mg) by mouth once daily at bedtime. 12/20/24 12/20/25 Lakia Johnson MD   vit C,J-Ln-yufwo-lutein-zeaxan (PreserVision AREDS-2) 250-90-40-1 mg capsule Take 1 capsule by mouth 2 times a day.   Kay Barcenas APRN-CNP   azelastine (Astelin) 137 mcg (0.1 %) nasal spray Administer 2 sprays into each nostril if needed. Use in each nostril as directed   Lakia Johnson MD   oxygen (O2) gas therapy Inhale 3 L/min continuously.   Historical Provider, MD        The list below reflectives the updated allergy list. Please review each documented allergy for additional  clarification and justification.  Allergies  Reviewed by Tenisha Matute on 1/7/2025   No Known Allergies         Below are additional concerns with the patient's PTA list.      Tenisha Matute

## 2025-01-07 NOTE — ED TRIAGE NOTES
Patient arrives reporting bilateral lower extremity swelling. States she has been compliant with her diuretic. States she had previous blood clot in her lung and had been on Eliquis for 3 months and just completed her prescription on Friday. She reports severe pain in her LLE and endorses increased shortness of breath with her 3L NC at baseline

## 2025-01-07 NOTE — CONSULTS
Vancomycin Dosing by Pharmacy- EMERGENCY DEPARTMENT     Estefanía Contreras is a 89 y.o. year old female who Pharmacy has been consulted to give a ONE TIME ONLY vancomycin dose in the Emergency Department for cellulitis, skin and soft tissue.      Visit Vitals  /66   Pulse 68   Temp 36.7 °C (98.1 °F)   Resp 20            Lab Results   Component Value Date     CREATININE 1.59 (H) 01/07/2025     CREATININE 1.59 (H) 11/19/2024     CREATININE 1.48 (H) 10/06/2024     CREATININE 1.73 (H) 10/05/2024          Wt Readings from Last 1 Encounters:   01/07/25 57.2 kg (126 lb)         Assessment/Plan     Patient will be given a one time loading dose of 1000 mg  Pharmacy will sign off at this time. If vancomycin is to be continued, please re-consult Pharmacy.         Ofelia Ramirez, JoniD

## 2025-01-08 LAB
ANION GAP SERPL CALC-SCNC: 12 MMOL/L (ref 10–20)
ATRIAL RATE: 62 BPM
BUN SERPL-MCNC: 34 MG/DL (ref 6–23)
CALCIUM SERPL-MCNC: 7.8 MG/DL (ref 8.6–10.3)
CHLORIDE SERPL-SCNC: 105 MMOL/L (ref 98–107)
CO2 SERPL-SCNC: 28 MMOL/L (ref 21–32)
CREAT SERPL-MCNC: 1.78 MG/DL (ref 0.5–1.05)
EGFRCR SERPLBLD CKD-EPI 2021: 27 ML/MIN/1.73M*2
ERYTHROCYTE [DISTWIDTH] IN BLOOD BY AUTOMATED COUNT: 19.3 % (ref 11.5–14.5)
GLUCOSE SERPL-MCNC: 69 MG/DL (ref 74–99)
HCT VFR BLD AUTO: 30.3 % (ref 36–46)
HGB BLD-MCNC: 8.8 G/DL (ref 12–16)
MCH RBC QN AUTO: 28.6 PG (ref 26–34)
MCHC RBC AUTO-ENTMCNC: 29 G/DL (ref 32–36)
MCV RBC AUTO: 98 FL (ref 80–100)
NRBC BLD-RTO: 0 /100 WBCS (ref 0–0)
P AXIS: 70 DEGREES
PLATELET # BLD AUTO: 143 X10*3/UL (ref 150–450)
POTASSIUM SERPL-SCNC: 5.6 MMOL/L (ref 3.5–5.3)
PR INTERVAL: 212 MS
PROCALCITONIN SERPL-MCNC: 0.03 NG/ML
Q ONSET: 249 MS
QRS COUNT: 9 BEATS
QRS DURATION: 92 MS
QT INTERVAL: 406 MS
QTC CALCULATION(BAZETT): 409 MS
QTC FREDERICIA: 408 MS
R AXIS: -35 DEGREES
RBC # BLD AUTO: 3.08 X10*6/UL (ref 4–5.2)
SODIUM SERPL-SCNC: 139 MMOL/L (ref 136–145)
T AXIS: 40 DEGREES
T OFFSET: 452 MS
VENTRICULAR RATE: 61 BPM
WBC # BLD AUTO: 4.6 X10*3/UL (ref 4.4–11.3)

## 2025-01-08 PROCEDURE — G0378 HOSPITAL OBSERVATION PER HR: HCPCS

## 2025-01-08 PROCEDURE — 87449 NOS EACH ORGANISM AG IA: CPT | Mod: PARLAB

## 2025-01-08 PROCEDURE — 85027 COMPLETE CBC AUTOMATED: CPT

## 2025-01-08 PROCEDURE — 86738 MYCOPLASMA ANTIBODY: CPT

## 2025-01-08 PROCEDURE — 36415 COLL VENOUS BLD VENIPUNCTURE: CPT

## 2025-01-08 PROCEDURE — 84145 PROCALCITONIN (PCT): CPT | Mod: PARLAB

## 2025-01-08 PROCEDURE — 96372 THER/PROPH/DIAG INJ SC/IM: CPT

## 2025-01-08 PROCEDURE — 2500000001 HC RX 250 WO HCPCS SELF ADMINISTERED DRUGS (ALT 637 FOR MEDICARE OP)

## 2025-01-08 PROCEDURE — 97165 OT EVAL LOW COMPLEX 30 MIN: CPT | Mod: GO

## 2025-01-08 PROCEDURE — 96374 THER/PROPH/DIAG INJ IV PUSH: CPT | Mod: 59

## 2025-01-08 PROCEDURE — 2500000004 HC RX 250 GENERAL PHARMACY W/ HCPCS (ALT 636 FOR OP/ED)

## 2025-01-08 PROCEDURE — 2500000001 HC RX 250 WO HCPCS SELF ADMINISTERED DRUGS (ALT 637 FOR MEDICARE OP): Performed by: STUDENT IN AN ORGANIZED HEALTH CARE EDUCATION/TRAINING PROGRAM

## 2025-01-08 PROCEDURE — 96366 THER/PROPH/DIAG IV INF ADDON: CPT

## 2025-01-08 PROCEDURE — 87899 AGENT NOS ASSAY W/OPTIC: CPT | Mod: PARLAB

## 2025-01-08 PROCEDURE — 1200000002 HC GENERAL ROOM WITH TELEMETRY DAILY

## 2025-01-08 PROCEDURE — 80048 BASIC METABOLIC PNL TOTAL CA: CPT

## 2025-01-08 PROCEDURE — 96367 TX/PROPH/DG ADDL SEQ IV INF: CPT

## 2025-01-08 PROCEDURE — 97161 PT EVAL LOW COMPLEX 20 MIN: CPT | Mod: GP

## 2025-01-08 PROCEDURE — 2500000002 HC RX 250 W HCPCS SELF ADMINISTERED DRUGS (ALT 637 FOR MEDICARE OP, ALT 636 FOR OP/ED)

## 2025-01-08 RX ORDER — IPRATROPIUM BROMIDE AND ALBUTEROL SULFATE 2.5; .5 MG/3ML; MG/3ML
3 SOLUTION RESPIRATORY (INHALATION) EVERY 2 HOUR PRN
Status: DISCONTINUED | OUTPATIENT
Start: 2025-01-08 | End: 2025-01-09 | Stop reason: HOSPADM

## 2025-01-08 RX ORDER — MV-MIN/FA/VIT K/LUTEIN/ZEAXANT 200MCG-5MG
1 CAPSULE ORAL 2 TIMES DAILY
COMMUNITY

## 2025-01-08 RX ADMIN — FUROSEMIDE 20 MG: 10 INJECTION, SOLUTION INTRAMUSCULAR; INTRAVENOUS at 09:56

## 2025-01-08 RX ADMIN — TRAMADOL HYDROCHLORIDE 50 MG: 50 TABLET, COATED ORAL at 21:14

## 2025-01-08 RX ADMIN — TRAZODONE HYDROCHLORIDE 50 MG: 50 TABLET ORAL at 21:14

## 2025-01-08 RX ADMIN — SIMVASTATIN 20 MG: 20 TABLET, FILM COATED ORAL at 21:14

## 2025-01-08 RX ADMIN — GABAPENTIN 100 MG: 100 CAPSULE ORAL at 21:15

## 2025-01-08 RX ADMIN — HEPARIN SODIUM 5000 UNITS: 5000 INJECTION INTRAVENOUS; SUBCUTANEOUS at 09:55

## 2025-01-08 RX ADMIN — CEFTRIAXONE SODIUM 1 G: 1 INJECTION, SOLUTION INTRAVENOUS at 21:15

## 2025-01-08 RX ADMIN — HEPARIN SODIUM 5000 UNITS: 5000 INJECTION INTRAVENOUS; SUBCUTANEOUS at 15:22

## 2025-01-08 RX ADMIN — HEPARIN SODIUM 5000 UNITS: 5000 INJECTION INTRAVENOUS; SUBCUTANEOUS at 21:15

## 2025-01-08 RX ADMIN — POLYETHYLENE GLYCOL 3350 17 G: 17 POWDER, FOR SOLUTION ORAL at 09:58

## 2025-01-08 RX ADMIN — AZITHROMYCIN MONOHYDRATE 490.2 MG: 500 INJECTION, POWDER, LYOPHILIZED, FOR SOLUTION INTRAVENOUS at 12:59

## 2025-01-08 RX ADMIN — DILTIAZEM HYDROCHLORIDE 180 MG: 180 CAPSULE, COATED, EXTENDED RELEASE ORAL at 10:22

## 2025-01-08 RX ADMIN — GABAPENTIN 100 MG: 100 CAPSULE ORAL at 09:56

## 2025-01-08 SDOH — ECONOMIC STABILITY: FOOD INSECURITY: WITHIN THE PAST 12 MONTHS, THE FOOD YOU BOUGHT JUST DIDN'T LAST AND YOU DIDN'T HAVE MONEY TO GET MORE.: NEVER TRUE

## 2025-01-08 SDOH — SOCIAL STABILITY: SOCIAL INSECURITY: WERE YOU ABLE TO COMPLETE ALL THE BEHAVIORAL HEALTH SCREENINGS?: YES

## 2025-01-08 SDOH — ECONOMIC STABILITY: HOUSING INSECURITY: IN THE PAST 12 MONTHS, HOW MANY TIMES HAVE YOU MOVED WHERE YOU WERE LIVING?: 0

## 2025-01-08 SDOH — SOCIAL STABILITY: SOCIAL INSECURITY
WITHIN THE LAST YEAR, HAVE YOU BEEN RAPED OR FORCED TO HAVE ANY KIND OF SEXUAL ACTIVITY BY YOUR PARTNER OR EX-PARTNER?: NO

## 2025-01-08 SDOH — ECONOMIC STABILITY: TRANSPORTATION INSECURITY: IN THE PAST 12 MONTHS, HAS LACK OF TRANSPORTATION KEPT YOU FROM MEDICAL APPOINTMENTS OR FROM GETTING MEDICATIONS?: NO

## 2025-01-08 SDOH — ECONOMIC STABILITY: FOOD INSECURITY: HOW HARD IS IT FOR YOU TO PAY FOR THE VERY BASICS LIKE FOOD, HOUSING, MEDICAL CARE, AND HEATING?: NOT HARD AT ALL

## 2025-01-08 SDOH — ECONOMIC STABILITY: HOUSING INSECURITY: IN THE LAST 12 MONTHS, WAS THERE A TIME WHEN YOU WERE NOT ABLE TO PAY THE MORTGAGE OR RENT ON TIME?: NO

## 2025-01-08 SDOH — ECONOMIC STABILITY: FOOD INSECURITY: WITHIN THE PAST 12 MONTHS, YOU WORRIED THAT YOUR FOOD WOULD RUN OUT BEFORE YOU GOT THE MONEY TO BUY MORE.: NEVER TRUE

## 2025-01-08 SDOH — SOCIAL STABILITY: SOCIAL INSECURITY: ARE YOU OR HAVE YOU BEEN THREATENED OR ABUSED PHYSICALLY, EMOTIONALLY, OR SEXUALLY BY ANYONE?: NO

## 2025-01-08 SDOH — HEALTH STABILITY: PHYSICAL HEALTH: ON AVERAGE, HOW MANY DAYS PER WEEK DO YOU ENGAGE IN MODERATE TO STRENUOUS EXERCISE (LIKE A BRISK WALK)?: 2 DAYS

## 2025-01-08 SDOH — SOCIAL STABILITY: SOCIAL INSECURITY: DO YOU FEEL UNSAFE GOING BACK TO THE PLACE WHERE YOU ARE LIVING?: NO

## 2025-01-08 SDOH — ECONOMIC STABILITY: HOUSING INSECURITY: AT ANY TIME IN THE PAST 12 MONTHS, WERE YOU HOMELESS OR LIVING IN A SHELTER (INCLUDING NOW)?: NO

## 2025-01-08 SDOH — ECONOMIC STABILITY: INCOME INSECURITY: IN THE PAST 12 MONTHS HAS THE ELECTRIC, GAS, OIL, OR WATER COMPANY THREATENED TO SHUT OFF SERVICES IN YOUR HOME?: NO

## 2025-01-08 SDOH — SOCIAL STABILITY: SOCIAL INSECURITY: DO YOU FEEL ANYONE HAS EXPLOITED OR TAKEN ADVANTAGE OF YOU FINANCIALLY OR OF YOUR PERSONAL PROPERTY?: NO

## 2025-01-08 SDOH — SOCIAL STABILITY: SOCIAL INSECURITY: WITHIN THE LAST YEAR, HAVE YOU BEEN HUMILIATED OR EMOTIONALLY ABUSED IN OTHER WAYS BY YOUR PARTNER OR EX-PARTNER?: NO

## 2025-01-08 SDOH — HEALTH STABILITY: PHYSICAL HEALTH: ON AVERAGE, HOW MANY MINUTES DO YOU ENGAGE IN EXERCISE AT THIS LEVEL?: 30 MIN

## 2025-01-08 SDOH — SOCIAL STABILITY: SOCIAL INSECURITY: WITHIN THE LAST YEAR, HAVE YOU BEEN AFRAID OF YOUR PARTNER OR EX-PARTNER?: NO

## 2025-01-08 SDOH — SOCIAL STABILITY: SOCIAL INSECURITY: HAVE YOU HAD THOUGHTS OF HARMING ANYONE ELSE?: NO

## 2025-01-08 SDOH — SOCIAL STABILITY: SOCIAL INSECURITY: ARE THERE ANY APPARENT SIGNS OF INJURIES/BEHAVIORS THAT COULD BE RELATED TO ABUSE/NEGLECT?: NO

## 2025-01-08 SDOH — SOCIAL STABILITY: SOCIAL INSECURITY: HAVE YOU HAD ANY THOUGHTS OF HARMING ANYONE ELSE?: NO

## 2025-01-08 SDOH — SOCIAL STABILITY: SOCIAL INSECURITY: DOES ANYONE TRY TO KEEP YOU FROM HAVING/CONTACTING OTHER FRIENDS OR DOING THINGS OUTSIDE YOUR HOME?: NO

## 2025-01-08 SDOH — SOCIAL STABILITY: SOCIAL INSECURITY: HAS ANYONE EVER THREATENED TO HURT YOUR FAMILY OR YOUR PETS?: NO

## 2025-01-08 SDOH — SOCIAL STABILITY: SOCIAL INSECURITY: ABUSE: ADULT

## 2025-01-08 ASSESSMENT — ACTIVITIES OF DAILY LIVING (ADL)
GROOMING: INDEPENDENT
DRESSING YOURSELF: INDEPENDENT
PATIENT'S MEMORY ADEQUATE TO SAFELY COMPLETE DAILY ACTIVITIES?: YES
LACK_OF_TRANSPORTATION: NO
TOILETING: NEEDS ASSISTANCE
HEARING - RIGHT EAR: FUNCTIONAL
LACK_OF_TRANSPORTATION: NO
HEARING - LEFT EAR: FUNCTIONAL
ASSISTIVE_DEVICE: WALKER
WALKS IN HOME: NEEDS ASSISTANCE
ADEQUATE_TO_COMPLETE_ADL: YES
JUDGMENT_ADEQUATE_SAFELY_COMPLETE_DAILY_ACTIVITIES: YES
FEEDING YOURSELF: INDEPENDENT
BATHING: NEEDS ASSISTANCE

## 2025-01-08 ASSESSMENT — PAIN - FUNCTIONAL ASSESSMENT
PAIN_FUNCTIONAL_ASSESSMENT: 0-10
PAIN_FUNCTIONAL_ASSESSMENT: 0-10

## 2025-01-08 ASSESSMENT — COGNITIVE AND FUNCTIONAL STATUS - GENERAL
DRESSING REGULAR LOWER BODY CLOTHING: A LOT
HELP NEEDED FOR BATHING: A LOT
DAILY ACTIVITIY SCORE: 16
PERSONAL GROOMING: A LITTLE
CLIMB 3 TO 5 STEPS WITH RAILING: A LOT
TOILETING: A LITTLE
DAILY ACTIVITIY SCORE: 22
MOBILITY SCORE: 23
DRESSING REGULAR UPPER BODY CLOTHING: A LITTLE
TURNING FROM BACK TO SIDE WHILE IN FLAT BAD: A LITTLE
MOVING TO AND FROM BED TO CHAIR: A LITTLE
HELP NEEDED FOR BATHING: A LITTLE
STANDING UP FROM CHAIR USING ARMS: A LITTLE
MOBILITY SCORE: 18
PATIENT BASELINE BEDBOUND: NO
TOILETING: A LOT
WALKING IN HOSPITAL ROOM: A LITTLE
CLIMB 3 TO 5 STEPS WITH RAILING: A LITTLE

## 2025-01-08 ASSESSMENT — PAIN SCALES - GENERAL
PAINLEVEL_OUTOF10: 0 - NO PAIN
PAINLEVEL_OUTOF10: 0 - NO PAIN
PAINLEVEL_OUTOF10: 7

## 2025-01-08 ASSESSMENT — LIFESTYLE VARIABLES
HOW OFTEN DO YOU HAVE A DRINK CONTAINING ALCOHOL: NEVER
SKIP TO QUESTIONS 9-10: 1
AUDIT-C TOTAL SCORE: 0
AUDIT-C TOTAL SCORE: 0
HOW MANY STANDARD DRINKS CONTAINING ALCOHOL DO YOU HAVE ON A TYPICAL DAY: PATIENT DOES NOT DRINK
HOW OFTEN DO YOU HAVE 6 OR MORE DRINKS ON ONE OCCASION: NEVER

## 2025-01-08 ASSESSMENT — PAIN DESCRIPTION - ORIENTATION: ORIENTATION: RIGHT;LEFT

## 2025-01-08 ASSESSMENT — PAIN DESCRIPTION - LOCATION: LOCATION: LEG

## 2025-01-08 NOTE — PROGRESS NOTES
Internal Medicine Progress Note         Estefanía Contreras is a 89 y.o. female on day 0 of admission presenting with Hypervolemia, unspecified hypervolemia type.    SUBJECTIVE    Patient seen and examined at bedside this morning. States she was late to her PCP appointment yesterday and was therefore sent to the ED. She has experienced some cough and shortness of breath in addition to her leg swelling and pain.    OBJECTIVE    Vitals:    01/07/25 2200 01/08/25 0529 01/08/25 0626 01/08/25 1022   BP: 129/63 178/67 159/69 120/56   BP Location:  Right arm Right arm    Patient Position: Sitting Lying Lying    Pulse: 65 71  66   Resp: 18 17     Temp:       SpO2: 97% 98%     Weight:       Height:          Results from last 7 days   Lab Units 01/08/25  0608 01/07/25  1234   WBC AUTO x10*3/uL 4.6 6.1   HEMOGLOBIN g/dL 8.8* 9.1*   HEMATOCRIT % 30.3* 30.8*   PLATELETS AUTO x10*3/uL 143* 154   NEUTROS PCT AUTO %  --  72.0   LYMPHS PCT AUTO %  --  17.3   MONOS PCT AUTO %  --  7.8   EOS PCT AUTO %  --  2.1     Results from last 7 days   Lab Units 01/08/25  0608 01/07/25  1234   SODIUM mmol/L 139 141   POTASSIUM mmol/L 5.6* 5.2   CHLORIDE mmol/L 105 107   CO2 mmol/L 28 28   BUN mg/dL 34* 35*   CREATININE mg/dL 1.78* 1.59*   CALCIUM mg/dL 7.8* 8.6   PROTEIN TOTAL g/dL  --  6.4   BILIRUBIN TOTAL mg/dL  --  0.3   ALK PHOS U/L  --  74   ALT U/L  --  11   AST U/L  --  17   GLUCOSE mg/dL 69* 88       Scheduled Medications  azithromycin, 500 mg, intravenous, q24h  cefTRIAXone, 1 g, intravenous, q24h  dilTIAZem CD, 180 mg, oral, Daily  furosemide, 20 mg, intravenous, Daily  gabapentin, 100 mg, oral, BID  heparin (porcine), 5,000 Units, subcutaneous, q8h  [Held by provider] meloxicam, 15 mg, oral, Daily  polyethylene glycol, 17 g, oral, Daily  pramipexole, 1 mg, oral, Nightly  simvastatin, 20 mg, oral, Nightly  traZODone, 50 mg, oral, Nightly       Physical Exam  Constitutional: Well  developed, A&Ox3, no acute distress, alert and cooperative  Eyes: EOMI, clear sclera  Respiratory/Thorax: CTAB, good chest expansion  Cardiovascular: Regular rate, regular rhythm  Gastrointestinal: Nondistended, soft, non-tender  Extremities: bilateral lower extremity edema with trace pitting. No significant erythema noted. Peripheral pulses intact  Skin: Warm and dry         ASSESSMENT & PLAN    Estefanía Contreras is a 89 year old female with PMH significant for HFpEF (EF 62% 1/6/25), DLD, HTN, CKD3b, AAA (s/p repair), PAD, ITP, and provoked PE (left hip fracture, s/p 3 months of apixaban, on 2-3L NC PRN) who [facility] chief complaint of bilateral lower extremity pain and edema and admitted for evaluation and management of suspected CAP.    Daily Progress  -Due to patient report of cough and some atelectasis vs consolidation at LLB and lingula, will add azithromycin and obtain pneumonia labs. Will continue diuresis for now and monitor renal function. Low suspicion for cellulitis, discontinued tissue cultures.     #Suspicion for CAP  #HFpEF, not in acute exacerbation  #Bilateral lower extremity edema  -Patient presents with cough, shortness of breath, and increased utilization of home oxygen, as well as bilateral lower extremity edema  -CT PE with lingula/LLB atelectasis vs consolidation.  -Flu, COVID, RSV negative  PLAN:  -Continue ceftriaxone, added azithromycin  -Continue furosemide 20mg daily IV; monitor renal function closely  -Strict I/Os, daily weights  -Continuous pulse oximetry  -Continue PRN dextromethorphan-guaifenesin and DuoNebs    Chronic Conditions  #HTN- continue home diltiazem  #HLD- continue home simvastatin    DVT ppx: subcutaneous heparin  GI ppx: none  IVF: none  Diet: Regular  Consults: none  CODE STATUS: DNR/DNI    Jorge Mart, DO   Please SecureChat for any further questions  This is a preliminary note, please await attending attestation for final A/P

## 2025-01-08 NOTE — PROGRESS NOTES
Occupational Therapy    Evaluation    Patient Name: Estefanía Contreras  MRN: [id number]  Department: [facility] ED  Room: [place] [address]  Today's Date: 1/8/2025  Time Calculation  Start Time: 0932  Stop Time: 0950  Time Calculation (min): 18 min        Assessment:  End of Session Communication: Bedside nurse  End of Session Patient Position: Bed, 3 rail up, Alarm off, not on at start of session  OT Assessment Results: Decreased ADL status, Decreased upper extremity strength, Decreased endurance, Decreased functional mobility  Strengths: Living arrangement secure, Physical health, Support of Caregivers, Support of extended family/friends  Barriers to Participation: Comorbidities  Plan:  Treatment Interventions: ADL retraining, Functional transfer training, UE strengthening/ROM, Endurance training, Patient/family training, Equipment evaluation/education, Compensatory technique education  OT Frequency: 3 times per week  OT Discharge Recommendations: Low intensity level of continued care (with 24 hr support)  OT - OK to Discharge: Yes (once medically appropriate to next level of care)  Treatment Interventions: ADL retraining, Functional transfer training, UE strengthening/ROM, Endurance training, Patient/family training, Equipment evaluation/education, Compensatory technique education    Subjective   Current Problem:  1. Hypervolemia, unspecified hypervolemia type        2. Localized edema  Vascular US lower extremity venous duplex bilateral      3. Cellulitis of lower extremity, unspecified laterality          General:  General  Reason for Referral: OT eval and tx; ADLs. dx: hypovolemia, ble cellulitis, HF; 1/7/25 w/ report of ble pain/edema, cough, congestion, sob xfew days; flu/rsv/covid (-); imaging (-)  Referred By: Sagar  Past Medical History Relevant to Rehab: 89-year-old female with a past medical history of HFpEF, DLD, HTN, CKD, AAA s/p repair, carotid disease, PAD, ITP, acute respiratory failure with hypoxia and 2-3 L nasal  "cannula as needed, and provoked PE s/p Eliquis for long-term anticoagulation who presents to the ED for bilateral lower extremity edema/pain, cough, congestion, and shortness of breath over the past few days.  Patient notes she has been compliant with her home Lasix.  Patient notes that she just recently finished 3-month course of Eliquis for provoked PE.  Patient admits to worsening shortness of breath over the past 2 days and that she has required 2 to 3 L via nasal cannula intermittently over the past few days.  Patient also notes difficulty ambulating due to lower extremity pain and edema.  Patient also has new onset of ecchymosis of left ankle which came about this morning.  Patient denies any trauma to the area. Denied falls, chest pain, abdominal pain, neck pain, back pain, dysuria, and abnormal bowel movements.  Co-Treatment: PT  Co-Treatment Reason: for safety and to maximize therapeutic performance  Prior to Session Communication: Bedside nurse, PCT/NA/CTA  Patient Position Received: Bed, 3 rail up, Alarm off, not on at start of session  General Comment: patient pleasant and cooperative to participate; impulsive, requires cues for mobility and monitoring sp02  Precautions:  Precautions Comment: fall, alarm, 02 via NC, tele, [employer]            Pain:  Pain Assessment  Pain Assessment:  (reports 10/10 pain in BLE \"painful to touch\" however, does not appear to be in acute distress/pain)    Objective   Cognition:  Overall Cognitive Status: Within Functional Limits           Home Living:  Type of Home:  (patient lives alone, son lives across the street. apartment, [address] with B HR.)  Bathroom Shower/Tub:  (tub shower, shower chair, grab bar, HHS)  Bathroom Toilet:  (raised toilet with grab bar)  Prior Function:  Level of [place]:  (independent with dressing. has HHA 4x/week to assist with showers and to assist with IADLs. son does shopping and driivng. use of WW PLOF)    ADL:  LE Dressing Deficit:  " (dependent assistance to jacquelyn non skid socks at bed level)       Bed Mobility/Transfers: Bed Mobility  Bed Mobility:  (completed supine <-->sit with SBA ; cues to slow pace as impulsive)    Transfers  Transfer:  (completed STS with CGA; requires cues to slow pace)      Functional Mobility:  Functional Mobility  Functional Mobility Performed:  (engaged in simple mobility with CGA with WW; requires max cues due to impulsivity)     Sensation:  Light Touch: No apparent deficits  Strength:  Strength Comments: BUE ROM/MMT WFL for patient age as seen through transfers and mobility this date      Outcome Measures:[facility] Daily Activity  Putting on and taking off regular lower body clothing: A lot  Bathing (including washing, rinsing, drying): A lot  Putting on and taking off regular upper body clothing: A little  Toileting, which includes using toilet, bedpan or urinal: A lot  Taking care of personal grooming such as brushing teeth: A little  Eating Meals: None  Daily Activity - Total Score: 16        Education Documentation  Body Mechanics, taught by Michelle Kaminski OT at 1/8/2025 12:49 PM.  Learner: Patient  Readiness: Acceptance  Method: Explanation  Response: Verbalizes Understanding, Needs Reinforcement    ADL Training, taught by Michelle Kaminski OT at 1/8/2025 12:49 PM.  Learner: Patient  Readiness: Acceptance  Method: Explanation  Response: Verbalizes Understanding, Needs Reinforcement    Education Comments  No comments found.        OP EDUCATION:       Goals:  Encounter Problems       Encounter Problems (Active)       OT Goals       STG- patient will complete LB dressing at MOD I with use of ae/ad/dme prn (Progressing)       Start:  01/08/25    Expected End:  01/22/25            STG- patient will complete toileting at MOD I with use of ae/ad/dme prn (Progressing)       Start:  01/08/25    Expected End:  01/22/25            STG- patient will complete transfers at MOD I with use of ae/ad/dme prn (Progressing)        Start:  01/08/25    Expected End:  01/22/25            STG- patient will complete grooming at MOD I with use of ae/ad/dme prn (Progressing)       Start:  01/08/25    Expected End:  01/22/25            STG- patient will complete simple mobility at MOD I with use of ae/ad/dme prn (Progressing)       Start:  01/08/25    Expected End:  01/22/25

## 2025-01-08 NOTE — PROGRESS NOTES
Physical Therapy    Physical Therapy Evaluation    Patient Name: Estefanía Contreras  MRN: [id number]  Today's Date: 1/8/2025   Time Calculation  Start Time: 0932  Stop Time: 0950  Time Calculation (min): 18 min  CDU07/CDU07    Assessment/Plan   PT Assessment  PT Assessment Results: Decreased strength, Decreased endurance, Impaired balance, Decreased mobility, Decreased safety awareness, Impaired hearing, Pain  Rehab Prognosis: Good  Barriers to Discharge Home: Caregiver assistance, Physical needs  Caregiver Assistance: Patient lives alone and/or does not have reliable caregiver assistance  Physical Needs: Stair navigation into home limited by function/safety  Evaluation/Treatment Tolerance: Patient tolerated treatment well (decreased spo2)  End of Session Communication: Bedside nurse  Assessment Comment: Continued skilled PT intervention indicated to facilitate increased strength, balance & gait stability  End of Session Patient Position: Bed, 3 rail up, Alarm off, not on at start of session  IP OR SWING BED PT PLAN  Inpatient or Swing Bed: Inpatient  PT Plan  Treatment/Interventions: Bed mobility, Transfer training, Gait training, Stair training, Therapeutic exercise, Therapeutic activity  PT Plan: Ongoing PT  PT Frequency: 3 times per week  PT Discharge Recommendations: Low intensity level of continued care (w/ initial 24hr support for safe transition home due to medical status)  PT Recommended Transfer Status: Assist x1  PT - OK to Discharge: Yes (when cleared by medical team)    Subjective     Current Problem:  1. Hypervolemia, unspecified hypervolemia type        2. Localized edema  Vascular US lower extremity venous duplex bilateral      3. Cellulitis of lower extremity, unspecified laterality          Patient Active Problem List   Diagnosis    Closed fracture of neck of left femur with routine healing    Stage 3b chronic kidney disease (Multi)    Status post reverse arthroplasty of left shoulder    Thrombocytopenia  (CMS-HCC)    HTN (hypertension)    Peripheral vascular disease (CMS-[facility])    Cellulitis of great toe of right foot    Onychocryptosis    Anemia    Neuropathy    HLD (hyperlipidemia)    Anorexia    Constipation    Anxiety    Change in mental status    Dysuria    Pulmonary embolism, other, unspecified chronicity, unspecified whether acute cor pulmonale present (Multi)    Dermatochalasis of left upper eyelid    Abdominal aortic aneurysm without rupture (CMS-HCC)    Age-related osteoporosis with current pathological fracture, left humerus, subsequent encounter for fracture with routine healing    Carotid stenosis    Neuropathy of left foot    Presence of left artificial shoulder joint    Hypervolemia, unspecified hypervolemia type       General Visit Information:  General  Reason for Referral: PT eval & treat/impaired mobility DX: hypovolemia, ble cellulitis, HF; 1/7/25 w/ report of ble pain/edema, cough, congestion, sob xfew days; flu/rsv/covid (-); imaging (-)  Referred By: Enma  Caregiver Feedback: Per conference w/ RN patient stable to participate in therapy  Co-Treatment: OT  Co-Treatment Reason: to maximize pt safety & mobility  Patient Position Received: Bed, 3 rail up, Alarm off, not on at start of session  General Comment: Pleasant & cooperative, receptive to mobility& instructions, cues for safety, imulsive w/ mobility , cues for monitoring spo2    Home Living:  Home Living  Home Living Comments: patient lives alone in a senior apartment. [address] building with HR. patient lives on 1st floor)  Bathroom Shower/Tub:  (tub shower, grab bar, handheld shower)  Bathroom Toilet:  (standard toilet with 3:1 frame)    Prior Level of Function:  Prior Function Per Pt/Caregiver Report  Prior Function Comments: independent with dressing/toileting; use of WW. son lives across the street. son does driving and shopping, visits daily. patient reports can cook light meals. HHA 4x/week for 4 hours a day to assist with bathing and  IADLs.    Precautions:  Precautions  Precautions Comment: fall, alarm    Vital Signs:  Vital Signs  Pulse Ox:  (resting 96%, w/ gait decreased to 80%)  Objective     Pain:  Pain Assessment  Pain Assessment:  (ble pain rated 10/10, unsure if she has received pain meds; glove lt hand for pain management s/p prior 'nerve damage')    Cognition:  Cognition  Overall Cognitive Status: Within Functional Limits    General Assessments:    Sensation  Light Touch: No apparent deficits     Functional Assessments:     Bed Mobility  Bed Mobility:  (sba supine>sit report of dizziness upon sitting, sba sit>supinel; impulsive, cues for safe pace w/ position changes)  Transfers  Transfer:  (cga sit>stand denies dizziness, cues for safe pace; sba stand>sit)  Ambulation/Gait Training  Ambulation/Gait Training Performed:  (cga w/ ww forward/backward/side stepping ~10ft total forward mobility limited by lines & further distance limited by decreased spo2)   Extremity/Trunk Assessments:        RLE   RLE :  (arom grossly wfl; strength grossly 4/5)  LLE   LLE :  (arom grossly wfl; strength grossly 4/5)    Outcome Measures:     [facility] Basic Mobility  Turning from your back to your side while in a flat bed without using bedrails: None  Moving from lying on your back to sitting on the side of a flat bed without using bedrails: A little  Moving to and from bed to chair (including a wheelchair): A little  Standing up from a chair using your arms (e.g. wheelchair or bedside chair): A little  To walk in hospital room: A little  Climbing 3-5 steps with railing: A lot  Basic Mobility - Total Score: 18        Goals:  Encounter Problems       Encounter Problems (Active)       PT Problem       STG - Pt will transition supine <> sitting independently  (Progressing)       Start:  01/08/25    Expected End:  01/22/25            STG - Pt will transfer STS with modified independence  (Progressing)       Start:  01/08/25    Expected End:  01/22/25            STG  - Pt will amb >=50' using ww with modified independence   (Progressing)       Start:  01/08/25    Expected End:  01/22/25            STG -  Pt will navigate >=4 stairs using rail with sba  (Progressing)       Start:  01/08/25    Expected End:  01/22/25            STG - Pt will perform a B LE ther ex program of 2-3 sets of 10  (Progressing)       Start:  01/08/25    Expected End:  01/22/25                 Education Documentation  Mobility Training, taught by Zoë Li, PT at 1/8/2025 12:20 PM.  Learner: Patient  Readiness: Acceptance  Method: Explanation  Response: Verbalizes Understanding  Comment: safety, activity progresssion, monitoring tolerance to position change

## 2025-01-09 ENCOUNTER — PHARMACY VISIT (OUTPATIENT)
Dept: PHARMACY | Facility: CLINIC | Age: OVER 89
End: 2025-01-09
Payer: MEDICARE

## 2025-01-09 ENCOUNTER — HOME HEALTH ADMISSION (OUTPATIENT)
Dept: HOME HEALTH SERVICES | Facility: HOME HEALTH | Age: OVER 89
End: 2025-01-09
Payer: MEDICARE

## 2025-01-09 VITALS
SYSTOLIC BLOOD PRESSURE: 122 MMHG | OXYGEN SATURATION: 94 % | HEIGHT: 63 IN | RESPIRATION RATE: 18 BRPM | WEIGHT: 125.88 LBS | TEMPERATURE: 97.7 F | HEART RATE: 62 BPM | BODY MASS INDEX: 22.3 KG/M2 | DIASTOLIC BLOOD PRESSURE: 53 MMHG

## 2025-01-09 LAB
ANION GAP SERPL CALC-SCNC: 11 MMOL/L (ref 10–20)
BUN SERPL-MCNC: 37 MG/DL (ref 6–23)
CALCIUM SERPL-MCNC: 8 MG/DL (ref 8.6–10.3)
CHLORIDE SERPL-SCNC: 101 MMOL/L (ref 98–107)
CO2 SERPL-SCNC: 31 MMOL/L (ref 21–32)
CREAT SERPL-MCNC: 1.65 MG/DL (ref 0.5–1.05)
EGFRCR SERPLBLD CKD-EPI 2021: 29 ML/MIN/1.73M*2
ERYTHROCYTE [DISTWIDTH] IN BLOOD BY AUTOMATED COUNT: 18.3 % (ref 11.5–14.5)
GLUCOSE SERPL-MCNC: 107 MG/DL (ref 74–99)
HCT VFR BLD AUTO: 29.8 % (ref 36–46)
HGB BLD-MCNC: 8.8 G/DL (ref 12–16)
LEGIONELLA AG UR QL: NEGATIVE
MCH RBC QN AUTO: 28.3 PG (ref 26–34)
MCHC RBC AUTO-ENTMCNC: 29.5 G/DL (ref 32–36)
MCV RBC AUTO: 96 FL (ref 80–100)
NRBC BLD-RTO: 0 /100 WBCS (ref 0–0)
PLATELET # BLD AUTO: 137 X10*3/UL (ref 150–450)
POTASSIUM SERPL-SCNC: 4.8 MMOL/L (ref 3.5–5.3)
RBC # BLD AUTO: 3.11 X10*6/UL (ref 4–5.2)
S PNEUM AG UR QL: NEGATIVE
SODIUM SERPL-SCNC: 138 MMOL/L (ref 136–145)
WBC # BLD AUTO: 5.6 X10*3/UL (ref 4.4–11.3)

## 2025-01-09 PROCEDURE — 2500000004 HC RX 250 GENERAL PHARMACY W/ HCPCS (ALT 636 FOR OP/ED)

## 2025-01-09 PROCEDURE — 36415 COLL VENOUS BLD VENIPUNCTURE: CPT

## 2025-01-09 PROCEDURE — 2500000001 HC RX 250 WO HCPCS SELF ADMINISTERED DRUGS (ALT 637 FOR MEDICARE OP)

## 2025-01-09 PROCEDURE — RXMED WILLOW AMBULATORY MEDICATION CHARGE

## 2025-01-09 PROCEDURE — 85027 COMPLETE CBC AUTOMATED: CPT

## 2025-01-09 PROCEDURE — 2500000001 HC RX 250 WO HCPCS SELF ADMINISTERED DRUGS (ALT 637 FOR MEDICARE OP): Performed by: STUDENT IN AN ORGANIZED HEALTH CARE EDUCATION/TRAINING PROGRAM

## 2025-01-09 PROCEDURE — 80048 BASIC METABOLIC PNL TOTAL CA: CPT

## 2025-01-09 PROCEDURE — G0378 HOSPITAL OBSERVATION PER HR: HCPCS

## 2025-01-09 RX ORDER — DOXYCYCLINE 100 MG/1
100 CAPSULE ORAL 2 TIMES DAILY
Qty: 6 CAPSULE | Refills: 0 | Status: SHIPPED | OUTPATIENT
Start: 2025-01-09 | End: 2025-01-12

## 2025-01-09 RX ORDER — TRAZODONE HYDROCHLORIDE 50 MG/1
75 TABLET ORAL NIGHTLY PRN
Qty: 30 TABLET | Refills: 0 | Status: SHIPPED | OUTPATIENT
Start: 2025-01-09

## 2025-01-09 RX ORDER — GABAPENTIN 100 MG/1
100 CAPSULE ORAL 2 TIMES DAILY
Qty: 6 CAPSULE | Refills: 0 | Status: SHIPPED | OUTPATIENT
Start: 2025-01-09 | End: 2025-01-12

## 2025-01-09 RX ORDER — TRAMADOL HYDROCHLORIDE 50 MG/1
50 TABLET ORAL EVERY 8 HOURS PRN
Qty: 10 TABLET | Refills: 0 | Status: SHIPPED | OUTPATIENT
Start: 2025-01-09 | End: 2025-01-14

## 2025-01-09 RX ADMIN — ACETAMINOPHEN 650 MG: 325 TABLET, FILM COATED ORAL at 13:45

## 2025-01-09 RX ADMIN — HEPARIN SODIUM 5000 UNITS: 5000 INJECTION INTRAVENOUS; SUBCUTANEOUS at 05:43

## 2025-01-09 RX ADMIN — TRAMADOL HYDROCHLORIDE 50 MG: 50 TABLET, COATED ORAL at 17:35

## 2025-01-09 RX ADMIN — HEPARIN SODIUM 5000 UNITS: 5000 INJECTION INTRAVENOUS; SUBCUTANEOUS at 13:45

## 2025-01-09 RX ADMIN — TRAMADOL HYDROCHLORIDE 50 MG: 50 TABLET, COATED ORAL at 09:33

## 2025-01-09 RX ADMIN — AZITHROMYCIN MONOHYDRATE 490.2 MG: 500 INJECTION, POWDER, LYOPHILIZED, FOR SOLUTION INTRAVENOUS at 11:31

## 2025-01-09 RX ADMIN — FUROSEMIDE 20 MG: 10 INJECTION, SOLUTION INTRAMUSCULAR; INTRAVENOUS at 09:32

## 2025-01-09 RX ADMIN — POLYETHYLENE GLYCOL 3350 17 G: 17 POWDER, FOR SOLUTION ORAL at 09:31

## 2025-01-09 RX ADMIN — DILTIAZEM HYDROCHLORIDE 180 MG: 180 CAPSULE, COATED, EXTENDED RELEASE ORAL at 09:31

## 2025-01-09 RX ADMIN — GABAPENTIN 100 MG: 100 CAPSULE ORAL at 09:32

## 2025-01-09 ASSESSMENT — COGNITIVE AND FUNCTIONAL STATUS - GENERAL
TOILETING: A LITTLE
HELP NEEDED FOR BATHING: A LITTLE
DAILY ACTIVITIY SCORE: 22
CLIMB 3 TO 5 STEPS WITH RAILING: A LITTLE
MOBILITY SCORE: 23

## 2025-01-09 ASSESSMENT — PAIN DESCRIPTION - LOCATION
LOCATION: LEG
LOCATION: LEG

## 2025-01-09 ASSESSMENT — PAIN SCALES - GENERAL
PAINLEVEL_OUTOF10: 7
PAINLEVEL_OUTOF10: 8

## 2025-01-09 ASSESSMENT — PAIN DESCRIPTION - ORIENTATION
ORIENTATION: RIGHT;LEFT
ORIENTATION: RIGHT;LEFT

## 2025-01-09 NOTE — NURSING NOTE
1710: Discharge paperwork went over with pt and her son, all questions answered at this time. IV, tele pulse ox and tele removed. Pt to be dc home.

## 2025-01-09 NOTE — PROGRESS NOTES
25 1541   Discharge Planning   Living Arrangements Alone   Support Systems Family members   Assistance Needed Bathing, dressing, grooming   Type of Residence Private residence   Number of Stairs to Enter Residence 7   Do you have animals or pets at home?  --    Home or Post Acute Services In home services   Type of Home Care Services Home OT;Home PT   Expected Discharge Disposition Home Health   Does the patient need discharge transport arranged? No     TCC Note: : Met with pt. at bedside, introduced self and role on the Transition of Care Team.  Verified name, , demographics, insurance, PCP is Dr. Hager.  Emergency contact is Sharan falk Phone number listed on file.  Pt. lives alone in a one level home with [address] and has a first floor bed/bath. Pt. is needs assist and has a HHC 4 days a week for 5 hours per day.. Pt. denies any recent falls and does not use an assistive device for ambulation. Pt. is not diabetic and does not use any home O2 or any other home services/supplies. Preferred pharmacy is  [facility] on [address]. No problems affording or obtaining medications. [facility]  Transitions of Care will continue to follow until discharge. Shaniqua Borja, MSN,

## 2025-01-09 NOTE — DISCHARGE INSTRUCTIONS
Please follow-up with your PCP within 1 week to discuss your hospital admission  Please take doxycycline 100mg twice daily for the next 3 days to complete treatment for your pneumonia  You reported a pain medicine started in the hospital significnatly helped your symptoms; this was either gabapentin or tramadol. Prescriptions for 3 days worth of both were sent to your pharmacy. Discuss with your PCP if you would benefit from continuing these medications.  A referral for Home Healthcare was sent for home PT/OT to help you improve your strength  Eliquis was removed from your medication list as you reported completing your course  Please continue taking your other medications as previously prescribed  Please follow-up with your other physicians as previously scheduled.

## 2025-01-09 NOTE — DISCHARGE SUMMARY
Discharge Diagnosis  Pneumonia  Bilateral lower extremity edema  HFpEF, not in acute exacerbation    Issues Requiring Follow-Up  Completion of 3 further days of doxycycline for pneumonia treatment.  Continued weaning of oxygen at home    Discharge Meds     Medication List      ASK your doctor about these medications     apixaban 5 mg tablet; Commonly known as: Eliquis; Take 1 tablet (5 mg)   by mouth 2 times a day.   azelastine 137 mcg (0.1 %) nasal spray; Commonly known as: Astelin;   Administer 2 sprays into each nostril 2 times a day. Use in each nostril   as directed   dilTIAZem  mg 24 hr tablet; Commonly known as: Cardizem LA; Take 1   tablet (180 mg) by mouth once daily.   ferrous sulfate 325 (65 Fe) MG EC tablet; Take 1 tablet by mouth once   daily with breakfast. Do not crush, chew, or split.   furosemide 20 mg tablet; Commonly known as: Lasix; Take 1 tablet (20 mg)   by mouth once daily.   meloxicam 15 mg tablet; Commonly known as: Mobic; Take 1 tablet (15 mg)   by mouth once daily.   oxygen gas therapy; Commonly known as: O2   potassium chloride CR 20 mEq ER tablet; Commonly known as: Klor-Con M20   pramipexole 1 mg tablet; Commonly known as: Mirapex; Take 1 tablet (1   mg) by mouth once daily at bedtime.   PreserVision AREDS 2 Plus  mcg-15 mcg- 5 mg-1 mg capsule; Generic   drug: mv-min-FA-vit K-lutein-zeaxant   PreserVision AREDS-2 250-90-40-1 mg capsule; Generic drug: vit   C,N-Qi-xvfcf-lutein-zeaxan; Take 1 capsule by mouth 2 times a day.   simvastatin 20 mg tablet; Commonly known as: Zocor; Take 1 tablet (20   mg) by mouth once daily at bedtime.   traZODone 50 mg tablet; Commonly known as: Desyrel; Take 1 tablet (50   mg) by mouth as needed at bedtime for sleep.       Test Results Pending At Discharge  Pending Labs       Order Current Status    Mycoplasma Pneumoniae Antibody, IgM In process            Hospital Course   Estefanía Contreras is a 89 year old female with PMH significant for HFpEF (EF 62%  1/6/25), DLD, HTN, CKD3b, AAA (s/p repair), PAD, ITP, and provoked PE (left hip fracture, s/p 3 months of apixaban, on 2-3L NC PRN) who presented to [facility] for chief complaint of bilateral lower extremity pain and edema, as well as cough, shortness of breath, and increased need for her home oxygen. Her CT PE was concerning for lingula/LLB atelectasis versus consolidation. She was started on ceftriaxone and azithromycin. For her edema, she was started on furosemide with significant improvement. Her leg pain was likely secondary to the bruise noted on her left ankle. She felt significantly improved and is significantly improved. She did mention her neuropathy pain was significantly better with either gabapentin or tramadol; a short course of both of these medications was sent and patient should follow-up with PCP to discuss further. Patient stable for discharge home with PCP follow-up, three further days of doxycycline, and short courses of tramadol and gabapentin.     Pertinent Physical Exam At Time of Discharge  Constitutional: Well developed, A&Ox3, no acute distress, alert and cooperative  Eyes: EOMI, clear sclera  Respiratory/Thorax: CTAB, good chest expansion  Cardiovascular: Regular rate, regular rhythm  Gastrointestinal: Nondistended, soft, non-tender  Extremities: Ecchymosis of the left ankle. No significant erythema noted. Peripheral pulses intact  Skin: Warm and dry    Outpatient Follow-Up  No future appointments.      Jorge Mart DO

## 2025-01-10 ENCOUNTER — PATIENT OUTREACH (OUTPATIENT)
Dept: PRIMARY CARE | Facility: CLINIC | Age: OVER 89
End: 2025-01-10
Payer: MEDICARE

## 2025-01-10 NOTE — PROGRESS NOTES
Discharge Facility: [facility]  Discharge Diagnosis:9Pneumonia, Her CT PE was concerning for lingula/LLB atelectasis versus consolidation. She was started on ceftriaxone and azithromycin.  Bilateral lower extremity edema  HFpEF, not in acute exacerbation  three further days of doxycycline, and short courses of tramadol and gabapentin.  Admission Date:25  Discharge Date: 25    PCP Appointment Date:Messaged office as follow up appt was not available within 14 days    Specialist Appointment Date:   Hospital Encounter and Summary Linked: Yes  See discharge assessment below for further details  Engagement  Call Start Time: 1145 (1/10/2025 11:45 AM)    Medications  Medications reviewed with patient/caregiver?: Yes (1/10/2025 11:45 AM)  Is the patient having any side effects they believe may be caused by any medication additions or changes?: No (1/10/2025 11:45 AM)  Does the patient have all medications ordered at discharge?: Yes (1/10/2025 11:45 AM)  Care Management Interventions: No intervention needed (1/10/2025 11:45 AM)  Prescription Comments: doxycycline (Vibramycin) 100 mg capsule ([id number])    Order Details  Dose: 100 mg Route: oral Frequency: 2 times daily  Dispense Quantity: 6 capsule Refills: 0 Fills remainin       Sig: Take 1 capsule (100 mg) by mouth 2 times a day for 3 days. Take with at least 8 ounces (large glass) of water, do not lie down for 30 minutes after  gabapentin (Neurontin) 100 mg capsule ([id number])    Order Details  Dose: 100 mg Route: oral Frequency: 2 times daily  Dispense Quantity: 6 capsule Refills: 0 Fills remainin       Sig: Take 1 capsule (100 mg) by mouth 2 times a day for 3 days.  traMADol (Ultram) 50 mg tablet ([id number])    Order Details  Dose: 50 mg Route: oral Frequency: Every 8 hours PRN for severe pain (7 - 10)  Dispense Quantity: 10 tablet Refills: 0 Fills remainin       Sig: Take 1 tablet (50 mg) by mouth every 8 hours if needed for severe pain (7 - 10) for up  to 3 days.         traZODone (Desyrel) 50 mg tablet ([id number])    Order Details  Dose: 75 mg Route: oral Frequency: Nightly PRN for sleep  Dispense Quantity: 30 tablet Refills: 0 Fills remainin       Sig: Take 1.5 tablets (75 mg) by mouth as needed at bedtime for sleep. (1/10/2025 11:45 AM)  Is the patient taking all medications as directed (includes completed medication regime)?: Yes (1/10/2025 11:45 AM)  Care Management Interventions: Provided patient education (1/10/2025 11:45 AM)  Medication Comments: Patient verbalized understanding of discharge medications. (1/10/2025 11:45 AM)    Appointments  Does the patient have a primary care provider?: Yes (1/10/2025 11:45 AM)  Care Management Interventions: Educated patient on importance of making appointment (Messaged office as follow up appt was not available within 14 days) (1/10/2025 11:45 AM)  Has the patient kept scheduled appointments due by today?: Yes (1/10/2025 11:45 AM)    Self Management  What is the home health agency?: [facility] (1/10/2025 11:45 AM)  Has home health visited the patient within 72 hours of discharge?: -- (dcd 1.10.25) (1/10/2025 11:45 AM)  What Durable Medical Equipment (DME) was ordered?: n/a (1/10/2025 11:45 AM)    Patient Teaching  Does the patient have access to their discharge instructions?: Yes (1/10/2025 11:45 AM)  Care Management Interventions: Reviewed instructions with patient (1/10/2025 11:45 AM)  What is the patient's perception of their health status since discharge?: Improving (1/10/2025 11:45 AM)  Is the patient/caregiver able to teach back the hierarchy of who to call/visit for symptoms/problems? PCP, Specialist, Home Health nurse, Urgent Care, ED, 911: Yes (1/10/2025 11:45 AM)  Patient/Caregiver Education Comments: Spoke with patient. States lower extremities remain swollen. Is taking atb as prescribed. C is ordered and tried to call patient. Advised her that c trying to call. Denies SOB at this time. Is eager to see  PCP for follow up.Messaged office as follow up appt was not available within 14 days (1/10/2025 11:45 AM)

## 2025-01-11 LAB — M PNEUMO IGM SER IA-ACNC: 0.05 U/L

## 2025-01-14 ENCOUNTER — APPOINTMENT (OUTPATIENT)
Dept: CARDIOLOGY | Facility: HOSPITAL | Age: OVER 89
DRG: 603 | End: 2025-01-14
Payer: MEDICARE

## 2025-01-14 ENCOUNTER — TELEPHONE (OUTPATIENT)
Dept: PRIMARY CARE | Facility: CLINIC | Age: OVER 89
End: 2025-01-14
Payer: MEDICARE

## 2025-01-14 ENCOUNTER — HOSPITAL ENCOUNTER (INPATIENT)
Facility: HOSPITAL | Age: OVER 89
Discharge: HOME | DRG: 603 | End: 2025-01-14
Attending: EMERGENCY MEDICINE | Admitting: INTERNAL MEDICINE
Payer: MEDICARE

## 2025-01-14 ENCOUNTER — APPOINTMENT (OUTPATIENT)
Dept: VASCULAR MEDICINE | Facility: HOSPITAL | Age: OVER 89
DRG: 603 | End: 2025-01-14
Payer: MEDICARE

## 2025-01-14 DIAGNOSIS — G57.92 NEUROPATHY OF LEFT FOOT: ICD-10-CM

## 2025-01-14 DIAGNOSIS — M79.605 PAIN IN LEFT LEG: ICD-10-CM

## 2025-01-14 DIAGNOSIS — R63.0 ANOREXIA: ICD-10-CM

## 2025-01-14 DIAGNOSIS — S72.002D CLOSED FRACTURE OF NECK OF LEFT FEMUR WITH ROUTINE HEALING: ICD-10-CM

## 2025-01-14 DIAGNOSIS — L03.116 LEFT LEG CELLULITIS: Primary | ICD-10-CM

## 2025-01-14 DIAGNOSIS — G62.9 NEUROPATHY: ICD-10-CM

## 2025-01-14 LAB
ALBUMIN SERPL BCP-MCNC: 3.7 G/DL (ref 3.4–5)
ALP SERPL-CCNC: 76 U/L (ref 33–136)
ALT SERPL W P-5'-P-CCNC: 11 U/L (ref 7–45)
ANION GAP SERPL CALC-SCNC: 13 MMOL/L (ref 10–20)
AST SERPL W P-5'-P-CCNC: 20 U/L (ref 9–39)
BASOPHILS # BLD AUTO: 0.05 X10*3/UL (ref 0–0.1)
BASOPHILS NFR BLD AUTO: 0.9 %
BILIRUB SERPL-MCNC: 0.3 MG/DL (ref 0–1.2)
BNP SERPL-MCNC: 162 PG/ML (ref 0–99)
BUN SERPL-MCNC: 38 MG/DL (ref 6–23)
CALCIUM SERPL-MCNC: 9 MG/DL (ref 8.6–10.3)
CHLORIDE SERPL-SCNC: 103 MMOL/L (ref 98–107)
CO2 SERPL-SCNC: 29 MMOL/L (ref 21–32)
CREAT SERPL-MCNC: 1.61 MG/DL (ref 0.5–1.05)
D DIMER PPP FEU-MCNC: 4886 NG/ML FEU
EGFRCR SERPLBLD CKD-EPI 2021: 30 ML/MIN/1.73M*2
EOSINOPHIL # BLD AUTO: 0.24 X10*3/UL (ref 0–0.4)
EOSINOPHIL NFR BLD AUTO: 4.1 %
ERYTHROCYTE [DISTWIDTH] IN BLOOD BY AUTOMATED COUNT: 18 % (ref 11.5–14.5)
GLUCOSE SERPL-MCNC: 79 MG/DL (ref 74–99)
HCT VFR BLD AUTO: 31.1 % (ref 36–46)
HGB BLD-MCNC: 9.5 G/DL (ref 12–16)
IMM GRANULOCYTES # BLD AUTO: 0.03 X10*3/UL (ref 0–0.5)
IMM GRANULOCYTES NFR BLD AUTO: 0.5 % (ref 0–0.9)
LYMPHOCYTES # BLD AUTO: 1.02 X10*3/UL (ref 0.8–3)
LYMPHOCYTES NFR BLD AUTO: 17.5 %
MCH RBC QN AUTO: 28.4 PG (ref 26–34)
MCHC RBC AUTO-ENTMCNC: 30.5 G/DL (ref 32–36)
MCV RBC AUTO: 93 FL (ref 80–100)
MONOCYTES # BLD AUTO: 0.62 X10*3/UL (ref 0.05–0.8)
MONOCYTES NFR BLD AUTO: 10.6 %
NEUTROPHILS # BLD AUTO: 3.88 X10*3/UL (ref 1.6–5.5)
NEUTROPHILS NFR BLD AUTO: 66.4 %
NRBC BLD-RTO: 0 /100 WBCS (ref 0–0)
PLATELET # BLD AUTO: 206 X10*3/UL (ref 150–450)
POTASSIUM SERPL-SCNC: 4.8 MMOL/L (ref 3.5–5.3)
PROT SERPL-MCNC: 6.7 G/DL (ref 6.4–8.2)
RBC # BLD AUTO: 3.34 X10*6/UL (ref 4–5.2)
SODIUM SERPL-SCNC: 140 MMOL/L (ref 136–145)
WBC # BLD AUTO: 5.8 X10*3/UL (ref 4.4–11.3)

## 2025-01-14 PROCEDURE — 2500000001 HC RX 250 WO HCPCS SELF ADMINISTERED DRUGS (ALT 637 FOR MEDICARE OP): Performed by: NURSE PRACTITIONER

## 2025-01-14 PROCEDURE — 2500000001 HC RX 250 WO HCPCS SELF ADMINISTERED DRUGS (ALT 637 FOR MEDICARE OP): Performed by: PHYSICIAN ASSISTANT

## 2025-01-14 PROCEDURE — 96375 TX/PRO/DX INJ NEW DRUG ADDON: CPT

## 2025-01-14 PROCEDURE — G0378 HOSPITAL OBSERVATION PER HR: HCPCS

## 2025-01-14 PROCEDURE — 2500000001 HC RX 250 WO HCPCS SELF ADMINISTERED DRUGS (ALT 637 FOR MEDICARE OP): Performed by: EMERGENCY MEDICINE

## 2025-01-14 PROCEDURE — 2500000004 HC RX 250 GENERAL PHARMACY W/ HCPCS (ALT 636 FOR OP/ED): Performed by: PHYSICIAN ASSISTANT

## 2025-01-14 PROCEDURE — 36415 COLL VENOUS BLD VENIPUNCTURE: CPT | Performed by: STUDENT IN AN ORGANIZED HEALTH CARE EDUCATION/TRAINING PROGRAM

## 2025-01-14 PROCEDURE — 93971 EXTREMITY STUDY: CPT

## 2025-01-14 PROCEDURE — 96365 THER/PROPH/DIAG IV INF INIT: CPT | Mod: 59

## 2025-01-14 PROCEDURE — 2500000002 HC RX 250 W HCPCS SELF ADMINISTERED DRUGS (ALT 637 FOR MEDICARE OP, ALT 636 FOR OP/ED): Performed by: PHYSICIAN ASSISTANT

## 2025-01-14 PROCEDURE — 83880 ASSAY OF NATRIURETIC PEPTIDE: CPT | Performed by: STUDENT IN AN ORGANIZED HEALTH CARE EDUCATION/TRAINING PROGRAM

## 2025-01-14 PROCEDURE — 93971 EXTREMITY STUDY: CPT | Performed by: SURGERY

## 2025-01-14 PROCEDURE — 96366 THER/PROPH/DIAG IV INF ADDON: CPT

## 2025-01-14 PROCEDURE — 99285 EMERGENCY DEPT VISIT HI MDM: CPT | Mod: 25 | Performed by: EMERGENCY MEDICINE

## 2025-01-14 PROCEDURE — 85379 FIBRIN DEGRADATION QUANT: CPT | Performed by: STUDENT IN AN ORGANIZED HEALTH CARE EDUCATION/TRAINING PROGRAM

## 2025-01-14 PROCEDURE — 80053 COMPREHEN METABOLIC PANEL: CPT | Performed by: STUDENT IN AN ORGANIZED HEALTH CARE EDUCATION/TRAINING PROGRAM

## 2025-01-14 PROCEDURE — 2500000004 HC RX 250 GENERAL PHARMACY W/ HCPCS (ALT 636 FOR OP/ED): Performed by: EMERGENCY MEDICINE

## 2025-01-14 PROCEDURE — 85025 COMPLETE CBC W/AUTO DIFF WBC: CPT | Performed by: STUDENT IN AN ORGANIZED HEALTH CARE EDUCATION/TRAINING PROGRAM

## 2025-01-14 PROCEDURE — 93005 ELECTROCARDIOGRAM TRACING: CPT

## 2025-01-14 PROCEDURE — 96372 THER/PROPH/DIAG INJ SC/IM: CPT | Performed by: PHYSICIAN ASSISTANT

## 2025-01-14 RX ORDER — FERROUS SULFATE 325(65) MG
325 TABLET ORAL
Status: DISCONTINUED | OUTPATIENT
Start: 2025-01-15 | End: 2025-01-17

## 2025-01-14 RX ORDER — PRAMIPEXOLE DIHYDROCHLORIDE 1 MG/1
1 TABLET ORAL NIGHTLY
Status: DISCONTINUED | OUTPATIENT
Start: 2025-01-14 | End: 2025-01-20 | Stop reason: HOSPADM

## 2025-01-14 RX ORDER — TRAZODONE HYDROCHLORIDE 50 MG/1
75 TABLET ORAL NIGHTLY PRN
Status: DISCONTINUED | OUTPATIENT
Start: 2025-01-14 | End: 2025-01-20 | Stop reason: HOSPADM

## 2025-01-14 RX ORDER — ACETAMINOPHEN 160 MG/5ML
650 SOLUTION ORAL EVERY 4 HOURS PRN
Status: DISCONTINUED | OUTPATIENT
Start: 2025-01-14 | End: 2025-01-20 | Stop reason: HOSPADM

## 2025-01-14 RX ORDER — FUROSEMIDE 20 MG/1
20 TABLET ORAL EVERY MORNING
Status: DISCONTINUED | OUTPATIENT
Start: 2025-01-14 | End: 2025-01-20 | Stop reason: HOSPADM

## 2025-01-14 RX ORDER — HYDROCODONE BITARTRATE AND ACETAMINOPHEN 5; 325 MG/1; MG/1
1 TABLET ORAL EVERY 6 HOURS PRN
Status: DISCONTINUED | OUTPATIENT
Start: 2025-01-14 | End: 2025-01-20 | Stop reason: HOSPADM

## 2025-01-14 RX ORDER — HEPARIN SODIUM 5000 [USP'U]/ML
5000 INJECTION, SOLUTION INTRAVENOUS; SUBCUTANEOUS EVERY 8 HOURS
Status: DISCONTINUED | OUTPATIENT
Start: 2025-01-14 | End: 2025-01-20 | Stop reason: HOSPADM

## 2025-01-14 RX ORDER — ACETAMINOPHEN 325 MG/1
650 TABLET ORAL EVERY 4 HOURS PRN
Status: DISCONTINUED | OUTPATIENT
Start: 2025-01-14 | End: 2025-01-20 | Stop reason: HOSPADM

## 2025-01-14 RX ORDER — FUROSEMIDE 10 MG/ML
20 INJECTION INTRAMUSCULAR; INTRAVENOUS ONCE
Status: COMPLETED | OUTPATIENT
Start: 2025-01-14 | End: 2025-01-14

## 2025-01-14 RX ORDER — POTASSIUM CHLORIDE 20 MEQ/1
20 TABLET, EXTENDED RELEASE ORAL EVERY MORNING
Status: DISCONTINUED | OUTPATIENT
Start: 2025-01-14 | End: 2025-01-20 | Stop reason: HOSPADM

## 2025-01-14 RX ORDER — ACETAMINOPHEN 500 MG
5 TABLET ORAL ONCE
Status: DISCONTINUED | OUTPATIENT
Start: 2025-01-14 | End: 2025-01-14

## 2025-01-14 RX ORDER — TRAMADOL HYDROCHLORIDE 50 MG/1
50 TABLET ORAL EVERY 8 HOURS PRN
Status: DISCONTINUED | OUTPATIENT
Start: 2025-01-14 | End: 2025-01-14

## 2025-01-14 RX ORDER — DILTIAZEM HYDROCHLORIDE 180 MG/1
180 CAPSULE, COATED, EXTENDED RELEASE ORAL DAILY
Status: DISCONTINUED | OUTPATIENT
Start: 2025-01-14 | End: 2025-01-20 | Stop reason: HOSPADM

## 2025-01-14 RX ORDER — DIPHENHYDRAMINE HCL 25 MG
25 CAPSULE ORAL ONCE
Status: COMPLETED | OUTPATIENT
Start: 2025-01-14 | End: 2025-01-14

## 2025-01-14 RX ORDER — HYDROCODONE BITARTRATE AND ACETAMINOPHEN 5; 325 MG/1; MG/1
1 TABLET ORAL ONCE
Status: COMPLETED | OUTPATIENT
Start: 2025-01-14 | End: 2025-01-14

## 2025-01-14 RX ORDER — MELOXICAM 7.5 MG/1
7.5 TABLET ORAL
Status: DISCONTINUED | OUTPATIENT
Start: 2025-01-15 | End: 2025-01-20 | Stop reason: HOSPADM

## 2025-01-14 RX ORDER — SIMVASTATIN 20 MG/1
20 TABLET, FILM COATED ORAL NIGHTLY
Status: DISCONTINUED | OUTPATIENT
Start: 2025-01-14 | End: 2025-01-20 | Stop reason: HOSPADM

## 2025-01-14 RX ORDER — ACETAMINOPHEN 650 MG/1
650 SUPPOSITORY RECTAL EVERY 4 HOURS PRN
Status: DISCONTINUED | OUTPATIENT
Start: 2025-01-14 | End: 2025-01-20 | Stop reason: HOSPADM

## 2025-01-14 RX ADMIN — HYDROCODONE BITARTRATE AND ACETAMINOPHEN 1 TABLET: 5; 325 TABLET ORAL at 13:15

## 2025-01-14 RX ADMIN — FUROSEMIDE 20 MG: 10 INJECTION, SOLUTION INTRAMUSCULAR; INTRAVENOUS at 08:41

## 2025-01-14 RX ADMIN — SIMVASTATIN 20 MG: 20 TABLET, FILM COATED ORAL at 20:05

## 2025-01-14 RX ADMIN — HYDROCODONE BITARTRATE AND ACETAMINOPHEN 1 TABLET: 5; 325 TABLET ORAL at 20:05

## 2025-01-14 RX ADMIN — HEPARIN SODIUM 5000 UNITS: 5000 INJECTION INTRAVENOUS; SUBCUTANEOUS at 22:24

## 2025-01-14 RX ADMIN — AMPICILLIN SODIUM AND SULBACTAM SODIUM 3 G: 2; 1 INJECTION, POWDER, FOR SOLUTION INTRAMUSCULAR; INTRAVENOUS at 15:30

## 2025-01-14 RX ADMIN — DIPHENHYDRAMINE HYDROCHLORIDE 25 MG: 25 CAPSULE ORAL at 22:59

## 2025-01-14 RX ADMIN — DILTIAZEM HYDROCHLORIDE 180 MG: 180 CAPSULE, COATED, EXTENDED RELEASE ORAL at 16:45

## 2025-01-14 RX ADMIN — TRAZODONE HYDROCHLORIDE 75 MG: 50 TABLET ORAL at 20:06

## 2025-01-14 RX ADMIN — POTASSIUM CHLORIDE 20 MEQ: 1500 TABLET, EXTENDED RELEASE ORAL at 15:29

## 2025-01-14 SDOH — ECONOMIC STABILITY: HOUSING INSECURITY: IN THE LAST 12 MONTHS, WAS THERE A TIME WHEN YOU WERE NOT ABLE TO PAY THE MORTGAGE OR RENT ON TIME?: NO

## 2025-01-14 SDOH — ECONOMIC STABILITY: FOOD INSECURITY: WITHIN THE PAST 12 MONTHS, THE FOOD YOU BOUGHT JUST DIDN'T LAST AND YOU DIDN'T HAVE MONEY TO GET MORE.: NEVER TRUE

## 2025-01-14 SDOH — ECONOMIC STABILITY: FOOD INSECURITY: HOW HARD IS IT FOR YOU TO PAY FOR THE VERY BASICS LIKE FOOD, HOUSING, MEDICAL CARE, AND HEATING?: NOT VERY HARD

## 2025-01-14 SDOH — ECONOMIC STABILITY: HOUSING INSECURITY: AT ANY TIME IN THE PAST 12 MONTHS, WERE YOU HOMELESS OR LIVING IN A SHELTER (INCLUDING NOW)?: NO

## 2025-01-14 SDOH — ECONOMIC STABILITY: FOOD INSECURITY: WITHIN THE PAST 12 MONTHS, YOU WORRIED THAT YOUR FOOD WOULD RUN OUT BEFORE YOU GOT THE MONEY TO BUY MORE.: NEVER TRUE

## 2025-01-14 SDOH — SOCIAL STABILITY: SOCIAL INSECURITY: DO YOU FEEL ANYONE HAS EXPLOITED OR TAKEN ADVANTAGE OF YOU FINANCIALLY OR OF YOUR PERSONAL PROPERTY?: NO

## 2025-01-14 SDOH — SOCIAL STABILITY: SOCIAL INSECURITY: WITHIN THE LAST YEAR, HAVE YOU BEEN HUMILIATED OR EMOTIONALLY ABUSED IN OTHER WAYS BY YOUR PARTNER OR EX-PARTNER?: NO

## 2025-01-14 SDOH — SOCIAL STABILITY: SOCIAL INSECURITY: WITHIN THE LAST YEAR, HAVE YOU BEEN AFRAID OF YOUR PARTNER OR EX-PARTNER?: NO

## 2025-01-14 SDOH — SOCIAL STABILITY: SOCIAL INSECURITY: ARE YOU OR HAVE YOU BEEN THREATENED OR ABUSED PHYSICALLY, EMOTIONALLY, OR SEXUALLY BY ANYONE?: NO

## 2025-01-14 SDOH — SOCIAL STABILITY: SOCIAL INSECURITY: HAS ANYONE EVER THREATENED TO HURT YOUR FAMILY OR YOUR PETS?: NO

## 2025-01-14 SDOH — SOCIAL STABILITY: SOCIAL INSECURITY: ARE THERE ANY APPARENT SIGNS OF INJURIES/BEHAVIORS THAT COULD BE RELATED TO ABUSE/NEGLECT?: NO

## 2025-01-14 SDOH — ECONOMIC STABILITY: INCOME INSECURITY: IN THE PAST 12 MONTHS HAS THE ELECTRIC, GAS, OIL, OR WATER COMPANY THREATENED TO SHUT OFF SERVICES IN YOUR HOME?: NO

## 2025-01-14 SDOH — ECONOMIC STABILITY: HOUSING INSECURITY: IN THE PAST 12 MONTHS, HOW MANY TIMES HAVE YOU MOVED WHERE YOU WERE LIVING?: 1

## 2025-01-14 SDOH — ECONOMIC STABILITY: TRANSPORTATION INSECURITY: IN THE PAST 12 MONTHS, HAS LACK OF TRANSPORTATION KEPT YOU FROM MEDICAL APPOINTMENTS OR FROM GETTING MEDICATIONS?: NO

## 2025-01-14 SDOH — SOCIAL STABILITY: SOCIAL INSECURITY: WERE YOU ABLE TO COMPLETE ALL THE BEHAVIORAL HEALTH SCREENINGS?: YES

## 2025-01-14 SDOH — SOCIAL STABILITY: SOCIAL INSECURITY: DO YOU FEEL UNSAFE GOING BACK TO THE PLACE WHERE YOU ARE LIVING?: NO

## 2025-01-14 SDOH — SOCIAL STABILITY: SOCIAL INSECURITY: HAVE YOU HAD THOUGHTS OF HARMING ANYONE ELSE?: NO

## 2025-01-14 SDOH — SOCIAL STABILITY: SOCIAL INSECURITY: HAVE YOU HAD ANY THOUGHTS OF HARMING ANYONE ELSE?: NO

## 2025-01-14 SDOH — SOCIAL STABILITY: SOCIAL INSECURITY: ABUSE: ADULT

## 2025-01-14 SDOH — SOCIAL STABILITY: SOCIAL INSECURITY: DOES ANYONE TRY TO KEEP YOU FROM HAVING/CONTACTING OTHER FRIENDS OR DOING THINGS OUTSIDE YOUR HOME?: NO

## 2025-01-14 ASSESSMENT — COGNITIVE AND FUNCTIONAL STATUS - GENERAL
HELP NEEDED FOR BATHING: A LITTLE
TOILETING: A LITTLE
TURNING FROM BACK TO SIDE WHILE IN FLAT BAD: A LITTLE
PATIENT BASELINE BEDBOUND: NO
MOBILITY SCORE: 17
DAILY ACTIVITIY SCORE: 21
DRESSING REGULAR LOWER BODY CLOTHING: A LITTLE
CLIMB 3 TO 5 STEPS WITH RAILING: A LOT
STANDING UP FROM CHAIR USING ARMS: A LITTLE
MOVING TO AND FROM BED TO CHAIR: A LITTLE
WALKING IN HOSPITAL ROOM: A LOT

## 2025-01-14 ASSESSMENT — ENCOUNTER SYMPTOMS
NAUSEA: 0
CONFUSION: 0
WHEEZING: 0
DIAPHORESIS: 0
ABDOMINAL PAIN: 0
DYSURIA: 0
VOMITING: 0
COLOR CHANGE: 1
HEADACHES: 0
PALPITATIONS: 0
HALLUCINATIONS: 0
DIARRHEA: 0
CHEST TIGHTNESS: 0
SHORTNESS OF BREATH: 0
JOINT SWELLING: 0
COUGH: 0
SORE THROAT: 0
FEVER: 0
HEMATURIA: 0
CHILLS: 0

## 2025-01-14 ASSESSMENT — LIFESTYLE VARIABLES
AUDIT-C TOTAL SCORE: 0
HOW OFTEN DO YOU HAVE 6 OR MORE DRINKS ON ONE OCCASION: NEVER
AUDIT-C TOTAL SCORE: 0
EVER HAD A DRINK FIRST THING IN THE MORNING TO STEADY YOUR NERVES TO GET RID OF A HANGOVER: NO
EVER FELT BAD OR GUILTY ABOUT YOUR DRINKING: NO
HOW MANY STANDARD DRINKS CONTAINING ALCOHOL DO YOU HAVE ON A TYPICAL DAY: PATIENT DOES NOT DRINK
SKIP TO QUESTIONS 9-10: 1
HOW OFTEN DO YOU HAVE A DRINK CONTAINING ALCOHOL: NEVER
HAVE PEOPLE ANNOYED YOU BY CRITICIZING YOUR DRINKING: NO
SUBSTANCE_ABUSE_PAST_12_MONTHS: NO
PRESCIPTION_ABUSE_PAST_12_MONTHS: NO
HAVE YOU EVER FELT YOU SHOULD CUT DOWN ON YOUR DRINKING: NO
TOTAL SCORE: 0

## 2025-01-14 ASSESSMENT — PAIN SCALES - GENERAL
PAINLEVEL_OUTOF10: 10 - WORST POSSIBLE PAIN
PAINLEVEL_OUTOF10: 10 - WORST POSSIBLE PAIN
PAINLEVEL_OUTOF10: 8
PAINLEVEL_OUTOF10: 10 - WORST POSSIBLE PAIN
PAINLEVEL_OUTOF10: 5 - MODERATE PAIN
PAINLEVEL_OUTOF10: 10 - WORST POSSIBLE PAIN

## 2025-01-14 ASSESSMENT — ACTIVITIES OF DAILY LIVING (ADL)
PATIENT'S MEMORY ADEQUATE TO SAFELY COMPLETE DAILY ACTIVITIES?: YES
ADEQUATE_TO_COMPLETE_ADL: YES
BATHING: NEEDS ASSISTANCE
WALKS IN HOME: NEEDS ASSISTANCE
GROOMING: INDEPENDENT
FEEDING YOURSELF: INDEPENDENT
DRESSING YOURSELF: NEEDS ASSISTANCE
LACK_OF_TRANSPORTATION: NO
HEARING - LEFT EAR: FUNCTIONAL
HEARING - RIGHT EAR: FUNCTIONAL
LACK_OF_TRANSPORTATION: NO
ASSISTIVE_DEVICE: WALKER
JUDGMENT_ADEQUATE_SAFELY_COMPLETE_DAILY_ACTIVITIES: YES
TOILETING: NEEDS ASSISTANCE

## 2025-01-14 ASSESSMENT — COLUMBIA-SUICIDE SEVERITY RATING SCALE - C-SSRS
1. IN THE PAST MONTH, HAVE YOU WISHED YOU WERE DEAD OR WISHED YOU COULD GO TO SLEEP AND NOT WAKE UP?: NO
2. HAVE YOU ACTUALLY HAD ANY THOUGHTS OF KILLING YOURSELF?: NO
2. HAVE YOU ACTUALLY HAD ANY THOUGHTS OF KILLING YOURSELF?: NO
6. HAVE YOU EVER DONE ANYTHING, STARTED TO DO ANYTHING, OR PREPARED TO DO ANYTHING TO END YOUR LIFE?: NO
1. IN THE PAST MONTH, HAVE YOU WISHED YOU WERE DEAD OR WISHED YOU COULD GO TO SLEEP AND NOT WAKE UP?: NO
6. HAVE YOU EVER DONE ANYTHING, STARTED TO DO ANYTHING, OR PREPARED TO DO ANYTHING TO END YOUR LIFE?: NO

## 2025-01-14 ASSESSMENT — PAIN - FUNCTIONAL ASSESSMENT
PAIN_FUNCTIONAL_ASSESSMENT: 0-10
PAIN_FUNCTIONAL_ASSESSMENT: 0-10

## 2025-01-14 ASSESSMENT — PATIENT HEALTH QUESTIONNAIRE - PHQ9
1. LITTLE INTEREST OR PLEASURE IN DOING THINGS: NOT AT ALL
2. FEELING DOWN, DEPRESSED OR HOPELESS: NOT AT ALL
SUM OF ALL RESPONSES TO PHQ9 QUESTIONS 1 & 2: 0

## 2025-01-14 NOTE — H&P
History Of Present Illness  Estefanía Contreras is a 89 y.o. female with PMH of HFpEF (EF 62% 1/6/25), DLD, HTN, CKD3b, AAA (s/p repair), PAD, ITP, and provoked PE (left hip fracture, completed 3 months of apixaban, on 2-3L NC PRN) presented to [facility] ED from home on 1/14/2025 with B/L lower extremity pain. The pt is an excellent historian. The pt was admitted to [facility] on 1/7/2025 with bilateral lower extremity pain and edema, as well as cough, shortness of breath, and increased need for her home oxygen. She was treated for PNA with azithromycin and rocephin. Her LE edema was treated with Lasix. Patient was discharged home on 1/10/25 with PCP follow-up, three further days of doxycycline, and short courses of tramadol and gabapentin. Since her discharge, the pt has taken the medications she was discharged with including the 3 days of doxycycline, which she completed. Her leg swelling, pain, and redness worsened since returning home despite the pain medication. The tramadol isn't helping. She is having a very hard time putting any weight on her legs. A bruise was noted on her left leg last admission and XR was obtained showing no acute fracture. A LE ultrasound was also obtained that did not show any blood clot. She denies any injury to the leg. The left leg is more painful than the right. She denies any fever/chills, headache, chest pain/palpatations, shortness of breath, cough, abdominal pain, N/V/D, dysuria, hematuria, or bloody/black stools/   She decided to come back to the ER because of the pain and difficulty walking. ER evaluation included repeat LE US that was negative for VTE. Pt was recommended for admission to the hospital and accepted on the service of Dr. Flores who would like her treated for cellulitis with IV antibiotics. Pt notes she has been having to use oxygen more over last month and is having difficulty tolerating being off O2 at home.      Past Medical History  Past Medical History:   Diagnosis Date     H/O heart artery stent     H/O shoulder surgery     PE (pulmonary thromboembolism) (Multi)        Surgical History  History reviewed. No pertinent surgical history.     Social History  Social History     Tobacco Use    Smoking status: Former     Types: Cigarettes     Passive exposure: Never    Smokeless tobacco: Never   Vaping Use    Vaping status: Never Used   Substance Use Topics    Alcohol use: Not Currently    Drug use: Never        Family History  No family history on file.     Allergies  Patient has no known allergies.    Review of Systems   Constitutional:  Negative for chills, diaphoresis and fever.   HENT:  Negative for congestion and sore throat.    Eyes:  Negative for visual disturbance.   Respiratory:  Negative for cough, chest tightness, shortness of breath and wheezing.    Cardiovascular:  Positive for leg swelling. Negative for chest pain and palpitations.   Gastrointestinal:  Negative for abdominal pain, diarrhea, nausea and vomiting.   Endocrine: Negative for polyuria.   Genitourinary:  Negative for dysuria and hematuria.   Musculoskeletal:  Negative for joint swelling.   Skin:  Positive for color change. Negative for rash.   Allergic/Immunologic: Negative for immunocompromised state.   Neurological:  Negative for syncope and headaches.   Psychiatric/Behavioral:  Negative for confusion and hallucinations.      Physical Exam  Constitutional:       Appearance: Normal appearance. She is normal weight.   HENT:      Head: Normocephalic and atraumatic.      Mouth/Throat:      Mouth: Mucous membranes are moist.   Eyes:      Conjunctiva/sclera: Conjunctivae normal.   Cardiovascular:      Rate and Rhythm: Normal rate and regular rhythm.      Heart sounds: No murmur heard.  Pulmonary:      Effort: No respiratory distress.      Breath sounds: No wheezing, rhonchi or rales.   Abdominal:      General: There is no distension.      Tenderness: There is no abdominal tenderness. There is no guarding.  "  Musculoskeletal:         General: No deformity.      Cervical back: No rigidity.   Skin:     General: Skin is warm.      Comments: -Medial LLE just superior to her ankle is an ecchymosis with surrounding erythema.  Erythema is noncircumferential.  She also has a nonblanchable petechiae over her dorsal surfaces of both her feet closer to her toes. Anterior RLE just superior to her ankle is a small patch of erythema. Her bilateral lower extremities have +2 pitting edema, and there is +3 pedal edema.   Neurological:      General: No focal deficit present.      Mental Status: She is alert and oriented to person, place, and time.   Psychiatric:         Mood and Affect: Mood normal.       Last Recorded Vitals  Visit Vitals  /64   Pulse 59   Temp 36.8 °C (98.2 °F) (Temporal)   Resp 17   Ht 1.6 m (5' 3\")   Wt 56.7 kg (125 lb)   SpO2 100%   BMI 22.14 kg/m²   Smoking Status Former   BSA 1.59 m²        Relevant Results  Results for orders placed or performed during the hospital encounter of 01/14/25 (from the past 24 hours)   CBC and Auto Differential   Result Value Ref Range    WBC 5.8 4.4 - 11.3 x10*3/uL    nRBC 0.0 0.0 - 0.0 /100 WBCs    RBC 3.34 (L) 4.00 - 5.20 x10*6/uL    Hemoglobin 9.5 (L) 12.0 - 16.0 g/dL    Hematocrit 31.1 (L) 36.0 - 46.0 %    MCV 93 80 - 100 fL    MCH 28.4 26.0 - 34.0 pg    MCHC 30.5 (L) 32.0 - 36.0 g/dL    RDW 18.0 (H) 11.5 - 14.5 %    Platelets 206 150 - 450 x10*3/uL    Neutrophils % 66.4 40.0 - 80.0 %    Immature Granulocytes %, Automated 0.5 0.0 - 0.9 %    Lymphocytes % 17.5 13.0 - 44.0 %    Monocytes % 10.6 2.0 - 10.0 %    Eosinophils % 4.1 0.0 - 6.0 %    Basophils % 0.9 0.0 - 2.0 %    Neutrophils Absolute 3.88 1.60 - 5.50 x10*3/uL    Immature Granulocytes Absolute, Automated 0.03 0.00 - 0.50 x10*3/uL    Lymphocytes Absolute 1.02 0.80 - 3.00 x10*3/uL    Monocytes Absolute 0.62 0.05 - 0.80 x10*3/uL    Eosinophils Absolute 0.24 0.00 - 0.40 x10*3/uL    Basophils Absolute 0.05 0.00 - 0.10 " x10*3/uL   Comprehensive metabolic panel   Result Value Ref Range    Glucose 79 74 - 99 mg/dL    Sodium 140 136 - 145 mmol/L    Potassium 4.8 3.5 - 5.3 mmol/L    Chloride 103 98 - 107 mmol/L    Bicarbonate 29 21 - 32 mmol/L    Anion Gap 13 10 - 20 mmol/L    Urea Nitrogen 38 (H) 6 - 23 mg/dL    Creatinine 1.61 (H) 0.50 - 1.05 mg/dL    eGFR 30 (L) >60 mL/min/1.73m*2    Calcium 9.0 8.6 - 10.3 mg/dL    Albumin 3.7 3.4 - 5.0 g/dL    Alkaline Phosphatase 76 33 - 136 U/L    Total Protein 6.7 6.4 - 8.2 g/dL    AST 20 9 - 39 U/L    Bilirubin, Total 0.3 0.0 - 1.2 mg/dL    ALT 11 7 - 45 U/L   B-type natriuretic peptide   Result Value Ref Range     (H) 0 - 99 pg/mL   D-dimer, VTE Exclusion   Result Value Ref Range    D-Dimer, Quantitative VTE Exclusion 4,886 (H) <=500 ng/mL FEU   Vascular US lower extremity venous duplex left   Result Value Ref Range    BSA 1.59 m2        Imaging  Vascular US lower extremity venous duplex left    Result Date: 1/14/2025  Preliminary Cardiology Report          [place] [address] Tel [phone] and Fax [phone]       Preliminary Vascular Lab Report  VASC US LOWER EXTREMITY VENOUS DUPLEX LEFT  Patient Name:      SEAN YUNG RICKY  Reading Physician:  [id number] Madeline Yanez MD Study Date:        1/14/2025    Ordering Physician: [id number] ZAMAN MRN/PID:           [id number]     Technologist:       Saniya Summers RVT Accession#:        [id number] Technologist 2: Date of Birth/Age: 12/11/1934   Encounter#:         [id number] Gender:            F Admission Status:  Emergency    Location Performed: [facility]  Diagnosis/ICD: Pain in left leg-M79.605 Procedure/CPT: 64942 Peripheral venous duplex scan for DVT Limited  PRELIMINARY CONCLUSIONS: Right Lower Venous: The right common femoral vein demonstrates normal spontaneous and respirophasic flow. Left Lower Venous: No evidence of acute deep vein thrombus visualized in the left lower  extremity. Cannot rule out thrombus in non-visualized posterior tibial and peroneal veins due to edema.  Imaging & Doppler Findings:  Right        Flow CFV   Spontaneous/Phasic  Left                  Compress Thrombus        Flow Distal External Iliac   Yes      None   Spontaneous/Phasic CFV                     Yes      None   Spontaneous/Phasic PFV                     Yes      None FV Proximal             Yes      None   Spontaneous/Phasic FV Mid                  Yes      None FV Distal               Yes      None Popliteal               Yes      None   Spontaneous/Phasic VASCULAR PRELIMINARY REPORT completed by Saniya Summers RVT on 1/14/2025 at 9:47:01 AM  ** Final **     XR ankle bilateral 2 views    Result Date: 1/7/2025  Interpreted By:  Chlesey Merino, STUDY: XR ANKLE BILATERAL 2 VIEWS;  1/7/2025 1:15 pm   INDICATION: Signs/Symptoms:TTP.   COMPARISON: None.   ACCESSION NUMBER(S): [id number]   ORDERING CLINICIAN: EMIL BAILEY   FINDINGS: Left ankle: No acute fracture or dislocation is identified. The ankle mortise is congruent. There are degenerative changes in the visualized joints of the midfoot. A small plantar calcaneal enthesophyte is present. There is moderate-to-marked diffuse subcutaneous edema the imaged calf.   Right ankle: No acute fracture or dislocation is identified. The ankle mortise is congruent. There are degenerative changes in the visualized joints of the midfoot. A small plantar calcaneal enthesophyte is present. There is moderate-to-marked diffuse subcutaneous edema the imaged calf.       Bilateral calf edema without acute osseous abnormality.   MACRO None   Signed by: Chelsey Merino 1/7/2025 1:23 PM Dictation workstation:   [id number]     Home Medications  Prior to Admission medications    Medication Sig Start Date End Date Taking? Authorizing Provider   azelastine (Astelin) 137 mcg (0.1 %) nasal spray Administer 2 sprays into each nostril 2 times a day. Use in each nostril as  directed  Patient taking differently: Administer 2 sprays into each nostril if needed. Use in each nostril as directed 11/21/24 11/21/25 Yes Lakia Johnson MD   dilTIAZem LA (Cardizem LA) 180 mg 24 hr tablet Take 1 tablet (180 mg) by mouth once daily.  Patient taking differently: Take 1 tablet (180 mg) by mouth once daily in the morning. 11/5/24  Yes Lakia Johnson MD   ferrous sulfate 325 (65 Fe) MG EC tablet Take 1 tablet by mouth once daily with breakfast. Do not crush, chew, or split. 11/5/24 11/5/25 Yes Lakia Johnson MD   furosemide (Lasix) 20 mg tablet Take 1 tablet (20 mg) by mouth once daily.  Patient taking differently: Take 1 tablet (20 mg) by mouth once daily in the morning. 12/16/24 12/16/25 Yes Lakia Johnson MD   meloxicam (Mobic) 15 mg tablet Take 1 tablet (15 mg) by mouth once daily.  Patient taking differently: Take 1 tablet (15 mg) by mouth once daily with breakfast. 12/16/24  Yes Lakia Johnson MD   mv-min-FA-vit K-lutein-zeaxant (PreserVision AREDS 2 Plus MV) 200 mcg-15 mcg- 5 mg-1 mg capsule Take 1 capsule by mouth 2 times a day.   Yes Historical Provider, MD   oxygen (O2) gas therapy Inhale 3 L/min continuously.   Yes Historical Provider, MD   potassium chloride CR 20 mEq ER tablet Take 1 tablet (20 mEq) by mouth once daily in the morning.   Yes Historical Provider, MD   pramipexole (Mirapex) 1 mg tablet Take 1 tablet (1 mg) by mouth once daily at bedtime. 12/16/24 12/16/25 Yes Lakia Johnson MD   simvastatin (Zocor) 20 mg tablet Take 1 tablet (20 mg) by mouth once daily at bedtime. 12/16/24 12/16/25 Yes Lakia Johnson MD   traMADol (Ultram) 50 mg tablet Take 1 tablet (50 mg) by mouth every 8 hours if needed for severe pain (7 - 10) for up to 3 days. 1/9/25 1/14/25 Yes Jorge Mart, DO   traZODone (Desyrel) 50 mg tablet Take 1.5 tablets (75 mg) by mouth as needed at bedtime for sleep. 1/9/25  Yes Jorge Mart,    doxycycline (Vibramycin) 100 mg capsule Take 1 capsule (100 mg) by  mouth 2 times a day for 3 days. Take with at least 8 ounces (large glass) of water, do not lie down for 30 minutes after  Patient not taking: Reported on 1/14/2025 1/9/25 1/12/25  Jorge Mart DO   gabapentin (Neurontin) 100 mg capsule Take 1 capsule (100 mg) by mouth 2 times a day for 3 days.  Patient not taking: Reported on 1/14/2025 1/9/25 1/12/25  Jorge Mart DO   apixaban (Eliquis) 5 mg tablet Take 1 tablet (5 mg) by mouth 2 times a day.  Patient not taking: Reported on 1/7/2025 11/19/24 1/9/25  Jorge Bhandari MD   traZODone (Desyrel) 50 mg tablet Take 1 tablet (50 mg) by mouth as needed at bedtime for sleep.  Patient taking differently: Take 1 tablet (50 mg) by mouth once daily at bedtime. 12/20/24 1/9/25  Lakia Johnson MD   vit C,V-Yk-bflrg-lutein-zeaxan (PreserVision AREDS-2) 250-90-40-1 mg capsule Take 1 capsule by mouth 2 times a day. 10/10/24 1/14/25  Kay Barcenas, CARLEE-CNP       Medications  Scheduled medications  ampicillin-sulbactam, 3 g, intravenous, q12h  dilTIAZem CD, 180 mg, oral, Daily  [START ON 1/15/2025] ferrous sulfate (325 mg ferrous sulfate), 325 mg, oral, Daily with breakfast  furosemide, 20 mg, oral, q AM  heparin (porcine), 5,000 Units, subcutaneous, q8h  [START ON 1/15/2025] meloxicam, 7.5 mg, oral, Daily with breakfast  potassium chloride CR, 20 mEq, oral, q AM  pramipexole, 1 mg, oral, Nightly  simvastatin, 20 mg, oral, Nightly      Continuous medications     PRN medications  acetaminophen, 650 mg, q4h PRN   Or  acetaminophen, 650 mg, q4h PRN   Or  acetaminophen, 650 mg, q4h PRN  HYDROcodone-acetaminophen, 1 tablet, q6h PRN  traZODone, 75 mg, Nightly PRN           Assessment/Plan   Assessment & Plan  Left leg cellulitis    Bilateral LE cellulitis  LLE ecchymosis  B/L LE edema, acute on chronic  -HDS  -Does not meets SIRS/sepsis   -No leukocytosis and afebrile  -Denies fever/chills  -Will initiate IV Unasyn, renally dosed  -Patient reports compliance with her home PO  Lasix 20mg QD, which is continued  -B/L LE duplex ultrasound negative for DVT  -Bruising to LLE noted during last admission and XR of L ankle obtained showing no acute fracture with some degenerative changes  -Pt reports difficulty ambulating secondary to pain  -PT/OT  -Up with assist and fall precautions    Chronic diastolic heart failure  Chronic respiratory failure  Hx of PE and DVT  -Lungs are CTA on exam  -Pt requiring 3L NC. Pt reports requiring 2-3L oxygen PRN/intermittently since she had her PE. States she has been using it more continuously lately because she gets more SOB. CT for PE obtained 1/7/2025 showing no acute PE or PNA.  -Echo obtained 1/3/2025 showing EF 62%, Grade I diastolic dysfunction, normal RV systolic function  -Monitor pulse ox  -Admitted from 10/2-10/7 for provoked multiple right sided pulmonary emboli with signs of right heart strain after having hip surgery   -Completed Eliquis just about at the New Year  -Will use SQ heparin for VTE PPx    Other chronic medical conditions:  HTN  CKDIII  AAA status post repair  Carotid disease  PAD  ITP  Dyslipidemia  Neuropathic pain  -Continue home medications as appropriate  -Monitor renal function    VTE PPx: SQ heparin    See additional orders for further plan of care.   Further evaluation and management per attending and consulting physicians.      Caryn Martinez PA-C  [facility] Staff

## 2025-01-14 NOTE — TELEPHONE ENCOUNTER
ON CALL  Pt called last night  States that her leg was with more redness/ swelling and painful   Recently DC from hospital last week and completed abx    PLAN  Suggested ER follow up for further evaluation    The patient agrees and understands the above plan.  All questions have been answered.  Pt thankful for the follow up call.  Michelle Boone DO

## 2025-01-14 NOTE — CARE PLAN
The patient's goals for the shift include      The clinical goals for the shift include pt will be safe and comftorable      Problem: Pain - Adult  Goal: Verbalizes/displays adequate comfort level or baseline comfort level  Outcome: Progressing     Problem: Safety - Adult  Goal: Free from fall injury  Outcome: Progressing     Problem: Discharge Planning  Goal: Discharge to home or other facility with appropriate resources  Outcome: Progressing     Problem: Chronic Conditions and Co-morbidities  Goal: Patient's chronic conditions and co-morbidity symptoms are monitored and maintained or improved  Outcome: Progressing     Problem: Skin  Goal: Decreased wound size/increased tissue granulation at next dressing change  Outcome: Progressing  Flowsheets (Taken 1/14/2025 1858)  Decreased wound size/increased tissue granulation at next dressing change: Promote sleep for wound healing  Goal: Participates in plan/prevention/treatment measures  Outcome: Progressing  Flowsheets (Taken 1/14/2025 1858)  Participates in plan/prevention/treatment measures: Elevate heels  Goal: Prevent/manage excess moisture  Outcome: Progressing  Flowsheets (Taken 1/14/2025 1858)  Prevent/manage excess moisture: Cleanse incontinence/protect with barrier cream  Goal: Prevent/minimize sheer/friction injuries  Outcome: Progressing  Flowsheets (Taken 1/14/2025 1858)  Prevent/minimize sheer/friction injuries: Turn/reposition every 2 hours/use positioning/transfer devices  Goal: Promote/optimize nutrition  Outcome: Progressing  Flowsheets (Taken 1/14/2025 1858)  Promote/optimize nutrition: Offer water/supplements/favorite foods  Goal: Promote skin healing  Outcome: Progressing  Flowsheets (Taken 1/14/2025 1858)  Promote skin healing: Turn/reposition every 2 hours/use positioning/transfer devices

## 2025-01-14 NOTE — ED PROVIDER NOTES
HPI   Chief Complaint   Patient presents with    Leg Swelling     PT. ARRIVED VIA PRIVATE CAR, RIDE PROVIDED, TO ED FROM HOME FOR B/L  LE EDEMA. PT. STATES RECENT ADMIT TO HOSPITAL FOR PE, NO LONG ON BLOOD THINNERS, REPEAT CT SHOWED DISSOLVED PE. PT. STATES B/L LE EDEMA FOR A FEW WEEKS, LT WORSE THAN RT, LT LEG PAINFUL TO TOUCH.        89-year-old female presents with significant left lower extremity pain.  She was on anticoagulation for quite some time because of a pulmonary embolism and lower extremity DVT.  She is no longer anticoagulated.  She was here recently for left lower extremity pain which was thought to be secondary to trauma however she does not recall any trauma and feels that it was atraumatic.  Pain is worse to the point where she is unable to ambulate.              Patient History   Past Medical History:   Diagnosis Date    H/O heart artery stent     H/O shoulder surgery     PE (pulmonary thromboembolism) (Multi)      History reviewed. No pertinent surgical history.  No family history on file.  Social History     Tobacco Use    Smoking status: Former     Types: Cigarettes     Passive exposure: Never    Smokeless tobacco: Never   Vaping Use    Vaping status: Never Used   Substance Use Topics    Alcohol use: Not Currently    Drug use: Never       Physical Exam   ED Triage Vitals [01/14/25 0651]   Temperature Heart Rate Respirations BP   36.8 °C (98.2 °F) 73 18 115/55      Pulse Ox Temp Source Heart Rate Source Patient Position   98 % Temporal -- Sitting      BP Location FiO2 (%)     Left arm --       Physical Exam  Vitals and nursing note reviewed.   Constitutional:       General: She is not in acute distress.     Appearance: She is well-developed.   HENT:      Head: Normocephalic and atraumatic.   Eyes:      Conjunctiva/sclera: Conjunctivae normal.   Cardiovascular:      Rate and Rhythm: Normal rate and regular rhythm.      Heart sounds: No murmur heard.  Pulmonary:      Effort: Pulmonary effort is  normal. No respiratory distress.      Breath sounds: Normal breath sounds.   Abdominal:      Palpations: Abdomen is soft.      Tenderness: There is no abdominal tenderness.   Musculoskeletal:         General: Swelling and tenderness present.      Cervical back: Neck supple.      Left lower leg: Edema present.   Skin:     General: Skin is warm and dry.      Capillary Refill: Capillary refill takes less than 2 seconds.   Neurological:      Mental Status: She is alert.   Psychiatric:         Mood and Affect: Mood normal.           ED [facility]   ED Course as of 01/14/25 1233   e Jan 14, 2025   1231 Dr. Flores was contacted by the patient's son.  At the bedside.  He knows the patient well.  He is concerned she has a left lower extremity cellulitis.  Her ultrasound is negative for DVT.  She does have warmth and erythema noted and she is very tender to touch.  He recommended admission for observation, pain control, IV antibiotics. [CD]      ED Course User Index  [CD] Darnell Bynum MD         Diagnoses as of 01/14/25 1233   Left leg cellulitis                 No data recorded     Tiara Coma Scale Score: 15 (01/14/25 0657 : Alka Roque, RAMANA)                           Medical Decision Making      Procedure  Procedures     Darnell Bynum MD  01/14/25 1233

## 2025-01-14 NOTE — H&P
History Of Present Illness  Estefanía Contreras is a 89 y.o. female with PMH of HFpEF (EF 62% 1/6/25), DLD, HTN, CKD3b, AAA (s/p repair), PAD, ITP, and provoked PE (left hip fracture, completed 3 months of apixaban, on 2-3L NC PRN) [facility] from home on 1/14/2025 with B/L lower extremity pain. The pt is an excellent historian. The pt was [facility] on 1/7/2025 with bilateral lower extremity pain and edema, as well as cough, shortness of breath, and increased need for her home oxygen. She was treated for PNA with azithromycin and rocephin. Her LE edema was treated with Lasix. Patient was discharged home on 1/10/25 with PCP follow-up, three further days of doxycycline, and short courses of tramadol and gabapentin. Since her discharge, the pt has taken the medications she was discharged with including the 3 days of doxycycline, which she completed. Her leg swelling, pain, and redness worsened since returning home despite the pain medication. The tramadol isn't helping. She is having a very hard time putting any weight on her legs. A bruise was noted on her left leg last admission and XR was obtained showing no acute fracture. A LE ultrasound was also obtained that did not show any blood clot. She denies any injury to the leg. The left leg is more painful than the right. She denies any fever/chills, headache, chest pain/palpatations, shortness of breath, cough, abdominal pain, N/V/D, dysuria, hematuria, or bloody/black stools/   She decided to come back to the ER because of the pain and difficulty walking. ER evaluation included repeat LE US that was negative for VTE. Pt was recommended for admission to the hospital and accepted on the service of Dr. Flores who would like her treated for cellulitis with IV antibiotics. Pt notes she has been having to use oxygen more over last month and is having difficulty tolerating being off O2 at home.      Past Medical History  Past Medical History:   Diagnosis Date     H/O heart artery stent     H/O shoulder surgery     PE (pulmonary thromboembolism) (Multi)        Surgical History  History reviewed. No pertinent surgical history.     Social History  Social History     Tobacco Use    Smoking status: Former     Types: Cigarettes     Passive exposure: Never    Smokeless tobacco: Never   Vaping Use    Vaping status: Never Used   Substance Use Topics    Alcohol use: Not Currently    Drug use: Never        Family History  No family history on file.     Allergies  Patient has no known allergies.    Review of Systems   Constitutional:  Negative for chills, diaphoresis and fever.   HENT:  Negative for congestion and sore throat.    Eyes:  Negative for visual disturbance.   Respiratory:  Negative for cough, chest tightness, shortness of breath and wheezing.    Cardiovascular:  Positive for leg swelling. Negative for chest pain and palpitations.   Gastrointestinal:  Negative for abdominal pain, diarrhea, nausea and vomiting.   Endocrine: Negative for polyuria.   Genitourinary:  Negative for dysuria and hematuria.   Musculoskeletal:  Negative for joint swelling.   Skin:  Positive for color change. Negative for rash.   Allergic/Immunologic: Negative for immunocompromised state.   Neurological:  Negative for syncope and headaches.   Psychiatric/Behavioral:  Negative for confusion and hallucinations.      Physical Exam  Constitutional:       Appearance: Normal appearance. She is normal weight.   HENT:      Head: Normocephalic and atraumatic.      Mouth/Throat:      Mouth: Mucous membranes are moist.   Eyes:      Conjunctiva/sclera: Conjunctivae normal.   Cardiovascular:      Rate and Rhythm: Normal rate and regular rhythm.      Heart sounds: No murmur heard.  Pulmonary:      Effort: No respiratory distress.      Breath sounds: No wheezing, rhonchi or rales.   Abdominal:      General: There is no distension.      Tenderness: There is no abdominal tenderness. There is no guarding.  "  Musculoskeletal:         General: No deformity.      Cervical back: No rigidity.   Skin:     General: Skin is warm.      Comments: -Medial LLE just superior to her ankle is an ecchymosis with surrounding erythema.  Erythema is noncircumferential.  She also has a nonblanchable petechiae over her dorsal surfaces of both her feet closer to her toes. Anterior RLE just superior to her ankle is a small patch of erythema. Her bilateral lower extremities have +2 pitting edema, and there is +3 pedal edema.   Neurological:      General: No focal deficit present.      Mental Status: She is alert and oriented to person, place, and time.   Psychiatric:         Mood and Affect: Mood normal.       Last Recorded Vitals  Visit Vitals  /63   Pulse 60   Temp 36.8 °C (98.2 °F) (Temporal)   Resp 15   Ht 1.6 m (5' 3\")   Wt 56.7 kg (125 lb)   SpO2 100%   BMI 22.14 kg/m²   Smoking Status Former   BSA 1.59 m²        Relevant Results  Results for orders placed or performed during the hospital encounter of 01/14/25 (from the past 24 hours)   CBC and Auto Differential   Result Value Ref Range    WBC 5.8 4.4 - 11.3 x10*3/uL    nRBC 0.0 0.0 - 0.0 /100 WBCs    RBC 3.34 (L) 4.00 - 5.20 x10*6/uL    Hemoglobin 9.5 (L) 12.0 - 16.0 g/dL    Hematocrit 31.1 (L) 36.0 - 46.0 %    MCV 93 80 - 100 fL    MCH 28.4 26.0 - 34.0 pg    MCHC 30.5 (L) 32.0 - 36.0 g/dL    RDW 18.0 (H) 11.5 - 14.5 %    Platelets 206 150 - 450 x10*3/uL    Neutrophils % 66.4 40.0 - 80.0 %    Immature Granulocytes %, Automated 0.5 0.0 - 0.9 %    Lymphocytes % 17.5 13.0 - 44.0 %    Monocytes % 10.6 2.0 - 10.0 %    Eosinophils % 4.1 0.0 - 6.0 %    Basophils % 0.9 0.0 - 2.0 %    Neutrophils Absolute 3.88 1.60 - 5.50 x10*3/uL    Immature Granulocytes Absolute, Automated 0.03 0.00 - 0.50 x10*3/uL    Lymphocytes Absolute 1.02 0.80 - 3.00 x10*3/uL    Monocytes Absolute 0.62 0.05 - 0.80 x10*3/uL    Eosinophils Absolute 0.24 0.00 - 0.40 x10*3/uL    Basophils Absolute 0.05 0.00 - 0.10 " x10*3/uL   Comprehensive metabolic panel   Result Value Ref Range    Glucose 79 74 - 99 mg/dL    Sodium 140 136 - 145 mmol/L    Potassium 4.8 3.5 - 5.3 mmol/L    Chloride 103 98 - 107 mmol/L    Bicarbonate 29 21 - 32 mmol/L    Anion Gap 13 10 - 20 mmol/L    Urea Nitrogen 38 (H) 6 - 23 mg/dL    Creatinine 1.61 (H) 0.50 - 1.05 mg/dL    eGFR 30 (L) >60 mL/min/1.73m*2    Calcium 9.0 8.6 - 10.3 mg/dL    Albumin 3.7 3.4 - 5.0 g/dL    Alkaline Phosphatase 76 33 - 136 U/L    Total Protein 6.7 6.4 - 8.2 g/dL    AST 20 9 - 39 U/L    Bilirubin, Total 0.3 0.0 - 1.2 mg/dL    ALT 11 7 - 45 U/L   B-type natriuretic peptide   Result Value Ref Range     (H) 0 - 99 pg/mL   D-dimer, VTE Exclusion   Result Value Ref Range    D-Dimer, Quantitative VTE Exclusion 4,886 (H) <=500 ng/mL FEU   Vascular US lower extremity venous duplex left   Result Value Ref Range    BSA 1.59 m2        Imaging  Vascular US lower extremity venous duplex left    Result Date: 1/14/2025  Preliminary Cardiology Report          [place] [address] Tel [phone] and Fax [phone]       Preliminary Vascular Lab Report  VASC US LOWER EXTREMITY VENOUS DUPLEX LEFT  Patient Name:      SEAN YUNG RICKY  Reading Physician:  [id number] Madeline Yanze MD Study Date:        1/14/2025    Ordering Physician: [id number] ZAMAN MRN/PID:           [id number]     Technologist:       Saniya Summers RVT Accession#:        [id number] Technologist 2: Date of Birth/Age: 12/11/1934   Encounter#:         [id number] Gender:            F Admission Status:  Emergency    Location Performed: [facility]  Diagnosis/ICD: Pain in left leg-M79.605 Procedure/CPT: 08406 Peripheral venous duplex scan for DVT Limited  PRELIMINARY CONCLUSIONS: Right Lower Venous: The right common femoral vein demonstrates normal spontaneous and respirophasic flow. Left Lower Venous: No evidence of acute deep vein thrombus visualized in the left lower  extremity. Cannot rule out thrombus in non-visualized posterior tibial and peroneal veins due to edema.  Imaging & Doppler Findings:  Right        Flow CFV   Spontaneous/Phasic  Left                  Compress Thrombus        Flow Distal External Iliac   Yes      None   Spontaneous/Phasic CFV                     Yes      None   Spontaneous/Phasic PFV                     Yes      None FV Proximal             Yes      None   Spontaneous/Phasic FV Mid                  Yes      None FV Distal               Yes      None Popliteal               Yes      None   Spontaneous/Phasic VASCULAR PRELIMINARY REPORT completed by Saniya Summers RVT on 1/14/2025 at 9:47:01 AM  ** Final **     XR ankle bilateral 2 views    Result Date: 1/7/2025  Interpreted By:  Chelsey Merino, STUDY: XR ANKLE BILATERAL 2 VIEWS;  1/7/2025 1:15 pm   INDICATION: Signs/Symptoms:TTP.   COMPARISON: None.   ACCESSION NUMBER(S): [id number]   ORDERING CLINICIAN: EMIL BAILEY   FINDINGS: Left ankle: No acute fracture or dislocation is identified. The ankle mortise is congruent. There are degenerative changes in the visualized joints of the midfoot. A small plantar calcaneal enthesophyte is present. There is moderate-to-marked diffuse subcutaneous edema the imaged calf.   Right ankle: No acute fracture or dislocation is identified. The ankle mortise is congruent. There are degenerative changes in the visualized joints of the midfoot. A small plantar calcaneal enthesophyte is present. There is moderate-to-marked diffuse subcutaneous edema the imaged calf.       Bilateral calf edema without acute osseous abnormality.   MACRO None   Signed by: Chelsey Merino 1/7/2025 1:23 PM Dictation workstation:   [id number]     Home Medications  Prior to Admission medications    Medication Sig Start Date End Date Taking? Authorizing Provider   azelastine (Astelin) 137 mcg (0.1 %) nasal spray Administer 2 sprays into each nostril 2 times a day. Use in each nostril as  directed  Patient taking differently: Administer 2 sprays into each nostril if needed. Use in each nostril as directed 11/21/24 11/21/25 Yes Lakia Johnson MD   dilTIAZem LA (Cardizem LA) 180 mg 24 hr tablet Take 1 tablet (180 mg) by mouth once daily.  Patient taking differently: Take 1 tablet (180 mg) by mouth once daily in the morning. 11/5/24  Yes Lakia Johnson MD   ferrous sulfate 325 (65 Fe) MG EC tablet Take 1 tablet by mouth once daily with breakfast. Do not crush, chew, or split. 11/5/24 11/5/25 Yes Lakia Johnson MD   furosemide (Lasix) 20 mg tablet Take 1 tablet (20 mg) by mouth once daily.  Patient taking differently: Take 1 tablet (20 mg) by mouth once daily in the morning. 12/16/24 12/16/25 Yes Lakia Johnson MD   meloxicam (Mobic) 15 mg tablet Take 1 tablet (15 mg) by mouth once daily.  Patient taking differently: Take 1 tablet (15 mg) by mouth once daily with breakfast. 12/16/24  Yes Lakia Johnson MD   mv-min-FA-vit K-lutein-zeaxant (PreserVision AREDS 2 Plus MV) 200 mcg-15 mcg- 5 mg-1 mg capsule Take 1 capsule by mouth 2 times a day.   Yes Historical Provider, MD   oxygen (O2) gas therapy Inhale 3 L/min continuously.   Yes Historical Provider, MD   potassium chloride CR 20 mEq ER tablet Take 1 tablet (20 mEq) by mouth once daily in the morning.   Yes Historical Provider, MD   pramipexole (Mirapex) 1 mg tablet Take 1 tablet (1 mg) by mouth once daily at bedtime. 12/16/24 12/16/25 Yes Lakia Johnson MD   simvastatin (Zocor) 20 mg tablet Take 1 tablet (20 mg) by mouth once daily at bedtime. 12/16/24 12/16/25 Yes Lakia Johnson MD   traMADol (Ultram) 50 mg tablet Take 1 tablet (50 mg) by mouth every 8 hours if needed for severe pain (7 - 10) for up to 3 days. 1/9/25 1/14/25 Yes Jorge Mart, DO   traZODone (Desyrel) 50 mg tablet Take 1.5 tablets (75 mg) by mouth as needed at bedtime for sleep. 1/9/25  Yes Jorge Mart,    doxycycline (Vibramycin) 100 mg capsule Take 1 capsule (100 mg) by  mouth 2 times a day for 3 days. Take with at least 8 ounces (large glass) of water, do not lie down for 30 minutes after  Patient not taking: Reported on 1/14/2025 1/9/25 1/12/25  Jorge Mart DO   gabapentin (Neurontin) 100 mg capsule Take 1 capsule (100 mg) by mouth 2 times a day for 3 days.  Patient not taking: Reported on 1/14/2025 1/9/25 1/12/25  Jorge Mart DO   apixaban (Eliquis) 5 mg tablet Take 1 tablet (5 mg) by mouth 2 times a day.  Patient not taking: Reported on 1/7/2025 11/19/24 1/9/25  Jorge Bhandari MD   traZODone (Desyrel) 50 mg tablet Take 1 tablet (50 mg) by mouth as needed at bedtime for sleep.  Patient taking differently: Take 1 tablet (50 mg) by mouth once daily at bedtime. 12/20/24 1/9/25  Lakia Johnson MD   vit C,F-Tb-pvlkp-lutein-zeaxan (PreserVision AREDS-2) 250-90-40-1 mg capsule Take 1 capsule by mouth 2 times a day. 10/10/24 1/14/25  Kay Barcenas, CARLEE-CNP       Medications  Scheduled medications  ampicillin-sulbactam, 3 g, intravenous, q12h  dilTIAZem CD, 180 mg, oral, Daily  [START ON 1/15/2025] ferrous sulfate (325 mg ferrous sulfate), 325 mg, oral, Daily with breakfast  furosemide, 20 mg, oral, q AM  heparin (porcine), 5,000 Units, subcutaneous, q8h  [START ON 1/15/2025] meloxicam, 7.5 mg, oral, Daily with breakfast  potassium chloride CR, 20 mEq, oral, q AM  pramipexole, 1 mg, oral, Nightly  simvastatin, 20 mg, oral, Nightly      Continuous medications     PRN medications  acetaminophen, 650 mg, q4h PRN   Or  acetaminophen, 650 mg, q4h PRN   Or  acetaminophen, 650 mg, q4h PRN  HYDROcodone-acetaminophen, 1 tablet, q6h PRN  traZODone, 75 mg, Nightly PRN           Assessment/Plan   Assessment & Plan  Left leg cellulitis    Bilateral LE cellulitis  LLE ecchymosis  B/L LE edema, acute on chronic  -HDS  -Does not meets SIRS/sepsis   -No leukocytosis and afebrile  -Denies fever/chills  -Will initiate IV Unasyn, renally dosed  -Patient reports compliance with her home PO  Lasix 20mg QD, which is continued  -B/L LE duplex ultrasound negative for DVT  -Bruising to LLE noted during last admission and XR of L ankle obtained showing no acute fracture with some degenerative changes  -Pt reports difficulty ambulating secondary to pain  -PT/OT  -Up with assist and fall precautions    Chronic diastolic heart failure  Chronic respiratory failure  Hx of PE and DVT  -Lungs are CTA on exam  -Pt requiring 3L NC. Pt reports requiring 2-3L oxygen PRN/intermittently since she had her PE. States she has been using it more continuously lately because she gets more SOB. CT for PE obtained 1/7/2025 showing no acute PE or PNA.  -Echo obtained 1/3/2025 showing EF 62%, Grade I diastolic dysfunction, normal RV systolic function  -Monitor pulse ox  -Admitted from 10/2-10/7 for provoked multiple right sided pulmonary emboli with signs of right heart strain after having hip surgery   -Completed Eliquis just about at the New Year  -Will use SQ heparin for VTE PPx    Other chronic medical conditions:  HTN  CKDIII  AAA status post repair  Carotid disease  PAD  ITP  Dyslipidemia  Neuropathic pain  -Continue home medications as appropriate  -Monitor renal function    VTE PPx: SQ heparin    See additional orders for further plan of care.   Further evaluation and management per attending and consulting physicians.      Bahman Flores MD  Patient fully evaluated in the emergency room on January 14.  Continue present IV antibiotics and monitor.  Recheck labs in AM.

## 2025-01-15 ENCOUNTER — HOME CARE VISIT (OUTPATIENT)
Dept: HOME HEALTH SERVICES | Facility: HOME HEALTH | Age: OVER 89
End: 2025-01-15

## 2025-01-15 LAB
ATRIAL RATE: 70 BPM
P AXIS: 72 DEGREES
P OFFSET: 150 MS
P ONSET: 99 MS
PR INTERVAL: 214 MS
Q ONSET: 206 MS
QRS COUNT: 11 BEATS
QRS DURATION: 88 MS
QT INTERVAL: 408 MS
QTC CALCULATION(BAZETT): 440 MS
QTC FREDERICIA: 429 MS
R AXIS: -35 DEGREES
T AXIS: 49 DEGREES
T OFFSET: 410 MS
VENTRICULAR RATE: 70 BPM

## 2025-01-15 PROCEDURE — 2500000001 HC RX 250 WO HCPCS SELF ADMINISTERED DRUGS (ALT 637 FOR MEDICARE OP)

## 2025-01-15 PROCEDURE — 97161 PT EVAL LOW COMPLEX 20 MIN: CPT | Mod: GP

## 2025-01-15 PROCEDURE — 2500000002 HC RX 250 W HCPCS SELF ADMINISTERED DRUGS (ALT 637 FOR MEDICARE OP, ALT 636 FOR OP/ED): Performed by: PHYSICIAN ASSISTANT

## 2025-01-15 PROCEDURE — 97165 OT EVAL LOW COMPLEX 30 MIN: CPT | Mod: GO

## 2025-01-15 PROCEDURE — 2500000005 HC RX 250 GENERAL PHARMACY W/O HCPCS: Performed by: INTERNAL MEDICINE

## 2025-01-15 PROCEDURE — 2500000004 HC RX 250 GENERAL PHARMACY W/ HCPCS (ALT 636 FOR OP/ED): Performed by: PHYSICIAN ASSISTANT

## 2025-01-15 PROCEDURE — 1100000001 HC PRIVATE ROOM DAILY

## 2025-01-15 PROCEDURE — 2500000001 HC RX 250 WO HCPCS SELF ADMINISTERED DRUGS (ALT 637 FOR MEDICARE OP): Performed by: PHYSICIAN ASSISTANT

## 2025-01-15 RX ORDER — HYDROCODONE BITARTRATE AND ACETAMINOPHEN 5; 325 MG/1; MG/1
1 TABLET ORAL ONCE
Status: COMPLETED | OUTPATIENT
Start: 2025-01-15 | End: 2025-01-15

## 2025-01-15 RX ORDER — LIDOCAINE 560 MG/1
3 PATCH PERCUTANEOUS; TOPICAL; TRANSDERMAL DAILY
Status: DISCONTINUED | OUTPATIENT
Start: 2025-01-15 | End: 2025-01-16

## 2025-01-15 RX ADMIN — HEPARIN SODIUM 5000 UNITS: 5000 INJECTION INTRAVENOUS; SUBCUTANEOUS at 08:57

## 2025-01-15 RX ADMIN — HYDROCODONE BITARTRATE AND ACETAMINOPHEN 1 TABLET: 5; 325 TABLET ORAL at 03:46

## 2025-01-15 RX ADMIN — TRAZODONE HYDROCHLORIDE 75 MG: 50 TABLET ORAL at 20:41

## 2025-01-15 RX ADMIN — MELOXICAM 7.5 MG: 7.5 TABLET ORAL at 09:10

## 2025-01-15 RX ADMIN — HYDROCODONE BITARTRATE AND ACETAMINOPHEN 1 TABLET: 5; 325 TABLET ORAL at 09:10

## 2025-01-15 RX ADMIN — Medication 1 CAPSULE: at 20:24

## 2025-01-15 RX ADMIN — HEPARIN SODIUM 5000 UNITS: 5000 INJECTION INTRAVENOUS; SUBCUTANEOUS at 15:54

## 2025-01-15 RX ADMIN — FUROSEMIDE 20 MG: 20 TABLET ORAL at 08:58

## 2025-01-15 RX ADMIN — HEPARIN SODIUM 5000 UNITS: 5000 INJECTION INTRAVENOUS; SUBCUTANEOUS at 22:10

## 2025-01-15 RX ADMIN — SIMVASTATIN 20 MG: 20 TABLET, FILM COATED ORAL at 20:25

## 2025-01-15 RX ADMIN — FERROUS SULFATE TAB 325 MG (65 MG ELEMENTAL FE) 325 MG: 325 (65 FE) TAB at 08:57

## 2025-01-15 RX ADMIN — AMPICILLIN SODIUM AND SULBACTAM SODIUM 3 G: 2; 1 INJECTION, POWDER, FOR SOLUTION INTRAMUSCULAR; INTRAVENOUS at 15:54

## 2025-01-15 RX ADMIN — AMPICILLIN SODIUM AND SULBACTAM SODIUM 3 G: 2; 1 INJECTION, POWDER, FOR SOLUTION INTRAMUSCULAR; INTRAVENOUS at 03:39

## 2025-01-15 RX ADMIN — PRAMIPEXOLE DIHYDROCHLORIDE 1 MG: 1 TABLET ORAL at 20:26

## 2025-01-15 RX ADMIN — DILTIAZEM HYDROCHLORIDE 180 MG: 180 CAPSULE, COATED, EXTENDED RELEASE ORAL at 08:58

## 2025-01-15 RX ADMIN — HYDROCODONE BITARTRATE AND ACETAMINOPHEN 1 TABLET: 5; 325 TABLET ORAL at 15:53

## 2025-01-15 RX ADMIN — LIDOCAINE 4% 3 PATCH: 40 PATCH TOPICAL at 17:19

## 2025-01-15 RX ADMIN — HYDROCODONE BITARTRATE AND ACETAMINOPHEN 1 TABLET: 5; 325 TABLET ORAL at 22:10

## 2025-01-15 ASSESSMENT — PAIN SCALES - GENERAL
PAINLEVEL_OUTOF10: 10 - WORST POSSIBLE PAIN
PAINLEVEL_OUTOF10: 10 - WORST POSSIBLE PAIN
PAINLEVEL_OUTOF10: 9
PAINLEVEL_OUTOF10: 8
PAINLEVEL_OUTOF10: 4
PAINLEVEL_OUTOF10: 5 - MODERATE PAIN
PAINLEVEL_OUTOF10: 8
PAINLEVEL_OUTOF10: 2

## 2025-01-15 ASSESSMENT — PAIN DESCRIPTION - LOCATION
LOCATION: LEG

## 2025-01-15 ASSESSMENT — COGNITIVE AND FUNCTIONAL STATUS - GENERAL
MOVING TO AND FROM BED TO CHAIR: A LITTLE
MOBILITY SCORE: 19
DRESSING REGULAR UPPER BODY CLOTHING: A LITTLE
DRESSING REGULAR LOWER BODY CLOTHING: A LITTLE
CLIMB 3 TO 5 STEPS WITH RAILING: A LITTLE
PERSONAL GROOMING: A LITTLE
DRESSING REGULAR LOWER BODY CLOTHING: A LOT
STANDING UP FROM CHAIR USING ARMS: A LITTLE
MOVING TO AND FROM BED TO CHAIR: A LITTLE
TURNING FROM BACK TO SIDE WHILE IN FLAT BAD: A LITTLE
DAILY ACTIVITIY SCORE: 17
HELP NEEDED FOR BATHING: A LITTLE
MOBILITY SCORE: 17
WALKING IN HOSPITAL ROOM: A LOT
HELP NEEDED FOR BATHING: A LOT
TURNING FROM BACK TO SIDE WHILE IN FLAT BAD: A LITTLE
CLIMB 3 TO 5 STEPS WITH RAILING: A LOT
DAILY ACTIVITIY SCORE: 21
STANDING UP FROM CHAIR USING ARMS: A LITTLE
TOILETING: A LITTLE
WALKING IN HOSPITAL ROOM: A LITTLE
TOILETING: A LITTLE

## 2025-01-15 ASSESSMENT — PAIN DESCRIPTION - DESCRIPTORS: DESCRIPTORS: BURNING

## 2025-01-15 ASSESSMENT — PAIN DESCRIPTION - ORIENTATION
ORIENTATION: RIGHT;LEFT

## 2025-01-15 ASSESSMENT — PAIN - FUNCTIONAL ASSESSMENT
PAIN_FUNCTIONAL_ASSESSMENT: 0-10

## 2025-01-15 ASSESSMENT — PAIN SCALES - WONG BAKER: WONGBAKER_NUMERICALRESPONSE: HURTS WHOLE LOT

## 2025-01-15 NOTE — PROGRESS NOTES
Occupational Therapy    Evaluation    Patient Name: Estefanía Contreras  MRN: [id number]  Today's Date: 1/15/2025  Time Calculation  Start Time: 1123  Stop Time: 1140  Time Calculation (min): 17 min    Assessment  IP OT Assessment  OT Assessment: Patient presents with a decline in functional status and would benefit from O.T. services at a low intensity to improve independence with ADLs, transfers & mobility.  Prognosis: Good  End of Session Communication: Bedside nurse  End of Session Patient Position: Up in chair, Alarm on (call light in reach)    Plan:  Treatment Interventions: ADL retraining, Functional transfer training, Neuromuscular reeducation, Compensatory technique education  OT Frequency: 3 times per week  OT Discharge Recommendations: Low intensity level of continued care  OT - OK to Discharge: Yes (from an O.T. standpoint)    Subjective     Current Problem:  Patient Active Problem List   Diagnosis    Closed fracture of neck of left femur with routine healing    Stage 3b chronic kidney disease (Multi)    Status post reverse arthroplasty of left shoulder    Thrombocytopenia (CMS-HCC)    HTN (hypertension)    Peripheral vascular disease (CMS-HCC)    Cellulitis of great toe of right foot    Onychocryptosis    Anemia    Neuropathy    HLD (hyperlipidemia)    Anorexia    Constipation    Anxiety    Change in mental status    Dysuria    Pulmonary embolism, other, unspecified chronicity, unspecified whether acute cor pulmonale present (Multi)    Dermatochalasis of left upper eyelid    Abdominal aortic aneurysm without rupture (CMS-HCC)    Age-related osteoporosis with current pathological fracture, left humerus, subsequent encounter for fracture with routine healing    Carotid stenosis    Neuropathy of left foot    Presence of left artificial shoulder joint    Hypervolemia, unspecified hypervolemia type    Left leg cellulitis       General:  General  Reason for Referral: OT eval & treat for ADL/safety (LLE  cellulitis)  Referred By: Caryn Martinez PA-C  Past Medical History Relevant to Rehab: cc: BLE edema L > R, pain BLE; recent admit for PE. (PMH: left femur fracture, CKD, HTN, PVD, neuropathy, anemia, anxiety)  Co-Treatment: PT  Co-Treatment Reason: To maximize patient safety & mobility  Prior to Session Communication: Bedside nurse  Patient Position Received: Bed, 2 rail up  General Comment: Patient cleared for therapy by nursing.    Precautions:  Precautions Comment: OOB with assist, high fall risk, 2 L O2      Pain:  Pain Assessment  Pain Assessment: 0-10  0-10 (Numeric) Pain Score: 10 - Worst possible pain  Pain Type: Acute pain  Pain Location: Leg  Pain Orientation: Right, Left  Pain Interventions: Repositioned (patient reports she was medicated for pain this a.m.)    Objective     Cognition:  Overall Cognitive Status: Within Functional Limits  Orientation Level: Oriented X4        Home Living:  Home Living Comments: Lives alone, son lives across the street; first floor senior apartment; Has [facility] aide 4-6 days/wk x 5 hours/day to assist with bathing/dressing and cooking/cleaning. Patient reports she is independent transfers & mobility with wheeled walker; independent to undress self in evenings and manage light meals/reheating food. Owns wheeled walker, bedside commode over toilet, tub/shower with hand held shower.       ADL:  Grooming Assistance: Independent  UE Dressing Assistance: Stand by  LE Dressing Assistance: Moderate    Activity Tolerance:  Endurance: Endurance does not limit participation in activity    Bed Mobility/Transfers:   Bed Mobility  Bed Mobility:  (SBA supine to sit)  Transfers  Transfer:  (CGA sit to stand from edge of bed)    Ambulation/Gait Training:  Functional Mobility  Functional Mobility Performed:  (SBA with wheeled walker)    Sitting Balance:  Dynamic Sitting Balance  Dynamic Sitting-Level of Assistance: Distant supervision    Standing Balance:  Dynamic Standing  Balance  Dynamic Standing-Level of Assistance: Contact guard, Close supervision    Sensation:  Sensation Comment: Burning sensation BLE    Strength:  Strength Comments: BUE grossly WFL; chronic limitation LUE s/p nerve damage related to remote shoulder surgery      Outcome Measures: [facility] Daily Activity  Putting on and taking off regular lower body clothing: A lot  Bathing (including washing, rinsing, drying): A lot  Putting on and taking off regular upper body clothing: A little  Toileting, which includes using toilet, bedpan or urinal: A little  Taking care of personal grooming such as brushing teeth: A little  Eating Meals: None  Daily Activity - Total Score: 17                    EDUCATION:     Education Documentation  ADL Training, taught by Brittny Orta, OT at 1/15/2025 12:48 PM.  Learner: Patient  Readiness: Acceptance  Method: Explanation  Response: Verbalizes Understanding    Education Comments  No comments found.        Goals:   Encounter Problems       Encounter Problems (Active)       OT Goals       Increase LE bathing & dressing to min assist with adaptive equipment as needed.  (Progressing)       Start:  01/15/25    Expected End:  01/29/25            Increase functional transfers to/from bed, chair & commode to supervision with DME for safety  (Progressing)       Start:  01/15/25    Expected End:  01/29/25            Increase toileting to supervision with DME for safety (Progressing)       Start:  01/15/25    Expected End:  01/29/25

## 2025-01-15 NOTE — CARE PLAN
The patient's goals for the shift include  comfort and safety    The clinical goals for the shift include pt will be safe and comftorable

## 2025-01-15 NOTE — PROGRESS NOTES
Dear Celia Castro,    Your symptoms show that you may have coronavirus (COVID-19). This illness can cause fever, cough and trouble breathing. Many people get a mild case and get better on their own. Some people can get very sick.    Because you also reported sore throat I would like to also test you for Strep Throat to determine if we need to treat you for that as well.    What should I do?  We would like to test you for Covid-19 virus and Strep Throat. I have placed orders for these tests.     For all employees or close contacts (except [place] and [facility] - see below), go to your [employer] home page and scroll down to the section that says  You have an appointment that needs to be scheduled  and click the large green button that says  Schedule Now  and follow the steps to find the next available opening. PLEASE Mention when asked what you are scheduling for that you need BOTH Covid and Strep tests.   If you are unable to complete these steps or if you cannot find any available times, please call [phone] to schedule employee testing.       [place] employees or close contacts, please call [phone].   [facility]) employees or close contacts call [phone].      When it's time for your COVID and Strep test:  Stay at least 6 feet away from others. (If someone will drive you to your test, stay in the backseat, as far away from the  as you can.)  Cover your mouth and nose with a mask, tissue or washcloth.  Go straight to the testing site. Don't make any stops on the way there or back.    Starting now:     Do not go to work.   o If you receive a negative COVID-19 test result and were NOT exposed to someone with a known positive COVID-19 test, you can return to work once you're free of fever for 24 hours without fever-reducing medication and your symptoms are improving or resolved.  o If you receive a positive COVID-19 test result, you must be cleared by Employee Occupational Health and Safety  Physical Therapy    Physical Therapy Evaluation    Patient Name: Estefanía Contreras  MRN: [id number]  Today's Date: 1/15/2025   Time Calculation  Start Time: 1122  Stop Time: 1140  Time Calculation (min): 18 min  724/724-A    Assessment/Plan   PT Assessment  PT Assessment Results: Decreased mobility  End of Session Communication: Bedside nurse  End of Session Patient Position: Alarm on, Up in chair (All needs in reach and no complaints)  IP OR SWING BED PT PLAN  Inpatient or Swing Bed: Inpatient  PT Plan  Treatment/Interventions: Bed mobility, Transfer training, Gait training  PT Plan: Ongoing PT  PT Frequency: 4 times per week  PT Discharge Recommendations: Low intensity level of continued care  PT - OK to Discharge: Yes    Subjective     Current Problem:  1. Left leg cellulitis        2. Pain in left leg  Vascular US lower extremity venous duplex left        Patient Active Problem List   Diagnosis    Closed fracture of neck of left femur with routine healing    Stage 3b chronic kidney disease (Multi)    Status post reverse arthroplasty of left shoulder    Thrombocytopenia (CMS-HCC)    HTN (hypertension)    Peripheral vascular disease (CMS-HCC)    Cellulitis of great toe of right foot    Onychocryptosis    Anemia    Neuropathy    HLD (hyperlipidemia)    Anorexia    Constipation    Anxiety    Change in mental status    Dysuria    Pulmonary embolism, other, unspecified chronicity, unspecified whether acute cor pulmonale present (Multi)    Dermatochalasis of left upper eyelid    Abdominal aortic aneurysm without rupture (CMS-HCC)    Age-related osteoporosis with current pathological fracture, left humerus, subsequent encounter for fracture with routine healing    Carotid stenosis    Neuropathy of left foot    Presence of left artificial shoulder joint    Hypervolemia, unspecified hypervolemia type    Left leg cellulitis     General Visit Information:  General  Reason for Referral: PT Eval and Treat  Referred By: Caryn BROWN  "to return to work.   o If you were exposed to someone who has tested positive for COVID-19, you can return to work 14 days after your last contact with the positive individual, provided you do not have symptoms at all during that time. In some cases, your manager may ask you to come back sooner than 14 days.     During this time, don't leave the house except for testing or medical care.  o Stay in your own room, even for meals. Use your own bathroom if you can.  o Stay away from others in your home. No hugging, kissing or shaking hands. No visitors.  o Don't go to work, school or anywhere else.    Clean \"high touch\" surfaces often (doorknobs, counters, handles, etc.). Use a household cleaning spray or wipes. You'll find a full list of  on the EPA website: www.epa.gov/pesticide-registration/list-n-disinfectants-use-against-sars-cov-2.    Cover your mouth and nose with a mask, tissue or washcloth to avoid spreading germs.    Wash your hands and face often. Use soap and water.    People in these groups are at risk for severe illness due to COVID-19:  o People 65 years and older  o People who live in a nursing home or long-term care facility  o People with chronic disease (lung, heart, cancer, diabetes, kidney, liver, immunologic)  o People who have a weakened immune system, including those who:  - Are in cancer treatment  - Take medicine that weakens the immune system, such as corticosteroids  - Had a bone marrow or organ transplant  - Have an immune deficiency  - Have poorly controlled HIV or AIDS  - Are obese (body mass index of 40 or higher)  - Smoke regularly      Caregivers should wear gloves while washing dishes, handling laundry and cleaning bedrooms and bathrooms.    Use caution when washing and drying laundry: Don't shake dirty laundry, and use the warmest water setting that you can.    For more tips, go to www.cdc.gov/coronavirus/2019-ncov/downloads/10Things.pdf.    Sign up for [place]. We know " DYLAN Martinez  Past Medical History Relevant to Rehab: 89 y.o. female with PMH of HFpEF (EF 62% 1/6/25), DLD, HTN, CKD3b, AAA (s/p repair), PAD, ITP, and provoked PE (left hip fracture, completed 3 months of apixaban, on 2-3L NC PRN) presented to [facility] ED from home on 1/14/2025 with B/L lower extremity pain.  The pt was admitted to [facility] on 1/7/2025 with bilateral lower extremity pain and edema, as well as cough, shortness of breath, and increased need for her home oxygen. She was treated for PNA with azithromycin and rocephin. Her LE edema was treated with Lasix. Patient was discharged home on 1/10/25 with PCP follow-up, three further days of doxycycline, and short courses of tramadol and gabapentin. Since her discharge, the pt has taken the medications she was discharged with including the 3 days of doxycycline, which she completed. Her leg swelling, pain, and redness worsened since returning home despite the pain medication.  Co-Treatment: OT  Co-Treatment Reason: maximize safety and functional mobility  Prior to Session Communication: Bedside nurse  Patient Position Received: Bed, 2 rail up, Alarm off, not on at start of session (Agreeable to PT)    Home Living:  Home Living  Home Living Comments: Pt lives alone in a senior living apt with 2 steps no HR then 4 steps with HR and then 1 threshold step. 1st floor apt, tub/shower with [facility] and standard toilet with BSC overtop it.    Prior Level of Function:  Prior Function Per Pt/Caregiver Report  Level of [place]: Independent with ADLs and functional transfers (Pt amb with RW, has HHC aide 4-6 days/wk x 5 hours/day to assist with bathing/dressing and cooking/cleaning. independent to undress self in evenings and manage light meals/reheating food.)    Precautions:  Precautions  Precautions Comment: Fall precautions    Objective     Pain:  Pain Assessment  Pain Assessment:  (10/10 L LE, premedicated, repositioned for comfort s/p  it's scary to hear that you might have COVID-19. We want to track your symptoms to make sure you're okay over the next 2 weeks. Please look for an email from [employer]--this is a free, online program that we'll use to keep in touch. To sign up, follow the link in the email you will receive. Learn more at [email or web address]    How can I take care of myself?    Get lots of rest. Drink extra fluids (unless a doctor has told you not to)    Take Tylenol (acetaminophen) for fever or pain. If you have liver or kidney problems, ask your family doctor if it's okay to take Tylenol.  Adults can take either:    650 mg (two 325 mg pills) every 4 to 6 hours, or     1,000 mg (two 500 mg pills) every 8 hours as needed.    Note: Don't take more than 3,000 mg in one day. Acetaminophen is found in many medicines (both prescribed and over-the-counter medicines). Read all labels to be sure you don't take too much.  For children, check the Tylenol bottle for the right dose. The dose is based on the child's age or weight.    If you have other health problems (like cancer, heart failure, an organ transplant or severe kidney disease): Call your specialty clinic if you don't feel better in the next 2 days.    Know when to call 911. Emergency warning signs include:  Trouble breathing or shortness of breath  Pain or pressure in the chest that doesn't go away  Feeling confused like you haven't felt before, or not being able to wake up  Bluish-colored lips or face  Where can I get more information?    [facility] - About COVID-19: [email or web address]    CDC - What to Do If You're Sick: www.cdc.gov/coronavirus/2019-ncov/about/steps-when-sick.html      December 11, 2020    RE:  Celia Castro                                                                                                                                                       [id number] [id number]        To whom it may concern:    I  session)    Cognition:  Cognition  Overall Cognitive Status: Within Functional Limits    General Assessments:  Sensation  Light Touch:  ((+) burning sensation in B LEs)  Strength  Strength Comments: B LE ROM and strength WFL  Dynamic Standing Balance  Dynamic Standing-Comments: Fair- with CGA to SBA    Functional Assessments:  Bed Mobility  Bed Mobility:  (supine to sitting: SBA)  Transfers  Transfer:  (STS from EOB: CGA)  Ambulation/Gait Training  Ambulation/Gait Training Performed:  (Pt amb 50' x 2 using RW with SBA demonstrating good balance and safety awareness.)    Outcome Measures:  [facility] Basic Mobility  Turning from your back to your side while in a flat bed without using bedrails: None  Moving from lying on your back to sitting on the side of a flat bed without using bedrails: A little  Moving to and from bed to chair (including a wheelchair): A little  Standing up from a chair using your arms (e.g. wheelchair or bedside chair): A little  To walk in hospital room: A little  Climbing 3-5 steps with railing: A little  Basic Mobility - Total Score: 19     Goals:  Encounter Problems       Encounter Problems (Active)       PT Problem       STG - Pt will transition supine <> sitting with mod I  (Progressing)       Start:  01/15/25    Expected End:  01/29/25            STG - Pt will transfer STS with mod I  (Progressing)       Start:  01/15/25    Expected End:  01/29/25            STG - Pt will amb 100' using RW with mod I  (Progressing)       Start:  01/15/25    Expected End:  01/29/25               Pain - Adult            Education Documentation  Precautions, taught by Jagruti Vega PT at 1/15/2025  2:45 PM.  Learner: Patient  Readiness: Acceptance  Method: Explanation  Response: Verbalizes Understanding    Mobility Training, taught by Jagruti Vega PT at 1/15/2025  2:45 PM.  Learner: Patient  Readiness: Acceptance  Method: Explanation  Response: Verbalizes Understanding    Education Comments  No comments  evaluated Celia Castro on 12/11/20. Celia Castro should be excused from work/school.     Do not go to work.      If you receive a negative COVID-19 test result and were NOT exposed to someone with a known positive COVID-19 test, you can return to work once you're free of fever for 24 hours without fever-reducing medication and your symptoms are improving or resolved.    If you receive a positive COVID-19 test result, you must be cleared by Employee Occupational Health and Safety to return to work.     If you were exposed to someone who has tested positive for COVID-19, you can return to work 14 days after your last contact with the positive individual, provided you do not have symptoms at all during that time. In some cases, your manager may ask you to come back sooner than 14 days.       Sincerely,  Ryan Crews PA-C               found.

## 2025-01-15 NOTE — PROGRESS NOTES
Estefanía Contreras is a 89 y.o. female on day 0 of admission presenting with Left leg cellulitis.      Subjective   No acute events overnight, patient denies chest pain, worsening SOB at this time.        Objective     Last Recorded Vitals  /60 (BP Location: Left arm, Patient Position: Lying)   Pulse 59   Temp 36.9 °C (98.4 °F) (Temporal)   Resp 18   Wt 56.7 kg (125 lb)   SpO2 96%   Intake/Output last 3 Shifts:    Intake/Output Summary (Last 24 hours) at 1/15/2025 1442  Last data filed at 1/14/2025 1741  Gross per 24 hour   Intake 100 ml   Output --   Net 100 ml       Admission Weight  Weight: 56.7 kg (125 lb) (01/14/25 0651)    Daily Weight  01/14/25 : 56.7 kg (125 lb)    Image Results  ECG 12 lead  Sinus rhythm with 1st degree AV block  Left axis deviation  Minimal voltage criteria for LVH, may be normal variant ( R in aVL )  Abnormal ECG  When compared with ECG of 07-JAN-2025 12:37,  PREVIOUS ECG IS PRESENT      Physical Exam    Relevant Results               Assessment/Plan                  Assessment & Plan  Left leg cellulitis          Bahman Flores MD   Physician  Internal Medicine     H&P      Signed     Date of Service: 1/14/2025  6:28 PM     Signed       Expand All Collapse All    History Of Present Illness  Estefanía Contreras is a 89 y.o. female with PMH of HFpEF (EF 62% 1/6/25), DLD, HTN, CKD3b, AAA (s/p repair), PAD, ITP, and provoked PE (left hip fracture, completed 3 months of apixaban, on 2-3L NC PRN) presented to [facility] ED from home on 1/14/2025 with B/L lower extremity pain. The pt is an excellent historian. The pt was admitted to [facility] on 1/7/2025 with bilateral lower extremity pain and edema, as well as cough, shortness of breath, and increased need for her home oxygen. She was treated for PNA with azithromycin and rocephin. Her LE edema was treated with Lasix. Patient was discharged home on 1/10/25 with PCP follow-up, three further days of doxycycline, and short courses of tramadol and  gabapentin. Since her discharge, the pt has taken the medications she was discharged with including the 3 days of doxycycline, which she completed. Her leg swelling, pain, and redness worsened since returning home despite the pain medication. The tramadol isn't helping. She is having a very hard time putting any weight on her legs. A bruise was noted on her left leg last admission and XR was obtained showing no acute fracture. A LE ultrasound was also obtained that did not show any blood clot. She denies any injury to the leg. The left leg is more painful than the right. She denies any fever/chills, headache, chest pain/palpatations, shortness of breath, cough, abdominal pain, N/V/D, dysuria, hematuria, or bloody/black stools/   She decided to come back to the ER because of the pain and difficulty walking. ER evaluation included repeat LE US that was negative for VTE. Pt was recommended for admission to the hospital and accepted on the service of Dr. Flores who would like her treated for cellulitis with IV antibiotics. Pt notes she has been having to use oxygen more over last month and is having difficulty tolerating being off O2 at home.        Past Medical History  Medical History        Past Medical History:   Diagnosis Date    H/O heart artery stent      H/O shoulder surgery      PE (pulmonary thromboembolism) (Multi)              Surgical History  Surgical History   History reviewed. No pertinent surgical history.        Social History  Social History   Social History            Tobacco Use    Smoking status: Former       Types: Cigarettes       Passive exposure: Never    Smokeless tobacco: Never   Vaping Use    Vaping status: Never Used   Substance Use Topics    Alcohol use: Not Currently    Drug use: Never            Family History  Family History   No family history on file.        Allergies  Patient has no known allergies.     Review of Systems   Constitutional:  Negative for chills, diaphoresis and fever.    HENT:  Negative for congestion and sore throat.    Eyes:  Negative for visual disturbance.   Respiratory:  Negative for cough, chest tightness, shortness of breath and wheezing.    Cardiovascular:  Positive for leg swelling. Negative for chest pain and palpitations.   Gastrointestinal:  Negative for abdominal pain, diarrhea, nausea and vomiting.   Endocrine: Negative for polyuria.   Genitourinary:  Negative for dysuria and hematuria.   Musculoskeletal:  Negative for joint swelling.   Skin:  Positive for color change. Negative for rash.   Allergic/Immunologic: Negative for immunocompromised state.   Neurological:  Negative for syncope and headaches.   Psychiatric/Behavioral:  Negative for confusion and hallucinations.       Physical Exam  Constitutional:       Appearance: Normal appearance. She is normal weight.   HENT:      Head: Normocephalic and atraumatic.      Mouth/Throat:      Mouth: Mucous membranes are moist.   Eyes:      Conjunctiva/sclera: Conjunctivae normal.   Cardiovascular:      Rate and Rhythm: Normal rate and regular rhythm.      Heart sounds: No murmur heard.  Pulmonary:      Effort: No respiratory distress.      Breath sounds: No wheezing, rhonchi or rales.   Abdominal:      General: There is no distension.      Tenderness: There is no abdominal tenderness. There is no guarding.   Musculoskeletal:         General: No deformity.      Cervical back: No rigidity.   Skin:     General: Skin is warm.      Comments: -Medial LLE just superior to her ankle is an ecchymosis with surrounding erythema.  Erythema is noncircumferential.  She also has a nonblanchable petechiae over her dorsal surfaces of both her feet closer to her toes. Anterior RLE just superior to her ankle is a small patch of erythema. Her bilateral lower extremities have +2 pitting edema, and there is +3 pedal edema.   Neurological:      General: No focal deficit present.      Mental Status: She is alert and oriented to person, place, and  "time.   Psychiatric:         Mood and Affect: Mood normal.         Last Recorded Vitals  Visit Vitals  /63   Pulse 60   Temp 36.8 °C (98.2 °F) (Temporal)   Resp 15   Ht 1.6 m (5' 3\")   Wt 56.7 kg (125 lb)   SpO2 100%   BMI 22.14 kg/m²   Smoking Status Former   BSA 1.59 m²         Relevant Results        Results for orders placed or performed during the hospital encounter of 01/14/25 (from the past 24 hours)   CBC and Auto Differential   Result Value Ref Range     WBC 5.8 4.4 - 11.3 x10*3/uL     nRBC 0.0 0.0 - 0.0 /100 WBCs     RBC 3.34 (L) 4.00 - 5.20 x10*6/uL     Hemoglobin 9.5 (L) 12.0 - 16.0 g/dL     Hematocrit 31.1 (L) 36.0 - 46.0 %     MCV 93 80 - 100 fL     MCH 28.4 26.0 - 34.0 pg     MCHC 30.5 (L) 32.0 - 36.0 g/dL     RDW 18.0 (H) 11.5 - 14.5 %     Platelets 206 150 - 450 x10*3/uL     Neutrophils % 66.4 40.0 - 80.0 %     Immature Granulocytes %, Automated 0.5 0.0 - 0.9 %     Lymphocytes % 17.5 13.0 - 44.0 %     Monocytes % 10.6 2.0 - 10.0 %     Eosinophils % 4.1 0.0 - 6.0 %     Basophils % 0.9 0.0 - 2.0 %     Neutrophils Absolute 3.88 1.60 - 5.50 x10*3/uL     Immature Granulocytes Absolute, Automated 0.03 0.00 - 0.50 x10*3/uL     Lymphocytes Absolute 1.02 0.80 - 3.00 x10*3/uL     Monocytes Absolute 0.62 0.05 - 0.80 x10*3/uL     Eosinophils Absolute 0.24 0.00 - 0.40 x10*3/uL     Basophils Absolute 0.05 0.00 - 0.10 x10*3/uL   Comprehensive metabolic panel   Result Value Ref Range     Glucose 79 74 - 99 mg/dL     Sodium 140 136 - 145 mmol/L     Potassium 4.8 3.5 - 5.3 mmol/L     Chloride 103 98 - 107 mmol/L     Bicarbonate 29 21 - 32 mmol/L     Anion Gap 13 10 - 20 mmol/L     Urea Nitrogen 38 (H) 6 - 23 mg/dL     Creatinine 1.61 (H) 0.50 - 1.05 mg/dL     eGFR 30 (L) >60 mL/min/1.73m*2     Calcium 9.0 8.6 - 10.3 mg/dL     Albumin 3.7 3.4 - 5.0 g/dL     Alkaline Phosphatase 76 33 - 136 U/L     Total Protein 6.7 6.4 - 8.2 g/dL     AST 20 9 - 39 U/L     Bilirubin, Total 0.3 0.0 - 1.2 mg/dL     ALT 11 7 - 45 " U/L   B-type natriuretic peptide   Result Value Ref Range      (H) 0 - 99 pg/mL   D-dimer, VTE Exclusion   Result Value Ref Range     D-Dimer, Quantitative VTE Exclusion 4,886 (H) <=500 ng/mL FEU   Vascular US lower extremity venous duplex left   Result Value Ref Range     BSA 1.59 m2         Imaging  Vascular US lower extremity venous duplex left     Result Date: 1/14/2025  Preliminary Cardiology Report          [facility] [address] [address] Tel [phone] and Fax [phone]       Preliminary Vascular Lab Report  VASC US LOWER EXTREMITY VENOUS DUPLEX LEFT  Patient Name:      SEAN GARCÍA  Reading Physician:  [id number] Madeline Yanez MD Study Date:        1/14/2025    Ordering Physician: [id number] ZAMAN MRN/PID:           [id number]     Technologist:       Saniya Summers RVT Accession#:        [id number] Technologist 2: Date of Birth/Age: 12/11/1934   Encounter#:         [id number] Gender:            F Admission Status:  Emergency    Location Performed: [facility]  Diagnosis/ICD: Pain in left leg-M79.605 Procedure/CPT: 97227 Peripheral venous duplex scan for DVT Limited  PRELIMINARY CONCLUSIONS: Right Lower Venous: The right common femoral vein demonstrates normal spontaneous and respirophasic flow. Left Lower Venous: No evidence of acute deep vein thrombus visualized in the left lower extremity. Cannot rule out thrombus in non-visualized posterior tibial and peroneal veins due to edema.  Imaging & Doppler Findings:  Right        Flow CFV   Spontaneous/Phasic  Left                  Compress Thrombus        Flow Distal External Iliac   Yes      None   Spontaneous/Phasic CFV                     Yes      None   Spontaneous/Phasic PFV                     Yes      None FV Proximal             Yes      None   Spontaneous/Phasic FV Mid                  Yes      None FV Distal               Yes      None Popliteal               Yes      None   Spontaneous/Phasic  VASCULAR PRELIMINARY REPORT completed by Saniya Summers RVT on 1/14/2025 at 9:47:01 AM  ** Final **      XR ankle bilateral 2 views     Result Date: 1/7/2025  Interpreted By:  Chelsey Merino, STUDY: XR ANKLE BILATERAL 2 VIEWS;  1/7/2025 1:15 pm   INDICATION: Signs/Symptoms:TTP.   COMPARISON: None.   ACCESSION NUMBER(S): [id number]   ORDERING CLINICIAN: MEIL BAILEY   FINDINGS: Left ankle: No acute fracture or dislocation is identified. The ankle mortise is congruent. There are degenerative changes in the visualized joints of the midfoot. A small plantar calcaneal enthesophyte is present. There is moderate-to-marked diffuse subcutaneous edema the imaged calf.   Right ankle: No acute fracture or dislocation is identified. The ankle mortise is congruent. There are degenerative changes in the visualized joints of the midfoot. A small plantar calcaneal enthesophyte is present. There is moderate-to-marked diffuse subcutaneous edema the imaged calf.        Bilateral calf edema without acute osseous abnormality.   MACRO None   Signed by: Chelsey Merino 1/7/2025 1:23 PM Dictation workstation:   [id number]      Home Medications          Prior to Admission medications    Medication Sig Start Date End Date Taking? Authorizing Provider   azelastine (Astelin) 137 mcg (0.1 %) nasal spray Administer 2 sprays into each nostril 2 times a day. Use in each nostril as directed  Patient taking differently: Administer 2 sprays into each nostril if needed. Use in each nostril as directed 11/21/24 11/21/25 Yes Lakia Johnson MD   dilTIAZem LA (Cardizem LA) 180 mg 24 hr tablet Take 1 tablet (180 mg) by mouth once daily.  Patient taking differently: Take 1 tablet (180 mg) by mouth once daily in the morning. 11/5/24   Yes Lakia Johnson MD   ferrous sulfate 325 (65 Fe) MG EC tablet Take 1 tablet by mouth once daily with breakfast. Do not crush, chew, or split. 11/5/24 11/5/25 Yes Lakia Johnson MD   furosemide (Lasix) 20 mg tablet  Take 1 tablet (20 mg) by mouth once daily.  Patient taking differently: Take 1 tablet (20 mg) by mouth once daily in the morning. 12/16/24 12/16/25 Yes Lakia Johnson MD   meloxicam (Mobic) 15 mg tablet Take 1 tablet (15 mg) by mouth once daily.  Patient taking differently: Take 1 tablet (15 mg) by mouth once daily with breakfast. 12/16/24   Yes Lakia Johnson MD   mv-min-FA-vit K-lutein-zeaxant (PreserVision AREDS 2 Plus MV) 200 mcg-15 mcg- 5 mg-1 mg capsule Take 1 capsule by mouth 2 times a day.     Yes Historical Provider, MD   oxygen (O2) gas therapy Inhale 3 L/min continuously.     Yes Historical Provider, MD   potassium chloride CR 20 mEq ER tablet Take 1 tablet (20 mEq) by mouth once daily in the morning.     Yes Historical Provider, MD   pramipexole (Mirapex) 1 mg tablet Take 1 tablet (1 mg) by mouth once daily at bedtime. 12/16/24 12/16/25 Yes Lakia Johnson MD   simvastatin (Zocor) 20 mg tablet Take 1 tablet (20 mg) by mouth once daily at bedtime. 12/16/24 12/16/25 Yes Lakia Johnson MD   traMADol (Ultram) 50 mg tablet Take 1 tablet (50 mg) by mouth every 8 hours if needed for severe pain (7 - 10) for up to 3 days. 1/9/25 1/14/25 Yes Jorge Mart DO   traZODone (Desyrel) 50 mg tablet Take 1.5 tablets (75 mg) by mouth as needed at bedtime for sleep. 1/9/25   Yes Jorge Mart DO   doxycycline (Vibramycin) 100 mg capsule Take 1 capsule (100 mg) by mouth 2 times a day for 3 days. Take with at least 8 ounces (large glass) of water, do not lie down for 30 minutes after  Patient not taking: Reported on 1/14/2025 1/9/25 1/12/25   Jorge Mart DO   gabapentin (Neurontin) 100 mg capsule Take 1 capsule (100 mg) by mouth 2 times a day for 3 days.  Patient not taking: Reported on 1/14/202514/2025 1/9/25 1/12/25   Jorge Mart DO   apixaban (Eliquis) 5 mg tablet Take 1 tablet (5 mg) by mouth 2 times a day.  Patient not taking: Reported on 1/7/2025 11/19/24 1/9/25   MD Rajani Holland ([facility])  50 mg tablet Take 1 tablet (50 mg) by mouth as needed at bedtime for sleep.  Patient taking differently: Take 1 tablet (50 mg) by mouth once daily at bedtime. 12/20/24 1/9/25   Lakia Johnson MD   vit C,Y-Um-mmmxg-lutein-zeaxan (PreserVision AREDS-2) 250-90-40-1 mg capsule Take 1 capsule by mouth 2 times a day. 10/10/24 1/14/25   CARLEE Barber-CNP         Medications  Scheduled medications    Scheduled Medications   ampicillin-sulbactam, 3 g, intravenous, q12h  dilTIAZem CD, 180 mg, oral, Daily  [START ON 1/15/2025] ferrous sulfate (325 mg ferrous sulfate), 325 mg, oral, Daily with breakfast  furosemide, 20 mg, oral, q AM  heparin (porcine), 5,000 Units, subcutaneous, q8h  [START ON 1/15/2025] meloxicam, 7.5 mg, oral, Daily with breakfast  potassium chloride CR, 20 mEq, oral, q AM  pramipexole, 1 mg, oral, Nightly  simvastatin, 20 mg, oral, Nightly         Continuous medications  Continuous Medications         PRN medications  acetaminophen, 650 mg, q4h PRN   Or  acetaminophen, 650 mg, q4h PRN   Or  acetaminophen, 650 mg, q4h PRN  HYDROcodone-acetaminophen, 1 tablet, q6h PRN  traZODone, 75 mg, Nightly PRN                 Assessment/Plan     Assessment & Plan  Left leg cellulitis     Bilateral LE cellulitis  LLE ecchymosis  B/L LE edema, acute on chronic  -HDS  -Does not meets SIRS/sepsis   -No leukocytosis and afebrile  -Denies fever/chills  -Will initiate IV Unasyn, renally dosed  -Patient reports compliance with her home PO Lasix 20mg QD, which is continued  -B/L LE duplex ultrasound negative for DVT  -Bruising to LLE noted during last admission and XR of L ankle obtained showing no acute fracture with some degenerative changes  -Pt reports difficulty ambulating secondary to pain  -PT/OT  -Up with assist and fall precautions     Chronic diastolic heart failure  Chronic respiratory failure  Hx of PE and DVT  -Lungs are CTA on exam  -Pt requiring 3L NC. Pt reports requiring 2-3L oxygen PRN/intermittently  since she had her PE. States she has been using it more continuously lately because she gets more SOB. CT for PE obtained 1/7/2025 showing no acute PE or PNA.  -Echo obtained 1/3/2025 showing EF 62%, Grade I diastolic dysfunction, normal RV systolic function  -Monitor pulse ox  -Admitted from 10/2-10/7 for provoked multiple right sided pulmonary emboli with signs of right heart strain after having hip surgery              -Completed Eliquis just about at the New Year  -Will use SQ heparin for VTE PPx     Other chronic medical conditions:  HTN  CKDIII  AAA status post repair  Carotid disease  PAD  ITP  Dyslipidemia  Neuropathic pain  -Continue home medications as appropriate  -Monitor renal function     VTE PPx: SQ heparin     See additional orders for further plan of care.   Further evaluation and management per attending and consulting physicians.       Bahman Flores MD  Patient fully evaluated in the emergency room on January 14.  Continue present IV antibiotics and monitor.  Recheck labs in AM.                   Patient fully evaluated  01/15  for    Problem List Items Addressed This Visit       * (Principal) Left leg cellulitis - Primary     Other Visit Diagnoses       Pain in left leg              Patient seen resting in bed with head of bed elevated, no s/s or c/o acute difficulties at this time.  Vital signs for last 24 hours Temp:  [35.7 °C (96.3 °F)-36.9 °C (98.4 °F)] 36.9 °C (98.4 °F)  Heart Rate:  [59-70] 59  Resp:  [15-20] 18  BP: (118-149)/(55-63) 129/60    No intake/output data recorded.  Patient still requiring frequent cardiac and SPO2 monitoring. Continue aggressive pulmonary hygiene and oral hygiene. Off loading as tolerated for skin integrity. Medications and labs reviewed- No results found for this or any previous visit (from the past 24 hours).   Patient recently received an antibiotic (last 12 hours)       Date/Time Action Medication Dose Rate    01/15/25 0339 New Bag    ampicillin-sulbactam  (Unasyn) 3 g in sodium chloride 0.9%  mL 3 g 200 mL/hr           Plan discussed with interdisciplinary team, double layer tubi  to BLE, diligent wound care, elevate BLE as tolerated. Will continue IV antibiotics - Unasyn, titrate supplemental oxygen, patient room air at baseline, repeat labs including iron levels, and chest xray in the AM. Incentive spirometry ordered. Patient awake, alert, and appropriate, states she is from home with a home health aide that assists with ADLs at home. No acute events overnight, patient denies chest pain, worsening SOB at this time.continue current and repeat labs in the AM.     Discharge planning discussed with patient and care team. Therapy evaluations ordered. Anticipate HHC at discharge. Patient aware and agreeable to current plan, continue plan as above.     I spent a total of 50 minutes on the date of the service which included preparing to see the patient, face-to-face patient care, completing clinical documentation, obtaining and/or reviewing separately obtained history, performing a medically appropriate examination, counseling and educating the patient/family/caregiver, ordering medications, tests, or procedures, communicating with other HCPs (not separately reported), independently interpreting results (not separately reported), communicating results to the patient/family/caregiver, and care coordination (not separately reported).               Katerina Fitzgerald

## 2025-01-15 NOTE — PROGRESS NOTES
01/15/25 1130   Discharge Planning   Living Arrangements Alone   Support Systems Children   Assistance Needed assisted with adls   Type of Residence Private residence  (1st floor apartment)   Number of Stairs to Enter Residence 7   Intensity of Service   Intensity of Service 0-30 min     Care transitions at bedside to complete assessment.  TCC introduced self and explained role to patient.  Patient demographics verified.  Patient stated that she lives alone and has aide that comes to her home to help with various things including cleaning and assisting the patient with adls and bathing.  Stated that she has been utilizing a walker to assist with ambulation.  Patient stated that her son assists with groceries and providing transportation to her appointments.  Patient denies wearing home oxygen.  PT/OT notes pending at this time.  Care transitions will continue to follow.    PCP: Lakia Johnson   Insurance: [employer]  Pharmacy: [facility] on [address]

## 2025-01-15 NOTE — CARE PLAN
The patient's goals for the shift include      The clinical goals for the shift include pt will be safe and comftorable      Problem: Pain - Adult  Goal: Verbalizes/displays adequate comfort level or baseline comfort level  Outcome: Progressing     Problem: Safety - Adult  Goal: Free from fall injury  Outcome: Progressing     Problem: Discharge Planning  Goal: Discharge to home or other facility with appropriate resources  Outcome: Progressing     Problem: Chronic Conditions and Co-morbidities  Goal: Patient's chronic conditions and co-morbidity symptoms are monitored and maintained or improved  Outcome: Progressing     Problem: Skin  Goal: Decreased wound size/increased tissue granulation at next dressing change  Outcome: Progressing  Flowsheets (Taken 1/15/2025 1158)  Decreased wound size/increased tissue granulation at next dressing change: Promote sleep for wound healing  Goal: Participates in plan/prevention/treatment measures  Outcome: Progressing  Flowsheets (Taken 1/15/2025 1158)  Participates in plan/prevention/treatment measures: Elevate heels  Goal: Prevent/manage excess moisture  Outcome: Progressing  Flowsheets (Taken 1/15/2025 1158)  Prevent/manage excess moisture:   Cleanse incontinence/protect with barrier cream   Moisturize dry skin  Goal: Prevent/minimize sheer/friction injuries  Outcome: Progressing  Flowsheets (Taken 1/15/2025 1158)  Prevent/minimize sheer/friction injuries: Turn/reposition every 2 hours/use positioning/transfer devices  Goal: Promote/optimize nutrition  Outcome: Progressing  Flowsheets (Taken 1/15/2025 1158)  Promote/optimize nutrition: Offer water/supplements/favorite foods  Goal: Promote skin healing  Outcome: Progressing  Flowsheets (Taken 1/15/2025 1158)  Promote skin healing: Turn/reposition every 2 hours/use positioning/transfer devices

## 2025-01-16 LAB
ALBUMIN SERPL BCP-MCNC: 3.2 G/DL (ref 3.4–5)
ALP SERPL-CCNC: 68 U/L (ref 33–136)
ALT SERPL W P-5'-P-CCNC: 9 U/L (ref 7–45)
ANION GAP SERPL CALC-SCNC: 12 MMOL/L (ref 10–20)
AST SERPL W P-5'-P-CCNC: 15 U/L (ref 9–39)
ATRIAL RATE: 62 BPM
BASOPHILS # BLD AUTO: 0.05 X10*3/UL (ref 0–0.1)
BASOPHILS NFR BLD AUTO: 1 %
BILIRUB SERPL-MCNC: 0.3 MG/DL (ref 0–1.2)
BUN SERPL-MCNC: 35 MG/DL (ref 6–23)
CALCIUM SERPL-MCNC: 8.1 MG/DL (ref 8.6–10.3)
CHLORIDE SERPL-SCNC: 103 MMOL/L (ref 98–107)
CO2 SERPL-SCNC: 29 MMOL/L (ref 21–32)
CREAT SERPL-MCNC: 1.62 MG/DL (ref 0.5–1.05)
EGFRCR SERPLBLD CKD-EPI 2021: 30 ML/MIN/1.73M*2
EOSINOPHIL # BLD AUTO: 0.32 X10*3/UL (ref 0–0.4)
EOSINOPHIL NFR BLD AUTO: 6.3 %
ERYTHROCYTE [DISTWIDTH] IN BLOOD BY AUTOMATED COUNT: 17.8 % (ref 11.5–14.5)
GLUCOSE SERPL-MCNC: 80 MG/DL (ref 74–99)
HCT VFR BLD AUTO: 31 % (ref 36–46)
HGB BLD-MCNC: 9.1 G/DL (ref 12–16)
IMM GRANULOCYTES # BLD AUTO: 0.01 X10*3/UL (ref 0–0.5)
IMM GRANULOCYTES NFR BLD AUTO: 0.2 % (ref 0–0.9)
LYMPHOCYTES # BLD AUTO: 1.45 X10*3/UL (ref 0.8–3)
LYMPHOCYTES NFR BLD AUTO: 28.7 %
MCH RBC QN AUTO: 27.5 PG (ref 26–34)
MCHC RBC AUTO-ENTMCNC: 29.4 G/DL (ref 32–36)
MCV RBC AUTO: 94 FL (ref 80–100)
MONOCYTES # BLD AUTO: 0.47 X10*3/UL (ref 0.05–0.8)
MONOCYTES NFR BLD AUTO: 9.3 %
NEUTROPHILS # BLD AUTO: 2.75 X10*3/UL (ref 1.6–5.5)
NEUTROPHILS NFR BLD AUTO: 54.5 %
NRBC BLD-RTO: 0 /100 WBCS (ref 0–0)
P AXIS: 70 DEGREES
PLATELET # BLD AUTO: 162 X10*3/UL (ref 150–450)
POTASSIUM SERPL-SCNC: 4.6 MMOL/L (ref 3.5–5.3)
PR INTERVAL: 212 MS
PROT SERPL-MCNC: 6 G/DL (ref 6.4–8.2)
Q ONSET: 249 MS
QRS COUNT: 9 BEATS
QRS DURATION: 92 MS
QT INTERVAL: 406 MS
QTC CALCULATION(BAZETT): 409 MS
QTC FREDERICIA: 408 MS
R AXIS: -35 DEGREES
RBC # BLD AUTO: 3.31 X10*6/UL (ref 4–5.2)
SODIUM SERPL-SCNC: 139 MMOL/L (ref 136–145)
T AXIS: 40 DEGREES
T OFFSET: 452 MS
VENTRICULAR RATE: 61 BPM
WBC # BLD AUTO: 5.1 X10*3/UL (ref 4.4–11.3)

## 2025-01-16 PROCEDURE — 36415 COLL VENOUS BLD VENIPUNCTURE: CPT | Performed by: INTERNAL MEDICINE

## 2025-01-16 PROCEDURE — 2500000002 HC RX 250 W HCPCS SELF ADMINISTERED DRUGS (ALT 637 FOR MEDICARE OP, ALT 636 FOR OP/ED): Performed by: PHYSICIAN ASSISTANT

## 2025-01-16 PROCEDURE — 1100000001 HC PRIVATE ROOM DAILY

## 2025-01-16 PROCEDURE — 85025 COMPLETE CBC W/AUTO DIFF WBC: CPT | Performed by: INTERNAL MEDICINE

## 2025-01-16 PROCEDURE — 80053 COMPREHEN METABOLIC PANEL: CPT | Performed by: INTERNAL MEDICINE

## 2025-01-16 PROCEDURE — 97116 GAIT TRAINING THERAPY: CPT | Mod: GP,CQ

## 2025-01-16 PROCEDURE — 2500000001 HC RX 250 WO HCPCS SELF ADMINISTERED DRUGS (ALT 637 FOR MEDICARE OP): Performed by: INTERNAL MEDICINE

## 2025-01-16 PROCEDURE — 2500000001 HC RX 250 WO HCPCS SELF ADMINISTERED DRUGS (ALT 637 FOR MEDICARE OP)

## 2025-01-16 PROCEDURE — 2500000004 HC RX 250 GENERAL PHARMACY W/ HCPCS (ALT 636 FOR OP/ED): Performed by: PHYSICIAN ASSISTANT

## 2025-01-16 PROCEDURE — 2500000001 HC RX 250 WO HCPCS SELF ADMINISTERED DRUGS (ALT 637 FOR MEDICARE OP): Performed by: PHYSICIAN ASSISTANT

## 2025-01-16 PROCEDURE — 97110 THERAPEUTIC EXERCISES: CPT | Mod: GP,CQ

## 2025-01-16 RX ORDER — LIDOCAINE 560 MG/1
3 PATCH PERCUTANEOUS; TOPICAL; TRANSDERMAL DAILY
Status: DISCONTINUED | OUTPATIENT
Start: 2025-01-17 | End: 2025-01-20 | Stop reason: HOSPADM

## 2025-01-16 RX ORDER — DULOXETIN HYDROCHLORIDE 30 MG/1
30 CAPSULE, DELAYED RELEASE ORAL DAILY
Status: DISCONTINUED | OUTPATIENT
Start: 2025-01-16 | End: 2025-01-20 | Stop reason: HOSPADM

## 2025-01-16 RX ADMIN — FUROSEMIDE 20 MG: 20 TABLET ORAL at 10:37

## 2025-01-16 RX ADMIN — AMPICILLIN SODIUM AND SULBACTAM SODIUM 3 G: 2; 1 INJECTION, POWDER, FOR SOLUTION INTRAMUSCULAR; INTRAVENOUS at 14:53

## 2025-01-16 RX ADMIN — HEPARIN SODIUM 5000 UNITS: 5000 INJECTION INTRAVENOUS; SUBCUTANEOUS at 22:04

## 2025-01-16 RX ADMIN — FERROUS SULFATE TAB 325 MG (65 MG ELEMENTAL FE) 325 MG: 325 (65 FE) TAB at 10:37

## 2025-01-16 RX ADMIN — DILTIAZEM HYDROCHLORIDE 180 MG: 180 CAPSULE, COATED, EXTENDED RELEASE ORAL at 10:37

## 2025-01-16 RX ADMIN — PRAMIPEXOLE DIHYDROCHLORIDE 1 MG: 1 TABLET ORAL at 20:02

## 2025-01-16 RX ADMIN — AMPICILLIN SODIUM AND SULBACTAM SODIUM 3 G: 2; 1 INJECTION, POWDER, FOR SOLUTION INTRAMUSCULAR; INTRAVENOUS at 02:44

## 2025-01-16 RX ADMIN — DULOXETINE HYDROCHLORIDE 30 MG: 30 CAPSULE, DELAYED RELEASE ORAL at 16:44

## 2025-01-16 RX ADMIN — HEPARIN SODIUM 5000 UNITS: 5000 INJECTION INTRAVENOUS; SUBCUTANEOUS at 06:10

## 2025-01-16 RX ADMIN — SIMVASTATIN 20 MG: 20 TABLET, FILM COATED ORAL at 20:02

## 2025-01-16 RX ADMIN — Medication 1 CAPSULE: at 20:03

## 2025-01-16 RX ADMIN — Medication 1 CAPSULE: at 10:38

## 2025-01-16 RX ADMIN — HEPARIN SODIUM 5000 UNITS: 5000 INJECTION INTRAVENOUS; SUBCUTANEOUS at 14:47

## 2025-01-16 RX ADMIN — MELOXICAM 7.5 MG: 7.5 TABLET ORAL at 10:42

## 2025-01-16 RX ADMIN — HYDROCODONE BITARTRATE AND ACETAMINOPHEN 1 TABLET: 5; 325 TABLET ORAL at 10:44

## 2025-01-16 RX ADMIN — POTASSIUM CHLORIDE 20 MEQ: 1500 TABLET, EXTENDED RELEASE ORAL at 10:39

## 2025-01-16 ASSESSMENT — PAIN SCALES - GENERAL
PAINLEVEL_OUTOF10: 5 - MODERATE PAIN
PAINLEVEL_OUTOF10: 8
PAINLEVEL_OUTOF10: 7
PAINLEVEL_OUTOF10: 0 - NO PAIN

## 2025-01-16 ASSESSMENT — PAIN - FUNCTIONAL ASSESSMENT
PAIN_FUNCTIONAL_ASSESSMENT: 0-10
PAIN_FUNCTIONAL_ASSESSMENT: 0-10

## 2025-01-16 ASSESSMENT — COGNITIVE AND FUNCTIONAL STATUS - GENERAL
STANDING UP FROM CHAIR USING ARMS: A LITTLE
TURNING FROM BACK TO SIDE WHILE IN FLAT BAD: A LITTLE
MOBILITY SCORE: 19
CLIMB 3 TO 5 STEPS WITH RAILING: A LITTLE
WALKING IN HOSPITAL ROOM: A LITTLE
MOVING TO AND FROM BED TO CHAIR: A LITTLE

## 2025-01-16 ASSESSMENT — PAIN DESCRIPTION - LOCATION: LOCATION: LEG

## 2025-01-16 ASSESSMENT — PAIN DESCRIPTION - ORIENTATION: ORIENTATION: RIGHT;LEFT;LOWER

## 2025-01-16 NOTE — CARE PLAN
The patient's goals for the shift include  safety    The clinical goals for the shift include pt will be safe and comftorable    Problem: Skin  Goal: Decreased wound size/increased tissue granulation at next dressing change  1/15/2025 2221 by Chelsi Almaraz RN  Flowsheets (Taken 1/15/2025 2221)  Decreased wound size/increased tissue granulation at next dressing change: Promote sleep for wound healing  1/15/2025 2221 by Chelsi Almaraz RN  Outcome: Progressing  Flowsheets (Taken 1/15/2025 2221)  Decreased wound size/increased tissue granulation at next dressing change: Promote sleep for wound healing     Problem: Skin  Goal: Participates in plan/prevention/treatment measures  1/15/2025 2221 by Chelsi Almaraz RN  Flowsheets (Taken 1/15/2025 2221)  Participates in plan/prevention/treatment measures: Increase activity/out of bed for meals  1/15/2025 2221 by Chelsi Almaraz RN  Outcome: Progressing  Flowsheets (Taken 1/15/2025 2221)  Participates in plan/prevention/treatment measures: Increase activity/out of bed for meals     Problem: Skin  Goal: Prevent/manage excess moisture  1/15/2025 2221 by Chelsi Almaraz RN  Flowsheets (Taken 1/15/2025 2221)  Prevent/manage excess moisture: Moisturize dry skin  1/15/2025 2221 by Chelsi Almaraz RN  Outcome: Progressing  Flowsheets (Taken 1/15/2025 2221)  Prevent/manage excess moisture: Moisturize dry skin     Problem: Skin  Goal: Prevent/minimize sheer/friction injuries  1/15/2025 2221 by Chelsi Almaraz RN  Flowsheets (Taken 1/15/2025 2221)  Prevent/minimize sheer/friction injuries: Increase activity/out of bed for meals  1/15/2025 2221 by Chelsi Almaraz RN  Outcome: Progressing  Flowsheets (Taken 1/15/2025 2221)  Prevent/minimize sheer/friction injuries: Increase activity/out of bed for meals     Problem: Skin  Goal: Promote/optimize nutrition  1/15/2025 2221 by Chelsi Almaraz RN  Flowsheets (Taken 1/15/2025  2221)  Promote/optimize nutrition: Offer water/supplements/favorite foods  1/15/2025 2221 by Chelsi Almaraz RN  Outcome: Progressing  Flowsheets (Taken 1/15/2025 2221)  Promote/optimize nutrition: Offer water/supplements/favorite foods     Problem: Skin  Goal: Promote skin healing  1/15/2025 2221 by Chelsi Almaraz RN  Flowsheets (Taken 1/15/2025 2221)  Promote skin healing: Protective dressings over bony prominences  1/15/2025 2221 by Chelsi Almaraz RN  Outcome: Progressing  Flowsheets (Taken 1/15/2025 2221)  Promote skin healing: Protective dressings over bony prominences

## 2025-01-16 NOTE — PROGRESS NOTES
Estefanía Contreras is a 89 y.o. female on day 1 of admission presenting with Left leg cellulitis.      Subjective   No acute events overnight, patient denies chest pain, worsening SOB at this time.        Objective     Last Recorded Vitals  /73 (BP Location: Left arm, Patient Position: Lying)   Pulse 67   Temp 36.8 °C (98.2 °F) (Temporal)   Resp 18   Wt 56.7 kg (125 lb)   SpO2 91%   Intake/Output last 3 Shifts:    Intake/Output Summary (Last 24 hours) at 1/16/2025 1508  Last data filed at 1/16/2025 0426  Gross per 24 hour   Intake 300 ml   Output 700 ml   Net -400 ml       Admission Weight  Weight: 56.7 kg (125 lb) (01/14/25 0651)    Daily Weight  01/14/25 : 56.7 kg (125 lb)    Image Results  ECG 12 lead  Sinus rhythm with 1st degree AV block  Left axis deviation  Minimal voltage criteria for LVH, may be normal variant ( R in aVL )  Abnormal ECG  When compared with ECG of 07-JAN-2025 12:37,  PREVIOUS ECG IS PRESENT      Physical Exam    Relevant Results               Assessment/Plan                  Assessment & Plan  Left leg cellulitis          Bahman Flores MD   Physician  Internal Medicine     H&P      Signed     Date of Service: 1/14/2025  6:28 PM     Signed       Expand All Collapse All    History Of Present Illness  Estefanía Contreras is a 89 y.o. female with PMH of HFpEF (EF 62% 1/6/25), DLD, HTN, CKD3b, AAA (s/p repair), PAD, ITP, and provoked PE (left hip fracture, completed 3 months of apixaban, on 2-3L NC PRN) presented to [facility] ED from home on 1/14/2025 with B/L lower extremity pain. The pt is an excellent historian. The pt was admitted to [facility] on 1/7/2025 with bilateral lower extremity pain and edema, as well as cough, shortness of breath, and increased need for her home oxygen. She was treated for PNA with azithromycin and rocephin. Her LE edema was treated with Lasix. Patient was discharged home on 1/10/25 with PCP follow-up, three further days of doxycycline, and short courses of tramadol and  gabapentin. Since her discharge, the pt has taken the medications she was discharged with including the 3 days of doxycycline, which she completed. Her leg swelling, pain, and redness worsened since returning home despite the pain medication. The tramadol isn't helping. She is having a very hard time putting any weight on her legs. A bruise was noted on her left leg last admission and XR was obtained showing no acute fracture. A LE ultrasound was also obtained that did not show any blood clot. She denies any injury to the leg. The left leg is more painful than the right. She denies any fever/chills, headache, chest pain/palpatations, shortness of breath, cough, abdominal pain, N/V/D, dysuria, hematuria, or bloody/black stools/   She decided to come back to the ER because of the pain and difficulty walking. ER evaluation included repeat LE US that was negative for VTE. Pt was recommended for admission to the hospital and accepted on the service of Dr. Flores who would like her treated for cellulitis with IV antibiotics. Pt notes she has been having to use oxygen more over last month and is having difficulty tolerating being off O2 at home.        Past Medical History  Medical History        Past Medical History:   Diagnosis Date    H/O heart artery stent      H/O shoulder surgery      PE (pulmonary thromboembolism) (Multi)              Surgical History  Surgical History   History reviewed. No pertinent surgical history.        Social History  Social History   Social History            Tobacco Use    Smoking status: Former       Types: Cigarettes       Passive exposure: Never    Smokeless tobacco: Never   Vaping Use    Vaping status: Never Used   Substance Use Topics    Alcohol use: Not Currently    Drug use: Never            Family History  Family History   No family history on file.        Allergies  Patient has no known allergies.     Review of Systems   Constitutional:  Negative for chills, diaphoresis and fever.    HENT:  Negative for congestion and sore throat.    Eyes:  Negative for visual disturbance.   Respiratory:  Negative for cough, chest tightness, shortness of breath and wheezing.    Cardiovascular:  Positive for leg swelling. Negative for chest pain and palpitations.   Gastrointestinal:  Negative for abdominal pain, diarrhea, nausea and vomiting.   Endocrine: Negative for polyuria.   Genitourinary:  Negative for dysuria and hematuria.   Musculoskeletal:  Negative for joint swelling.   Skin:  Positive for color change. Negative for rash.   Allergic/Immunologic: Negative for immunocompromised state.   Neurological:  Negative for syncope and headaches.   Psychiatric/Behavioral:  Negative for confusion and hallucinations.       Physical Exam  Constitutional:       Appearance: Normal appearance. She is normal weight.   HENT:      Head: Normocephalic and atraumatic.      Mouth/Throat:      Mouth: Mucous membranes are moist.   Eyes:      Conjunctiva/sclera: Conjunctivae normal.   Cardiovascular:      Rate and Rhythm: Normal rate and regular rhythm.      Heart sounds: No murmur heard.  Pulmonary:      Effort: No respiratory distress.      Breath sounds: No wheezing, rhonchi or rales.   Abdominal:      General: There is no distension.      Tenderness: There is no abdominal tenderness. There is no guarding.   Musculoskeletal:         General: No deformity.      Cervical back: No rigidity.   Skin:     General: Skin is warm.      Comments: -Medial LLE just superior to her ankle is an ecchymosis with surrounding erythema.  Erythema is noncircumferential.  She also has a nonblanchable petechiae over her dorsal surfaces of both her feet closer to her toes. Anterior RLE just superior to her ankle is a small patch of erythema. Her bilateral lower extremities have +2 pitting edema, and there is +3 pedal edema.   Neurological:      General: No focal deficit present.      Mental Status: She is alert and oriented to person, place, and  "time.   Psychiatric:         Mood and Affect: Mood normal.         Last Recorded Vitals  Visit Vitals  /63   Pulse 60   Temp 36.8 °C (98.2 °F) (Temporal)   Resp 15   Ht 1.6 m (5' 3\")   Wt 56.7 kg (125 lb)   SpO2 100%   BMI 22.14 kg/m²   Smoking Status Former   BSA 1.59 m²         Relevant Results        Results for orders placed or performed during the hospital encounter of 01/14/25 (from the past 24 hours)   CBC and Auto Differential   Result Value Ref Range     WBC 5.8 4.4 - 11.3 x10*3/uL     nRBC 0.0 0.0 - 0.0 /100 WBCs     RBC 3.34 (L) 4.00 - 5.20 x10*6/uL     Hemoglobin 9.5 (L) 12.0 - 16.0 g/dL     Hematocrit 31.1 (L) 36.0 - 46.0 %     MCV 93 80 - 100 fL     MCH 28.4 26.0 - 34.0 pg     MCHC 30.5 (L) 32.0 - 36.0 g/dL     RDW 18.0 (H) 11.5 - 14.5 %     Platelets 206 150 - 450 x10*3/uL     Neutrophils % 66.4 40.0 - 80.0 %     Immature Granulocytes %, Automated 0.5 0.0 - 0.9 %     Lymphocytes % 17.5 13.0 - 44.0 %     Monocytes % 10.6 2.0 - 10.0 %     Eosinophils % 4.1 0.0 - 6.0 %     Basophils % 0.9 0.0 - 2.0 %     Neutrophils Absolute 3.88 1.60 - 5.50 x10*3/uL     Immature Granulocytes Absolute, Automated 0.03 0.00 - 0.50 x10*3/uL     Lymphocytes Absolute 1.02 0.80 - 3.00 x10*3/uL     Monocytes Absolute 0.62 0.05 - 0.80 x10*3/uL     Eosinophils Absolute 0.24 0.00 - 0.40 x10*3/uL     Basophils Absolute 0.05 0.00 - 0.10 x10*3/uL   Comprehensive metabolic panel   Result Value Ref Range     Glucose 79 74 - 99 mg/dL     Sodium 140 136 - 145 mmol/L     Potassium 4.8 3.5 - 5.3 mmol/L     Chloride 103 98 - 107 mmol/L     Bicarbonate 29 21 - 32 mmol/L     Anion Gap 13 10 - 20 mmol/L     Urea Nitrogen 38 (H) 6 - 23 mg/dL     Creatinine 1.61 (H) 0.50 - 1.05 mg/dL     eGFR 30 (L) >60 mL/min/1.73m*2     Calcium 9.0 8.6 - 10.3 mg/dL     Albumin 3.7 3.4 - 5.0 g/dL     Alkaline Phosphatase 76 33 - 136 U/L     Total Protein 6.7 6.4 - 8.2 g/dL     AST 20 9 - 39 U/L     Bilirubin, Total 0.3 0.0 - 1.2 mg/dL     ALT 11 7 - 45 " U/L   B-type natriuretic peptide   Result Value Ref Range      (H) 0 - 99 pg/mL   D-dimer, VTE Exclusion   Result Value Ref Range     D-Dimer, Quantitative VTE Exclusion 4,886 (H) <=500 ng/mL FEU   Vascular US lower extremity venous duplex left   Result Value Ref Range     BSA 1.59 m2         Imaging  Vascular US lower extremity venous duplex left     Result Date: 1/14/2025  Preliminary Cardiology Report          [facility] [address] [address] Tel [phone] and Fax [phone]       Preliminary Vascular Lab Report  VASC US LOWER EXTREMITY VENOUS DUPLEX LEFT  Patient Name:      SEAN GARCÍA  Reading Physician:  [id number] Madeline Yanez MD Study Date:        1/14/2025    Ordering Physician: [id number] ZAMAN MRN/PID:           [id number]     Technologist:       Saniya Summers RVT Accession#:        [id number] Technologist 2: Date of Birth/Age: 12/11/1934   Encounter#:         [id number] Gender:            F Admission Status:  Emergency    Location Performed: [facility]  Diagnosis/ICD: Pain in left leg-M79.605 Procedure/CPT: 45024 Peripheral venous duplex scan for DVT Limited  PRELIMINARY CONCLUSIONS: Right Lower Venous: The right common femoral vein demonstrates normal spontaneous and respirophasic flow. Left Lower Venous: No evidence of acute deep vein thrombus visualized in the left lower extremity. Cannot rule out thrombus in non-visualized posterior tibial and peroneal veins due to edema.  Imaging & Doppler Findings:  Right        Flow CFV   Spontaneous/Phasic  Left                  Compress Thrombus        Flow Distal External Iliac   Yes      None   Spontaneous/Phasic CFV                     Yes      None   Spontaneous/Phasic PFV                     Yes      None FV Proximal             Yes      None   Spontaneous/Phasic FV Mid                  Yes      None FV Distal               Yes      None Popliteal               Yes      None   Spontaneous/Phasic  VASCULAR PRELIMINARY REPORT completed by Saniya Summers RVT on 1/14/2025 at 9:47:01 AM  ** Final **      XR ankle bilateral 2 views     Result Date: 1/7/2025  Interpreted By:  Chelsey Merino, STUDY: XR ANKLE BILATERAL 2 VIEWS;  1/7/2025 1:15 pm   INDICATION: Signs/Symptoms:TTP.   COMPARISON: None.   ACCESSION NUMBER(S): [id number]   ORDERING CLINICIAN: EMIL BAILEY   FINDINGS: Left ankle: No acute fracture or dislocation is identified. The ankle mortise is congruent. There are degenerative changes in the visualized joints of the midfoot. A small plantar calcaneal enthesophyte is present. There is moderate-to-marked diffuse subcutaneous edema the imaged calf.   Right ankle: No acute fracture or dislocation is identified. The ankle mortise is congruent. There are degenerative changes in the visualized joints of the midfoot. A small plantar calcaneal enthesophyte is present. There is moderate-to-marked diffuse subcutaneous edema the imaged calf.        Bilateral calf edema without acute osseous abnormality.   MACRO None   Signed by: Chelsey Merino 1/7/2025 1:23 PM Dictation workstation:   [id number]      Home Medications          Prior to Admission medications    Medication Sig Start Date End Date Taking? Authorizing Provider   azelastine (Astelin) 137 mcg (0.1 %) nasal spray Administer 2 sprays into each nostril 2 times a day. Use in each nostril as directed  Patient taking differently: Administer 2 sprays into each nostril if needed. Use in each nostril as directed 11/21/24 11/21/25 Yes Lakia Johnson MD   dilTIAZem LA (Cardizem LA) 180 mg 24 hr tablet Take 1 tablet (180 mg) by mouth once daily.  Patient taking differently: Take 1 tablet (180 mg) by mouth once daily in the morning. 11/5/24   Yes Lakia Johnson MD   ferrous sulfate 325 (65 Fe) MG EC tablet Take 1 tablet by mouth once daily with breakfast. Do not crush, chew, or split. 11/5/24 11/5/25 Yes Lakia Johnson MD   furosemide (Lasix) 20 mg tablet  Take 1 tablet (20 mg) by mouth once daily.  Patient taking differently: Take 1 tablet (20 mg) by mouth once daily in the morning. 12/16/24 12/16/25 Yes Lakia Johnson MD   meloxicam (Mobic) 15 mg tablet Take 1 tablet (15 mg) by mouth once daily.  Patient taking differently: Take 1 tablet (15 mg) by mouth once daily with breakfast. 12/16/24   Yes Lakia Johnson MD   mv-min-FA-vit K-lutein-zeaxant (PreserVision AREDS 2 Plus MV) 200 mcg-15 mcg- 5 mg-1 mg capsule Take 1 capsule by mouth 2 times a day.     Yes Historical Provider, MD   oxygen (O2) gas therapy Inhale 3 L/min continuously.     Yes Historical Provider, MD   potassium chloride CR 20 mEq ER tablet Take 1 tablet (20 mEq) by mouth once daily in the morning.     Yes Historical Provider, MD   pramipexole (Mirapex) 1 mg tablet Take 1 tablet (1 mg) by mouth once daily at bedtime. 12/16/24 12/16/25 Yes Lakia Johnson MD   simvastatin (Zocor) 20 mg tablet Take 1 tablet (20 mg) by mouth once daily at bedtime. 12/16/24 12/16/25 Yes Lakia Johnson MD   traMADol (Ultram) 50 mg tablet Take 1 tablet (50 mg) by mouth every 8 hours if needed for severe pain (7 - 10) for up to 3 days. 1/9/25 1/14/25 Yes Jorge Mart DO   traZODone (Desyrel) 50 mg tablet Take 1.5 tablets (75 mg) by mouth as needed at bedtime for sleep. 1/9/25   Yes Jorge Mart DO   doxycycline (Vibramycin) 100 mg capsule Take 1 capsule (100 mg) by mouth 2 times a day for 3 days. Take with at least 8 ounces (large glass) of water, do not lie down for 30 minutes after  Patient not taking: Reported on 1/14/2025 1/9/25 1/12/25   Jorge Mart DO   gabapentin (Neurontin) 100 mg capsule Take 1 capsule (100 mg) by mouth 2 times a day for 3 days.  Patient not taking: Reported on 1/14/202514/2025 1/9/25 1/12/25   Jorge Mart DO   apixaban (Eliquis) 5 mg tablet Take 1 tablet (5 mg) by mouth 2 times a day.  Patient not taking: Reported on 1/7/2025 11/19/24 1/9/25   MD Rajani Holland ([facility])  50 mg tablet Take 1 tablet (50 mg) by mouth as needed at bedtime for sleep.  Patient taking differently: Take 1 tablet (50 mg) by mouth once daily at bedtime. 12/20/24 1/9/25   Lakia Johnson MD   vit C,P-Ta-cdmve-lutein-zeaxan (PreserVision AREDS-2) 250-90-40-1 mg capsule Take 1 capsule by mouth 2 times a day. 10/10/24 1/14/25   CARLEE Barber-CNP         Medications  Scheduled medications    Scheduled Medications   ampicillin-sulbactam, 3 g, intravenous, q12h  dilTIAZem CD, 180 mg, oral, Daily  [START ON 1/15/2025] ferrous sulfate (325 mg ferrous sulfate), 325 mg, oral, Daily with breakfast  furosemide, 20 mg, oral, q AM  heparin (porcine), 5,000 Units, subcutaneous, q8h  [START ON 1/15/2025] meloxicam, 7.5 mg, oral, Daily with breakfast  potassium chloride CR, 20 mEq, oral, q AM  pramipexole, 1 mg, oral, Nightly  simvastatin, 20 mg, oral, Nightly         Continuous medications  Continuous Medications         PRN medications  acetaminophen, 650 mg, q4h PRN   Or  acetaminophen, 650 mg, q4h PRN   Or  acetaminophen, 650 mg, q4h PRN  HYDROcodone-acetaminophen, 1 tablet, q6h PRN  traZODone, 75 mg, Nightly PRN                 Assessment/Plan     Assessment & Plan  Left leg cellulitis     Bilateral LE cellulitis  LLE ecchymosis  B/L LE edema, acute on chronic  -HDS  -Does not meets SIRS/sepsis   -No leukocytosis and afebrile  -Denies fever/chills  -Will initiate IV Unasyn, renally dosed  -Patient reports compliance with her home PO Lasix 20mg QD, which is continued  -B/L LE duplex ultrasound negative for DVT  -Bruising to LLE noted during last admission and XR of L ankle obtained showing no acute fracture with some degenerative changes  -Pt reports difficulty ambulating secondary to pain  -PT/OT  -Up with assist and fall precautions     Chronic diastolic heart failure  Chronic respiratory failure  Hx of PE and DVT  -Lungs are CTA on exam  -Pt requiring 3L NC. Pt reports requiring 2-3L oxygen PRN/intermittently  since she had her PE. States she has been using it more continuously lately because she gets more SOB. CT for PE obtained 1/7/2025 showing no acute PE or PNA.  -Echo obtained 1/3/2025 showing EF 62%, Grade I diastolic dysfunction, normal RV systolic function  -Monitor pulse ox  -Admitted from 10/2-10/7 for provoked multiple right sided pulmonary emboli with signs of right heart strain after having hip surgery              -Completed Eliquis just about at the New Year  -Will use SQ heparin for VTE PPx     Other chronic medical conditions:  HTN  CKDIII  AAA status post repair  Carotid disease  PAD  ITP  Dyslipidemia  Neuropathic pain  -Continue home medications as appropriate  -Monitor renal function     VTE PPx: SQ heparin     See additional orders for further plan of care.   Further evaluation and management per attending and consulting physicians.       Bahman Flores MD  Patient fully evaluated in the emergency room on January 14.  Continue present IV antibiotics and monitor.  Recheck labs in AM.                   Patient fully evaluated  01/15  for    Problem List Items Addressed This Visit       * (Principal) Left leg cellulitis - Primary     Other Visit Diagnoses       Pain in left leg              Patient seen resting in bed with head of bed elevated, no s/s or c/o acute difficulties at this time.  Vital signs for last 24 hours Temp:  [36.7 °C (98.1 °F)-37.6 °C (99.7 °F)] 36.8 °C (98.2 °F)  Heart Rate:  [61-73] 67  Resp:  [18] 18  BP: (125-167)/(59-73) 167/73    No intake/output data recorded.  Patient still requiring frequent cardiac and SPO2 monitoring. Continue aggressive pulmonary hygiene and oral hygiene. Off loading as tolerated for skin integrity. Medications and labs reviewed-   Results for orders placed or performed during the hospital encounter of 01/14/25 (from the past 24 hours)   CBC and Auto Differential   Result Value Ref Range    WBC 5.1 4.4 - 11.3 x10*3/uL    nRBC 0.0 0.0 - 0.0 /100 WBCs     RBC 3.31 (L) 4.00 - 5.20 x10*6/uL    Hemoglobin 9.1 (L) 12.0 - 16.0 g/dL    Hematocrit 31.0 (L) 36.0 - 46.0 %    MCV 94 80 - 100 fL    MCH 27.5 26.0 - 34.0 pg    MCHC 29.4 (L) 32.0 - 36.0 g/dL    RDW 17.8 (H) 11.5 - 14.5 %    Platelets 162 150 - 450 x10*3/uL    Neutrophils % 54.5 40.0 - 80.0 %    Immature Granulocytes %, Automated 0.2 0.0 - 0.9 %    Lymphocytes % 28.7 13.0 - 44.0 %    Monocytes % 9.3 2.0 - 10.0 %    Eosinophils % 6.3 0.0 - 6.0 %    Basophils % 1.0 0.0 - 2.0 %    Neutrophils Absolute 2.75 1.60 - 5.50 x10*3/uL    Immature Granulocytes Absolute, Automated 0.01 0.00 - 0.50 x10*3/uL    Lymphocytes Absolute 1.45 0.80 - 3.00 x10*3/uL    Monocytes Absolute 0.47 0.05 - 0.80 x10*3/uL    Eosinophils Absolute 0.32 0.00 - 0.40 x10*3/uL    Basophils Absolute 0.05 0.00 - 0.10 x10*3/uL   Comprehensive Metabolic Panel   Result Value Ref Range    Glucose 80 74 - 99 mg/dL    Sodium 139 136 - 145 mmol/L    Potassium 4.6 3.5 - 5.3 mmol/L    Chloride 103 98 - 107 mmol/L    Bicarbonate 29 21 - 32 mmol/L    Anion Gap 12 10 - 20 mmol/L    Urea Nitrogen 35 (H) 6 - 23 mg/dL    Creatinine 1.62 (H) 0.50 - 1.05 mg/dL    eGFR 30 (L) >60 mL/min/1.73m*2    Calcium 8.1 (L) 8.6 - 10.3 mg/dL    Albumin 3.2 (L) 3.4 - 5.0 g/dL    Alkaline Phosphatase 68 33 - 136 U/L    Total Protein 6.0 (L) 6.4 - 8.2 g/dL    AST 15 9 - 39 U/L    Bilirubin, Total 0.3 0.0 - 1.2 mg/dL    ALT 9 7 - 45 U/L      Patient recently received an antibiotic (last 12 hours)       Date/Time Action Medication Dose Rate    01/15/25 0339 New Bag    ampicillin-sulbactam (Unasyn) 3 g in sodium chloride 0.9%  mL 3 g 200 mL/hr           Plan discussed with interdisciplinary team, double layer tubi  to BLE, diligent wound care, elevate BLE as tolerated. Will continue IV antibiotics - Unasyn, titrate supplemental oxygen, patient room air at baseline, repeat labs including iron levels, and chest xray in the AM. Incentive spirometry ordered. Patient awake, alert, and  appropriate, states she is from home with a home health aide that assists with ADLs at home. No acute events overnight, patient denies chest pain, worsening SOB at this time.continue current and repeat labs in the AM.     Discharge planning discussed with patient and care team. Therapy evaluations ordered. Anticipate HHC at discharge. Patient aware and agreeable to current plan, continue plan as above.     I spent a total of 50 minutes on the date of the service which included preparing to see the patient, face-to-face patient care, completing clinical documentation, obtaining and/or reviewing separately obtained history, performing a medically appropriate examination, counseling and educating the patient/family/caregiver, ordering medications, tests, or procedures, communicating with other HCPs (not separately reported), independently interpreting results (not separately reported), communicating results to the patient/family/caregiver, and care coordination (not separately reported).        Patient fully evaluated  01/16  for    Problem List Items Addressed This Visit       * (Principal) Left leg cellulitis - Primary     Other Visit Diagnoses       Pain in left leg              Patient seen resting in bed with head of bed elevated, no s/s or c/o acute difficulties at this time.  Vital signs for last 24 hours Temp:  [36.7 °C (98.1 °F)-37.6 °C (99.7 °F)] 36.8 °C (98.2 °F)  Heart Rate:  [61-73] 67  Resp:  [18] 18  BP: (125-167)/(59-73) 167/73    No intake/output data recorded.  Patient still requiring frequent cardiac and SPO2 monitoring. Continue aggressive pulmonary hygiene and oral hygiene. Off loading as tolerated for skin integrity. Medications and labs reviewed-   Results for orders placed or performed during the hospital encounter of 01/14/25 (from the past 24 hours)   CBC and Auto Differential   Result Value Ref Range    WBC 5.1 4.4 - 11.3 x10*3/uL    nRBC 0.0 0.0 - 0.0 /100 WBCs    RBC 3.31 (L) 4.00 - 5.20  x10*6/uL    Hemoglobin 9.1 (L) 12.0 - 16.0 g/dL    Hematocrit 31.0 (L) 36.0 - 46.0 %    MCV 94 80 - 100 fL    MCH 27.5 26.0 - 34.0 pg    MCHC 29.4 (L) 32.0 - 36.0 g/dL    RDW 17.8 (H) 11.5 - 14.5 %    Platelets 162 150 - 450 x10*3/uL    Neutrophils % 54.5 40.0 - 80.0 %    Immature Granulocytes %, Automated 0.2 0.0 - 0.9 %    Lymphocytes % 28.7 13.0 - 44.0 %    Monocytes % 9.3 2.0 - 10.0 %    Eosinophils % 6.3 0.0 - 6.0 %    Basophils % 1.0 0.0 - 2.0 %    Neutrophils Absolute 2.75 1.60 - 5.50 x10*3/uL    Immature Granulocytes Absolute, Automated 0.01 0.00 - 0.50 x10*3/uL    Lymphocytes Absolute 1.45 0.80 - 3.00 x10*3/uL    Monocytes Absolute 0.47 0.05 - 0.80 x10*3/uL    Eosinophils Absolute 0.32 0.00 - 0.40 x10*3/uL    Basophils Absolute 0.05 0.00 - 0.10 x10*3/uL   Comprehensive Metabolic Panel   Result Value Ref Range    Glucose 80 74 - 99 mg/dL    Sodium 139 136 - 145 mmol/L    Potassium 4.6 3.5 - 5.3 mmol/L    Chloride 103 98 - 107 mmol/L    Bicarbonate 29 21 - 32 mmol/L    Anion Gap 12 10 - 20 mmol/L    Urea Nitrogen 35 (H) 6 - 23 mg/dL    Creatinine 1.62 (H) 0.50 - 1.05 mg/dL    eGFR 30 (L) >60 mL/min/1.73m*2    Calcium 8.1 (L) 8.6 - 10.3 mg/dL    Albumin 3.2 (L) 3.4 - 5.0 g/dL    Alkaline Phosphatase 68 33 - 136 U/L    Total Protein 6.0 (L) 6.4 - 8.2 g/dL    AST 15 9 - 39 U/L    Bilirubin, Total 0.3 0.0 - 1.2 mg/dL    ALT 9 7 - 45 U/L      Patient recently received an antibiotic (last 12 hours)       Date/Time Action Medication Dose Rate    01/16/25 5105 New Bag    ampicillin-sulbactam (Unasyn) 3 g in sodium chloride 0.9%  mL 3 g 200 mL/hr           No acute events overnight, patient denies chest pain, worsening SOB at this time. Patient on supplemental oxygen at baseline but would like to titrate as tolerated, continue titration. Plan discussed with interdisciplinary team, patient with significant pain to left ankle, will apply lidocaine patch to site, cymbalta started, will monitor overnight, continue  current and repeat labs in the AM.     Discharge planning discussed with patient and care team. Therapy evaluations ordered. Anticipate [facility] at discharge, per [facility] able to accept patient.  Patient aware and agreeable to current plan, continue plan as above.     I spent a total of 50 minutes on the date of the service which included preparing to see the patient, face-to-face patient care, completing clinical documentation, obtaining and/or reviewing separately obtained history, performing a medically appropriate examination, counseling and educating the patient/family/caregiver, ordering medications, tests, or procedures, communicating with other HCPs (not separately reported), independently interpreting results (not separately reported), communicating results to the patient/family/caregiver, and care coordination (not separately reported).        Katerina Fitzgerald

## 2025-01-16 NOTE — PROGRESS NOTES
01/16/25 1103   Discharge Planning   Home or Post Acute Services In home services   Type of Home Care Services Home nursing visits;Home OT;Home PT   Expected Discharge Disposition Home H   Does the patient need discharge transport arranged? No   Patient Choice   Provider Choice list and CMS website (https://medicare.gov/care-compare#search) for post-acute Quality and Resource Measure Data were provided and reviewed with: Patient   Patient / Family choosing to utilize agency / facility established prior to hospitalization No   Intensity of Service   Intensity of Service 0-30 min     This TCC met with patient this morning to discuss therapy recommendations of [facility] with her.  Patient provided with HHC choice list.  Patient stated that she would be interested in using \[facility]\""[facility] as it is across the street from her residence.  She also mentioned that she has used the agency in the past.  Pt stated that she would be open to using [facility] if [facility] would not be able to provide the services.  Will place the referral for [facility] and will keep the patient updated.  Care transitions to follow.     1146am: [facility] able to accept patient.

## 2025-01-16 NOTE — CARE PLAN
The patient's goals for the shift include  pain control and safety    The clinical goals for the shift include pt will be safe and comftorable    Problem: Pain - Adult  Goal: Verbalizes/displays adequate comfort level or baseline comfort level  Outcome: Progressing     Problem: Safety - Adult  Goal: Free from fall injury  Outcome: Progressing     Problem: Discharge Planning  Goal: Discharge to home or other facility with appropriate resources  Outcome: Progressing     Problem: Chronic Conditions and Co-morbidities  Goal: Patient's chronic conditions and co-morbidity symptoms are monitored and maintained or improved  Outcome: Progressing     Problem: Skin  Goal: Decreased wound size/increased tissue granulation at next dressing change  Outcome: Progressing  Goal: Participates in plan/prevention/treatment measures  Outcome: Progressing  Goal: Prevent/manage excess moisture  Outcome: Progressing  Goal: Prevent/minimize sheer/friction injuries  Outcome: Progressing  Goal: Promote/optimize nutrition  Outcome: Progressing  Goal: Promote skin healing  Outcome: Progressing

## 2025-01-16 NOTE — PROGRESS NOTES
Physical Therapy    Physical Therapy Treatment    Patient Name: Estefanía Contreras  MRN: [id number]  Department: [facility]  Room: [facility]  Today's Date: 1/16/2025  Time Calculation  Start Time: 0955  Stop Time: 1019  Time Calculation (min): 24 min         Assessment/Plan   PT Assessment  End of Session Communication: Bedside nurse  End of Session Patient Position: Up in chair, Alarm on (call light and needs within reach.)     PT Plan  Treatment/Interventions: Bed mobility, Transfer training, Gait training  PT Plan: Ongoing PT  PT Frequency: 4 times per week  PT Discharge Recommendations: Low intensity level of continued care  PT - OK to Discharge: Yes      General Visit Information:   PT  Visit  PT Received On: 01/16/25  General  Prior to Session Communication: Bedside nurse  Patient Position Received: Bed, 2 rail up, Alarm off, not on at start of session  General Comment: Patient pleasant and agreeable to therapy.    Subjective   Precautions:  Precautions  Precautions Comment: OOB with assist, high fall risk    Objective   Pain:  Pain Assessment  Pain Assessment: 0-10  0-10 (Numeric) Pain Score: 8  Pain Location: Leg  Pain Orientation: Right, Left, Lower  Pain Interventions: Distraction, Emotional support  Treatments:  Therapeutic Exercise  Therapeutic Exercise Performed: Yes  Therapeutic Exercise Activity 1: Patient performed seated therex, BLE: APs, hip flexion, and LAQ all [id number]    Bed Mobility  Bed Mobility: Yes  Bed Mobility 1  Bed Mobility 1: Supine to sitting  Level of Assistance 1:  (SBA x1)  Bed Mobility Comments 1: Increased time and effort to perform Cues for sequencing. HOB elevated.    Ambulation/Gait Training  Ambulation/Gait Training Performed: Yes  Ambulation/Gait Training 1  Comments/Distance (ft) 1: Patient ambulated ~60ft with FWW and SBA/CGA x1. Patient demo's step through gait with slow but adequate velocity. Steady with no LOB noted.  Transfers  Transfer: Yes  Transfer 1  Trials/Comments 1: Patient  performed sit<>stand with SBA/CGA x1. Cues for hand placement and sequencing.    Outcome Measures:  [facility] Basic Mobility  Turning from your back to your side while in a flat bed without using bedrails: None  Moving from lying on your back to sitting on the side of a flat bed without using bedrails: A little  Moving to and from bed to chair (including a wheelchair): A little  Standing up from a chair using your arms (e.g. wheelchair or bedside chair): A little  To walk in hospital room: A little  Climbing 3-5 steps with railing: A little  Basic Mobility - Total Score: 19    Education Documentation  Precautions, taught by Columba Elizabeth PTA at 1/16/2025  1:14 PM.  Learner: Patient  Readiness: Acceptance  Method: Explanation  Response: Verbalizes Understanding  Comment: Educated patient on transfer training and gait training techniques to maximize safety and independence.    Mobility Training, taught by Columba Elizabeth PTA at 1/16/2025  1:14 PM.  Learner: Patient  Readiness: Acceptance  Method: Explanation  Response: Verbalizes Understanding  Comment: Educated patient on transfer training and gait training techniques to maximize safety and independence.    Education Comments  No comments found.        OP EDUCATION:       Encounter Problems       Encounter Problems (Active)       PT Problem       STG - Pt will transition supine <> sitting with mod I  (Progressing)       Start:  01/15/25    Expected End:  01/29/25            STG - Pt will transfer STS with mod I  (Progressing)       Start:  01/15/25    Expected End:  01/29/25            STG - Pt will amb 100' using RW with mod I  (Progressing)       Start:  01/15/25    Expected End:  01/29/25               Pain - Adult

## 2025-01-16 NOTE — CARE PLAN
The patient's goals for the shift include      The clinical goals for the shift include pain control      Problem: Pain - Adult  Goal: Verbalizes/displays adequate comfort level or baseline comfort level  Outcome: Progressing     Problem: Safety - Adult  Goal: Free from fall injury  Outcome: Progressing     Problem: Discharge Planning  Goal: Discharge to home or other facility with appropriate resources  Outcome: Progressing     Problem: Chronic Conditions and Co-morbidities  Goal: Patient's chronic conditions and co-morbidity symptoms are monitored and maintained or improved  Outcome: Progressing     Problem: Skin  Goal: Decreased wound size/increased tissue granulation at next dressing change  Outcome: Progressing  Flowsheets (Taken 1/16/2025 1146)  Decreased wound size/increased tissue granulation at next dressing change: Promote sleep for wound healing  Goal: Participates in plan/prevention/treatment measures  Outcome: Progressing  Flowsheets (Taken 1/16/2025 1146)  Participates in plan/prevention/treatment measures: Elevate heels  Goal: Prevent/manage excess moisture  Outcome: Progressing  Flowsheets (Taken 1/16/2025 1146)  Prevent/manage excess moisture: Monitor for/manage infection if present  Goal: Prevent/minimize sheer/friction injuries  Outcome: Progressing  Flowsheets (Taken 1/16/2025 1146)  Prevent/minimize sheer/friction injuries: Use pull sheet  Goal: Promote/optimize nutrition  Outcome: Progressing  Flowsheets (Taken 1/16/2025 1146)  Promote/optimize nutrition: Offer water/supplements/favorite foods  Goal: Promote skin healing  Outcome: Progressing  Flowsheets (Taken 1/16/2025 1146)  Promote skin healing: Assess skin/pad under line(s)/device(s)

## 2025-01-17 LAB
ALBUMIN SERPL BCP-MCNC: 3 G/DL (ref 3.4–5)
ALP SERPL-CCNC: 66 U/L (ref 33–136)
ALT SERPL W P-5'-P-CCNC: 8 U/L (ref 7–45)
ANION GAP SERPL CALC-SCNC: 11 MMOL/L (ref 10–20)
AST SERPL W P-5'-P-CCNC: 14 U/L (ref 9–39)
BASOPHILS # BLD AUTO: 0.04 X10*3/UL (ref 0–0.1)
BASOPHILS NFR BLD AUTO: 0.9 %
BILIRUB SERPL-MCNC: 0.3 MG/DL (ref 0–1.2)
BUN SERPL-MCNC: 31 MG/DL (ref 6–23)
CALCIUM SERPL-MCNC: 7.9 MG/DL (ref 8.6–10.3)
CHLORIDE SERPL-SCNC: 105 MMOL/L (ref 98–107)
CO2 SERPL-SCNC: 29 MMOL/L (ref 21–32)
CREAT SERPL-MCNC: 1.59 MG/DL (ref 0.5–1.05)
EGFRCR SERPLBLD CKD-EPI 2021: 31 ML/MIN/1.73M*2
EOSINOPHIL # BLD AUTO: 0.24 X10*3/UL (ref 0–0.4)
EOSINOPHIL NFR BLD AUTO: 5.7 %
ERYTHROCYTE [DISTWIDTH] IN BLOOD BY AUTOMATED COUNT: 17.6 % (ref 11.5–14.5)
GLUCOSE SERPL-MCNC: 79 MG/DL (ref 74–99)
HCT VFR BLD AUTO: 28.2 % (ref 36–46)
HGB BLD-MCNC: 8.6 G/DL (ref 12–16)
IMM GRANULOCYTES # BLD AUTO: 0.01 X10*3/UL (ref 0–0.5)
IMM GRANULOCYTES NFR BLD AUTO: 0.2 % (ref 0–0.9)
IRON SATN MFR SERPL: 10 % (ref 25–45)
IRON SERPL-MCNC: 29 UG/DL (ref 35–150)
LYMPHOCYTES # BLD AUTO: 1.09 X10*3/UL (ref 0.8–3)
LYMPHOCYTES NFR BLD AUTO: 25.7 %
MCH RBC QN AUTO: 28.4 PG (ref 26–34)
MCHC RBC AUTO-ENTMCNC: 30.5 G/DL (ref 32–36)
MCV RBC AUTO: 93 FL (ref 80–100)
MONOCYTES # BLD AUTO: 0.39 X10*3/UL (ref 0.05–0.8)
MONOCYTES NFR BLD AUTO: 9.2 %
NEUTROPHILS # BLD AUTO: 2.47 X10*3/UL (ref 1.6–5.5)
NEUTROPHILS NFR BLD AUTO: 58.3 %
NRBC BLD-RTO: 0 /100 WBCS (ref 0–0)
PLATELET # BLD AUTO: 156 X10*3/UL (ref 150–450)
POTASSIUM SERPL-SCNC: 4.5 MMOL/L (ref 3.5–5.3)
PROT SERPL-MCNC: 5.5 G/DL (ref 6.4–8.2)
RBC # BLD AUTO: 3.03 X10*6/UL (ref 4–5.2)
SODIUM SERPL-SCNC: 140 MMOL/L (ref 136–145)
TIBC SERPL-MCNC: 277 UG/DL (ref 240–445)
UIBC SERPL-MCNC: 248 UG/DL (ref 110–370)
WBC # BLD AUTO: 4.2 X10*3/UL (ref 4.4–11.3)

## 2025-01-17 PROCEDURE — 1100000001 HC PRIVATE ROOM DAILY

## 2025-01-17 PROCEDURE — 85025 COMPLETE CBC W/AUTO DIFF WBC: CPT | Performed by: INTERNAL MEDICINE

## 2025-01-17 PROCEDURE — 83550 IRON BINDING TEST: CPT | Performed by: INTERNAL MEDICINE

## 2025-01-17 PROCEDURE — 2500000001 HC RX 250 WO HCPCS SELF ADMINISTERED DRUGS (ALT 637 FOR MEDICARE OP): Performed by: INTERNAL MEDICINE

## 2025-01-17 PROCEDURE — 2500000001 HC RX 250 WO HCPCS SELF ADMINISTERED DRUGS (ALT 637 FOR MEDICARE OP): Performed by: PHYSICIAN ASSISTANT

## 2025-01-17 PROCEDURE — 2500000002 HC RX 250 W HCPCS SELF ADMINISTERED DRUGS (ALT 637 FOR MEDICARE OP, ALT 636 FOR OP/ED): Performed by: PHYSICIAN ASSISTANT

## 2025-01-17 PROCEDURE — 36415 COLL VENOUS BLD VENIPUNCTURE: CPT | Performed by: INTERNAL MEDICINE

## 2025-01-17 PROCEDURE — 97535 SELF CARE MNGMENT TRAINING: CPT | Mod: CQ,GP

## 2025-01-17 PROCEDURE — 2500000001 HC RX 250 WO HCPCS SELF ADMINISTERED DRUGS (ALT 637 FOR MEDICARE OP)

## 2025-01-17 PROCEDURE — 97116 GAIT TRAINING THERAPY: CPT | Mod: CQ,GP

## 2025-01-17 PROCEDURE — 2500000004 HC RX 250 GENERAL PHARMACY W/ HCPCS (ALT 636 FOR OP/ED): Performed by: INTERNAL MEDICINE

## 2025-01-17 PROCEDURE — 80053 COMPREHEN METABOLIC PANEL: CPT | Performed by: INTERNAL MEDICINE

## 2025-01-17 PROCEDURE — 2500000004 HC RX 250 GENERAL PHARMACY W/ HCPCS (ALT 636 FOR OP/ED): Performed by: PHYSICIAN ASSISTANT

## 2025-01-17 PROCEDURE — 2500000005 HC RX 250 GENERAL PHARMACY W/O HCPCS: Performed by: INTERNAL MEDICINE

## 2025-01-17 RX ORDER — ONDANSETRON HYDROCHLORIDE 2 MG/ML
4 INJECTION, SOLUTION INTRAVENOUS EVERY 6 HOURS PRN
Status: DISCONTINUED | OUTPATIENT
Start: 2025-01-17 | End: 2025-01-20 | Stop reason: HOSPADM

## 2025-01-17 RX ORDER — DOXYCYCLINE HYCLATE 100 MG
100 TABLET ORAL EVERY 12 HOURS SCHEDULED
Status: DISCONTINUED | OUTPATIENT
Start: 2025-01-17 | End: 2025-01-17

## 2025-01-17 RX ORDER — PANTOPRAZOLE SODIUM 40 MG/1
40 TABLET, DELAYED RELEASE ORAL
Status: DISCONTINUED | OUTPATIENT
Start: 2025-01-18 | End: 2025-01-20 | Stop reason: HOSPADM

## 2025-01-17 RX ORDER — L. ACIDOPHILUS/L.BULGARICUS 1MM CELL
1 TABLET ORAL DAILY
Status: DISCONTINUED | OUTPATIENT
Start: 2025-01-17 | End: 2025-01-20 | Stop reason: HOSPADM

## 2025-01-17 RX ORDER — CEPHALEXIN 250 MG/1
250 CAPSULE ORAL EVERY 8 HOURS
Status: DISCONTINUED | OUTPATIENT
Start: 2025-01-17 | End: 2025-01-19

## 2025-01-17 RX ADMIN — Medication 1 TABLET: at 15:08

## 2025-01-17 RX ADMIN — POTASSIUM CHLORIDE 20 MEQ: 1500 TABLET, EXTENDED RELEASE ORAL at 09:01

## 2025-01-17 RX ADMIN — MELOXICAM 7.5 MG: 7.5 TABLET ORAL at 09:01

## 2025-01-17 RX ADMIN — AMPICILLIN SODIUM AND SULBACTAM SODIUM 3 G: 2; 1 INJECTION, POWDER, FOR SOLUTION INTRAMUSCULAR; INTRAVENOUS at 02:53

## 2025-01-17 RX ADMIN — Medication 1 CAPSULE: at 09:04

## 2025-01-17 RX ADMIN — CEPHALEXIN 250 MG: 250 CAPSULE ORAL at 21:50

## 2025-01-17 RX ADMIN — HYDROCODONE BITARTRATE AND ACETAMINOPHEN 1 TABLET: 5; 325 TABLET ORAL at 09:10

## 2025-01-17 RX ADMIN — Medication 1 CAPSULE: at 21:05

## 2025-01-17 RX ADMIN — DULOXETINE HYDROCHLORIDE 30 MG: 30 CAPSULE, DELAYED RELEASE ORAL at 09:01

## 2025-01-17 RX ADMIN — CEPHALEXIN 250 MG: 250 CAPSULE ORAL at 15:08

## 2025-01-17 RX ADMIN — DOXYCYCLINE HYCLATE 100 MG: 100 TABLET, COATED ORAL at 13:06

## 2025-01-17 RX ADMIN — FERROUS SULFATE TAB 325 MG (65 MG ELEMENTAL FE) 325 MG: 325 (65 FE) TAB at 09:01

## 2025-01-17 RX ADMIN — FUROSEMIDE 20 MG: 20 TABLET ORAL at 09:01

## 2025-01-17 RX ADMIN — HEPARIN SODIUM 5000 UNITS: 5000 INJECTION INTRAVENOUS; SUBCUTANEOUS at 15:07

## 2025-01-17 RX ADMIN — LIDOCAINE 4% 3 PATCH: 40 PATCH TOPICAL at 09:01

## 2025-01-17 RX ADMIN — HEPARIN SODIUM 5000 UNITS: 5000 INJECTION INTRAVENOUS; SUBCUTANEOUS at 06:18

## 2025-01-17 RX ADMIN — SIMVASTATIN 20 MG: 20 TABLET, FILM COATED ORAL at 21:05

## 2025-01-17 RX ADMIN — HYDROCODONE BITARTRATE AND ACETAMINOPHEN 1 TABLET: 5; 325 TABLET ORAL at 15:08

## 2025-01-17 RX ADMIN — PRAMIPEXOLE DIHYDROCHLORIDE 1 MG: 1 TABLET ORAL at 21:05

## 2025-01-17 RX ADMIN — HYDROCODONE BITARTRATE AND ACETAMINOPHEN 1 TABLET: 5; 325 TABLET ORAL at 21:10

## 2025-01-17 RX ADMIN — ONDANSETRON 4 MG: 2 INJECTION INTRAMUSCULAR; INTRAVENOUS at 10:59

## 2025-01-17 RX ADMIN — HEPARIN SODIUM 5000 UNITS: 5000 INJECTION INTRAVENOUS; SUBCUTANEOUS at 21:11

## 2025-01-17 RX ADMIN — DILTIAZEM HYDROCHLORIDE 180 MG: 180 CAPSULE, COATED, EXTENDED RELEASE ORAL at 09:01

## 2025-01-17 ASSESSMENT — PAIN SCALES - GENERAL
PAINLEVEL_OUTOF10: 7
PAINLEVEL_OUTOF10: 0 - NO PAIN
PAINLEVEL_OUTOF10: 10 - WORST POSSIBLE PAIN
PAINLEVEL_OUTOF10: 7
PAINLEVEL_OUTOF10: 5 - MODERATE PAIN
PAINLEVEL_OUTOF10: 8

## 2025-01-17 ASSESSMENT — COGNITIVE AND FUNCTIONAL STATUS - GENERAL
MOBILITY SCORE: 19
MOVING TO AND FROM BED TO CHAIR: A LITTLE
STANDING UP FROM CHAIR USING ARMS: A LITTLE
TURNING FROM BACK TO SIDE WHILE IN FLAT BAD: A LITTLE
WALKING IN HOSPITAL ROOM: A LITTLE
CLIMB 3 TO 5 STEPS WITH RAILING: A LITTLE

## 2025-01-17 ASSESSMENT — PAIN SCALES - WONG BAKER: WONGBAKER_NUMERICALRESPONSE: HURTS WHOLE LOT

## 2025-01-17 ASSESSMENT — PAIN DESCRIPTION - LOCATION
LOCATION: LEG

## 2025-01-17 ASSESSMENT — PAIN DESCRIPTION - ORIENTATION
ORIENTATION: RIGHT;LEFT

## 2025-01-17 ASSESSMENT — PAIN - FUNCTIONAL ASSESSMENT
PAIN_FUNCTIONAL_ASSESSMENT: 0-10

## 2025-01-17 NOTE — PROGRESS NOTES
Estefanía Contreras is a 89 y.o. female on day 2 of admission presenting with Left leg cellulitis.      Subjective   No acute events overnight, patient denies chest pain, worsening SOB at this time.        Objective     Last Recorded Vitals  /56 (BP Location: Left arm, Patient Position: Lying)   Pulse 56   Temp 36.8 °C (98.2 °F) (Temporal)   Resp 16   Wt 56.7 kg (125 lb)   SpO2 96%   Intake/Output last 3 Shifts:  No intake or output data in the 24 hours ending 01/17/25 1405      Admission Weight  Weight: 56.7 kg (125 lb) (01/14/25 0651)    Daily Weight  01/14/25 : 56.7 kg (125 lb)    Image Results  ECG 12 lead  Sinus rhythm  Borderline prolonged MA interval  Left ventricular hypertrophy  Anterior Q waves, possibly due to LVH    See ED provider note for full interpretation and clinical correlation  Confirmed by Ashley Mcqueen ([id number]) on 1/16/2025 4:37:11 PM      Physical Exam    Relevant Results               Assessment/Plan                  Assessment & Plan  Left leg cellulitis          Bahman Flores MD   Physician  Internal Medicine     H&P      Signed     Date of Service: 1/14/2025  6:28 PM     Signed       Expand All Collapse All    History Of Present Illness  Estefanía Contreras is a 89 y.o. female with PMH of HFpEF (EF 62% 1/6/25), DLD, HTN, CKD3b, AAA (s/p repair), PAD, ITP, and provoked PE (left hip fracture, completed 3 months of apixaban, on 2-3L NC PRN) presented to [facility] ED from home on 1/14/2025 with B/L lower extremity pain. The pt is an excellent historian. The pt was admitted to [facility] on 1/7/2025 with bilateral lower extremity pain and edema, as well as cough, shortness of breath, and increased need for her home oxygen. She was treated for PNA with azithromycin and rocephin. Her LE edema was treated with Lasix. Patient was discharged home on 1/10/25 with PCP follow-up, three further days of doxycycline, and short courses of tramadol and gabapentin. Since her discharge, the pt has taken the  medications she was discharged with including the 3 days of doxycycline, which she completed. Her leg swelling, pain, and redness worsened since returning home despite the pain medication. The tramadol isn't helping. She is having a very hard time putting any weight on her legs. A bruise was noted on her left leg last admission and XR was obtained showing no acute fracture. A LE ultrasound was also obtained that did not show any blood clot. She denies any injury to the leg. The left leg is more painful than the right. She denies any fever/chills, headache, chest pain/palpatations, shortness of breath, cough, abdominal pain, N/V/D, dysuria, hematuria, or bloody/black stools/   She decided to come back to the ER because of the pain and difficulty walking. ER evaluation included repeat LE US that was negative for VTE. Pt was recommended for admission to the hospital and accepted on the service of Dr. Flores who would like her treated for cellulitis with IV antibiotics. Pt notes she has been having to use oxygen more over last month and is having difficulty tolerating being off O2 at home.        Past Medical History  Medical History        Past Medical History:   Diagnosis Date    H/O heart artery stent      H/O shoulder surgery      PE (pulmonary thromboembolism) (Multi)              Surgical History  Surgical History   History reviewed. No pertinent surgical history.        Social History  Social History   Social History            Tobacco Use    Smoking status: Former       Types: Cigarettes       Passive exposure: Never    Smokeless tobacco: Never   Vaping Use    Vaping status: Never Used   Substance Use Topics    Alcohol use: Not Currently    Drug use: Never            Family History  Family History   No family history on file.        Allergies  Patient has no known allergies.     Review of Systems   Constitutional:  Negative for chills, diaphoresis and fever.   HENT:  Negative for congestion and sore throat.     Eyes:  Negative for visual disturbance.   Respiratory:  Negative for cough, chest tightness, shortness of breath and wheezing.    Cardiovascular:  Positive for leg swelling. Negative for chest pain and palpitations.   Gastrointestinal:  Negative for abdominal pain, diarrhea, nausea and vomiting.   Endocrine: Negative for polyuria.   Genitourinary:  Negative for dysuria and hematuria.   Musculoskeletal:  Negative for joint swelling.   Skin:  Positive for color change. Negative for rash.   Allergic/Immunologic: Negative for immunocompromised state.   Neurological:  Negative for syncope and headaches.   Psychiatric/Behavioral:  Negative for confusion and hallucinations.       Physical Exam  Constitutional:       Appearance: Normal appearance. She is normal weight.   HENT:      Head: Normocephalic and atraumatic.      Mouth/Throat:      Mouth: Mucous membranes are moist.   Eyes:      Conjunctiva/sclera: Conjunctivae normal.   Cardiovascular:      Rate and Rhythm: Normal rate and regular rhythm.      Heart sounds: No murmur heard.  Pulmonary:      Effort: No respiratory distress.      Breath sounds: No wheezing, rhonchi or rales.   Abdominal:      General: There is no distension.      Tenderness: There is no abdominal tenderness. There is no guarding.   Musculoskeletal:         General: No deformity.      Cervical back: No rigidity.   Skin:     General: Skin is warm.      Comments: -Medial LLE just superior to her ankle is an ecchymosis with surrounding erythema.  Erythema is noncircumferential.  She also has a nonblanchable petechiae over her dorsal surfaces of both her feet closer to her toes. Anterior RLE just superior to her ankle is a small patch of erythema. Her bilateral lower extremities have +2 pitting edema, and there is +3 pedal edema.   Neurological:      General: No focal deficit present.      Mental Status: She is alert and oriented to person, place, and time.   Psychiatric:         Mood and Affect: Mood  "normal.         Last Recorded Vitals  Visit Vitals  /63   Pulse 60   Temp 36.8 °C (98.2 °F) (Temporal)   Resp 15   Ht 1.6 m (5' 3\")   Wt 56.7 kg (125 lb)   SpO2 100%   BMI 22.14 kg/m²   Smoking Status Former   BSA 1.59 m²         Relevant Results        Results for orders placed or performed during the hospital encounter of 01/14/25 (from the past 24 hours)   CBC and Auto Differential   Result Value Ref Range     WBC 5.8 4.4 - 11.3 x10*3/uL     nRBC 0.0 0.0 - 0.0 /100 WBCs     RBC 3.34 (L) 4.00 - 5.20 x10*6/uL     Hemoglobin 9.5 (L) 12.0 - 16.0 g/dL     Hematocrit 31.1 (L) 36.0 - 46.0 %     MCV 93 80 - 100 fL     MCH 28.4 26.0 - 34.0 pg     MCHC 30.5 (L) 32.0 - 36.0 g/dL     RDW 18.0 (H) 11.5 - 14.5 %     Platelets 206 150 - 450 x10*3/uL     Neutrophils % 66.4 40.0 - 80.0 %     Immature Granulocytes %, Automated 0.5 0.0 - 0.9 %     Lymphocytes % 17.5 13.0 - 44.0 %     Monocytes % 10.6 2.0 - 10.0 %     Eosinophils % 4.1 0.0 - 6.0 %     Basophils % 0.9 0.0 - 2.0 %     Neutrophils Absolute 3.88 1.60 - 5.50 x10*3/uL     Immature Granulocytes Absolute, Automated 0.03 0.00 - 0.50 x10*3/uL     Lymphocytes Absolute 1.02 0.80 - 3.00 x10*3/uL     Monocytes Absolute 0.62 0.05 - 0.80 x10*3/uL     Eosinophils Absolute 0.24 0.00 - 0.40 x10*3/uL     Basophils Absolute 0.05 0.00 - 0.10 x10*3/uL   Comprehensive metabolic panel   Result Value Ref Range     Glucose 79 74 - 99 mg/dL     Sodium 140 136 - 145 mmol/L     Potassium 4.8 3.5 - 5.3 mmol/L     Chloride 103 98 - 107 mmol/L     Bicarbonate 29 21 - 32 mmol/L     Anion Gap 13 10 - 20 mmol/L     Urea Nitrogen 38 (H) 6 - 23 mg/dL     Creatinine 1.61 (H) 0.50 - 1.05 mg/dL     eGFR 30 (L) >60 mL/min/1.73m*2     Calcium 9.0 8.6 - 10.3 mg/dL     Albumin 3.7 3.4 - 5.0 g/dL     Alkaline Phosphatase 76 33 - 136 U/L     Total Protein 6.7 6.4 - 8.2 g/dL     AST 20 9 - 39 U/L     Bilirubin, Total 0.3 0.0 - 1.2 mg/dL     ALT 11 7 - 45 U/L   B-type natriuretic peptide   Result Value Ref " Range      (H) 0 - 99 pg/mL   D-dimer, VTE Exclusion   Result Value Ref Range     D-Dimer, Quantitative VTE Exclusion 4,886 (H) <=500 ng/mL FEU   Vascular US lower extremity venous duplex left   Result Value Ref Range     BSA 1.59 m2         Imaging  Vascular US lower extremity venous duplex left     Result Date: 1/14/2025  Preliminary Cardiology Report          [facility] [address] [address] Tel [phone] and Fax [phone]       Preliminary Vascular Lab Report  VASC US LOWER EXTREMITY VENOUS DUPLEX LEFT  Patient Name:      SEAN GARCÍA  Reading Physician:  [id number] Madeline Yanez MD Study Date:        1/14/2025    Ordering Physician: [id number] ANNETTE ZAMAN MRN/PID:           [id number]     Technologist:       Saniya Summers RVT Accession#:        [id number] Technologist 2: Date of Birth/Age: 12/11/1934   Encounter#:         [id number] Gender:            F Admission Status:  Emergency    Location Performed: [facility]  Diagnosis/ICD: Pain in left leg-M79.605 Procedure/CPT: 20905 Peripheral venous duplex scan for DVT Limited  PRELIMINARY CONCLUSIONS: Right Lower Venous: The right common femoral vein demonstrates normal spontaneous and respirophasic flow. Left Lower Venous: No evidence of acute deep vein thrombus visualized in the left lower extremity. Cannot rule out thrombus in non-visualized posterior tibial and peroneal veins due to edema.  Imaging & Doppler Findings:  Right        Flow CFV   Spontaneous/Phasic  Left                  Compress Thrombus        Flow Distal External Iliac   Yes      None   Spontaneous/Phasic CFV                     Yes      None   Spontaneous/Phasic PFV                     Yes      None FV Proximal             Yes      None   Spontaneous/Phasic FV Mid                  Yes      None FV Distal               Yes      None Popliteal               Yes      None   Spontaneous/Phasic VASCULAR PRELIMINARY REPORT completed by Saniya Summers  RVT on 1/14/2025 at 9:47:01 AM  ** Final **      XR ankle bilateral 2 views     Result Date: 1/7/2025  Interpreted By:  Chelsey Merino, STUDY: XR ANKLE BILATERAL 2 VIEWS;  1/7/2025 1:15 pm   INDICATION: Signs/Symptoms:TTP.   COMPARISON: None.   ACCESSION NUMBER(S): [id number]   ORDERING CLINICIAN: EMIL BAILEY   FINDINGS: Left ankle: No acute fracture or dislocation is identified. The ankle mortise is congruent. There are degenerative changes in the visualized joints of the midfoot. A small plantar calcaneal enthesophyte is present. There is moderate-to-marked diffuse subcutaneous edema the imaged calf.   Right ankle: No acute fracture or dislocation is identified. The ankle mortise is congruent. There are degenerative changes in the visualized joints of the midfoot. A small plantar calcaneal enthesophyte is present. There is moderate-to-marked diffuse subcutaneous edema the imaged calf.        Bilateral calf edema without acute osseous abnormality.   MACRO None   Signed by: Chelsey Merino 1/7/2025 1:23 PM Dictation workstation:   [id number]      Home Medications          Prior to Admission medications    Medication Sig Start Date End Date Taking? Authorizing Provider   azelastine (Astelin) 137 mcg (0.1 %) nasal spray Administer 2 sprays into each nostril 2 times a day. Use in each nostril as directed  Patient taking differently: Administer 2 sprays into each nostril if needed. Use in each nostril as directed 11/21/24 11/21/25 Yes Lakia Johnson MD   dilTIAZem LA (Cardizem LA) 180 mg 24 hr tablet Take 1 tablet (180 mg) by mouth once daily.  Patient taking differently: Take 1 tablet (180 mg) by mouth once daily in the morning. 11/5/24   Yes Lakia Johnson MD   ferrous sulfate 325 (65 Fe) MG EC tablet Take 1 tablet by mouth once daily with breakfast. Do not crush, chew, or split. 11/5/24 11/5/25 Yes Lakia Johnson MD   furosemide (Lasix) 20 mg tablet Take 1 tablet (20 mg) by mouth once daily.  Patient taking  differently: Take 1 tablet (20 mg) by mouth once daily in the morning. 12/16/24 12/16/25 Yes Lakia Johnson MD   meloxicam (Mobic) 15 mg tablet Take 1 tablet (15 mg) by mouth once daily.  Patient taking differently: Take 1 tablet (15 mg) by mouth once daily with breakfast. 12/16/24   Yes Lakia Johnson MD   mv-min-FA-vit K-lutein-zeaxant (PreserVision AREDS 2 Plus MV) 200 mcg-15 mcg- 5 mg-1 mg capsule Take 1 capsule by mouth 2 times a day.     Yes Historical Provider, MD   oxygen (O2) gas therapy Inhale 3 L/min continuously.     Yes Historical Provider, MD   potassium chloride CR 20 mEq ER tablet Take 1 tablet (20 mEq) by mouth once daily in the morning.     Yes Historical Provider, MD   pramipexole (Mirapex) 1 mg tablet Take 1 tablet (1 mg) by mouth once daily at bedtime. 12/16/24 12/16/25 Yes Lakia Johnson MD   simvastatin (Zocor) 20 mg tablet Take 1 tablet (20 mg) by mouth once daily at bedtime. 12/16/24 12/16/25 Yes Lakia Johnson MD   traMADol (Ultram) 50 mg tablet Take 1 tablet (50 mg) by mouth every 8 hours if needed for severe pain (7 - 10) for up to 3 days. 1/9/25 1/14/25 Yes Jorge Mart DO   traZODone (Desyrel) 50 mg tablet Take 1.5 tablets (75 mg) by mouth as needed at bedtime for sleep. 1/9/25   Yes Jorge Mart DO   doxycycline (Vibramycin) 100 mg capsule Take 1 capsule (100 mg) by mouth 2 times a day for 3 days. Take with at least 8 ounces (large glass) of water, do not lie down for 30 minutes after  Patient not taking: Reported on 1/14/2025 1/9/25 1/12/25   Jorge Mart DO   gabapentin (Neurontin) 100 mg capsule Take 1 capsule (100 mg) by mouth 2 times a day for 3 days.  Patient not taking: Reported on 1/14/2025 1/9/25 1/12/25   Jorge Mart DO   apixaban (Eliquis) 5 mg tablet Take 1 tablet (5 mg) by mouth 2 times a day.  Patient not taking: Reported on 1/7/2025 11/19/24 1/9/25   Jorge Bhandari MD   traZODone (Desyrel) 50 mg tablet Take 1 tablet (50 mg) by mouth as needed at  bedtime for sleep.  Patient taking differently: Take 1 tablet (50 mg) by mouth once daily at bedtime. 12/20/24 1/9/25   Lakia Johnson MD   vit C,W-Tq-zihfv-lutein-zeaxan (PreserVision AREDS-2) 250-90-40-1 mg capsule Take 1 capsule by mouth 2 times a day. 10/10/24 1/14/25   Kay Barcenas, APRN-CNP         Medications  Scheduled medications    Scheduled Medications   ampicillin-sulbactam, 3 g, intravenous, q12h  dilTIAZem CD, 180 mg, oral, Daily  [START ON 1/15/2025] ferrous sulfate (325 mg ferrous sulfate), 325 mg, oral, Daily with breakfast  furosemide, 20 mg, oral, q AM  heparin (porcine), 5,000 Units, subcutaneous, q8h  [START ON 1/15/2025] meloxicam, 7.5 mg, oral, Daily with breakfast  potassium chloride CR, 20 mEq, oral, q AM  pramipexole, 1 mg, oral, Nightly  simvastatin, 20 mg, oral, Nightly         Continuous medications  Continuous Medications         PRN medications  acetaminophen, 650 mg, q4h PRN   Or  acetaminophen, 650 mg, q4h PRN   Or  acetaminophen, 650 mg, q4h PRN  HYDROcodone-acetaminophen, 1 tablet, q6h PRN  traZODone, 75 mg, Nightly PRN                 Assessment/Plan     Assessment & Plan  Left leg cellulitis     Bilateral LE cellulitis  LLE ecchymosis  B/L LE edema, acute on chronic  -HDS  -Does not meets SIRS/sepsis   -No leukocytosis and afebrile  -Denies fever/chills  -Will initiate IV Unasyn, renally dosed  -Patient reports compliance with her home PO Lasix 20mg QD, which is continued  -B/L LE duplex ultrasound negative for DVT  -Bruising to LLE noted during last admission and XR of L ankle obtained showing no acute fracture with some degenerative changes  -Pt reports difficulty ambulating secondary to pain  -PT/OT  -Up with assist and fall precautions     Chronic diastolic heart failure  Chronic respiratory failure  Hx of PE and DVT  -Lungs are CTA on exam  -Pt requiring 3L NC. Pt reports requiring 2-3L oxygen PRN/intermittently since she had her PE. States she has been using it more  continuously lately because she gets more SOB. CT for PE obtained 1/7/2025 showing no acute PE or PNA.  -Echo obtained 1/3/2025 showing EF 62%, Grade I diastolic dysfunction, normal RV systolic function  -Monitor pulse ox  -Admitted from 10/2-10/7 for provoked multiple right sided pulmonary emboli with signs of right heart strain after having hip surgery              -Completed Eliquis just about at the New Year  -Will use SQ heparin for VTE PPx     Other chronic medical conditions:  HTN  CKDIII  AAA status post repair  Carotid disease  PAD  ITP  Dyslipidemia  Neuropathic pain  -Continue home medications as appropriate  -Monitor renal function     VTE PPx: SQ heparin     See additional orders for further plan of care.   Further evaluation and management per attending and consulting physicians.       Bahman Flores MD  Patient fully evaluated in the emergency room on January 14.  Continue present IV antibiotics and monitor.  Recheck labs in AM.                   Patient fully evaluated  01/15  for    Problem List Items Addressed This Visit          Infectious Diseases    * (Principal) Left leg cellulitis - Primary     Other Visit Diagnoses       Pain in left leg              Patient seen resting in bed with head of bed elevated, no s/s or c/o acute difficulties at this time.  Vital signs for last 24 hours Temp:  [36.8 °C (98.2 °F)-37.3 °C (99.1 °F)] 36.8 °C (98.2 °F)  Heart Rate:  [52-71] 56  Resp:  [16-18] 16  BP: ()/(52-65) 133/56    No intake/output data recorded.  Patient still requiring frequent cardiac and SPO2 monitoring. Continue aggressive pulmonary hygiene and oral hygiene. Off loading as tolerated for skin integrity. Medications and labs reviewed-   Results for orders placed or performed during the hospital encounter of 01/14/25 (from the past 24 hours)   CBC and Auto Differential   Result Value Ref Range    WBC 4.2 (L) 4.4 - 11.3 x10*3/uL    nRBC 0.0 0.0 - 0.0 /100 WBCs    RBC 3.03 (L) 4.00 - 5.20  x10*6/uL    Hemoglobin 8.6 (L) 12.0 - 16.0 g/dL    Hematocrit 28.2 (L) 36.0 - 46.0 %    MCV 93 80 - 100 fL    MCH 28.4 26.0 - 34.0 pg    MCHC 30.5 (L) 32.0 - 36.0 g/dL    RDW 17.6 (H) 11.5 - 14.5 %    Platelets 156 150 - 450 x10*3/uL    Neutrophils % 58.3 40.0 - 80.0 %    Immature Granulocytes %, Automated 0.2 0.0 - 0.9 %    Lymphocytes % 25.7 13.0 - 44.0 %    Monocytes % 9.2 2.0 - 10.0 %    Eosinophils % 5.7 0.0 - 6.0 %    Basophils % 0.9 0.0 - 2.0 %    Neutrophils Absolute 2.47 1.60 - 5.50 x10*3/uL    Immature Granulocytes Absolute, Automated 0.01 0.00 - 0.50 x10*3/uL    Lymphocytes Absolute 1.09 0.80 - 3.00 x10*3/uL    Monocytes Absolute 0.39 0.05 - 0.80 x10*3/uL    Eosinophils Absolute 0.24 0.00 - 0.40 x10*3/uL    Basophils Absolute 0.04 0.00 - 0.10 x10*3/uL   Comprehensive Metabolic Panel   Result Value Ref Range    Glucose 79 74 - 99 mg/dL    Sodium 140 136 - 145 mmol/L    Potassium 4.5 3.5 - 5.3 mmol/L    Chloride 105 98 - 107 mmol/L    Bicarbonate 29 21 - 32 mmol/L    Anion Gap 11 10 - 20 mmol/L    Urea Nitrogen 31 (H) 6 - 23 mg/dL    Creatinine 1.59 (H) 0.50 - 1.05 mg/dL    eGFR 31 (L) >60 mL/min/1.73m*2    Calcium 7.9 (L) 8.6 - 10.3 mg/dL    Albumin 3.0 (L) 3.4 - 5.0 g/dL    Alkaline Phosphatase 66 33 - 136 U/L    Total Protein 5.5 (L) 6.4 - 8.2 g/dL    AST 14 9 - 39 U/L    Bilirubin, Total 0.3 0.0 - 1.2 mg/dL    ALT 8 7 - 45 U/L      Patient recently received an antibiotic (last 12 hours)       Date/Time Action Medication Dose Rate    01/15/25 0339 New Bag    ampicillin-sulbactam (Unasyn) 3 g in sodium chloride 0.9%  mL 3 g 200 mL/hr           Plan discussed with interdisciplinary team, double layer tubi  to BLE, diligent wound care, elevate BLE as tolerated. Will continue IV antibiotics - Unasyn, titrate supplemental oxygen, patient room air at baseline, repeat labs including iron levels, and chest xray in the AM. Incentive spirometry ordered. Patient awake, alert, and appropriate, states she  is from home with a home health aide that assists with ADLs at home. No acute events overnight, patient denies chest pain, worsening SOB at this time.continue current and repeat labs in the AM.     Discharge planning discussed with patient and care team. Therapy evaluations ordered. Anticipate HHC at discharge. Patient aware and agreeable to current plan, continue plan as above.     I spent a total of 50 minutes on the date of the service which included preparing to see the patient, face-to-face patient care, completing clinical documentation, obtaining and/or reviewing separately obtained history, performing a medically appropriate examination, counseling and educating the patient/family/caregiver, ordering medications, tests, or procedures, communicating with other HCPs (not separately reported), independently interpreting results (not separately reported), communicating results to the patient/family/caregiver, and care coordination (not separately reported).        Patient fully evaluated  01/16  for    Problem List Items Addressed This Visit          Infectious Diseases    * (Principal) Left leg cellulitis - Primary     Other Visit Diagnoses       Pain in left leg              Patient seen resting in bed with head of bed elevated, no s/s or c/o acute difficulties at this time.  Vital signs for last 24 hours Temp:  [36.8 °C (98.2 °F)-37.3 °C (99.1 °F)] 36.8 °C (98.2 °F)  Heart Rate:  [52-71] 56  Resp:  [16-18] 16  BP: ()/(52-65) 133/56    No intake/output data recorded.  Patient still requiring frequent cardiac and SPO2 monitoring. Continue aggressive pulmonary hygiene and oral hygiene. Off loading as tolerated for skin integrity. Medications and labs reviewed-   Results for orders placed or performed during the hospital encounter of 01/14/25 (from the past 24 hours)   CBC and Auto Differential   Result Value Ref Range    WBC 4.2 (L) 4.4 - 11.3 x10*3/uL    nRBC 0.0 0.0 - 0.0 /100 WBCs    RBC 3.03 (L) 4.00 -  5.20 x10*6/uL    Hemoglobin 8.6 (L) 12.0 - 16.0 g/dL    Hematocrit 28.2 (L) 36.0 - 46.0 %    MCV 93 80 - 100 fL    MCH 28.4 26.0 - 34.0 pg    MCHC 30.5 (L) 32.0 - 36.0 g/dL    RDW 17.6 (H) 11.5 - 14.5 %    Platelets 156 150 - 450 x10*3/uL    Neutrophils % 58.3 40.0 - 80.0 %    Immature Granulocytes %, Automated 0.2 0.0 - 0.9 %    Lymphocytes % 25.7 13.0 - 44.0 %    Monocytes % 9.2 2.0 - 10.0 %    Eosinophils % 5.7 0.0 - 6.0 %    Basophils % 0.9 0.0 - 2.0 %    Neutrophils Absolute 2.47 1.60 - 5.50 x10*3/uL    Immature Granulocytes Absolute, Automated 0.01 0.00 - 0.50 x10*3/uL    Lymphocytes Absolute 1.09 0.80 - 3.00 x10*3/uL    Monocytes Absolute 0.39 0.05 - 0.80 x10*3/uL    Eosinophils Absolute 0.24 0.00 - 0.40 x10*3/uL    Basophils Absolute 0.04 0.00 - 0.10 x10*3/uL   Comprehensive Metabolic Panel   Result Value Ref Range    Glucose 79 74 - 99 mg/dL    Sodium 140 136 - 145 mmol/L    Potassium 4.5 3.5 - 5.3 mmol/L    Chloride 105 98 - 107 mmol/L    Bicarbonate 29 21 - 32 mmol/L    Anion Gap 11 10 - 20 mmol/L    Urea Nitrogen 31 (H) 6 - 23 mg/dL    Creatinine 1.59 (H) 0.50 - 1.05 mg/dL    eGFR 31 (L) >60 mL/min/1.73m*2    Calcium 7.9 (L) 8.6 - 10.3 mg/dL    Albumin 3.0 (L) 3.4 - 5.0 g/dL    Alkaline Phosphatase 66 33 - 136 U/L    Total Protein 5.5 (L) 6.4 - 8.2 g/dL    AST 14 9 - 39 U/L    Bilirubin, Total 0.3 0.0 - 1.2 mg/dL    ALT 8 7 - 45 U/L      Patient recently received an antibiotic (last 12 hours)       Date/Time Action Medication Dose Rate    01/16/25 1453 New Bag    ampicillin-sulbactam (Unasyn) 3 g in sodium chloride 0.9%  mL 3 g 200 mL/hr           No acute events overnight, patient denies chest pain, worsening SOB at this time. Patient on supplemental oxygen at baseline but would like to titrate as tolerated, continue titration. Plan discussed with interdisciplinary team, patient with significant pain to left ankle, will apply lidocaine patch to site, cymbalta started, will monitor overnight,  continue current and repeat labs in the AM.     Discharge planning discussed with patient and care team. Therapy evaluations ordered. Anticipate [facility] at discharge, per [facility] able to accept patient.  Patient aware and agreeable to current plan, continue plan as above.     I spent a total of 50 minutes on the date of the service which included preparing to see the patient, face-to-face patient care, completing clinical documentation, obtaining and/or reviewing separately obtained history, performing a medically appropriate examination, counseling and educating the patient/family/caregiver, ordering medications, tests, or procedures, communicating with other HCPs (not separately reported), independently interpreting results (not separately reported), communicating results to the patient/family/caregiver, and care coordination (not separately reported).        Patient fully evaluated 1/17  for    Problem List Items Addressed This Visit          Infectious Diseases    * (Principal) Left leg cellulitis - Primary     Other Visit Diagnoses       Pain in left leg              Patient seen resting in bed with head of bed elevated, no s/s or c/o acute difficulties at this time.  Vital signs for last 24 hours Temp:  [36.8 °C (98.2 °F)-37.3 °C (99.1 °F)] 36.8 °C (98.2 °F)  Heart Rate:  [52-71] 56  Resp:  [16-18] 16  BP: ()/(52-65) 133/56    No intake/output data recorded.  Patient still requiring frequent cardiac and SPO2 monitoring. Continue aggressive pulmonary hygiene and oral hygiene. Off loading as tolerated for skin integrity. Medications and labs reviewed-   Results for orders placed or performed during the hospital encounter of 01/14/25 (from the past 24 hours)   CBC and Auto Differential   Result Value Ref Range    WBC 4.2 (L) 4.4 - 11.3 x10*3/uL    nRBC 0.0 0.0 - 0.0 /100 WBCs    RBC 3.03 (L) 4.00 - 5.20 x10*6/uL    Hemoglobin 8.6 (L) 12.0 - 16.0 g/dL    Hematocrit 28.2 (L) 36.0 - 46.0 %     MCV 93 80 - 100 fL    MCH 28.4 26.0 - 34.0 pg    MCHC 30.5 (L) 32.0 - 36.0 g/dL    RDW 17.6 (H) 11.5 - 14.5 %    Platelets 156 150 - 450 x10*3/uL    Neutrophils % 58.3 40.0 - 80.0 %    Immature Granulocytes %, Automated 0.2 0.0 - 0.9 %    Lymphocytes % 25.7 13.0 - 44.0 %    Monocytes % 9.2 2.0 - 10.0 %    Eosinophils % 5.7 0.0 - 6.0 %    Basophils % 0.9 0.0 - 2.0 %    Neutrophils Absolute 2.47 1.60 - 5.50 x10*3/uL    Immature Granulocytes Absolute, Automated 0.01 0.00 - 0.50 x10*3/uL    Lymphocytes Absolute 1.09 0.80 - 3.00 x10*3/uL    Monocytes Absolute 0.39 0.05 - 0.80 x10*3/uL    Eosinophils Absolute 0.24 0.00 - 0.40 x10*3/uL    Basophils Absolute 0.04 0.00 - 0.10 x10*3/uL   Comprehensive Metabolic Panel   Result Value Ref Range    Glucose 79 74 - 99 mg/dL    Sodium 140 136 - 145 mmol/L    Potassium 4.5 3.5 - 5.3 mmol/L    Chloride 105 98 - 107 mmol/L    Bicarbonate 29 21 - 32 mmol/L    Anion Gap 11 10 - 20 mmol/L    Urea Nitrogen 31 (H) 6 - 23 mg/dL    Creatinine 1.59 (H) 0.50 - 1.05 mg/dL    eGFR 31 (L) >60 mL/min/1.73m*2    Calcium 7.9 (L) 8.6 - 10.3 mg/dL    Albumin 3.0 (L) 3.4 - 5.0 g/dL    Alkaline Phosphatase 66 33 - 136 U/L    Total Protein 5.5 (L) 6.4 - 8.2 g/dL    AST 14 9 - 39 U/L    Bilirubin, Total 0.3 0.0 - 1.2 mg/dL    ALT 8 7 - 45 U/L      Patient recently received an antibiotic (last 12 hours)       Date/Time Action Medication Dose Rate    01/17/25 1306 Given    doxycycline (Vibra-Tabs) tablet 100 mg 100 mg     01/17/25 0253 New Bag    ampicillin-sulbactam (Unasyn) 3 g in sodium chloride 0.9%  mL 3 g 200 mL/hr           No acute events overnight, patient denies chest pain, worsening SOB at this time. Patient on supplemental oxygen at baseline 3L but would like to titrate as tolerated, continue titration. Plan discussed with interdisciplinary team, patient with significant pain to left ankle, will apply lidocaine patch to site. Patient complaining of nausea today, will discontinue the iron  to see if that helps. Will also discontinue Unasyn and start patient on Keflex. Also starting patient on Protonix. Will monitor overnight, continue current and repeat labs in the AM.     Discharge planning discussed with patient and care team. Therapy evaluations ordered. [facility], anticipate HHC/SNF at discharge. Patient aware and agreeable to current plan, continue plan as above.     I spent a total of 50 minutes on the date of the service which included preparing to see the patient, face-to-face patient care, completing clinical documentation, obtaining and/or reviewing separately obtained history, performing a medically appropriate examination, counseling and educating the patient/family/caregiver, ordering medications, tests, or procedures, communicating with other HCPs (not separately reported), independently interpreting results (not separately reported), communicating results to the patient/family/caregiver, and care coordination (not separately reported).     [place] JOE

## 2025-01-17 NOTE — CARE PLAN
The patient's goals for the shift include  comfort    The clinical goals for the shift include pain control and comfort      Problem: Skin  Goal: Decreased wound size/increased tissue granulation at next dressing change  1/16/2025 2119 by Chelsi Almaraz RN  Flowsheets (Taken 1/16/2025 2119)  Decreased wound size/increased tissue granulation at next dressing change: Promote sleep for wound healing  1/16/2025 2114 by Chelsi Almaraz RN  Outcome: Progressing     Problem: Skin  Goal: Participates in plan/prevention/treatment measures  1/16/2025 2119 by Chelsi Almaraz RN  Flowsheets (Taken 1/16/2025 1146 by Caryn Myles LPN)  Participates in plan/prevention/treatment measures: Elevate heels  1/16/2025 2114 by Chelsi Almaraz RN  Outcome: Progressing     Problem: Skin  Goal: Prevent/manage excess moisture  1/16/2025 2119 by Chelsi Almaraz RN  Flowsheets (Taken 1/16/2025 2119)  Prevent/manage excess moisture: Cleanse incontinence/protect with barrier cream  1/16/2025 2114 by Chelsi Almaraz RN  Outcome: Progressing

## 2025-01-17 NOTE — CARE PLAN
The patient's goals for the shift include  pain control and comfort    The clinical goals for the shift include pain control    Problem: Pain - Adult  Goal: Verbalizes/displays adequate comfort level or baseline comfort level  Outcome: Progressing     Problem: Safety - Adult  Goal: Free from fall injury  Outcome: Progressing     Problem: Discharge Planning  Goal: Discharge to home or other facility with appropriate resources  Outcome: Progressing     Problem: Chronic Conditions and Co-morbidities  Goal: Patient's chronic conditions and co-morbidity symptoms are monitored and maintained or improved  Outcome: Progressing     Problem: Skin  Goal: Decreased wound size/increased tissue granulation at next dressing change  Outcome: Progressing  Goal: Participates in plan/prevention/treatment measures  Outcome: Progressing  Goal: Prevent/manage excess moisture  Outcome: Progressing  Goal: Prevent/minimize sheer/friction injuries  Outcome: Progressing  Goal: Promote/optimize nutrition  Outcome: Progressing  Goal: Promote skin healing  Outcome: Progressing

## 2025-01-17 NOTE — PROGRESS NOTES
Physical Therapy    Physical Therapy Treatment    Patient Name: Estefanía Contreras  MRN: [id number]  Department: [facility]  Room: [address] [place] [address]  Today's Date: 1/17/2025  Time Calculation  Start Time: 1001  Stop Time: 1031  Time Calculation (min): 30 min         Assessment/Plan         PT Plan  Treatment/Interventions: Bed mobility, Transfer training, Gait training  PT Plan: Ongoing PT  PT Frequency: 4 times per week  PT Discharge Recommendations: Low intensity level of continued care  PT - OK to Discharge: Yes      General Visit Information:   PT  Visit  PT Received On: 01/17/25       Subjective                    Objective   Pain: complained of lower leg /heel pain                           Treatments:       Bed Mobility  Bed Mobility:  (supine to sit sba)    Ambulation/Gait Training  Ambulation/Gait Training Performed:  (ambulated 75 feet using fixed wheeled walker sba  3 l O2)  Transfers  Transfer:  (sit to stand sba /cga  stood at sink for 4minutes and brushed teeth with sba)    Became nauseous and vomited. RN was informed.       Outcome Measures:  [facility] Basic Mobility  Turning from your back to your side while in a flat bed without using bedrails: None  Moving from lying on your back to sitting on the side of a flat bed without using bedrails: A little  Moving to and from bed to chair (including a wheelchair): A little  Standing up from a chair using your arms (e.g. wheelchair or bedside chair): A little  To walk in hospital room: A little  Climbing 3-5 steps with railing: A little  Basic Mobility - Total Score: 19    Education Documentation  ADL Training, taught by Judie Soriano PTA at 1/17/2025 10:44 AM.  Learner: Patient  Readiness: Acceptance  Method: Demonstration  Response: Demonstrated Understanding    Precautions, taught by Judie Soriano PTA at 1/17/2025 10:44 AM.  Learner: Patient  Readiness: Acceptance  Method: Demonstration  Response: Demonstrated Understanding    Mobility Training, taught by Judie Soriano  PTA at 1/17/2025 10:44 AM.  Learner: Patient  Readiness: Acceptance  Method: Demonstration  Response: Demonstrated Understanding    Education Comments  No comments found.               Encounter Problems       Encounter Problems (Active)       PT Problem       STG - Pt will transition supine <> sitting with mod I  (Progressing)       Start:  01/15/25    Expected End:  01/29/25            STG - Pt will transfer STS with mod I  (Progressing)       Start:  01/15/25    Expected End:  01/29/25            STG - Pt will amb 100' using RW with mod I  (Progressing)       Start:  01/15/25    Expected End:  01/29/25               Pain - Adult

## 2025-01-17 NOTE — CARE PLAN
The patient's goals for the shift include rest and pain management     The clinical goals for the shift include pain control and comfort

## 2025-01-18 LAB
ALBUMIN SERPL BCP-MCNC: 3 G/DL (ref 3.4–5)
ALP SERPL-CCNC: 66 U/L (ref 33–136)
ALT SERPL W P-5'-P-CCNC: 7 U/L (ref 7–45)
ANION GAP SERPL CALC-SCNC: 9 MMOL/L (ref 10–20)
AST SERPL W P-5'-P-CCNC: 15 U/L (ref 9–39)
BASOPHILS # BLD AUTO: 0.03 X10*3/UL (ref 0–0.1)
BASOPHILS NFR BLD AUTO: 0.5 %
BILIRUB SERPL-MCNC: 0.2 MG/DL (ref 0–1.2)
BUN SERPL-MCNC: 28 MG/DL (ref 6–23)
CALCIUM SERPL-MCNC: 8.1 MG/DL (ref 8.6–10.3)
CHLORIDE SERPL-SCNC: 105 MMOL/L (ref 98–107)
CO2 SERPL-SCNC: 30 MMOL/L (ref 21–32)
CREAT SERPL-MCNC: 1.44 MG/DL (ref 0.5–1.05)
EGFRCR SERPLBLD CKD-EPI 2021: 35 ML/MIN/1.73M*2
EOSINOPHIL # BLD AUTO: 0.22 X10*3/UL (ref 0–0.4)
EOSINOPHIL NFR BLD AUTO: 3.8 %
ERYTHROCYTE [DISTWIDTH] IN BLOOD BY AUTOMATED COUNT: 17.5 % (ref 11.5–14.5)
GLUCOSE SERPL-MCNC: 78 MG/DL (ref 74–99)
HCT VFR BLD AUTO: 30.6 % (ref 36–46)
HGB BLD-MCNC: 8.9 G/DL (ref 12–16)
IMM GRANULOCYTES # BLD AUTO: 0.02 X10*3/UL (ref 0–0.5)
IMM GRANULOCYTES NFR BLD AUTO: 0.3 % (ref 0–0.9)
LYMPHOCYTES # BLD AUTO: 0.55 X10*3/UL (ref 0.8–3)
LYMPHOCYTES NFR BLD AUTO: 9.6 %
MCH RBC QN AUTO: 28.3 PG (ref 26–34)
MCHC RBC AUTO-ENTMCNC: 29.1 G/DL (ref 32–36)
MCV RBC AUTO: 98 FL (ref 80–100)
MONOCYTES # BLD AUTO: 0.39 X10*3/UL (ref 0.05–0.8)
MONOCYTES NFR BLD AUTO: 6.8 %
NEUTROPHILS # BLD AUTO: 4.54 X10*3/UL (ref 1.6–5.5)
NEUTROPHILS NFR BLD AUTO: 79 %
NRBC BLD-RTO: 0 /100 WBCS (ref 0–0)
PLATELET # BLD AUTO: 143 X10*3/UL (ref 150–450)
POTASSIUM SERPL-SCNC: 5.1 MMOL/L (ref 3.5–5.3)
PROT SERPL-MCNC: 5.6 G/DL (ref 6.4–8.2)
RBC # BLD AUTO: 3.14 X10*6/UL (ref 4–5.2)
SODIUM SERPL-SCNC: 139 MMOL/L (ref 136–145)
WBC # BLD AUTO: 5.8 X10*3/UL (ref 4.4–11.3)

## 2025-01-18 PROCEDURE — 85025 COMPLETE CBC W/AUTO DIFF WBC: CPT | Performed by: INTERNAL MEDICINE

## 2025-01-18 PROCEDURE — 2500000001 HC RX 250 WO HCPCS SELF ADMINISTERED DRUGS (ALT 637 FOR MEDICARE OP): Performed by: PHYSICIAN ASSISTANT

## 2025-01-18 PROCEDURE — 2500000001 HC RX 250 WO HCPCS SELF ADMINISTERED DRUGS (ALT 637 FOR MEDICARE OP): Performed by: INTERNAL MEDICINE

## 2025-01-18 PROCEDURE — 1100000001 HC PRIVATE ROOM DAILY

## 2025-01-18 PROCEDURE — 36415 COLL VENOUS BLD VENIPUNCTURE: CPT | Performed by: INTERNAL MEDICINE

## 2025-01-18 PROCEDURE — 2500000001 HC RX 250 WO HCPCS SELF ADMINISTERED DRUGS (ALT 637 FOR MEDICARE OP)

## 2025-01-18 PROCEDURE — 80053 COMPREHEN METABOLIC PANEL: CPT | Performed by: INTERNAL MEDICINE

## 2025-01-18 PROCEDURE — 2500000002 HC RX 250 W HCPCS SELF ADMINISTERED DRUGS (ALT 637 FOR MEDICARE OP, ALT 636 FOR OP/ED): Performed by: PHYSICIAN ASSISTANT

## 2025-01-18 PROCEDURE — 2500000005 HC RX 250 GENERAL PHARMACY W/O HCPCS: Performed by: INTERNAL MEDICINE

## 2025-01-18 PROCEDURE — 2500000004 HC RX 250 GENERAL PHARMACY W/ HCPCS (ALT 636 FOR OP/ED): Performed by: PHYSICIAN ASSISTANT

## 2025-01-18 RX ADMIN — FUROSEMIDE 20 MG: 20 TABLET ORAL at 09:32

## 2025-01-18 RX ADMIN — Medication 1 TABLET: at 09:32

## 2025-01-18 RX ADMIN — DULOXETINE HYDROCHLORIDE 30 MG: 30 CAPSULE, DELAYED RELEASE ORAL at 09:32

## 2025-01-18 RX ADMIN — HYDROCODONE BITARTRATE AND ACETAMINOPHEN 1 TABLET: 5; 325 TABLET ORAL at 09:34

## 2025-01-18 RX ADMIN — Medication 1 CAPSULE: at 21:30

## 2025-01-18 RX ADMIN — LIDOCAINE 4% 3 PATCH: 40 PATCH TOPICAL at 09:36

## 2025-01-18 RX ADMIN — HEPARIN SODIUM 5000 UNITS: 5000 INJECTION INTRAVENOUS; SUBCUTANEOUS at 14:52

## 2025-01-18 RX ADMIN — DILTIAZEM HYDROCHLORIDE 180 MG: 180 CAPSULE, COATED, EXTENDED RELEASE ORAL at 09:32

## 2025-01-18 RX ADMIN — PANTOPRAZOLE SODIUM 40 MG: 40 TABLET, DELAYED RELEASE ORAL at 06:07

## 2025-01-18 RX ADMIN — HEPARIN SODIUM 5000 UNITS: 5000 INJECTION INTRAVENOUS; SUBCUTANEOUS at 06:02

## 2025-01-18 RX ADMIN — CEPHALEXIN 250 MG: 250 CAPSULE ORAL at 21:45

## 2025-01-18 RX ADMIN — MELOXICAM 7.5 MG: 7.5 TABLET ORAL at 09:32

## 2025-01-18 RX ADMIN — CEPHALEXIN 250 MG: 250 CAPSULE ORAL at 14:52

## 2025-01-18 RX ADMIN — Medication 1 CAPSULE: at 09:37

## 2025-01-18 RX ADMIN — POTASSIUM CHLORIDE 20 MEQ: 1500 TABLET, EXTENDED RELEASE ORAL at 09:32

## 2025-01-18 RX ADMIN — PRAMIPEXOLE DIHYDROCHLORIDE 1 MG: 1 TABLET ORAL at 21:29

## 2025-01-18 RX ADMIN — SIMVASTATIN 20 MG: 20 TABLET, FILM COATED ORAL at 21:29

## 2025-01-18 RX ADMIN — HEPARIN SODIUM 5000 UNITS: 5000 INJECTION INTRAVENOUS; SUBCUTANEOUS at 21:44

## 2025-01-18 RX ADMIN — CEPHALEXIN 250 MG: 250 CAPSULE ORAL at 06:02

## 2025-01-18 RX ADMIN — HYDROCODONE BITARTRATE AND ACETAMINOPHEN 1 TABLET: 5; 325 TABLET ORAL at 21:36

## 2025-01-18 ASSESSMENT — COGNITIVE AND FUNCTIONAL STATUS - GENERAL
DAILY ACTIVITIY SCORE: 22
CLIMB 3 TO 5 STEPS WITH RAILING: A LITTLE
MOVING FROM LYING ON BACK TO SITTING ON SIDE OF FLAT BED WITH BEDRAILS: A LITTLE
WALKING IN HOSPITAL ROOM: A LITTLE
STANDING UP FROM CHAIR USING ARMS: A LITTLE
DRESSING REGULAR LOWER BODY CLOTHING: A LITTLE
TOILETING: A LITTLE
MOVING TO AND FROM BED TO CHAIR: A LITTLE
MOBILITY SCORE: 18
TURNING FROM BACK TO SIDE WHILE IN FLAT BAD: A LITTLE

## 2025-01-18 ASSESSMENT — PAIN DESCRIPTION - LOCATION: LOCATION: LEG

## 2025-01-18 ASSESSMENT — PAIN DESCRIPTION - ORIENTATION: ORIENTATION: RIGHT;LEFT

## 2025-01-18 ASSESSMENT — PAIN SCALES - GENERAL
PAINLEVEL_OUTOF10: 7
PAINLEVEL_OUTOF10: 5 - MODERATE PAIN
PAINLEVEL_OUTOF10: 7
PAINLEVEL_OUTOF10: 2

## 2025-01-18 NOTE — CARE PLAN
The patient's goals for the shift include  free of skin breakdown  The clinical goals for the shift include pain control and safety      Problem: Skin  Goal: Decreased wound size/increased tissue granulation at next dressing change  1/17/2025 2209 by Chelsi Almaraz RN  Flowsheets (Taken 1/17/2025 2209)  Decreased wound size/increased tissue granulation at next dressing change: Utilize specialty bed per algorithm  1/17/2025 2155 by Chelsi Almaraz RN  Outcome: Progressing     Problem: Skin  Goal: Participates in plan/prevention/treatment measures  1/17/2025 2209 by Chelsi Almaraz RN  Flowsheets (Taken 1/17/2025 2209)  Participates in plan/prevention/treatment measures: Increase activity/out of bed for meals  1/17/2025 2155 by Chelsi Almaraz RN  Outcome: Progressing     Problem: Skin  Goal: Prevent/manage excess moisture  1/17/2025 2209 by Chelsi Almaraz RN  Flowsheets (Taken 1/17/2025 2209)  Prevent/manage excess moisture: Moisturize dry skin  1/17/2025 2155 by Chelsi Almaraz RN  Outcome: Progressing

## 2025-01-18 NOTE — CARE PLAN
The patient's goals for the shift include  comfort and rest    The clinical goals for the shift include pain control and safety

## 2025-01-18 NOTE — PROGRESS NOTES
Sean García is a 89 y.o. female on day 3 of admission presenting with Left leg cellulitis.      Subjective   No acute events overnight, patient denies chest pain, worsening SOB at this time.        Objective     Last Recorded Vitals  /64 (BP Location: Left arm, Patient Position: Lying)   Pulse 56   Temp 36 °C (96.8 °F) (Temporal)   Resp 18   Wt 56.7 kg (125 lb)   SpO2 97%   Intake/Output last 3 Shifts:    Intake/Output Summary (Last 24 hours) at 1/18/2025 1510  Last data filed at 1/18/2025 0410  Gross per 24 hour   Intake --   Output 600 ml   Net -600 ml         Admission Weight  Weight: 56.7 kg (125 lb) (01/14/25 0651)    Daily Weight  01/14/25 : 56.7 kg (125 lb)    Image Results  Vascular US lower extremity venous duplex left             [place] [address]  Tel [phone] and Fax [phone]       Vascular Lab Report  VASC US LOWER EXTREMITY VENOUS DUPLEX LEFT       Patient Name:      SEAN GARCÍA           Reading Physician:  [id number] Zelalem Lucia MD, RPVI  Study Date:        1/14/2025             Ordering Physician: [id number] ANNETTE ZAMAN  MRN/PID:           [id number]              Technologist:       Saniya Summers RVT  Accession#:        [id number]          Technologist 2:  Date of Birth/Age: 12/11/1934 / 89 years Encounter#:         [id number]  Gender:            F  Admission Status:  Emergency             Location Performed: [facility]       Diagnosis/ICD: Pain in left leg-M79.605  CPT Codes:     94770 Peripheral venous duplex scan for DVT Limited       CONCLUSIONS:  Right Lower Venous: The right common femoral vein demonstrates normal spontaneous and respirophasic flow.  Left Lower Venous: No evidence of acute deep vein thrombus visualized in the left lower  extremity. Cannot rule out thrombus in non-visualized posterior tibial and peroneal veins due to edema.     Comparison:  Compared with study from 1/7/2025, no significant change.     Imaging & Doppler Findings:     Right        Flow  CFV   Spontaneous/Phasic       Left                  Compress Thrombus        Flow  Distal External Iliac   Yes      None   Spontaneous/Phasic  CFV                     Yes      None   Spontaneous/Phasic  PFV                     Yes      None  FV Proximal             Yes      None   Spontaneous/Phasic  FV Mid                  Yes      None  FV Distal               Yes      None  Popliteal               Yes      None   Spontaneous/Phasic       [id number] Zelalem Lucia MD, RPVI  Electronically signed by [id number] Zelalem Lucia MD, RPVI on 1/17/2025 at 7:03:38 PM       ** Final **      Physical Exam    Relevant Results               Assessment/Plan                  Assessment & Plan  Left leg cellulitis          Bahman Flores MD   Physician  Internal Medicine     H&P      Signed     Date of Service: 1/14/2025  6:28 PM     Signed       Expand All Collapse All    History Of Present Illness  Estefanía Contreras is a 89 y.o. female with PMH of HFpEF (EF 62% 1/6/25), DLD, HTN, CKD3b, AAA (s/p repair), PAD, ITP, and provoked PE (left hip fracture, completed 3 months of apixaban, on 2-3L NC PRN) [facility] on 1/14/2025 with B/L lower extremity pain. The pt is an excellent historian. The [facility] with bilateral lower extremity pain and edema, as well as cough, shortness of breath, and increased need for her home oxygen. She was treated for PNA with azithromycin and rocephin. Her LE edema was treated with Lasix. Patient was discharged home on 1/10/25 with PCP follow-up, three further days of doxycycline, and short courses of tramadol and gabapentin. Since her discharge, the pt has taken the medications she was discharged with including the 3 days of doxycycline,  which she completed. Her leg swelling, pain, and redness worsened since returning home despite the pain medication. The tramadol isn't helping. She is having a very hard time putting any weight on her legs. A bruise was noted on her left leg last admission and XR was obtained showing no acute fracture. A LE ultrasound was also obtained that did not show any blood clot. She denies any injury to the leg. The left leg is more painful than the right. She denies any fever/chills, headache, chest pain/palpatations, shortness of breath, cough, abdominal pain, N/V/D, dysuria, hematuria, or bloody/black stools/   She decided to come back to the ER because of the pain and difficulty walking. ER evaluation included repeat LE US that was negative for VTE. Pt was recommended for admission to the hospital and accepted on the service of Dr. Flores who would like her treated for cellulitis with IV antibiotics. Pt notes she has been having to use oxygen more over last month and is having difficulty tolerating being off O2 at home.        Past Medical History  Medical History        Past Medical History:   Diagnosis Date    H/O heart artery stent      H/O shoulder surgery      PE (pulmonary thromboembolism) (Multi)              Surgical History  Surgical History   History reviewed. No pertinent surgical history.        Social History  Social History   Social History            Tobacco Use    Smoking status: Former       Types: Cigarettes       Passive exposure: Never    Smokeless tobacco: Never   Vaping Use    Vaping status: Never Used   Substance Use Topics    Alcohol use: Not Currently    Drug use: Never            Family History  Family History   No family history on file.        Allergies  Patient has no known allergies.     Review of Systems   Constitutional:  Negative for chills, diaphoresis and fever.   HENT:  Negative for congestion and sore throat.    Eyes:  Negative for visual disturbance.   Respiratory:  Negative for  cough, chest tightness, shortness of breath and wheezing.    Cardiovascular:  Positive for leg swelling. Negative for chest pain and palpitations.   Gastrointestinal:  Negative for abdominal pain, diarrhea, nausea and vomiting.   Endocrine: Negative for polyuria.   Genitourinary:  Negative for dysuria and hematuria.   Musculoskeletal:  Negative for joint swelling.   Skin:  Positive for color change. Negative for rash.   Allergic/Immunologic: Negative for immunocompromised state.   Neurological:  Negative for syncope and headaches.   Psychiatric/Behavioral:  Negative for confusion and hallucinations.       Physical Exam  Constitutional:       Appearance: Normal appearance. She is normal weight.   HENT:      Head: Normocephalic and atraumatic.      Mouth/Throat:      Mouth: Mucous membranes are moist.   Eyes:      Conjunctiva/sclera: Conjunctivae normal.   Cardiovascular:      Rate and Rhythm: Normal rate and regular rhythm.      Heart sounds: No murmur heard.  Pulmonary:      Effort: No respiratory distress.      Breath sounds: No wheezing, rhonchi or rales.   Abdominal:      General: There is no distension.      Tenderness: There is no abdominal tenderness. There is no guarding.   Musculoskeletal:         General: No deformity.      Cervical back: No rigidity.   Skin:     General: Skin is warm.      Comments: -Medial LLE just superior to her ankle is an ecchymosis with surrounding erythema.  Erythema is noncircumferential.  She also has a nonblanchable petechiae over her dorsal surfaces of both her feet closer to her toes. Anterior RLE just superior to her ankle is a small patch of erythema. Her bilateral lower extremities have +2 pitting edema, and there is +3 pedal edema.   Neurological:      General: No focal deficit present.      Mental Status: She is alert and oriented to person, place, and time.   Psychiatric:         Mood and Affect: Mood normal.         Last Recorded Vitals  Visit Vitals  /63   Pulse  "60   Temp 36.8 °C (98.2 °F) (Temporal)   Resp 15   Ht 1.6 m (5' 3\")   Wt 56.7 kg (125 lb)   SpO2 100%   BMI 22.14 kg/m²   Smoking Status Former   BSA 1.59 m²         Relevant Results        Results for orders placed or performed during the hospital encounter of 01/14/25 (from the past 24 hours)   CBC and Auto Differential   Result Value Ref Range     WBC 5.8 4.4 - 11.3 x10*3/uL     nRBC 0.0 0.0 - 0.0 /100 WBCs     RBC 3.34 (L) 4.00 - 5.20 x10*6/uL     Hemoglobin 9.5 (L) 12.0 - 16.0 g/dL     Hematocrit 31.1 (L) 36.0 - 46.0 %     MCV 93 80 - 100 fL     MCH 28.4 26.0 - 34.0 pg     MCHC 30.5 (L) 32.0 - 36.0 g/dL     RDW 18.0 (H) 11.5 - 14.5 %     Platelets 206 150 - 450 x10*3/uL     Neutrophils % 66.4 40.0 - 80.0 %     Immature Granulocytes %, Automated 0.5 0.0 - 0.9 %     Lymphocytes % 17.5 13.0 - 44.0 %     Monocytes % 10.6 2.0 - 10.0 %     Eosinophils % 4.1 0.0 - 6.0 %     Basophils % 0.9 0.0 - 2.0 %     Neutrophils Absolute 3.88 1.60 - 5.50 x10*3/uL     Immature Granulocytes Absolute, Automated 0.03 0.00 - 0.50 x10*3/uL     Lymphocytes Absolute 1.02 0.80 - 3.00 x10*3/uL     Monocytes Absolute 0.62 0.05 - 0.80 x10*3/uL     Eosinophils Absolute 0.24 0.00 - 0.40 x10*3/uL     Basophils Absolute 0.05 0.00 - 0.10 x10*3/uL   Comprehensive metabolic panel   Result Value Ref Range     Glucose 79 74 - 99 mg/dL     Sodium 140 136 - 145 mmol/L     Potassium 4.8 3.5 - 5.3 mmol/L     Chloride 103 98 - 107 mmol/L     Bicarbonate 29 21 - 32 mmol/L     Anion Gap 13 10 - 20 mmol/L     Urea Nitrogen 38 (H) 6 - 23 mg/dL     Creatinine 1.61 (H) 0.50 - 1.05 mg/dL     eGFR 30 (L) >60 mL/min/1.73m*2     Calcium 9.0 8.6 - 10.3 mg/dL     Albumin 3.7 3.4 - 5.0 g/dL     Alkaline Phosphatase 76 33 - 136 U/L     Total Protein 6.7 6.4 - 8.2 g/dL     AST 20 9 - 39 U/L     Bilirubin, Total 0.3 0.0 - 1.2 mg/dL     ALT 11 7 - 45 U/L   B-type natriuretic peptide   Result Value Ref Range      (H) 0 - 99 pg/mL   D-dimer, VTE Exclusion   Result " Value Ref Range     D-Dimer, Quantitative VTE Exclusion 4,886 (H) <=500 ng/mL FEU   Vascular US lower extremity venous duplex left   Result Value Ref Range     BSA 1.59 m2         Imaging  Vascular US lower extremity venous duplex left     Result Date: 1/14/2025  Preliminary Cardiology Report          [facility] [address] [place] [address] Tel [phone] and Fax [phone]       Preliminary Vascular Lab Report  VASC US LOWER EXTREMITY VENOUS DUPLEX LEFT  Patient Name:      SEAN GARCÍA  Reading Physician:  [id number] Madeline Yanez MD Study Date:        1/14/2025    Ordering Physician: [id number] ANNETTE ZAMAN MRN/PID:           [id number]     Technologist:       Saniya Summers RVT Accession#:        [id number] Technologist 2: Date of Birth/Age: 12/11/1934   Encounter#:         [id number] Gender:            F Admission Status:  Emergency    Location Performed: [facility]  Diagnosis/ICD: Pain in left leg-M79.605 Procedure/CPT: 40650 Peripheral venous duplex scan for DVT Limited  PRELIMINARY CONCLUSIONS: Right Lower Venous: The right common femoral vein demonstrates normal spontaneous and respirophasic flow. Left Lower Venous: No evidence of acute deep vein thrombus visualized in the left lower extremity. Cannot rule out thrombus in non-visualized posterior tibial and peroneal veins due to edema.  Imaging & Doppler Findings:  Right        Flow CFV   Spontaneous/Phasic  Left                  Compress Thrombus        Flow Distal External Iliac   Yes      None   Spontaneous/Phasic CFV                     Yes      None   Spontaneous/Phasic PFV                     Yes      None FV Proximal             Yes      None   Spontaneous/Phasic FV Mid                  Yes      None FV Distal               Yes      None Popliteal               Yes      None   Spontaneous/Phasic VASCULAR PRELIMINARY REPORT completed by Saniya Summers RVT on 1/14/2025 at 9:47:01 AM  ** Final **      XR ankle bilateral 2  views     Result Date: 1/7/2025  Interpreted By:  Chelsey Merino, STUDY: XR ANKLE BILATERAL 2 VIEWS;  1/7/2025 1:15 pm   INDICATION: Signs/Symptoms:TTP.   COMPARISON: None.   ACCESSION NUMBER(S): [id number]   ORDERING CLINICIAN: EMIL BAILEY   FINDINGS: Left ankle: No acute fracture or dislocation is identified. The ankle mortise is congruent. There are degenerative changes in the visualized joints of the midfoot. A small plantar calcaneal enthesophyte is present. There is moderate-to-marked diffuse subcutaneous edema the imaged calf.   Right ankle: No acute fracture or dislocation is identified. The ankle mortise is congruent. There are degenerative changes in the visualized joints of the midfoot. A small plantar calcaneal enthesophyte is present. There is moderate-to-marked diffuse subcutaneous edema the imaged calf.        Bilateral calf edema without acute osseous abnormality.   MACRO None   Signed by: Chelsey Merino 1/7/2025 1:23 PM Dictation workstation:   [id number]      Home Medications          Prior to Admission medications    Medication Sig Start Date End Date Taking? Authorizing Provider   azelastine (Astelin) 137 mcg (0.1 %) nasal spray Administer 2 sprays into each nostril 2 times a day. Use in each nostril as directed  Patient taking differently: Administer 2 sprays into each nostril if needed. Use in each nostril as directed 11/21/24 11/21/25 Yes Lakia Johnson MD   dilTIAZem LA (Cardizem LA) 180 mg 24 hr tablet Take 1 tablet (180 mg) by mouth once daily.  Patient taking differently: Take 1 tablet (180 mg) by mouth once daily in the morning. 11/5/24   Yes Lakia Johnson MD   ferrous sulfate 325 (65 Fe) MG EC tablet Take 1 tablet by mouth once daily with breakfast. Do not crush, chew, or split. 11/5/24 11/5/25 Yes Lakia Johnson MD   furosemide (Lasix) 20 mg tablet Take 1 tablet (20 mg) by mouth once daily.  Patient taking differently: Take 1 tablet (20 mg) by mouth once daily in the morning.  12/16/24 12/16/25 Yes Lakia Johnson MD   meloxicam (Mobic) 15 mg tablet Take 1 tablet (15 mg) by mouth once daily.  Patient taking differently: Take 1 tablet (15 mg) by mouth once daily with breakfast. 12/16/24   Yes Lakia Johnson MD   mv-min-FA-vit K-lutein-zeaxant (PreserVision AREDS 2 Plus MV) 200 mcg-15 mcg- 5 mg-1 mg capsule Take 1 capsule by mouth 2 times a day.     Yes Historical Provider, MD   oxygen (O2) gas therapy Inhale 3 L/min continuously.     Yes Historical Provider, MD   potassium chloride CR 20 mEq ER tablet Take 1 tablet (20 mEq) by mouth once daily in the morning.     Yes Historical Provider, MD   pramipexole (Mirapex) 1 mg tablet Take 1 tablet (1 mg) by mouth once daily at bedtime. 12/16/24 12/16/25 Yes Lakia Johnson MD   simvastatin (Zocor) 20 mg tablet Take 1 tablet (20 mg) by mouth once daily at bedtime. 12/16/24 12/16/25 Yes Lakia Johnson MD   traMADol (Ultram) 50 mg tablet Take 1 tablet (50 mg) by mouth every 8 hours if needed for severe pain (7 - 10) for up to 3 days. 1/9/25 1/14/25 Yes Jorge Mart DO   traZODone (Desyrel) 50 mg tablet Take 1.5 tablets (75 mg) by mouth as needed at bedtime for sleep. 1/9/25   Yes Jorge Mart DO   doxycycline (Vibramycin) 100 mg capsule Take 1 capsule (100 mg) by mouth 2 times a day for 3 days. Take with at least 8 ounces (large glass) of water, do not lie down for 30 minutes after  Patient not taking: Reported on 1/14/2025 1/9/25 1/12/25   Jorge Mart DO   gabapentin (Neurontin) 100 mg capsule Take 1 capsule (100 mg) by mouth 2 times a day for 3 days.  Patient not taking: Reported on 1/14/2025 1/9/25 1/12/25   Jorge Mart DO   apixaban (Eliquis) 5 mg tablet Take 1 tablet (5 mg) by mouth 2 times a day.  Patient not taking: Reported on 1/7/2025 11/19/24 1/9/25   Jorge Bhandari MD   traZODone (Desyrel) 50 mg tablet Take 1 tablet (50 mg) by mouth as needed at bedtime for sleep.  Patient taking differently: Take 1 tablet (50 mg) by  mouth once daily at bedtime. 12/20/24 1/9/25   Lakia Johnson MD   vit C,K-Ne-gwwhg-lutein-zeaxan (PreserVision AREDS-2) 250-90-40-1 mg capsule Take 1 capsule by mouth 2 times a day. 10/10/24 1/14/25   Kay Barcenas, APRN-CNP         Medications  Scheduled medications    Scheduled Medications   ampicillin-sulbactam, 3 g, intravenous, q12h  dilTIAZem CD, 180 mg, oral, Daily  [START ON 1/15/2025] ferrous sulfate (325 mg ferrous sulfate), 325 mg, oral, Daily with breakfast  furosemide, 20 mg, oral, q AM  heparin (porcine), 5,000 Units, subcutaneous, q8h  [START ON 1/15/2025] meloxicam, 7.5 mg, oral, Daily with breakfast  potassium chloride CR, 20 mEq, oral, q AM  pramipexole, 1 mg, oral, Nightly  simvastatin, 20 mg, oral, Nightly         Continuous medications  Continuous Medications         PRN medications  acetaminophen, 650 mg, q4h PRN   Or  acetaminophen, 650 mg, q4h PRN   Or  acetaminophen, 650 mg, q4h PRN  HYDROcodone-acetaminophen, 1 tablet, q6h PRN  traZODone, 75 mg, Nightly PRN                 Assessment/Plan     Assessment & Plan  Left leg cellulitis     Bilateral LE cellulitis  LLE ecchymosis  B/L LE edema, acute on chronic  -HDS  -Does not meets SIRS/sepsis   -No leukocytosis and afebrile  -Denies fever/chills  -Will initiate IV Unasyn, renally dosed  -Patient reports compliance with her home PO Lasix 20mg QD, which is continued  -B/L LE duplex ultrasound negative for DVT  -Bruising to LLE noted during last admission and XR of L ankle obtained showing no acute fracture with some degenerative changes  -Pt reports difficulty ambulating secondary to pain  -PT/OT  -Up with assist and fall precautions     Chronic diastolic heart failure  Chronic respiratory failure  Hx of PE and DVT  -Lungs are CTA on exam  -Pt requiring 3L NC. Pt reports requiring 2-3L oxygen PRN/intermittently since she had her PE. States she has been using it more continuously lately because she gets more SOB. CT for PE obtained 1/7/2025  showing no acute PE or PNA.  -Echo obtained 1/3/2025 showing EF 62%, Grade I diastolic dysfunction, normal RV systolic function  -Monitor pulse ox  -Admitted from 10/2-10/7 for provoked multiple right sided pulmonary emboli with signs of right heart strain after having hip surgery              -Completed Eliquis just about at the New Year  -Will use SQ heparin for VTE PPx     Other chronic medical conditions:  HTN  CKDIII  AAA status post repair  Carotid disease  PAD  ITP  Dyslipidemia  Neuropathic pain  -Continue home medications as appropriate  -Monitor renal function     VTE PPx: SQ heparin     See additional orders for further plan of care.   Further evaluation and management per attending and consulting physicians.       Bahman Flores MD  Patient fully evaluated in the emergency room on January 14.  Continue present IV antibiotics and monitor.  Recheck labs in AM.                   Patient fully evaluated  01/15  for    Problem List Items Addressed This Visit       * (Principal) Left leg cellulitis - Primary     Other Visit Diagnoses       Pain in left leg              Patient seen resting in bed with head of bed elevated, no s/s or c/o acute difficulties at this time.  Vital signs for last 24 hours Temp:  [36 °C (96.8 °F)-36.5 °C (97.7 °F)] 36 °C (96.8 °F)  Heart Rate:  [51-58] 56  Resp:  [17-18] 18  BP: (125-138)/(58-64) 138/64    No intake/output data recorded.  Patient still requiring frequent cardiac and SPO2 monitoring. Continue aggressive pulmonary hygiene and oral hygiene. Off loading as tolerated for skin integrity. Medications and labs reviewed-   Results for orders placed or performed during the hospital encounter of 01/14/25 (from the past 24 hours)   CBC and Auto Differential   Result Value Ref Range    WBC 5.8 4.4 - 11.3 x10*3/uL    nRBC 0.0 0.0 - 0.0 /100 WBCs    RBC 3.14 (L) 4.00 - 5.20 x10*6/uL    Hemoglobin 8.9 (L) 12.0 - 16.0 g/dL    Hematocrit 30.6 (L) 36.0 - 46.0 %    MCV 98 80 - 100 fL     MCH 28.3 26.0 - 34.0 pg    MCHC 29.1 (L) 32.0 - 36.0 g/dL    RDW 17.5 (H) 11.5 - 14.5 %    Platelets 143 (L) 150 - 450 x10*3/uL    Neutrophils % 79.0 40.0 - 80.0 %    Immature Granulocytes %, Automated 0.3 0.0 - 0.9 %    Lymphocytes % 9.6 13.0 - 44.0 %    Monocytes % 6.8 2.0 - 10.0 %    Eosinophils % 3.8 0.0 - 6.0 %    Basophils % 0.5 0.0 - 2.0 %    Neutrophils Absolute 4.54 1.60 - 5.50 x10*3/uL    Immature Granulocytes Absolute, Automated 0.02 0.00 - 0.50 x10*3/uL    Lymphocytes Absolute 0.55 (L) 0.80 - 3.00 x10*3/uL    Monocytes Absolute 0.39 0.05 - 0.80 x10*3/uL    Eosinophils Absolute 0.22 0.00 - 0.40 x10*3/uL    Basophils Absolute 0.03 0.00 - 0.10 x10*3/uL   Comprehensive Metabolic Panel   Result Value Ref Range    Glucose 78 74 - 99 mg/dL    Sodium 139 136 - 145 mmol/L    Potassium 5.1 3.5 - 5.3 mmol/L    Chloride 105 98 - 107 mmol/L    Bicarbonate 30 21 - 32 mmol/L    Anion Gap 9 (L) 10 - 20 mmol/L    Urea Nitrogen 28 (H) 6 - 23 mg/dL    Creatinine 1.44 (H) 0.50 - 1.05 mg/dL    eGFR 35 (L) >60 mL/min/1.73m*2    Calcium 8.1 (L) 8.6 - 10.3 mg/dL    Albumin 3.0 (L) 3.4 - 5.0 g/dL    Alkaline Phosphatase 66 33 - 136 U/L    Total Protein 5.6 (L) 6.4 - 8.2 g/dL    AST 15 9 - 39 U/L    Bilirubin, Total 0.2 0.0 - 1.2 mg/dL    ALT 7 7 - 45 U/L      Patient recently received an antibiotic (last 12 hours)       Date/Time Action Medication Dose Rate    01/15/25 0339 New Bag    ampicillin-sulbactam (Unasyn) 3 g in sodium chloride 0.9%  mL 3 g 200 mL/hr           Plan discussed with interdisciplinary team, double layer tubi  to BLE, diligent wound care, elevate BLE as tolerated. Will continue IV antibiotics - Unasyn, titrate supplemental oxygen, patient room air at baseline, repeat labs including iron levels, and chest xray in the AM. Incentive spirometry ordered. Patient awake, alert, and appropriate, states she is from home with a home health aide that assists with ADLs at home. No acute events overnight,  patient denies chest pain, worsening SOB at this time.continue current and repeat labs in the AM.     Discharge planning discussed with patient and care team. Therapy evaluations ordered. Anticipate HHC at discharge. Patient aware and agreeable to current plan, continue plan as above.     I spent a total of 50 minutes on the date of the service which included preparing to see the patient, face-to-face patient care, completing clinical documentation, obtaining and/or reviewing separately obtained history, performing a medically appropriate examination, counseling and educating the patient/family/caregiver, ordering medications, tests, or procedures, communicating with other HCPs (not separately reported), independently interpreting results (not separately reported), communicating results to the patient/family/caregiver, and care coordination (not separately reported).        Patient fully evaluated  01/16  for    Problem List Items Addressed This Visit       * (Principal) Left leg cellulitis - Primary     Other Visit Diagnoses       Pain in left leg              Patient seen resting in bed with head of bed elevated, no s/s or c/o acute difficulties at this time.  Vital signs for last 24 hours Temp:  [36 °C (96.8 °F)-36.5 °C (97.7 °F)] 36 °C (96.8 °F)  Heart Rate:  [51-58] 56  Resp:  [17-18] 18  BP: (125-138)/(58-64) 138/64    No intake/output data recorded.  Patient still requiring frequent cardiac and SPO2 monitoring. Continue aggressive pulmonary hygiene and oral hygiene. Off loading as tolerated for skin integrity. Medications and labs reviewed-   Results for orders placed or performed during the hospital encounter of 01/14/25 (from the past 24 hours)   CBC and Auto Differential   Result Value Ref Range    WBC 5.8 4.4 - 11.3 x10*3/uL    nRBC 0.0 0.0 - 0.0 /100 WBCs    RBC 3.14 (L) 4.00 - 5.20 x10*6/uL    Hemoglobin 8.9 (L) 12.0 - 16.0 g/dL    Hematocrit 30.6 (L) 36.0 - 46.0 %    MCV 98 80 - 100 fL    MCH 28.3 26.0  - 34.0 pg    MCHC 29.1 (L) 32.0 - 36.0 g/dL    RDW 17.5 (H) 11.5 - 14.5 %    Platelets 143 (L) 150 - 450 x10*3/uL    Neutrophils % 79.0 40.0 - 80.0 %    Immature Granulocytes %, Automated 0.3 0.0 - 0.9 %    Lymphocytes % 9.6 13.0 - 44.0 %    Monocytes % 6.8 2.0 - 10.0 %    Eosinophils % 3.8 0.0 - 6.0 %    Basophils % 0.5 0.0 - 2.0 %    Neutrophils Absolute 4.54 1.60 - 5.50 x10*3/uL    Immature Granulocytes Absolute, Automated 0.02 0.00 - 0.50 x10*3/uL    Lymphocytes Absolute 0.55 (L) 0.80 - 3.00 x10*3/uL    Monocytes Absolute 0.39 0.05 - 0.80 x10*3/uL    Eosinophils Absolute 0.22 0.00 - 0.40 x10*3/uL    Basophils Absolute 0.03 0.00 - 0.10 x10*3/uL   Comprehensive Metabolic Panel   Result Value Ref Range    Glucose 78 74 - 99 mg/dL    Sodium 139 136 - 145 mmol/L    Potassium 5.1 3.5 - 5.3 mmol/L    Chloride 105 98 - 107 mmol/L    Bicarbonate 30 21 - 32 mmol/L    Anion Gap 9 (L) 10 - 20 mmol/L    Urea Nitrogen 28 (H) 6 - 23 mg/dL    Creatinine 1.44 (H) 0.50 - 1.05 mg/dL    eGFR 35 (L) >60 mL/min/1.73m*2    Calcium 8.1 (L) 8.6 - 10.3 mg/dL    Albumin 3.0 (L) 3.4 - 5.0 g/dL    Alkaline Phosphatase 66 33 - 136 U/L    Total Protein 5.6 (L) 6.4 - 8.2 g/dL    AST 15 9 - 39 U/L    Bilirubin, Total 0.2 0.0 - 1.2 mg/dL    ALT 7 7 - 45 U/L      Patient recently received an antibiotic (last 12 hours)       Date/Time Action Medication Dose Rate    01/16/25 1453 New Bag    ampicillin-sulbactam (Unasyn) 3 g in sodium chloride 0.9%  mL 3 g 200 mL/hr           No acute events overnight, patient denies chest pain, worsening SOB at this time. Patient on supplemental oxygen at baseline but would like to titrate as tolerated, continue titration. Plan discussed with interdisciplinary team, patient with significant pain to left ankle, will apply lidocaine patch to site, cymbalta started, will monitor overnight, continue current and repeat labs in the AM.     Discharge planning discussed with patient and care team. Therapy evaluations  ordered. Anticipate [facility] at discharge, per [facility] able to accept patient.  Patient aware and agreeable to current plan, continue plan as above.     I spent a total of 50 minutes on the date of the service which included preparing to see the patient, face-to-face patient care, completing clinical documentation, obtaining and/or reviewing separately obtained history, performing a medically appropriate examination, counseling and educating the patient/family/caregiver, ordering medications, tests, or procedures, communicating with other HCPs (not separately reported), independently interpreting results (not separately reported), communicating results to the patient/family/caregiver, and care coordination (not separately reported).        Patient fully evaluated 1/17  for    Problem List Items Addressed This Visit       * (Principal) Left leg cellulitis - Primary     Other Visit Diagnoses       Pain in left leg              Patient seen resting in bed with head of bed elevated, no s/s or c/o acute difficulties at this time.  Vital signs for last 24 hours Temp:  [36 °C (96.8 °F)-36.5 °C (97.7 °F)] 36 °C (96.8 °F)  Heart Rate:  [51-58] 56  Resp:  [17-18] 18  BP: (125-138)/(58-64) 138/64    No intake/output data recorded.  Patient still requiring frequent cardiac and SPO2 monitoring. Continue aggressive pulmonary hygiene and oral hygiene. Off loading as tolerated for skin integrity. Medications and labs reviewed-   Results for orders placed or performed during the hospital encounter of 01/14/25 (from the past 24 hours)   CBC and Auto Differential   Result Value Ref Range    WBC 5.8 4.4 - 11.3 x10*3/uL    nRBC 0.0 0.0 - 0.0 /100 WBCs    RBC 3.14 (L) 4.00 - 5.20 x10*6/uL    Hemoglobin 8.9 (L) 12.0 - 16.0 g/dL    Hematocrit 30.6 (L) 36.0 - 46.0 %    MCV 98 80 - 100 fL    MCH 28.3 26.0 - 34.0 pg    MCHC 29.1 (L) 32.0 - 36.0 g/dL    RDW 17.5 (H) 11.5 - 14.5 %    Platelets 143 (L) 150 - 450 x10*3/uL     Neutrophils % 79.0 40.0 - 80.0 %    Immature Granulocytes %, Automated 0.3 0.0 - 0.9 %    Lymphocytes % 9.6 13.0 - 44.0 %    Monocytes % 6.8 2.0 - 10.0 %    Eosinophils % 3.8 0.0 - 6.0 %    Basophils % 0.5 0.0 - 2.0 %    Neutrophils Absolute 4.54 1.60 - 5.50 x10*3/uL    Immature Granulocytes Absolute, Automated 0.02 0.00 - 0.50 x10*3/uL    Lymphocytes Absolute 0.55 (L) 0.80 - 3.00 x10*3/uL    Monocytes Absolute 0.39 0.05 - 0.80 x10*3/uL    Eosinophils Absolute 0.22 0.00 - 0.40 x10*3/uL    Basophils Absolute 0.03 0.00 - 0.10 x10*3/uL   Comprehensive Metabolic Panel   Result Value Ref Range    Glucose 78 74 - 99 mg/dL    Sodium 139 136 - 145 mmol/L    Potassium 5.1 3.5 - 5.3 mmol/L    Chloride 105 98 - 107 mmol/L    Bicarbonate 30 21 - 32 mmol/L    Anion Gap 9 (L) 10 - 20 mmol/L    Urea Nitrogen 28 (H) 6 - 23 mg/dL    Creatinine 1.44 (H) 0.50 - 1.05 mg/dL    eGFR 35 (L) >60 mL/min/1.73m*2    Calcium 8.1 (L) 8.6 - 10.3 mg/dL    Albumin 3.0 (L) 3.4 - 5.0 g/dL    Alkaline Phosphatase 66 33 - 136 U/L    Total Protein 5.6 (L) 6.4 - 8.2 g/dL    AST 15 9 - 39 U/L    Bilirubin, Total 0.2 0.0 - 1.2 mg/dL    ALT 7 7 - 45 U/L      Patient recently received an antibiotic (last 12 hours)       Date/Time Action Medication Dose Rate    01/17/25 1306 Given    doxycycline (Vibra-Tabs) tablet 100 mg 100 mg     01/17/25 0253 New Bag    ampicillin-sulbactam (Unasyn) 3 g in sodium chloride 0.9%  mL 3 g 200 mL/hr           No acute events overnight, patient denies chest pain, worsening SOB at this time. Patient on supplemental oxygen at baseline 3L but would like to titrate as tolerated, continue titration. Plan discussed with interdisciplinary team, patient with significant pain to left ankle, will apply lidocaine patch to site. Patient complaining of nausea today, will discontinue the iron to see if that helps. Will also discontinue Unasyn and start patient on Keflex. Also starting patient on Protonix. Will monitor overnight,  continue current and repeat labs in the AM.     Discharge planning discussed with patient and care team. Therapy evaluations ordered. [facility], anticipate HHC/SNF at discharge. Patient aware and agreeable to current plan, continue plan as above.     I spent a total of 50 minutes on the date of the service which included preparing to see the patient, face-to-face patient care, completing clinical documentation, obtaining and/or reviewing separately obtained history, performing a medically appropriate examination, counseling and educating the patient/family/caregiver, ordering medications, tests, or procedures, communicating with other HCPs (not separately reported), independently interpreting results (not separately reported), communicating results to the patient/family/caregiver, and care coordination (not separately reported).     Patient fully evaluated  01/18  for    Problem List Items Addressed This Visit       * (Principal) Left leg cellulitis - Primary     Other Visit Diagnoses       Pain in left leg              Patient seen resting in bed with head of bed elevated, no s/s or c/o acute difficulties at this time.  Vital signs for last 24 hours Temp:  [36 °C (96.8 °F)-36.5 °C (97.7 °F)] 36 °C (96.8 °F)  Heart Rate:  [51-58] 56  Resp:  [17-18] 18  BP: (125-138)/(58-64) 138/64    No intake/output data recorded.  Patient still requiring frequent cardiac and SPO2 monitoring. Continue aggressive pulmonary hygiene and oral hygiene. Off loading as tolerated for skin integrity. Medications and labs reviewed-   Results for orders placed or performed during the hospital encounter of 01/14/25 (from the past 24 hours)   CBC and Auto Differential   Result Value Ref Range    WBC 5.8 4.4 - 11.3 x10*3/uL    nRBC 0.0 0.0 - 0.0 /100 WBCs    RBC 3.14 (L) 4.00 - 5.20 x10*6/uL    Hemoglobin 8.9 (L) 12.0 - 16.0 g/dL    Hematocrit 30.6 (L) 36.0 - 46.0 %    MCV 98 80 - 100 fL    MCH 28.3 26.0 - 34.0 pg    MCHC 29.1 (L) 32.0 - 36.0  g/dL    RDW 17.5 (H) 11.5 - 14.5 %    Platelets 143 (L) 150 - 450 x10*3/uL    Neutrophils % 79.0 40.0 - 80.0 %    Immature Granulocytes %, Automated 0.3 0.0 - 0.9 %    Lymphocytes % 9.6 13.0 - 44.0 %    Monocytes % 6.8 2.0 - 10.0 %    Eosinophils % 3.8 0.0 - 6.0 %    Basophils % 0.5 0.0 - 2.0 %    Neutrophils Absolute 4.54 1.60 - 5.50 x10*3/uL    Immature Granulocytes Absolute, Automated 0.02 0.00 - 0.50 x10*3/uL    Lymphocytes Absolute 0.55 (L) 0.80 - 3.00 x10*3/uL    Monocytes Absolute 0.39 0.05 - 0.80 x10*3/uL    Eosinophils Absolute 0.22 0.00 - 0.40 x10*3/uL    Basophils Absolute 0.03 0.00 - 0.10 x10*3/uL   Comprehensive Metabolic Panel   Result Value Ref Range    Glucose 78 74 - 99 mg/dL    Sodium 139 136 - 145 mmol/L    Potassium 5.1 3.5 - 5.3 mmol/L    Chloride 105 98 - 107 mmol/L    Bicarbonate 30 21 - 32 mmol/L    Anion Gap 9 (L) 10 - 20 mmol/L    Urea Nitrogen 28 (H) 6 - 23 mg/dL    Creatinine 1.44 (H) 0.50 - 1.05 mg/dL    eGFR 35 (L) >60 mL/min/1.73m*2    Calcium 8.1 (L) 8.6 - 10.3 mg/dL    Albumin 3.0 (L) 3.4 - 5.0 g/dL    Alkaline Phosphatase 66 33 - 136 U/L    Total Protein 5.6 (L) 6.4 - 8.2 g/dL    AST 15 9 - 39 U/L    Bilirubin, Total 0.2 0.0 - 1.2 mg/dL    ALT 7 7 - 45 U/L      Patient recently received an antibiotic (last 12 hours)       Date/Time Action Medication Dose    01/18/25 1452 Given    cephalexin (Keflex) capsule 250 mg 250 mg    01/18/25 0602 Given    cephalexin (Keflex) capsule 250 mg 250 mg           Patient tolerating oral antibiotics, will continue to titrate oxygen and repeat chest xray in the AM. Plan discussed with interdisciplinary team,  patient greatly improved today, more animated and alert, no further nausea noted. Patient to continue with elevating BLE, lidocaine patches, and ambulation. AMPA - 19 per physical therapy today, will continue current and repeat labs in the AM. Anticipate discharge in 24-48 hours.    Discharge planning discussed with patient and care team.  Therapy evaluations ordered. Anticipate HHC at discharge. Patient aware and agreeable to current plan, continue plan as above.     I spent a total of 50 minutes on the date of the service which included preparing to see the patient, face-to-face patient care, completing clinical documentation, obtaining and/or reviewing separately obtained history, performing a medically appropriate examination, counseling and educating the patient/family/caregiver, ordering medications, tests, or procedures, communicating with other HCPs (not separately reported), independently interpreting results (not separately reported), communicating results to the patient/family/caregiver, and care coordination (not separately reported).     Katerina Fitzgerald

## 2025-01-18 NOTE — PROGRESS NOTES
Sean García is a 89 y.o. female on day 3 of admission presenting with Left leg cellulitis.      Subjective   No acute events overnight, patient denies chest pain, worsening SOB at this time.        Objective     Last Recorded Vitals  /64 (BP Location: Left arm, Patient Position: Lying)   Pulse 56   Temp 36 °C (96.8 °F) (Temporal)   Resp 18   Wt 56.7 kg (125 lb)   SpO2 97%   Intake/Output last 3 Shifts:    Intake/Output Summary (Last 24 hours) at 1/18/2025 1527  Last data filed at 1/18/2025 0410  Gross per 24 hour   Intake --   Output 600 ml   Net -600 ml         Admission Weight  Weight: 56.7 kg (125 lb) (01/14/25 0651)    Daily Weight  01/14/25 : 56.7 kg (125 lb)    Image Results  Vascular US lower extremity venous duplex left             [place] [address]  Tel [phone] and Fax [phone]       Vascular Lab Report  VASC US LOWER EXTREMITY VENOUS DUPLEX LEFT       Patient Name:      SEAN GARCÍA           Reading Physician:  [id number] Zelalem Lucia MD, RPVI  Study Date:        1/14/2025             Ordering Physician: [id number] ANNETTE ZAMAN  MRN/PID:           [id number]              Technologist:       Saniya Summers RVT  Accession#:        [id number]          Technologist 2:  Date of Birth/Age: 12/11/1934 / 89 years Encounter#:         [id number]  Gender:            F  Admission Status:  Emergency             Location Performed: [facility]       Diagnosis/ICD: Pain in left leg-M79.605  CPT Codes:     30698 Peripheral venous duplex scan for DVT Limited       CONCLUSIONS:  Right Lower Venous: The right common femoral vein demonstrates normal spontaneous and respirophasic flow.  Left Lower Venous: No evidence of acute deep vein thrombus visualized in the left lower  extremity. Cannot rule out thrombus in non-visualized posterior tibial and peroneal veins due to edema.     Comparison:  Compared with study from 1/7/2025, no significant change.     Imaging & Doppler Findings:     Right        Flow  CFV   Spontaneous/Phasic       Left                  Compress Thrombus        Flow  Distal External Iliac   Yes      None   Spontaneous/Phasic  CFV                     Yes      None   Spontaneous/Phasic  PFV                     Yes      None  FV Proximal             Yes      None   Spontaneous/Phasic  FV Mid                  Yes      None  FV Distal               Yes      None  Popliteal               Yes      None   Spontaneous/Phasic       [id number] Zelalem Luica MD, RPVI  Electronically signed by [id number] Zelalem Lucia MD, RPVI on 1/17/2025 at 7:03:38 PM       ** Final **      Physical Exam    Relevant Results               Assessment/Plan                  Assessment & Plan  Left leg cellulitis          Bahman Flores MD   Physician  Internal Medicine     H&P      Signed     Date of Service: 1/14/2025  6:28 PM     Signed       Expand All Collapse All    History Of Present Illness  Estefanía Contreras is a 89 y.o. female with PMH of HFpEF (EF 62% 1/6/25), DLD, HTN, CKD3b, AAA (s/p repair), PAD, ITP, and provoked PE (left hip fracture, completed 3 months of apixaban, on 2-3L NC PRN) presented to [facility] ED from home on 1/14/2025 with B/L lower extremity pain. The pt is an excellent historian. The pt was admitted to [facility] on 1/7/2025 with bilateral lower extremity pain and edema, as well as cough, shortness of breath, and increased need for her home oxygen. She was treated for PNA with azithromycin and rocephin. Her LE edema was treated with Lasix. Patient was discharged home on 1/10/25 with PCP follow-up, three further days of doxycycline, and short courses of tramadol and gabapentin. Since her discharge, the pt has taken the medications she was discharged with including the 3 days of doxycycline,  which she completed. Her leg swelling, pain, and redness worsened since returning home despite the pain medication. The tramadol isn't helping. She is having a very hard time putting any weight on her legs. A bruise was noted on her left leg last admission and XR was obtained showing no acute fracture. A LE ultrasound was also obtained that did not show any blood clot. She denies any injury to the leg. The left leg is more painful than the right. She denies any fever/chills, headache, chest pain/palpatations, shortness of breath, cough, abdominal pain, N/V/D, dysuria, hematuria, or bloody/black stools/   She decided to come back to the ER because of the pain and difficulty walking. ER evaluation included repeat LE US that was negative for VTE. Pt was recommended for admission to the hospital and accepted on the service of Dr. Flores who would like her treated for cellulitis with IV antibiotics. Pt notes she has been having to use oxygen more over last month and is having difficulty tolerating being off O2 at home.        Past Medical History  Medical History        Past Medical History:   Diagnosis Date    H/O heart artery stent      H/O shoulder surgery      PE (pulmonary thromboembolism) (Multi)              Surgical History  Surgical History   History reviewed. No pertinent surgical history.        Social History  Social History   Social History            Tobacco Use    Smoking status: Former       Types: Cigarettes       Passive exposure: Never    Smokeless tobacco: Never   Vaping Use    Vaping status: Never Used   Substance Use Topics    Alcohol use: Not Currently    Drug use: Never            Family History  Family History   No family history on file.        Allergies  Patient has no known allergies.     Review of Systems   Constitutional:  Negative for chills, diaphoresis and fever.   HENT:  Negative for congestion and sore throat.    Eyes:  Negative for visual disturbance.   Respiratory:  Negative for  cough, chest tightness, shortness of breath and wheezing.    Cardiovascular:  Positive for leg swelling. Negative for chest pain and palpitations.   Gastrointestinal:  Negative for abdominal pain, diarrhea, nausea and vomiting.   Endocrine: Negative for polyuria.   Genitourinary:  Negative for dysuria and hematuria.   Musculoskeletal:  Negative for joint swelling.   Skin:  Positive for color change. Negative for rash.   Allergic/Immunologic: Negative for immunocompromised state.   Neurological:  Negative for syncope and headaches.   Psychiatric/Behavioral:  Negative for confusion and hallucinations.       Physical Exam  Constitutional:       Appearance: Normal appearance. She is normal weight.   HENT:      Head: Normocephalic and atraumatic.      Mouth/Throat:      Mouth: Mucous membranes are moist.   Eyes:      Conjunctiva/sclera: Conjunctivae normal.   Cardiovascular:      Rate and Rhythm: Normal rate and regular rhythm.      Heart sounds: No murmur heard.  Pulmonary:      Effort: No respiratory distress.      Breath sounds: No wheezing, rhonchi or rales.   Abdominal:      General: There is no distension.      Tenderness: There is no abdominal tenderness. There is no guarding.   Musculoskeletal:         General: No deformity.      Cervical back: No rigidity.   Skin:     General: Skin is warm.      Comments: -Medial LLE just superior to her ankle is an ecchymosis with surrounding erythema.  Erythema is noncircumferential.  She also has a nonblanchable petechiae over her dorsal surfaces of both her feet closer to her toes. Anterior RLE just superior to her ankle is a small patch of erythema. Her bilateral lower extremities have +2 pitting edema, and there is +3 pedal edema.   Neurological:      General: No focal deficit present.      Mental Status: She is alert and oriented to person, place, and time.   Psychiatric:         Mood and Affect: Mood normal.         Last Recorded Vitals  Visit Vitals  /63   Pulse  "60   Temp 36.8 °C (98.2 °F) (Temporal)   Resp 15   Ht 1.6 m (5' 3\")   Wt 56.7 kg (125 lb)   SpO2 100%   BMI 22.14 kg/m²   Smoking Status Former   BSA 1.59 m²         Relevant Results        Results for orders placed or performed during the hospital encounter of 01/14/25 (from the past 24 hours)   CBC and Auto Differential   Result Value Ref Range     WBC 5.8 4.4 - 11.3 x10*3/uL     nRBC 0.0 0.0 - 0.0 /100 WBCs     RBC 3.34 (L) 4.00 - 5.20 x10*6/uL     Hemoglobin 9.5 (L) 12.0 - 16.0 g/dL     Hematocrit 31.1 (L) 36.0 - 46.0 %     MCV 93 80 - 100 fL     MCH 28.4 26.0 - 34.0 pg     MCHC 30.5 (L) 32.0 - 36.0 g/dL     RDW 18.0 (H) 11.5 - 14.5 %     Platelets 206 150 - 450 x10*3/uL     Neutrophils % 66.4 40.0 - 80.0 %     Immature Granulocytes %, Automated 0.5 0.0 - 0.9 %     Lymphocytes % 17.5 13.0 - 44.0 %     Monocytes % 10.6 2.0 - 10.0 %     Eosinophils % 4.1 0.0 - 6.0 %     Basophils % 0.9 0.0 - 2.0 %     Neutrophils Absolute 3.88 1.60 - 5.50 x10*3/uL     Immature Granulocytes Absolute, Automated 0.03 0.00 - 0.50 x10*3/uL     Lymphocytes Absolute 1.02 0.80 - 3.00 x10*3/uL     Monocytes Absolute 0.62 0.05 - 0.80 x10*3/uL     Eosinophils Absolute 0.24 0.00 - 0.40 x10*3/uL     Basophils Absolute 0.05 0.00 - 0.10 x10*3/uL   Comprehensive metabolic panel   Result Value Ref Range     Glucose 79 74 - 99 mg/dL     Sodium 140 136 - 145 mmol/L     Potassium 4.8 3.5 - 5.3 mmol/L     Chloride 103 98 - 107 mmol/L     Bicarbonate 29 21 - 32 mmol/L     Anion Gap 13 10 - 20 mmol/L     Urea Nitrogen 38 (H) 6 - 23 mg/dL     Creatinine 1.61 (H) 0.50 - 1.05 mg/dL     eGFR 30 (L) >60 mL/min/1.73m*2     Calcium 9.0 8.6 - 10.3 mg/dL     Albumin 3.7 3.4 - 5.0 g/dL     Alkaline Phosphatase 76 33 - 136 U/L     Total Protein 6.7 6.4 - 8.2 g/dL     AST 20 9 - 39 U/L     Bilirubin, Total 0.3 0.0 - 1.2 mg/dL     ALT 11 7 - 45 U/L   B-type natriuretic peptide   Result Value Ref Range      (H) 0 - 99 pg/mL   D-dimer, VTE Exclusion   Result " Value Ref Range     D-Dimer, Quantitative VTE Exclusion 4,886 (H) <=500 ng/mL FEU   Vascular US lower extremity venous duplex left   Result Value Ref Range     BSA 1.59 m2         Imaging  Vascular US lower extremity venous duplex left     Result Date: 1/14/2025  Preliminary Cardiology Report          [facility] [address] [place] [address] Tel [phone] and Fax [phone]       Preliminary Vascular Lab Report  VASC US LOWER EXTREMITY VENOUS DUPLEX LEFT  Patient Name:      SEAN GARCÍA  Reading Physician:  [id number] Madeline Yanez MD Study Date:        1/14/2025    Ordering Physician: [id number] ANNETTE ZAMAN MRN/PID:           [id number]     Technologist:       Saniya Summers RVT Accession#:        [id number] Technologist 2: Date of Birth/Age: 12/11/1934   Encounter#:         [id number] Gender:            F Admission Status:  Emergency    Location Performed: [facility]  Diagnosis/ICD: Pain in left leg-M79.605 Procedure/CPT: 69589 Peripheral venous duplex scan for DVT Limited  PRELIMINARY CONCLUSIONS: Right Lower Venous: The right common femoral vein demonstrates normal spontaneous and respirophasic flow. Left Lower Venous: No evidence of acute deep vein thrombus visualized in the left lower extremity. Cannot rule out thrombus in non-visualized posterior tibial and peroneal veins due to edema.  Imaging & Doppler Findings:  Right        Flow CFV   Spontaneous/Phasic  Left                  Compress Thrombus        Flow Distal External Iliac   Yes      None   Spontaneous/Phasic CFV                     Yes      None   Spontaneous/Phasic PFV                     Yes      None FV Proximal             Yes      None   Spontaneous/Phasic FV Mid                  Yes      None FV Distal               Yes      None Popliteal               Yes      None   Spontaneous/Phasic VASCULAR PRELIMINARY REPORT completed by Saniya Summers RVT on 1/14/2025 at 9:47:01 AM  ** Final **      XR ankle bilateral 2  views     Result Date: 1/7/2025  Interpreted By:  Chelsey Merino, STUDY: XR ANKLE BILATERAL 2 VIEWS;  1/7/2025 1:15 pm   INDICATION: Signs/Symptoms:TTP.   COMPARISON: None.   ACCESSION NUMBER(S): [id number]   ORDERING CLINICIAN: EMIL BAILEY   FINDINGS: Left ankle: No acute fracture or dislocation is identified. The ankle mortise is congruent. There are degenerative changes in the visualized joints of the midfoot. A small plantar calcaneal enthesophyte is present. There is moderate-to-marked diffuse subcutaneous edema the imaged calf.   Right ankle: No acute fracture or dislocation is identified. The ankle mortise is congruent. There are degenerative changes in the visualized joints of the midfoot. A small plantar calcaneal enthesophyte is present. There is moderate-to-marked diffuse subcutaneous edema the imaged calf.        Bilateral calf edema without acute osseous abnormality.   MACRO None   Signed by: Chelsey Merino 1/7/2025 1:23 PM Dictation workstation:   [id number]      Home Medications          Prior to Admission medications    Medication Sig Start Date End Date Taking? Authorizing Provider   azelastine (Astelin) 137 mcg (0.1 %) nasal spray Administer 2 sprays into each nostril 2 times a day. Use in each nostril as directed  Patient taking differently: Administer 2 sprays into each nostril if needed. Use in each nostril as directed 11/21/24 11/21/25 Yes Lakia Johnson MD   dilTIAZem LA (Cardizem LA) 180 mg 24 hr tablet Take 1 tablet (180 mg) by mouth once daily.  Patient taking differently: Take 1 tablet (180 mg) by mouth once daily in the morning. 11/5/24   Yes Lakia Johnson MD   ferrous sulfate 325 (65 Fe) MG EC tablet Take 1 tablet by mouth once daily with breakfast. Do not crush, chew, or split. 11/5/24 11/5/25 Yes Lakia Johnson MD   furosemide (Lasix) 20 mg tablet Take 1 tablet (20 mg) by mouth once daily.  Patient taking differently: Take 1 tablet (20 mg) by mouth once daily in the morning.  12/16/24 12/16/25 Yes Lakia Johnson MD   meloxicam (Mobic) 15 mg tablet Take 1 tablet (15 mg) by mouth once daily.  Patient taking differently: Take 1 tablet (15 mg) by mouth once daily with breakfast. 12/16/24   Yes Lakia Johnson MD   mv-min-FA-vit K-lutein-zeaxant (PreserVision AREDS 2 Plus MV) 200 mcg-15 mcg- 5 mg-1 mg capsule Take 1 capsule by mouth 2 times a day.     Yes Historical Provider, MD   oxygen (O2) gas therapy Inhale 3 L/min continuously.     Yes Historical Provider, MD   potassium chloride CR 20 mEq ER tablet Take 1 tablet (20 mEq) by mouth once daily in the morning.     Yes Historical Provider, MD   pramipexole (Mirapex) 1 mg tablet Take 1 tablet (1 mg) by mouth once daily at bedtime. 12/16/24 12/16/25 Yes Lakia Johnson MD   simvastatin (Zocor) 20 mg tablet Take 1 tablet (20 mg) by mouth once daily at bedtime. 12/16/24 12/16/25 Yes Lakia Johnson MD   traMADol (Ultram) 50 mg tablet Take 1 tablet (50 mg) by mouth every 8 hours if needed for severe pain (7 - 10) for up to 3 days. 1/9/25 1/14/25 Yes Jorge Mart DO   traZODone (Desyrel) 50 mg tablet Take 1.5 tablets (75 mg) by mouth as needed at bedtime for sleep. 1/9/25   Yes Jorge Mart DO   doxycycline (Vibramycin) 100 mg capsule Take 1 capsule (100 mg) by mouth 2 times a day for 3 days. Take with at least 8 ounces (large glass) of water, do not lie down for 30 minutes after  Patient not taking: Reported on 1/14/2025 1/9/25 1/12/25   Jorge Mart DO   gabapentin (Neurontin) 100 mg capsule Take 1 capsule (100 mg) by mouth 2 times a day for 3 days.  Patient not taking: Reported on 1/14/2025 1/9/25 1/12/25   Jorge Mart DO   apixaban (Eliquis) 5 mg tablet Take 1 tablet (5 mg) by mouth 2 times a day.  Patient not taking: Reported on 1/7/2025 11/19/24 1/9/25   Jorge Bhandari MD   traZODone (Desyrel) 50 mg tablet Take 1 tablet (50 mg) by mouth as needed at bedtime for sleep.  Patient taking differently: Take 1 tablet (50 mg) by  mouth once daily at bedtime. 12/20/24 1/9/25   Lakia Johnson MD   vit C,Q-Lq-hukkq-lutein-zeaxan (PreserVision AREDS-2) 250-90-40-1 mg capsule Take 1 capsule by mouth 2 times a day. 10/10/24 1/14/25   Kay Barcenas, APRN-CNP         Medications  Scheduled medications    Scheduled Medications   ampicillin-sulbactam, 3 g, intravenous, q12h  dilTIAZem CD, 180 mg, oral, Daily  [START ON 1/15/2025] ferrous sulfate (325 mg ferrous sulfate), 325 mg, oral, Daily with breakfast  furosemide, 20 mg, oral, q AM  heparin (porcine), 5,000 Units, subcutaneous, q8h  [START ON 1/15/2025] meloxicam, 7.5 mg, oral, Daily with breakfast  potassium chloride CR, 20 mEq, oral, q AM  pramipexole, 1 mg, oral, Nightly  simvastatin, 20 mg, oral, Nightly         Continuous medications  Continuous Medications         PRN medications  acetaminophen, 650 mg, q4h PRN   Or  acetaminophen, 650 mg, q4h PRN   Or  acetaminophen, 650 mg, q4h PRN  HYDROcodone-acetaminophen, 1 tablet, q6h PRN  traZODone, 75 mg, Nightly PRN                 Assessment/Plan     Assessment & Plan  Left leg cellulitis     Bilateral LE cellulitis  LLE ecchymosis  B/L LE edema, acute on chronic  -HDS  -Does not meets SIRS/sepsis   -No leukocytosis and afebrile  -Denies fever/chills  -Will initiate IV Unasyn, renally dosed  -Patient reports compliance with her home PO Lasix 20mg QD, which is continued  -B/L LE duplex ultrasound negative for DVT  -Bruising to LLE noted during last admission and XR of L ankle obtained showing no acute fracture with some degenerative changes  -Pt reports difficulty ambulating secondary to pain  -PT/OT  -Up with assist and fall precautions     Chronic diastolic heart failure  Chronic respiratory failure  Hx of PE and DVT  -Lungs are CTA on exam  -Pt requiring 3L NC. Pt reports requiring 2-3L oxygen PRN/intermittently since she had her PE. States she has been using it more continuously lately because she gets more SOB. CT for PE obtained 1/7/2025  showing no acute PE or PNA.  -Echo obtained 1/3/2025 showing EF 62%, Grade I diastolic dysfunction, normal RV systolic function  -Monitor pulse ox  -Admitted from 10/2-10/7 for provoked multiple right sided pulmonary emboli with signs of right heart strain after having hip surgery              -Completed Eliquis just about at the New Year  -Will use SQ heparin for VTE PPx     Other chronic medical conditions:  HTN  CKDIII  AAA status post repair  Carotid disease  PAD  ITP  Dyslipidemia  Neuropathic pain  -Continue home medications as appropriate  -Monitor renal function     VTE PPx: SQ heparin     See additional orders for further plan of care.   Further evaluation and management per attending and consulting physicians.       Bahman Flores MD  Patient fully evaluated in the emergency room on January 14.  Continue present IV antibiotics and monitor.  Recheck labs in AM.                   Patient fully evaluated  01/15  for    Problem List Items Addressed This Visit          Infectious Diseases    * (Principal) Left leg cellulitis - Primary     Other Visit Diagnoses       Pain in left leg              Patient seen resting in bed with head of bed elevated, no s/s or c/o acute difficulties at this time.  Vital signs for last 24 hours Temp:  [36 °C (96.8 °F)-36.4 °C (97.5 °F)] 36 °C (96.8 °F)  Heart Rate:  [51-58] 56  Resp:  [17-18] 18  BP: (125-138)/(60-64) 138/64    No intake/output data recorded.  Patient still requiring frequent cardiac and SPO2 monitoring. Continue aggressive pulmonary hygiene and oral hygiene. Off loading as tolerated for skin integrity. Medications and labs reviewed-   Results for orders placed or performed during the hospital encounter of 01/14/25 (from the past 24 hours)   CBC and Auto Differential   Result Value Ref Range    WBC 5.8 4.4 - 11.3 x10*3/uL    nRBC 0.0 0.0 - 0.0 /100 WBCs    RBC 3.14 (L) 4.00 - 5.20 x10*6/uL    Hemoglobin 8.9 (L) 12.0 - 16.0 g/dL    Hematocrit 30.6 (L) 36.0 -  46.0 %    MCV 98 80 - 100 fL    MCH 28.3 26.0 - 34.0 pg    MCHC 29.1 (L) 32.0 - 36.0 g/dL    RDW 17.5 (H) 11.5 - 14.5 %    Platelets 143 (L) 150 - 450 x10*3/uL    Neutrophils % 79.0 40.0 - 80.0 %    Immature Granulocytes %, Automated 0.3 0.0 - 0.9 %    Lymphocytes % 9.6 13.0 - 44.0 %    Monocytes % 6.8 2.0 - 10.0 %    Eosinophils % 3.8 0.0 - 6.0 %    Basophils % 0.5 0.0 - 2.0 %    Neutrophils Absolute 4.54 1.60 - 5.50 x10*3/uL    Immature Granulocytes Absolute, Automated 0.02 0.00 - 0.50 x10*3/uL    Lymphocytes Absolute 0.55 (L) 0.80 - 3.00 x10*3/uL    Monocytes Absolute 0.39 0.05 - 0.80 x10*3/uL    Eosinophils Absolute 0.22 0.00 - 0.40 x10*3/uL    Basophils Absolute 0.03 0.00 - 0.10 x10*3/uL   Comprehensive Metabolic Panel   Result Value Ref Range    Glucose 78 74 - 99 mg/dL    Sodium 139 136 - 145 mmol/L    Potassium 5.1 3.5 - 5.3 mmol/L    Chloride 105 98 - 107 mmol/L    Bicarbonate 30 21 - 32 mmol/L    Anion Gap 9 (L) 10 - 20 mmol/L    Urea Nitrogen 28 (H) 6 - 23 mg/dL    Creatinine 1.44 (H) 0.50 - 1.05 mg/dL    eGFR 35 (L) >60 mL/min/1.73m*2    Calcium 8.1 (L) 8.6 - 10.3 mg/dL    Albumin 3.0 (L) 3.4 - 5.0 g/dL    Alkaline Phosphatase 66 33 - 136 U/L    Total Protein 5.6 (L) 6.4 - 8.2 g/dL    AST 15 9 - 39 U/L    Bilirubin, Total 0.2 0.0 - 1.2 mg/dL    ALT 7 7 - 45 U/L      Patient recently received an antibiotic (last 12 hours)       Date/Time Action Medication Dose Rate    01/15/25 0339 New Bag    ampicillin-sulbactam (Unasyn) 3 g in sodium chloride 0.9%  mL 3 g 200 mL/hr           Plan discussed with interdisciplinary team, double layer tubi  to BLE, diligent wound care, elevate BLE as tolerated. Will continue IV antibiotics - Unasyn, titrate supplemental oxygen, patient room air at baseline, repeat labs including iron levels, and chest xray in the AM. Incentive spirometry ordered. Patient awake, alert, and appropriate, states she is from home with a home health aide that assists with ADLs at home.  No acute events overnight, patient denies chest pain, worsening SOB at this time.continue current and repeat labs in the AM.     Discharge planning discussed with patient and care team. Therapy evaluations ordered. Anticipate HHC at discharge. Patient aware and agreeable to current plan, continue plan as above.     I spent a total of 50 minutes on the date of the service which included preparing to see the patient, face-to-face patient care, completing clinical documentation, obtaining and/or reviewing separately obtained history, performing a medically appropriate examination, counseling and educating the patient/family/caregiver, ordering medications, tests, or procedures, communicating with other HCPs (not separately reported), independently interpreting results (not separately reported), communicating results to the patient/family/caregiver, and care coordination (not separately reported).        Patient fully evaluated  01/16  for    Problem List Items Addressed This Visit          Infectious Diseases    * (Principal) Left leg cellulitis - Primary     Other Visit Diagnoses       Pain in left leg              Patient seen resting in bed with head of bed elevated, no s/s or c/o acute difficulties at this time.  Vital signs for last 24 hours Temp:  [36 °C (96.8 °F)-36.4 °C (97.5 °F)] 36 °C (96.8 °F)  Heart Rate:  [51-58] 56  Resp:  [17-18] 18  BP: (125-138)/(60-64) 138/64    No intake/output data recorded.  Patient still requiring frequent cardiac and SPO2 monitoring. Continue aggressive pulmonary hygiene and oral hygiene. Off loading as tolerated for skin integrity. Medications and labs reviewed-   Results for orders placed or performed during the hospital encounter of 01/14/25 (from the past 24 hours)   CBC and Auto Differential   Result Value Ref Range    WBC 5.8 4.4 - 11.3 x10*3/uL    nRBC 0.0 0.0 - 0.0 /100 WBCs    RBC 3.14 (L) 4.00 - 5.20 x10*6/uL    Hemoglobin 8.9 (L) 12.0 - 16.0 g/dL    Hematocrit 30.6 (L)  36.0 - 46.0 %    MCV 98 80 - 100 fL    MCH 28.3 26.0 - 34.0 pg    MCHC 29.1 (L) 32.0 - 36.0 g/dL    RDW 17.5 (H) 11.5 - 14.5 %    Platelets 143 (L) 150 - 450 x10*3/uL    Neutrophils % 79.0 40.0 - 80.0 %    Immature Granulocytes %, Automated 0.3 0.0 - 0.9 %    Lymphocytes % 9.6 13.0 - 44.0 %    Monocytes % 6.8 2.0 - 10.0 %    Eosinophils % 3.8 0.0 - 6.0 %    Basophils % 0.5 0.0 - 2.0 %    Neutrophils Absolute 4.54 1.60 - 5.50 x10*3/uL    Immature Granulocytes Absolute, Automated 0.02 0.00 - 0.50 x10*3/uL    Lymphocytes Absolute 0.55 (L) 0.80 - 3.00 x10*3/uL    Monocytes Absolute 0.39 0.05 - 0.80 x10*3/uL    Eosinophils Absolute 0.22 0.00 - 0.40 x10*3/uL    Basophils Absolute 0.03 0.00 - 0.10 x10*3/uL   Comprehensive Metabolic Panel   Result Value Ref Range    Glucose 78 74 - 99 mg/dL    Sodium 139 136 - 145 mmol/L    Potassium 5.1 3.5 - 5.3 mmol/L    Chloride 105 98 - 107 mmol/L    Bicarbonate 30 21 - 32 mmol/L    Anion Gap 9 (L) 10 - 20 mmol/L    Urea Nitrogen 28 (H) 6 - 23 mg/dL    Creatinine 1.44 (H) 0.50 - 1.05 mg/dL    eGFR 35 (L) >60 mL/min/1.73m*2    Calcium 8.1 (L) 8.6 - 10.3 mg/dL    Albumin 3.0 (L) 3.4 - 5.0 g/dL    Alkaline Phosphatase 66 33 - 136 U/L    Total Protein 5.6 (L) 6.4 - 8.2 g/dL    AST 15 9 - 39 U/L    Bilirubin, Total 0.2 0.0 - 1.2 mg/dL    ALT 7 7 - 45 U/L      Patient recently received an antibiotic (last 12 hours)       Date/Time Action Medication Dose Rate    01/16/25 1453 New Bag    ampicillin-sulbactam (Unasyn) 3 g in sodium chloride 0.9%  mL 3 g 200 mL/hr           No acute events overnight, patient denies chest pain, worsening SOB at this time. Patient on supplemental oxygen at baseline but would like to titrate as tolerated, continue titration. Plan discussed with interdisciplinary team, patient with significant pain to left ankle, will apply lidocaine patch to site, cymbalta started, will monitor overnight, continue current and repeat labs in the AM.     Discharge planning  discussed with patient and care team. Therapy evaluations ordered. Anticipate [facility] at discharge, per [facility] able to accept patient.  Patient aware and agreeable to current plan, continue plan as above.     I spent a total of 50 minutes on the date of the service which included preparing to see the patient, face-to-face patient care, completing clinical documentation, obtaining and/or reviewing separately obtained history, performing a medically appropriate examination, counseling and educating the patient/family/caregiver, ordering medications, tests, or procedures, communicating with other HCPs (not separately reported), independently interpreting results (not separately reported), communicating results to the patient/family/caregiver, and care coordination (not separately reported).        Patient fully evaluated 1/17  for    Problem List Items Addressed This Visit          Infectious Diseases    * (Principal) Left leg cellulitis - Primary     Other Visit Diagnoses       Pain in left leg              Patient seen resting in bed with head of bed elevated, no s/s or c/o acute difficulties at this time.  Vital signs for last 24 hours Temp:  [36 °C (96.8 °F)-36.4 °C (97.5 °F)] 36 °C (96.8 °F)  Heart Rate:  [51-58] 56  Resp:  [17-18] 18  BP: (125-138)/(60-64) 138/64    No intake/output data recorded.  Patient still requiring frequent cardiac and SPO2 monitoring. Continue aggressive pulmonary hygiene and oral hygiene. Off loading as tolerated for skin integrity. Medications and labs reviewed-   Results for orders placed or performed during the hospital encounter of 01/14/25 (from the past 24 hours)   CBC and Auto Differential   Result Value Ref Range    WBC 5.8 4.4 - 11.3 x10*3/uL    nRBC 0.0 0.0 - 0.0 /100 WBCs    RBC 3.14 (L) 4.00 - 5.20 x10*6/uL    Hemoglobin 8.9 (L) 12.0 - 16.0 g/dL    Hematocrit 30.6 (L) 36.0 - 46.0 %    MCV 98 80 - 100 fL    MCH 28.3 26.0 - 34.0 pg    MCHC 29.1 (L) 32.0 -  36.0 g/dL    RDW 17.5 (H) 11.5 - 14.5 %    Platelets 143 (L) 150 - 450 x10*3/uL    Neutrophils % 79.0 40.0 - 80.0 %    Immature Granulocytes %, Automated 0.3 0.0 - 0.9 %    Lymphocytes % 9.6 13.0 - 44.0 %    Monocytes % 6.8 2.0 - 10.0 %    Eosinophils % 3.8 0.0 - 6.0 %    Basophils % 0.5 0.0 - 2.0 %    Neutrophils Absolute 4.54 1.60 - 5.50 x10*3/uL    Immature Granulocytes Absolute, Automated 0.02 0.00 - 0.50 x10*3/uL    Lymphocytes Absolute 0.55 (L) 0.80 - 3.00 x10*3/uL    Monocytes Absolute 0.39 0.05 - 0.80 x10*3/uL    Eosinophils Absolute 0.22 0.00 - 0.40 x10*3/uL    Basophils Absolute 0.03 0.00 - 0.10 x10*3/uL   Comprehensive Metabolic Panel   Result Value Ref Range    Glucose 78 74 - 99 mg/dL    Sodium 139 136 - 145 mmol/L    Potassium 5.1 3.5 - 5.3 mmol/L    Chloride 105 98 - 107 mmol/L    Bicarbonate 30 21 - 32 mmol/L    Anion Gap 9 (L) 10 - 20 mmol/L    Urea Nitrogen 28 (H) 6 - 23 mg/dL    Creatinine 1.44 (H) 0.50 - 1.05 mg/dL    eGFR 35 (L) >60 mL/min/1.73m*2    Calcium 8.1 (L) 8.6 - 10.3 mg/dL    Albumin 3.0 (L) 3.4 - 5.0 g/dL    Alkaline Phosphatase 66 33 - 136 U/L    Total Protein 5.6 (L) 6.4 - 8.2 g/dL    AST 15 9 - 39 U/L    Bilirubin, Total 0.2 0.0 - 1.2 mg/dL    ALT 7 7 - 45 U/L      Patient recently received an antibiotic (last 12 hours)       Date/Time Action Medication Dose Rate    01/17/25 1306 Given    doxycycline (Vibra-Tabs) tablet 100 mg 100 mg     01/17/25 6143 New Bag    ampicillin-sulbactam (Unasyn) 3 g in sodium chloride 0.9%  mL 3 g 200 mL/hr           No acute events overnight, patient denies chest pain, worsening SOB at this time. Patient on supplemental oxygen at baseline 3L but would like to titrate as tolerated, continue titration. Plan discussed with interdisciplinary team, patient with significant pain to left ankle, will apply lidocaine patch to site. Patient complaining of nausea today, will discontinue the iron to see if that helps. Will also discontinue Unasyn and start  patient on Keflex. Also starting patient on Protonix. Will monitor overnight, continue current and repeat labs in the AM.     Discharge planning discussed with patient and care team. Therapy evaluations ordered. [facility], anticipate HHC/SNF at discharge. Patient aware and agreeable to current plan, continue plan as above.     I spent a total of 50 minutes on the date of the service which included preparing to see the patient, face-to-face patient care, completing clinical documentation, obtaining and/or reviewing separately obtained history, performing a medically appropriate examination, counseling and educating the patient/family/caregiver, ordering medications, tests, or procedures, communicating with other HCPs (not separately reported), independently interpreting results (not separately reported), communicating results to the patient/family/caregiver, and care coordination (not separately reported).     Bahman Flores MD

## 2025-01-18 NOTE — CARE PLAN
The patient's goals for the shift include  safety and pain control  The clinical goals for the shift include maintain comfort and safety    Problem: Pain - Adult  Goal: Verbalizes/displays adequate comfort level or baseline comfort level  Outcome: Progressing     Problem: Safety - Adult  Goal: Free from fall injury  Outcome: Progressing     Problem: Discharge Planning  Goal: Discharge to home or other facility with appropriate resources  Outcome: Progressing     Problem: Chronic Conditions and Co-morbidities  Goal: Patient's chronic conditions and co-morbidity symptoms are monitored and maintained or improved  Outcome: Progressing     Problem: Skin  Goal: Decreased wound size/increased tissue granulation at next dressing change  Outcome: Progressing  Goal: Participates in plan/prevention/treatment measures  Outcome: Progressing  Goal: Prevent/manage excess moisture  Outcome: Progressing  Goal: Prevent/minimize sheer/friction injuries  Outcome: Progressing  Goal: Promote/optimize nutrition  Outcome: Progressing  Goal: Promote skin healing  Outcome: Progressing

## 2025-01-19 ENCOUNTER — APPOINTMENT (OUTPATIENT)
Dept: RADIOLOGY | Facility: HOSPITAL | Age: OVER 89
DRG: 603 | End: 2025-01-19
Payer: MEDICARE

## 2025-01-19 VITALS
RESPIRATION RATE: 18 BRPM | DIASTOLIC BLOOD PRESSURE: 65 MMHG | BODY MASS INDEX: 22.15 KG/M2 | WEIGHT: 125 LBS | SYSTOLIC BLOOD PRESSURE: 138 MMHG | HEART RATE: 60 BPM | HEIGHT: 63 IN | TEMPERATURE: 97.5 F | OXYGEN SATURATION: 91 %

## 2025-01-19 PROCEDURE — 2500000004 HC RX 250 GENERAL PHARMACY W/ HCPCS (ALT 636 FOR OP/ED): Performed by: INTERNAL MEDICINE

## 2025-01-19 PROCEDURE — 2500000001 HC RX 250 WO HCPCS SELF ADMINISTERED DRUGS (ALT 637 FOR MEDICARE OP)

## 2025-01-19 PROCEDURE — 2500000004 HC RX 250 GENERAL PHARMACY W/ HCPCS (ALT 636 FOR OP/ED): Performed by: PHYSICIAN ASSISTANT

## 2025-01-19 PROCEDURE — 2500000001 HC RX 250 WO HCPCS SELF ADMINISTERED DRUGS (ALT 637 FOR MEDICARE OP): Performed by: INTERNAL MEDICINE

## 2025-01-19 PROCEDURE — 2500000005 HC RX 250 GENERAL PHARMACY W/O HCPCS: Performed by: INTERNAL MEDICINE

## 2025-01-19 PROCEDURE — 71045 X-RAY EXAM CHEST 1 VIEW: CPT | Performed by: RADIOLOGY

## 2025-01-19 PROCEDURE — 2500000001 HC RX 250 WO HCPCS SELF ADMINISTERED DRUGS (ALT 637 FOR MEDICARE OP): Performed by: PHYSICIAN ASSISTANT

## 2025-01-19 PROCEDURE — 2500000002 HC RX 250 W HCPCS SELF ADMINISTERED DRUGS (ALT 637 FOR MEDICARE OP, ALT 636 FOR OP/ED): Performed by: PHYSICIAN ASSISTANT

## 2025-01-19 PROCEDURE — 74018 RADEX ABDOMEN 1 VIEW: CPT

## 2025-01-19 PROCEDURE — 71045 X-RAY EXAM CHEST 1 VIEW: CPT

## 2025-01-19 PROCEDURE — 1100000001 HC PRIVATE ROOM DAILY

## 2025-01-19 RX ORDER — CEPHALEXIN 250 MG/1
250 CAPSULE ORAL EVERY 8 HOURS
Qty: 34 CAPSULE | Refills: 0 | Status: SHIPPED | OUTPATIENT
Start: 2025-01-19 | End: 2025-01-20 | Stop reason: HOSPADM

## 2025-01-19 RX ORDER — GABAPENTIN 100 MG/1
100 CAPSULE ORAL 3 TIMES DAILY
Status: DISCONTINUED | OUTPATIENT
Start: 2025-01-19 | End: 2025-01-20 | Stop reason: HOSPADM

## 2025-01-19 RX ORDER — MUPIROCIN 20 MG/G
OINTMENT TOPICAL 3 TIMES DAILY
Status: DISCONTINUED | OUTPATIENT
Start: 2025-01-19 | End: 2025-01-20 | Stop reason: HOSPADM

## 2025-01-19 RX ORDER — L. ACIDOPHILUS/L.BULGARICUS 1MM CELL
1 TABLET ORAL DAILY
Qty: 30 TABLET | Refills: 0 | Status: SHIPPED | OUTPATIENT
Start: 2025-01-20 | End: 2025-02-19

## 2025-01-19 RX ORDER — TRAMADOL HYDROCHLORIDE 50 MG/1
50 TABLET ORAL EVERY 8 HOURS PRN
Qty: 10 TABLET | Refills: 0 | Status: SHIPPED | OUTPATIENT
Start: 2025-01-19 | End: 2025-01-26

## 2025-01-19 RX ORDER — ACETAMINOPHEN 325 MG/1
650 TABLET ORAL EVERY 4 HOURS PRN
Start: 2025-01-19

## 2025-01-19 RX ORDER — DULOXETIN HYDROCHLORIDE 30 MG/1
30 CAPSULE, DELAYED RELEASE ORAL DAILY
Qty: 30 CAPSULE | Refills: 0 | Status: SHIPPED | OUTPATIENT
Start: 2025-01-20 | End: 2025-02-19

## 2025-01-19 RX ORDER — GABAPENTIN 100 MG/1
100 CAPSULE ORAL 2 TIMES DAILY
Qty: 60 CAPSULE | Refills: 0 | Status: SHIPPED | OUTPATIENT
Start: 2025-01-19 | End: 2025-02-18

## 2025-01-19 RX ORDER — PANTOPRAZOLE SODIUM 40 MG/1
40 TABLET, DELAYED RELEASE ORAL
Qty: 30 TABLET | Refills: 0 | Status: SHIPPED | OUTPATIENT
Start: 2025-01-20 | End: 2025-02-19

## 2025-01-19 RX ADMIN — PRAMIPEXOLE DIHYDROCHLORIDE 1 MG: 1 TABLET ORAL at 21:00

## 2025-01-19 RX ADMIN — Medication 1 CAPSULE: at 20:59

## 2025-01-19 RX ADMIN — GABAPENTIN 100 MG: 100 CAPSULE ORAL at 20:59

## 2025-01-19 RX ADMIN — FUROSEMIDE 20 MG: 20 TABLET ORAL at 10:06

## 2025-01-19 RX ADMIN — ONDANSETRON 4 MG: 2 INJECTION INTRAMUSCULAR; INTRAVENOUS at 12:10

## 2025-01-19 RX ADMIN — DILTIAZEM HYDROCHLORIDE 180 MG: 180 CAPSULE, COATED, EXTENDED RELEASE ORAL at 10:06

## 2025-01-19 RX ADMIN — LIDOCAINE 4% 3 PATCH: 40 PATCH TOPICAL at 10:10

## 2025-01-19 RX ADMIN — HEPARIN SODIUM 5000 UNITS: 5000 INJECTION INTRAVENOUS; SUBCUTANEOUS at 14:08

## 2025-01-19 RX ADMIN — GABAPENTIN 100 MG: 100 CAPSULE ORAL at 15:19

## 2025-01-19 RX ADMIN — CEPHALEXIN 250 MG: 250 CAPSULE ORAL at 06:20

## 2025-01-19 RX ADMIN — MELOXICAM 7.5 MG: 7.5 TABLET ORAL at 10:05

## 2025-01-19 RX ADMIN — TRAZODONE HYDROCHLORIDE 75 MG: 50 TABLET ORAL at 21:02

## 2025-01-19 RX ADMIN — SIMVASTATIN 20 MG: 20 TABLET, FILM COATED ORAL at 20:59

## 2025-01-19 RX ADMIN — PANTOPRAZOLE SODIUM 40 MG: 40 TABLET, DELAYED RELEASE ORAL at 06:20

## 2025-01-19 RX ADMIN — MUPIROCIN: 20 OINTMENT TOPICAL at 20:59

## 2025-01-19 RX ADMIN — MUPIROCIN: 20 OINTMENT TOPICAL at 15:19

## 2025-01-19 RX ADMIN — Medication 1 CAPSULE: at 10:08

## 2025-01-19 RX ADMIN — DULOXETINE HYDROCHLORIDE 30 MG: 30 CAPSULE, DELAYED RELEASE ORAL at 10:05

## 2025-01-19 RX ADMIN — Medication 1 TABLET: at 10:07

## 2025-01-19 RX ADMIN — HEPARIN SODIUM 5000 UNITS: 5000 INJECTION INTRAVENOUS; SUBCUTANEOUS at 21:03

## 2025-01-19 ASSESSMENT — COGNITIVE AND FUNCTIONAL STATUS - GENERAL
TURNING FROM BACK TO SIDE WHILE IN FLAT BAD: A LITTLE
MOVING TO AND FROM BED TO CHAIR: A LITTLE
STANDING UP FROM CHAIR USING ARMS: A LITTLE
MOBILITY SCORE: 18
DAILY ACTIVITIY SCORE: 22
TOILETING: A LITTLE
CLIMB 3 TO 5 STEPS WITH RAILING: A LITTLE
WALKING IN HOSPITAL ROOM: A LITTLE
MOVING FROM LYING ON BACK TO SITTING ON SIDE OF FLAT BED WITH BEDRAILS: A LITTLE
DRESSING REGULAR LOWER BODY CLOTHING: A LITTLE

## 2025-01-19 ASSESSMENT — PAIN SCALES - GENERAL: PAINLEVEL_OUTOF10: 3

## 2025-01-19 NOTE — CARE PLAN
The patient's goals for the shift include      The clinical goals for the shift include comfort and safety    Problem: Pain - Adult  Goal: Verbalizes/displays adequate comfort level or baseline comfort level  Outcome: Progressing     Problem: Safety - Adult  Goal: Free from fall injury  Outcome: Progressing     Problem: Discharge Planning  Goal: Discharge to home or other facility with appropriate resources  Outcome: Progressing     Problem: Chronic Conditions and Co-morbidities  Goal: Patient's chronic conditions and co-morbidity symptoms are monitored and maintained or improved  Outcome: Progressing     Problem: Skin  Goal: Decreased wound size/increased tissue granulation at next dressing change  Outcome: Progressing  Flowsheets (Taken 1/19/2025 1116)  Decreased wound size/increased tissue granulation at next dressing change: Promote sleep for wound healing  Goal: Participates in plan/prevention/treatment measures  Outcome: Progressing  Flowsheets (Taken 1/19/2025 1116)  Participates in plan/prevention/treatment measures: Increase activity/out of bed for meals  Goal: Prevent/manage excess moisture  Outcome: Progressing  Flowsheets (Taken 1/19/2025 1116)  Prevent/manage excess moisture: Monitor for/manage infection if present  Goal: Prevent/minimize sheer/friction injuries  Outcome: Progressing  Flowsheets (Taken 1/19/2025 1116)  Prevent/minimize sheer/friction injuries: Use pull sheet  Goal: Promote/optimize nutrition  Outcome: Progressing  Flowsheets (Taken 1/19/2025 1116)  Promote/optimize nutrition: Consume > 50% meals/supplements  Goal: Promote skin healing  Outcome: Progressing  Flowsheets (Taken 1/19/2025 1116)  Promote skin healing: Protective dressings over bony prominences

## 2025-01-19 NOTE — PROGRESS NOTES
Estefanía Contreras is a 89 y.o. female on day 4 of admission presenting with Left leg cellulitis.      Subjective   No acute events overnight, patient denies chest pain, worsening SOB at this time.        Objective     Last Recorded Vitals  /61 (BP Location: Right arm, Patient Position: Lying)   Pulse 58   Temp 36.7 °C (98.1 °F) (Temporal)   Resp 18   Wt 56.7 kg (125 lb)   SpO2 93%   Intake/Output last 3 Shifts:    Intake/Output Summary (Last 24 hours) at 1/19/2025 1324  Last data filed at 1/19/2025 0420  Gross per 24 hour   Intake --   Output 600 ml   Net -600 ml         Admission Weight  Weight: 56.7 kg (125 lb) (01/14/25 0651)    Daily Weight  01/14/25 : 56.7 kg (125 lb)    Image Results  XR chest 1 view  Narrative: Interpreted By:  Deana Hernandez,   STUDY:  XR CHEST 1 VIEW;  1/19/2025 4:15 am      INDICATION:  Signs/Symptoms:sob.      COMPARISON:  01/07/2025      ACCESSION NUMBER(S):  [id number]      ORDERING CLINICIAN:  BAHMAN MCDONNELL      FINDINGS:  CARDIOMEDIASTINAL SILHOUETTE:  The heart is enlarged.          LUNGS:  There is an infiltrate at the left lung base with obscuration of the  left hemidiaphragm. The right lung is clear.  There are underlying chronic interstitial changes of the lungs.      ABDOMEN:  No remarkable upper abdominal findings.          BONES:  No acute osseous changes.  There is a left reverse total shoulder arthroplasty.  There is superior subluxation of the right humeral head consistent  with a chronic rotator cuff tear or degeneration.      Impression: Development left basilar pneumonia.      MACRO:  None      Signed by: Deana Hernandez 1/19/2025 12:38 PM  Dictation workstation:   [id number]      Physical Exam    Relevant Results               Assessment/Plan                  Assessment & Plan  Left leg cellulitis          Bahman Mcdonnell MD   Physician  Internal Medicine     H&P      Signed     Date of Service: 1/14/2025  6:28 PM     Signed       Expand All Collapse  All    History Of Present Illness  Estefanía Contreras is a 89 y.o. female with PMH of HFpEF (EF 62% 1/6/25), DLD, HTN, CKD3b, AAA (s/p repair), PAD, ITP, and provoked PE (left hip fracture, completed 3 months of apixaban, on 2-3L NC PRN) presented to [facility] ED from home on 1/14/2025 with B/L lower extremity pain. The pt is an excellent historian. The pt was admitted to [facility] on 1/7/2025 with bilateral lower extremity pain and edema, as well as cough, shortness of breath, and increased need for her home oxygen. She was treated for PNA with azithromycin and rocephin. Her LE edema was treated with Lasix. Patient was discharged home on 1/10/25 with PCP follow-up, three further days of doxycycline, and short courses of tramadol and gabapentin. Since her discharge, the pt has taken the medications she was discharged with including the 3 days of doxycycline, which she completed. Her leg swelling, pain, and redness worsened since returning home despite the pain medication. The tramadol isn't helping. She is having a very hard time putting any weight on her legs. A bruise was noted on her left leg last admission and XR was obtained showing no acute fracture. A LE ultrasound was also obtained that did not show any blood clot. She denies any injury to the leg. The left leg is more painful than the right. She denies any fever/chills, headache, chest pain/palpatations, shortness of breath, cough, abdominal pain, N/V/D, dysuria, hematuria, or bloody/black stools/   She decided to come back to the ER because of the pain and difficulty walking. ER evaluation included repeat LE US that was negative for VTE. Pt was recommended for admission to the hospital and accepted on the service of Dr. Flores who would like her treated for cellulitis with IV antibiotics. Pt notes she has been having to use oxygen more over last month and is having difficulty tolerating being off O2 at home.        Past Medical History  Medical History        Past  Medical History:   Diagnosis Date    H/O heart artery stent      H/O shoulder surgery      PE (pulmonary thromboembolism) (Multi)              Surgical History  Surgical History   History reviewed. No pertinent surgical history.        Social History  Social History   Social History            Tobacco Use    Smoking status: Former       Types: Cigarettes       Passive exposure: Never    Smokeless tobacco: Never   Vaping Use    Vaping status: Never Used   Substance Use Topics    Alcohol use: Not Currently    Drug use: Never            Family History  Family History   No family history on file.        Allergies  Patient has no known allergies.     Review of Systems   Constitutional:  Negative for chills, diaphoresis and fever.   HENT:  Negative for congestion and sore throat.    Eyes:  Negative for visual disturbance.   Respiratory:  Negative for cough, chest tightness, shortness of breath and wheezing.    Cardiovascular:  Positive for leg swelling. Negative for chest pain and palpitations.   Gastrointestinal:  Negative for abdominal pain, diarrhea, nausea and vomiting.   Endocrine: Negative for polyuria.   Genitourinary:  Negative for dysuria and hematuria.   Musculoskeletal:  Negative for joint swelling.   Skin:  Positive for color change. Negative for rash.   Allergic/Immunologic: Negative for immunocompromised state.   Neurological:  Negative for syncope and headaches.   Psychiatric/Behavioral:  Negative for confusion and hallucinations.       Physical Exam  Constitutional:       Appearance: Normal appearance. She is normal weight.   HENT:      Head: Normocephalic and atraumatic.      Mouth/Throat:      Mouth: Mucous membranes are moist.   Eyes:      Conjunctiva/sclera: Conjunctivae normal.   Cardiovascular:      Rate and Rhythm: Normal rate and regular rhythm.      Heart sounds: No murmur heard.  Pulmonary:      Effort: No respiratory distress.      Breath sounds: No wheezing, rhonchi or rales.   Abdominal:       "General: There is no distension.      Tenderness: There is no abdominal tenderness. There is no guarding.   Musculoskeletal:         General: No deformity.      Cervical back: No rigidity.   Skin:     General: Skin is warm.      Comments: -Medial LLE just superior to her ankle is an ecchymosis with surrounding erythema.  Erythema is noncircumferential.  She also has a nonblanchable petechiae over her dorsal surfaces of both her feet closer to her toes. Anterior RLE just superior to her ankle is a small patch of erythema. Her bilateral lower extremities have +2 pitting edema, and there is +3 pedal edema.   Neurological:      General: No focal deficit present.      Mental Status: She is alert and oriented to person, place, and time.   Psychiatric:         Mood and Affect: Mood normal.         Last Recorded Vitals  Visit Vitals  /63   Pulse 60   Temp 36.8 °C (98.2 °F) (Temporal)   Resp 15   Ht 1.6 m (5' 3\")   Wt 56.7 kg (125 lb)   SpO2 100%   BMI 22.14 kg/m²   Smoking Status Former   BSA 1.59 m²         Relevant Results        Results for orders placed or performed during the hospital encounter of 01/14/25 (from the past 24 hours)   CBC and Auto Differential   Result Value Ref Range     WBC 5.8 4.4 - 11.3 x10*3/uL     nRBC 0.0 0.0 - 0.0 /100 WBCs     RBC 3.34 (L) 4.00 - 5.20 x10*6/uL     Hemoglobin 9.5 (L) 12.0 - 16.0 g/dL     Hematocrit 31.1 (L) 36.0 - 46.0 %     MCV 93 80 - 100 fL     MCH 28.4 26.0 - 34.0 pg     MCHC 30.5 (L) 32.0 - 36.0 g/dL     RDW 18.0 (H) 11.5 - 14.5 %     Platelets 206 150 - 450 x10*3/uL     Neutrophils % 66.4 40.0 - 80.0 %     Immature Granulocytes %, Automated 0.5 0.0 - 0.9 %     Lymphocytes % 17.5 13.0 - 44.0 %     Monocytes % 10.6 2.0 - 10.0 %     Eosinophils % 4.1 0.0 - 6.0 %     Basophils % 0.9 0.0 - 2.0 %     Neutrophils Absolute 3.88 1.60 - 5.50 x10*3/uL     Immature Granulocytes Absolute, Automated 0.03 0.00 - 0.50 x10*3/uL     Lymphocytes Absolute 1.02 0.80 - 3.00 x10*3/uL     " Monocytes Absolute 0.62 0.05 - 0.80 x10*3/uL     Eosinophils Absolute 0.24 0.00 - 0.40 x10*3/uL     Basophils Absolute 0.05 0.00 - 0.10 x10*3/uL   Comprehensive metabolic panel   Result Value Ref Range     Glucose 79 74 - 99 mg/dL     Sodium 140 136 - 145 mmol/L     Potassium 4.8 3.5 - 5.3 mmol/L     Chloride 103 98 - 107 mmol/L     Bicarbonate 29 21 - 32 mmol/L     Anion Gap 13 10 - 20 mmol/L     Urea Nitrogen 38 (H) 6 - 23 mg/dL     Creatinine 1.61 (H) 0.50 - 1.05 mg/dL     eGFR 30 (L) >60 mL/min/1.73m*2     Calcium 9.0 8.6 - 10.3 mg/dL     Albumin 3.7 3.4 - 5.0 g/dL     Alkaline Phosphatase 76 33 - 136 U/L     Total Protein 6.7 6.4 - 8.2 g/dL     AST 20 9 - 39 U/L     Bilirubin, Total 0.3 0.0 - 1.2 mg/dL     ALT 11 7 - 45 U/L   B-type natriuretic peptide   Result Value Ref Range      (H) 0 - 99 pg/mL   D-dimer, VTE Exclusion   Result Value Ref Range     D-Dimer, Quantitative VTE Exclusion 4,886 (H) <=500 ng/mL FEU   Vascular US lower extremity venous duplex left   Result Value Ref Range     BSA 1.59 m2         Imaging  Vascular US lower extremity venous duplex left     Result Date: 1/14/2025  Preliminary Cardiology Report          [place] [address] Tel [phone] and Fax [phone]       [facility] Lab Report  [facility]  Patient Name:      SEAN GARCÍA  Reading Physician:  [id number] Madeline Yanez MD Study Date:        1/14/2025    Ordering Physician: [id number] ZAMAN MRN/PID:           [id number]     Technologist:       Saniya Smumers RVT Accession#:        [id number] Technologist 2: Date of Birth/Age: 12/11/1934   Encounter#:         [id number] Gender:            F Admission Status:  Emergency    Location Performed: [facility]  Diagnosis/ICD: Pain in left leg-M79.605 Procedure/CPT: 07390 Peripheral venous duplex scan for DVT Limited  PRELIMINARY CONCLUSIONS: Right Lower Venous: The right common  femoral vein demonstrates normal spontaneous and respirophasic flow. Left Lower Venous: No evidence of acute deep vein thrombus visualized in the left lower extremity. Cannot rule out thrombus in non-visualized posterior tibial and peroneal veins due to edema.  Imaging & Doppler Findings:  Right        Flow CFV   Spontaneous/Phasic  Left                  Compress Thrombus        Flow Distal External Iliac   Yes      None   Spontaneous/Phasic CFV                     Yes      None   Spontaneous/Phasic PFV                     Yes      None FV Proximal             Yes      None   Spontaneous/Phasic FV Mid                  Yes      None FV Distal               Yes      None Popliteal               Yes      None   Spontaneous/Phasic VASCULAR PRELIMINARY REPORT completed by Saniya Summers RVT on 1/14/2025 at 9:47:01 AM  ** Final **      XR ankle bilateral 2 views     Result Date: 1/7/2025  Interpreted By:  Chelsey Merino, STUDY: XR ANKLE BILATERAL 2 VIEWS;  1/7/2025 1:15 pm   INDICATION: Signs/Symptoms:TTP.   COMPARISON: None.   ACCESSION NUMBER(S): [id number]   ORDERING CLINICIAN: EMIL BAILEY   FINDINGS: Left ankle: No acute fracture or dislocation is identified. The ankle mortise is congruent. There are degenerative changes in the visualized joints of the midfoot. A small plantar calcaneal enthesophyte is present. There is moderate-to-marked diffuse subcutaneous edema the imaged calf.   Right ankle: No acute fracture or dislocation is identified. The ankle mortise is congruent. There are degenerative changes in the visualized joints of the midfoot. A small plantar calcaneal enthesophyte is present. There is moderate-to-marked diffuse subcutaneous edema the imaged calf.        Bilateral calf edema without acute osseous abnormality.   MACRO None   Signed by: Chelsey Merino 1/7/2025 1:23 PM Dictation workstation:   [id number]      Home Medications          Prior to Admission medications    Medication Sig Start Date  End Date Taking? Authorizing Provider   azelastine (Astelin) 137 mcg (0.1 %) nasal spray Administer 2 sprays into each nostril 2 times a day. Use in each nostril as directed  Patient taking differently: Administer 2 sprays into each nostril if needed. Use in each nostril as directed 11/21/24 11/21/25 Yes Lakia Johnson MD   dilTIAZem LA (Cardizem LA) 180 mg 24 hr tablet Take 1 tablet (180 mg) by mouth once daily.  Patient taking differently: Take 1 tablet (180 mg) by mouth once daily in the morning. 11/5/24   Yes Lakia Johnson MD   ferrous sulfate 325 (65 Fe) MG EC tablet Take 1 tablet by mouth once daily with breakfast. Do not crush, chew, or split. 11/5/24 11/5/25 Yes Lakia Johnson MD   furosemide (Lasix) 20 mg tablet Take 1 tablet (20 mg) by mouth once daily.  Patient taking differently: Take 1 tablet (20 mg) by mouth once daily in the morning. 12/16/24 12/16/25 Yes Lakia Johnson MD   meloxicam (Mobic) 15 mg tablet Take 1 tablet (15 mg) by mouth once daily.  Patient taking differently: Take 1 tablet (15 mg) by mouth once daily with breakfast. 12/16/24   Yes Lakia Johnson MD   mv-min-FA-vit K-lutein-zeaxant (PreserVision AREDS 2 Plus MV) 200 mcg-15 mcg- 5 mg-1 mg capsule Take 1 capsule by mouth 2 times a day.     Yes Historical Provider, MD   oxygen (O2) gas therapy Inhale 3 L/min continuously.     Yes Historical Provider, MD   potassium chloride CR 20 mEq ER tablet Take 1 tablet (20 mEq) by mouth once daily in the morning.     Yes Historical Provider, MD   pramipexole (Mirapex) 1 mg tablet Take 1 tablet (1 mg) by mouth once daily at bedtime. 12/16/24 12/16/25 Yes Lakia Johnson MD   simvastatin (Zocor) 20 mg tablet Take 1 tablet (20 mg) by mouth once daily at bedtime. 12/16/24 12/16/25 Yes Lakia Johnson MD   traMADol (Ultram) 50 mg tablet Take 1 tablet (50 mg) by mouth every 8 hours if needed for severe pain (7 - 10) for up to 3 days. 1/9/25 1/14/25 Yes Jorge Mart,    traZODone (Desyrel) 50 mg  tablet Take 1.5 tablets (75 mg) by mouth as needed at bedtime for sleep. 1/9/25   Yes Jorge Mart DO   doxycycline (Vibramycin) 100 mg capsule Take 1 capsule (100 mg) by mouth 2 times a day for 3 days. Take with at least 8 ounces (large glass) of water, do not lie down for 30 minutes after  Patient not taking: Reported on 1/14/2025 1/9/25 1/12/25   Jorge Mart DO   gabapentin (Neurontin) 100 mg capsule Take 1 capsule (100 mg) by mouth 2 times a day for 3 days.  Patient not taking: Reported on 1/14/2025 1/9/25 1/12/25   Jorge Mart DO   apixaban (Eliquis) 5 mg tablet Take 1 tablet (5 mg) by mouth 2 times a day.  Patient not taking: Reported on 1/7/2025 11/19/24 1/9/25   Jorge Bhandari MD   traZODone (Desyrel) 50 mg tablet Take 1 tablet (50 mg) by mouth as needed at bedtime for sleep.  Patient taking differently: Take 1 tablet (50 mg) by mouth once daily at bedtime. 12/20/24 1/9/25   Lakia Johnson MD   vit C,A-Za-mqjed-lutein-zeaxan (PreserVision AREDS-2) 250-90-40-1 mg capsule Take 1 capsule by mouth 2 times a day. 10/10/24 1/14/25   Kay Barcenas, APRN-CNP         Medications  Scheduled medications    Scheduled Medications   ampicillin-sulbactam, 3 g, intravenous, q12h  dilTIAZem CD, 180 mg, oral, Daily  [START ON 1/15/2025] ferrous sulfate (325 mg ferrous sulfate), 325 mg, oral, Daily with breakfast  furosemide, 20 mg, oral, q AM  heparin (porcine), 5,000 Units, subcutaneous, q8h  [START ON 1/15/2025] meloxicam, 7.5 mg, oral, Daily with breakfast  potassium chloride CR, 20 mEq, oral, q AM  pramipexole, 1 mg, oral, Nightly  simvastatin, 20 mg, oral, Nightly         Continuous medications  Continuous Medications         PRN medications  acetaminophen, 650 mg, q4h PRN   Or  acetaminophen, 650 mg, q4h PRN   Or  acetaminophen, 650 mg, q4h PRN  HYDROcodone-acetaminophen, 1 tablet, q6h PRN  traZODone, 75 mg, Nightly PRN                 Assessment/Plan     Assessment & Plan  Left leg cellulitis      Bilateral LE cellulitis  LLE ecchymosis  B/L LE edema, acute on chronic  -HDS  -Does not meets SIRS/sepsis   -No leukocytosis and afebrile  -Denies fever/chills  -Will initiate IV Unasyn, renally dosed  -Patient reports compliance with her home PO Lasix 20mg QD, which is continued  -B/L LE duplex ultrasound negative for DVT  -Bruising to LLE noted during last admission and XR of L ankle obtained showing no acute fracture with some degenerative changes  -Pt reports difficulty ambulating secondary to pain  -PT/OT  -Up with assist and fall precautions     Chronic diastolic heart failure  Chronic respiratory failure  Hx of PE and DVT  -Lungs are CTA on exam  -Pt requiring 3L NC. Pt reports requiring 2-3L oxygen PRN/intermittently since she had her PE. States she has been using it more continuously lately because she gets more SOB. CT for PE obtained 1/7/2025 showing no acute PE or PNA.  -Echo obtained 1/3/2025 showing EF 62%, Grade I diastolic dysfunction, normal RV systolic function  -Monitor pulse ox  -Admitted from 10/2-10/7 for provoked multiple right sided pulmonary emboli with signs of right heart strain after having hip surgery              -Completed Eliquis just about at the New Year  -Will use SQ heparin for VTE PPx     Other chronic medical conditions:  HTN  CKDIII  AAA status post repair  Carotid disease  PAD  ITP  Dyslipidemia  Neuropathic pain  -Continue home medications as appropriate  -Monitor renal function     VTE PPx: SQ heparin     See additional orders for further plan of care.   Further evaluation and management per attending and consulting physicians.       Bahman Flores MD  Patient fully evaluated in the emergency room on January 14.  Continue present IV antibiotics and monitor.  Recheck labs in AM.                   Patient fully evaluated  01/15  for    Problem List Items Addressed This Visit       Closed fracture of neck of left femur with routine healing    Relevant Medications     traMADol (Ultram) 50 mg tablet    Neuropathy    Relevant Medications    acetaminophen (Tylenol) 325 mg tablet    cephalexin (Keflex) 250 mg capsule    DULoxetine (Cymbalta) 30 mg DR capsule (Start on 1/20/2025)    lactobacillus acidophilus tablet tablet (Start on 1/20/2025)    pantoprazole (ProtoNix) 40 mg EC tablet (Start on 1/20/2025)    Neuropathy of left foot    Relevant Medications    gabapentin (Neurontin) 100 mg capsule    * (Principal) Left leg cellulitis - Primary     Other Visit Diagnoses       Pain in left leg              Patient seen resting in bed with head of bed elevated, no s/s or c/o acute difficulties at this time.  Vital signs for last 24 hours Temp:  [36.6 °C (97.9 °F)-37 °C (98.6 °F)] 36.7 °C (98.1 °F)  Heart Rate:  [55-58] 58  Resp:  [18] 18  BP: (129-152)/(61-68) 137/61    No intake/output data recorded.  Patient still requiring frequent cardiac and SPO2 monitoring. Continue aggressive pulmonary hygiene and oral hygiene. Off loading as tolerated for skin integrity. Medications and labs reviewed-   No results found for this or any previous visit (from the past 24 hours).     Patient recently received an antibiotic (last 12 hours)       Date/Time Action Medication Dose Rate    01/15/25 0339 New Bag    ampicillin-sulbactam (Unasyn) 3 g in sodium chloride 0.9%  mL 3 g 200 mL/hr           Plan discussed with interdisciplinary team, double layer tubi  to BLE, diligent wound care, elevate BLE as tolerated. Will continue IV antibiotics - Unasyn, titrate supplemental oxygen, patient room air at baseline, repeat labs including iron levels, and chest xray in the AM. Incentive spirometry ordered. Patient awake, alert, and appropriate, states she is from home with a home health aide that assists with ADLs at home. No acute events overnight, patient denies chest pain, worsening SOB at this time.continue current and repeat labs in the AM.     Discharge planning discussed with patient and care  team. Therapy evaluations ordered. Anticipate [facility] at discharge. Patient aware and agreeable to current plan, continue plan as above.     I spent a total of 50 minutes on the date of the service which included preparing to see the patient, face-to-face patient care, completing clinical documentation, obtaining and/or reviewing separately obtained history, performing a medically appropriate examination, counseling and educating the patient/family/caregiver, ordering medications, tests, or procedures, communicating with other HCPs (not separately reported), independently interpreting results (not separately reported), communicating results to the patient/family/caregiver, and care coordination (not separately reported).        Patient fully evaluated  01/16  for    Problem List Items Addressed This Visit       Closed fracture of neck of left femur with routine healing    Relevant Medications    traMADol (Ultram) 50 mg tablet    Neuropathy    Relevant Medications    acetaminophen (Tylenol) 325 mg tablet    cephalexin (Keflex) 250 mg capsule    DULoxetine (Cymbalta) 30 mg DR capsule (Start on 1/20/2025)    lactobacillus acidophilus tablet tablet (Start on 1/20/2025)    pantoprazole (ProtoNix) 40 mg EC tablet (Start on 1/20/2025)    Neuropathy of left foot    Relevant Medications    gabapentin (Neurontin) 100 mg capsule    * (Principal) Left leg cellulitis - Primary     Other Visit Diagnoses       Pain in left leg              Patient seen resting in bed with head of bed elevated, no s/s or c/o acute difficulties at this time.  Vital signs for last 24 hours Temp:  [36.6 °C (97.9 °F)-37 °C (98.6 °F)] 36.7 °C (98.1 °F)  Heart Rate:  [55-58] 58  Resp:  [18] 18  BP: (129-152)/(61-68) 137/61    No intake/output data recorded.  Patient still requiring frequent cardiac and SPO2 monitoring. Continue aggressive pulmonary hygiene and oral hygiene. Off loading as tolerated for skin integrity. Medications and labs reviewed-   No  results found for this or any previous visit (from the past 24 hours).     Patient recently received an antibiotic (last 12 hours)       Date/Time Action Medication Dose Rate    01/16/25 1453 New Bag    ampicillin-sulbactam (Unasyn) 3 g in sodium chloride 0.9%  mL 3 g 200 mL/hr           No acute events overnight, patient denies chest pain, worsening SOB at this time. Patient on supplemental oxygen at baseline but would like to titrate as tolerated, continue titration. Plan discussed with interdisciplinary team, patient with significant pain to left ankle, will apply lidocaine patch to site, cymbalta started, will monitor overnight, continue current and repeat labs in the AM.     Discharge planning discussed with patient and care team. Therapy evaluations ordered. Anticipate [facility] at discharge, per [facility] able to accept patient.  Patient aware and agreeable to current plan, continue plan as above.     I spent a total of 50 minutes on the date of the service which included preparing to see the patient, face-to-face patient care, completing clinical documentation, obtaining and/or reviewing separately obtained history, performing a medically appropriate examination, counseling and educating the patient/family/caregiver, ordering medications, tests, or procedures, communicating with other HCPs (not separately reported), independently interpreting results (not separately reported), communicating results to the patient/family/caregiver, and care coordination (not separately reported).        Patient fully evaluated 1/17  for    Problem List Items Addressed This Visit       Closed fracture of neck of left femur with routine healing    Relevant Medications    traMADol (Ultram) 50 mg tablet    Neuropathy    Relevant Medications    acetaminophen (Tylenol) 325 mg tablet    cephalexin (Keflex) 250 mg capsule    DULoxetine (Cymbalta) 30 mg DR capsule (Start on 1/20/2025)    lactobacillus acidophilus  tablet tablet (Start on 1/20/2025)    pantoprazole (ProtoNix) 40 mg EC tablet (Start on 1/20/2025)    Neuropathy of left foot    Relevant Medications    gabapentin (Neurontin) 100 mg capsule    * (Principal) Left leg cellulitis - Primary     Other Visit Diagnoses       Pain in left leg              Patient seen resting in bed with head of bed elevated, no s/s or c/o acute difficulties at this time.  Vital signs for last 24 hours Temp:  [36.6 °C (97.9 °F)-37 °C (98.6 °F)] 36.7 °C (98.1 °F)  Heart Rate:  [55-58] 58  Resp:  [18] 18  BP: (129-152)/(61-68) 137/61    No intake/output data recorded.  Patient still requiring frequent cardiac and SPO2 monitoring. Continue aggressive pulmonary hygiene and oral hygiene. Off loading as tolerated for skin integrity. Medications and labs reviewed-   No results found for this or any previous visit (from the past 24 hours).     Patient recently received an antibiotic (last 12 hours)       Date/Time Action Medication Dose Rate    01/17/25 1306 Given    doxycycline (Vibra-Tabs) tablet 100 mg 100 mg     01/17/25 0253 New Bag    ampicillin-sulbactam (Unasyn) 3 g in sodium chloride 0.9%  mL 3 g 200 mL/hr           No acute events overnight, patient denies chest pain, worsening SOB at this time. Patient on supplemental oxygen at baseline 3L but would like to titrate as tolerated, continue titration. Plan discussed with interdisciplinary team, patient with significant pain to left ankle, will apply lidocaine patch to site. Patient complaining of nausea today, will discontinue the iron to see if that helps. Will also discontinue Unasyn and start patient on Keflex. Also starting patient on Protonix. Will monitor overnight, continue current and repeat labs in the AM.     Discharge planning discussed with patient and care team. Therapy evaluations ordered. [facility], anticipate HHC/SNF at discharge. Patient aware and agreeable to current plan, continue plan as above.     I spent a total  of 50 minutes on the date of the service which included preparing to see the patient, face-to-face patient care, completing clinical documentation, obtaining and/or reviewing separately obtained history, performing a medically appropriate examination, counseling and educating the patient/family/caregiver, ordering medications, tests, or procedures, communicating with other HCPs (not separately reported), independently interpreting results (not separately reported), communicating results to the patient/family/caregiver, and care coordination (not separately reported).       Patient fully evaluated  01/19  for    Problem List Items Addressed This Visit       Closed fracture of neck of left femur with routine healing    Relevant Medications    traMADol (Ultram) 50 mg tablet    Neuropathy    Relevant Medications    acetaminophen (Tylenol) 325 mg tablet    cephalexin (Keflex) 250 mg capsule    DULoxetine (Cymbalta) 30 mg DR capsule (Start on 1/20/2025)    lactobacillus acidophilus tablet tablet (Start on 1/20/2025)    pantoprazole (ProtoNix) 40 mg EC tablet (Start on 1/20/2025)    Neuropathy of left foot    Relevant Medications    gabapentin (Neurontin) 100 mg capsule    * (Principal) Left leg cellulitis - Primary     Other Visit Diagnoses       Pain in left leg              Patient seen resting in bed with head of bed elevated, no s/s or c/o acute difficulties at this time.  Vital signs for last 24 hours Temp:  [36.6 °C (97.9 °F)-37 °C (98.6 °F)] 36.7 °C (98.1 °F)  Heart Rate:  [55-58] 58  Resp:  [18] 18  BP: (129-152)/(61-68) 137/61    No intake/output data recorded.  Patient still requiring frequent cardiac and SPO2 monitoring. Continue aggressive pulmonary hygiene and oral hygiene. Off loading as tolerated for skin integrity. Medications and labs reviewed- No results found for this or any previous visit (from the past 24 hours).   Patient recently received an antibiotic (last 12 hours)       Date/Time Action  Medication Dose    01/19/25 0620 Given    cephalexin (Keflex) capsule 250 mg 250 mg        Patient with c/o nausea, keflex discontinued, Bactroban to left ankle tid x 10 days, restart gabapentin 100 mg tid. Will monitor overnight, no labs in the AM per patient request. Will continue current   Plan, discussed with interdisciplinary team, continue current and repeat labs in the AM.     Discharge planning discussed with patient and care team. Therapy evaluations ordered. Anticipate C at discharge. Patient aware and agreeable to current plan, continue plan as above.     I spent a total of 50 minutes on the date of the service which included preparing to see the patient, face-to-face patient care, completing clinical documentation, obtaining and/or reviewing separately obtained history, performing a medically appropriate examination, counseling and educating the patient/family/caregiver, ordering medications, tests, or procedures, communicating with other HCPs (not separately reported), independently interpreting results (not separately reported), communicating results to the patient/family/caregiver, and care coordination (not separately reported).   Katerina Fitzgerald

## 2025-01-19 NOTE — PROGRESS NOTES
01/19/25 9962   Discharge Planning   Expected Discharge Disposition Home H   Does the patient need discharge transport arranged? No     Patient not medically ready for discharge today per attending.  External/outgoing home cares needed to be sent to [facility].

## 2025-01-20 ENCOUNTER — TELEPHONE (OUTPATIENT)
Dept: CARDIOLOGY | Facility: CLINIC | Age: OVER 89
End: 2025-01-20
Payer: MEDICARE

## 2025-01-20 ENCOUNTER — PHARMACY VISIT (OUTPATIENT)
Dept: PHARMACY | Facility: CLINIC | Age: OVER 89
End: 2025-01-20
Payer: COMMERCIAL

## 2025-01-20 VITALS
HEIGHT: 63 IN | RESPIRATION RATE: 18 BRPM | OXYGEN SATURATION: 95 % | SYSTOLIC BLOOD PRESSURE: 115 MMHG | BODY MASS INDEX: 22.15 KG/M2 | TEMPERATURE: 97.2 F | HEART RATE: 58 BPM | WEIGHT: 125 LBS | DIASTOLIC BLOOD PRESSURE: 57 MMHG

## 2025-01-20 PROCEDURE — 2500000005 HC RX 250 GENERAL PHARMACY W/O HCPCS: Performed by: INTERNAL MEDICINE

## 2025-01-20 PROCEDURE — 2500000001 HC RX 250 WO HCPCS SELF ADMINISTERED DRUGS (ALT 637 FOR MEDICARE OP): Performed by: PHYSICIAN ASSISTANT

## 2025-01-20 PROCEDURE — RXMED WILLOW AMBULATORY MEDICATION CHARGE

## 2025-01-20 PROCEDURE — 2500000004 HC RX 250 GENERAL PHARMACY W/ HCPCS (ALT 636 FOR OP/ED): Performed by: PHYSICIAN ASSISTANT

## 2025-01-20 PROCEDURE — 2500000001 HC RX 250 WO HCPCS SELF ADMINISTERED DRUGS (ALT 637 FOR MEDICARE OP): Performed by: INTERNAL MEDICINE

## 2025-01-20 PROCEDURE — 2500000001 HC RX 250 WO HCPCS SELF ADMINISTERED DRUGS (ALT 637 FOR MEDICARE OP)

## 2025-01-20 RX ORDER — MUPIROCIN 20 MG/G
OINTMENT TOPICAL 3 TIMES DAILY
Qty: 22 G | Refills: 1 | Status: SHIPPED | OUTPATIENT
Start: 2025-01-20 | End: 2025-02-11

## 2025-01-20 RX ORDER — LEVOFLOXACIN 750 MG/1
750 TABLET ORAL
Qty: 5 TABLET | Refills: 0 | Status: SHIPPED | OUTPATIENT
Start: 2025-01-22 | End: 2025-02-01

## 2025-01-20 RX ORDER — GABAPENTIN 100 MG/1
100 CAPSULE ORAL 3 TIMES DAILY
Qty: 90 CAPSULE | Refills: 0 | Status: SHIPPED | OUTPATIENT
Start: 2025-01-20 | End: 2025-02-19

## 2025-01-20 RX ORDER — LEVOFLOXACIN 750 MG/1
750 TABLET ORAL
Status: DISCONTINUED | OUTPATIENT
Start: 2025-01-20 | End: 2025-01-20 | Stop reason: HOSPADM

## 2025-01-20 RX ADMIN — MELOXICAM 7.5 MG: 7.5 TABLET ORAL at 09:16

## 2025-01-20 RX ADMIN — LIDOCAINE 4% 3 PATCH: 40 PATCH TOPICAL at 09:05

## 2025-01-20 RX ADMIN — GABAPENTIN 100 MG: 100 CAPSULE ORAL at 09:05

## 2025-01-20 RX ADMIN — MUPIROCIN: 20 OINTMENT TOPICAL at 09:05

## 2025-01-20 RX ADMIN — DILTIAZEM HYDROCHLORIDE 180 MG: 180 CAPSULE, COATED, EXTENDED RELEASE ORAL at 09:05

## 2025-01-20 RX ADMIN — PANTOPRAZOLE SODIUM 40 MG: 40 TABLET, DELAYED RELEASE ORAL at 06:10

## 2025-01-20 RX ADMIN — LEVOFLOXACIN 750 MG: 750 TABLET, FILM COATED ORAL at 12:56

## 2025-01-20 RX ADMIN — Medication 1 CAPSULE: at 09:06

## 2025-01-20 RX ADMIN — Medication 1 TABLET: at 09:05

## 2025-01-20 RX ADMIN — FUROSEMIDE 20 MG: 20 TABLET ORAL at 09:05

## 2025-01-20 RX ADMIN — DULOXETINE HYDROCHLORIDE 30 MG: 30 CAPSULE, DELAYED RELEASE ORAL at 09:05

## 2025-01-20 RX ADMIN — HEPARIN SODIUM 5000 UNITS: 5000 INJECTION INTRAVENOUS; SUBCUTANEOUS at 06:11

## 2025-01-20 ASSESSMENT — PAIN SCALES - GENERAL: PAINLEVEL_OUTOF10: 0 - NO PAIN

## 2025-01-20 NOTE — TELEPHONE ENCOUNTER
----- Message from Pito Henao sent at 1/20/2025  9:03 AM EST -----  Regarding: Results reviewed  Please let the patient know the results are normal.    Thank you!  ----- Message -----  From: Jam Syngo - Cardiology Results In  Sent: 1/4/2025   2:09 PM EST  To: Pito Henao MD

## 2025-01-20 NOTE — CARE PLAN
The patient's goals for the shift include      The clinical goals for the shift include comfort and safety      Problem: Pain - Adult  Goal: Verbalizes/displays adequate comfort level or baseline comfort level  Outcome: Progressing     Problem: Safety - Adult  Goal: Free from fall injury  Outcome: Progressing     Problem: Discharge Planning  Goal: Discharge to home or other facility with appropriate resources  Outcome: Progressing     Problem: Chronic Conditions and Co-morbidities  Goal: Patient's chronic conditions and co-morbidity symptoms are monitored and maintained or improved  Outcome: Progressing     Problem: Skin  Goal: Decreased wound size/increased tissue granulation at next dressing change  Outcome: Progressing  Flowsheets (Taken 1/20/2025 1114)  Decreased wound size/increased tissue granulation at next dressing change: Promote sleep for wound healing  Goal: Participates in plan/prevention/treatment measures  Outcome: Progressing  Flowsheets (Taken 1/20/2025 1114)  Participates in plan/prevention/treatment measures: Elevate heels  Goal: Prevent/manage excess moisture  Outcome: Progressing  Flowsheets (Taken 1/20/2025 1114)  Prevent/manage excess moisture: Monitor for/manage infection if present  Goal: Prevent/minimize sheer/friction injuries  Outcome: Progressing  Flowsheets (Taken 1/20/2025 1114)  Prevent/minimize sheer/friction injuries: Turn/reposition every 2 hours/use positioning/transfer devices  Goal: Promote/optimize nutrition  Outcome: Progressing  Flowsheets (Taken 1/20/2025 1114)  Promote/optimize nutrition: Offer water/supplements/favorite foods  Goal: Promote skin healing  Outcome: Progressing  Flowsheets (Taken 1/20/2025 1114)  Promote skin healing: Turn/reposition every 2 hours/use positioning/transfer devices

## 2025-01-20 NOTE — PROGRESS NOTES
Awaiting medical readiness for discharge. Patient will go home with C. Patient will need external C orders placed.

## 2025-01-20 NOTE — NURSING NOTE
Met with patient and son at the bedside. Discharge Planning discussed. AVS updated with follow-ups, education, and medication information. Verified/ entered a PCP and pharmacy. New medications and side effects discussed. Discussed all her meds at length, including taking her antibiotic every other day. All questions answered.  Updated nurse on this info. AVS printed and hi-lighted. IV removed.

## 2025-01-20 NOTE — DISCHARGE SUMMARY
Discharge Diagnosis  Left leg cellulitis    Issues Requiring Follow-Up  Patient fully evaluated  for   Assessment & Plan  Left leg cellulitis      Patient with significant clinical improvement noted, patient medically cleared for discharge today. Patient seen resting in bed with head of bed elevated, no s/s or c/o acute difficulties at this time. Vital signs for last 24 hours:  Temp:  [36.2 °C (97.2 °F)-37 °C (98.6 °F)] 36.2 °C (97.2 °F)  Heart Rate:  [53-60] 58  Resp:  [18] 18  BP: (114-138)/(55-65) 115/57 Medications and labs reviewed-   No results found for this or any previous visit (from the past 24 hours).   Patient recently received an antibiotic (last 12 hours)       Date/Time Action Medication Dose    01/20/25 1256 Given    levoFLOXacin (Levaquin) tablet 750 mg 750 mg    01/20/25 0905 Given    mupirocin (Bactroban) 2 % ointment            No results found for the last 90 days.       Continue aggressive pulmonary hygiene and oral hygiene. Off loading as tolerated for skin integrity.  Plan discussed with interdisciplinary team, ok to discharge, will continue current and repeat labs in the AM if patient still hospitalized. Patient aware and agreeable to current plan, continue plan as above.     I spent 30 minutes on the date of the service which included preparing to see the patient, face-to-face patient care, completing clinical documentation, obtaining and/or reviewing separately obtained history, performing a medically appropriate examination, counseling and educating the patient/family/caregiver, ordering medications, tests, or procedures, communicating with other HCPs (not separately reported), independently interpreting results (not separately reported), communicating results to the patient/family/caregiver, and care coordination (not separately reported    Discharge Meds     Medication List      START taking these medications     acetaminophen 325 mg tablet; Commonly known as: Tylenol; Take 2 tablets    (650 mg) by mouth every 4 hours if needed for mild pain (1 - 3).   DULoxetine 30 mg DR capsule; Commonly known as: Cymbalta; Take 1 capsule   (30 mg) by mouth once daily. Do not crush or chew.   lactobacillus acidophilus tablet tablet; Take 1 tablet by mouth once   daily.   levoFLOXacin 750 mg tablet; Commonly known as: Levaquin; Take 1 tablet   (750 mg) by mouth every other day for 10 days.; Start taking on: January 22, 2025   mupirocin 2 % ointment; Commonly known as: Bactroban; Apply topically 3   times a day for 10 days.   pantoprazole 40 mg EC tablet; Commonly known as: ProtoNix; Take 1 tablet   (40 mg) by mouth once daily in the morning. Take before meals. Do not   crush, chew, or split.     CHANGE how you take these medications     azelastine 137 mcg (0.1 %) nasal spray; Commonly known as: Astelin;   Administer 2 sprays into each nostril 2 times a day. Use in each nostril   as directed; What changed: when to take this, reasons to take this   dilTIAZem  mg 24 hr tablet; Commonly known as: Cardizem LA; Take 1   tablet (180 mg) by mouth once daily.; What changed: when to take this   furosemide 20 mg tablet; Commonly known as: Lasix; Take 1 tablet (20 mg)   by mouth once daily.; What changed: when to take this   * gabapentin 100 mg capsule; Commonly known as: Neurontin; Take 1   capsule (100 mg) by mouth 2 times a day.; What changed: Another medication   with the same name was added. Make sure you understand how and when to   take each.   * gabapentin 100 mg capsule; Commonly known as: Neurontin; Take 1   capsule (100 mg) by mouth 3 times a day.; What changed: You were already   taking a medication with the same name, and this prescription was added.   Make sure you understand how and when to take each.   meloxicam 15 mg tablet; Commonly known as: Mobic; Take 1 tablet (15 mg)   by mouth once daily.; What changed: when to take this  * This list has 2 medication(s) that are the same as other medications    prescribed for you. Read the directions carefully, and ask your doctor or   other care provider to review them with you.     CONTINUE taking these medications     ferrous sulfate 325 (65 Fe) MG EC tablet; Take 1 tablet by mouth once   daily with breakfast. Do not crush, chew, or split.   oxygen gas therapy; Commonly known as: O2   potassium chloride CR 20 mEq ER tablet; Commonly known as: Klor-Con M20   pramipexole 1 mg tablet; Commonly known as: Mirapex; Take 1 tablet (1   mg) by mouth once daily at bedtime.   PreserVision AREDS 2 Plus  mcg-15 mcg- 5 mg-1 mg capsule; Generic   drug: mv-min-FA-vit K-lutein-zeaxant   simvastatin 20 mg tablet; Commonly known as: Zocor; Take 1 tablet (20   mg) by mouth once daily at bedtime.   traMADol 50 mg tablet; Commonly known as: Ultram; Take 1 tablet (50 mg)   by mouth every 8 hours if needed for severe pain (7 - 10) for up to 7   days.   traZODone 50 mg tablet; Commonly known as: Desyrel; Take 1.5 tablets (75   mg) by mouth as needed at bedtime for sleep.     STOP taking these medications     doxycycline 100 mg capsule; Commonly known as: Vibramycin       Test Results Pending At Discharge  Pending Labs       No current pending labs.            Hospital Course         Bahman Flores MD   Physician  Internal Medicine     Progress Notes      Signed     Date of Service: 1/19/2025  1:24 PM     Signed       Expand All Collapse Romulo Contreras is a 89 y.o. female on day 4 of admission presenting with Left leg cellulitis.           Subjective  No acute events overnight, patient denies chest pain, worsening SOB at this time.               Objective  Last Recorded Vitals  /61 (BP Location: Right arm, Patient Position: Lying)   Pulse 58   Temp 36.7 °C (98.1 °F) (Temporal)   Resp 18   Wt 56.7 kg (125 lb)   SpO2 93%   Intake/Output last 3 Shifts:     Intake/Output Summary (Last 24 hours) at 1/19/2025 1324  Last data filed at 1/19/2025 0420      Gross per 24 hour    Intake --   Output 600 ml   Net -600 ml            Admission Weight  Weight: 56.7 kg (125 lb) (01/14/25 0651)     Daily Weight  01/14/25 : 56.7 kg (125 lb)     Image Results  XR chest 1 view  Narrative: Interpreted By:  Deana Hernandez,   STUDY:  XR CHEST 1 VIEW;  1/19/2025 4:15 am      INDICATION:  Signs/Symptoms:sob.      COMPARISON:  01/07/2025      ACCESSION NUMBER(S):  [id number]      ORDERING CLINICIAN:  BAHMAN MCDONNELL      FINDINGS:  CARDIOMEDIASTINAL SILHOUETTE:  The heart is enlarged.          LUNGS:  There is an infiltrate at the left lung base with obscuration of the  left hemidiaphragm. The right lung is clear.  There are underlying chronic interstitial changes of the lungs.      ABDOMEN:  No remarkable upper abdominal findings.          BONES:  No acute osseous changes.  There is a left reverse total shoulder arthroplasty.  There is superior subluxation of the right humeral head consistent  with a chronic rotator cuff tear or degeneration.      Impression: Development left basilar pneumonia.      MACRO:  None      Signed by: Deana Hernandez 1/19/2025 12:38 PM  Dictation workstation:   [id number]        Physical Exam     Relevant Results                       Assessment/Plan                       Assessment & Plan  Left leg cellulitis           Bahman Mcdonnell MD   Physician  Internal Medicine     H&P      Signed     Date of Service: 1/14/2025  6:28 PM      Signed        Expand All Collapse All    History Of Present Illness  Estefanía Contreras is a 89 y.o. female with PMH of HFpEF (EF 62% 1/6/25), DLD, HTN, CKD3b, AAA (s/p repair), PAD, ITP, and provoked PE (left hip fracture, completed 3 months of apixaban, on 2-3L NC PRN) presented to [facility] ED from home on 1/14/2025 with B/L lower extremity pain. The pt is an excellent historian. The pt was admitted to [facility] on 1/7/2025 with bilateral lower extremity pain and edema, as well as cough, shortness of breath, and increased need for her home oxygen. She was  treated for PNA with azithromycin and rocephin. Her LE edema was treated with Lasix. Patient was discharged home on 1/10/25 with PCP follow-up, three further days of doxycycline, and short courses of tramadol and gabapentin. Since her discharge, the pt has taken the medications she was discharged with including the 3 days of doxycycline, which she completed. Her leg swelling, pain, and redness worsened since returning home despite the pain medication. The tramadol isn't helping. She is having a very hard time putting any weight on her legs. A bruise was noted on her left leg last admission and XR was obtained showing no acute fracture. A LE ultrasound was also obtained that did not show any blood clot. She denies any injury to the leg. The left leg is more painful than the right. She denies any fever/chills, headache, chest pain/palpatations, shortness of breath, cough, abdominal pain, N/V/D, dysuria, hematuria, or bloody/black stools/   She decided to come back to the ER because of the pain and difficulty walking. ER evaluation included repeat LE US that was negative for VTE. Pt was recommended for admission to the hospital and accepted on the service of Dr. Flores who would like her treated for cellulitis with IV antibiotics. Pt notes she has been having to use oxygen more over last month and is having difficulty tolerating being off O2 at home.        Past Medical History  Medical History           Past Medical History:   Diagnosis Date    H/O heart artery stent      H/O shoulder surgery      PE (pulmonary thromboembolism) (Multi)              Surgical History  Surgical History   History reviewed. No pertinent surgical history.         Social History  Social History   Social History                Tobacco Use    Smoking status: Former       Types: Cigarettes       Passive exposure: Never    Smokeless tobacco: Never   Vaping Use    Vaping status: Never Used   Substance Use Topics    Alcohol use: Not Currently     Drug use: Never            Family History  Family History   No family history on file.         Allergies  Patient has no known allergies.     Review of Systems   Constitutional:  Negative for chills, diaphoresis and fever.   HENT:  Negative for congestion and sore throat.    Eyes:  Negative for visual disturbance.   Respiratory:  Negative for cough, chest tightness, shortness of breath and wheezing.    Cardiovascular:  Positive for leg swelling. Negative for chest pain and palpitations.   Gastrointestinal:  Negative for abdominal pain, diarrhea, nausea and vomiting.   Endocrine: Negative for polyuria.   Genitourinary:  Negative for dysuria and hematuria.   Musculoskeletal:  Negative for joint swelling.   Skin:  Positive for color change. Negative for rash.   Allergic/Immunologic: Negative for immunocompromised state.   Neurological:  Negative for syncope and headaches.   Psychiatric/Behavioral:  Negative for confusion and hallucinations.       Physical Exam  Constitutional:       Appearance: Normal appearance. She is normal weight.   HENT:      Head: Normocephalic and atraumatic.      Mouth/Throat:      Mouth: Mucous membranes are moist.   Eyes:      Conjunctiva/sclera: Conjunctivae normal.   Cardiovascular:      Rate and Rhythm: Normal rate and regular rhythm.      Heart sounds: No murmur heard.  Pulmonary:      Effort: No respiratory distress.      Breath sounds: No wheezing, rhonchi or rales.   Abdominal:      General: There is no distension.      Tenderness: There is no abdominal tenderness. There is no guarding.   Musculoskeletal:         General: No deformity.      Cervical back: No rigidity.   Skin:     General: Skin is warm.      Comments: -Medial LLE just superior to her ankle is an ecchymosis with surrounding erythema.  Erythema is noncircumferential.  She also has a nonblanchable petechiae over her dorsal surfaces of both her feet closer to her toes. Anterior RLE just superior to her ankle is a small  "patch of erythema. Her bilateral lower extremities have +2 pitting edema, and there is +3 pedal edema.   Neurological:      General: No focal deficit present.      Mental Status: She is alert and oriented to person, place, and time.   Psychiatric:         Mood and Affect: Mood normal.         Last Recorded Vitals  Visit Vitals  /63   Pulse 60   Temp 36.8 °C (98.2 °F) (Temporal)   Resp 15   Ht 1.6 m (5' 3\")   Wt 56.7 kg (125 lb)   SpO2 100%   BMI 22.14 kg/m²   Smoking Status Former   BSA 1.59 m²         Relevant Results            Results for orders placed or performed during the hospital encounter of 01/14/25 (from the past 24 hours)   CBC and Auto Differential   Result Value Ref Range     WBC 5.8 4.4 - 11.3 x10*3/uL     nRBC 0.0 0.0 - 0.0 /100 WBCs     RBC 3.34 (L) 4.00 - 5.20 x10*6/uL     Hemoglobin 9.5 (L) 12.0 - 16.0 g/dL     Hematocrit 31.1 (L) 36.0 - 46.0 %     MCV 93 80 - 100 fL     MCH 28.4 26.0 - 34.0 pg     MCHC 30.5 (L) 32.0 - 36.0 g/dL     RDW 18.0 (H) 11.5 - 14.5 %     Platelets 206 150 - 450 x10*3/uL     Neutrophils % 66.4 40.0 - 80.0 %     Immature Granulocytes %, Automated 0.5 0.0 - 0.9 %     Lymphocytes % 17.5 13.0 - 44.0 %     Monocytes % 10.6 2.0 - 10.0 %     Eosinophils % 4.1 0.0 - 6.0 %     Basophils % 0.9 0.0 - 2.0 %     Neutrophils Absolute 3.88 1.60 - 5.50 x10*3/uL     Immature Granulocytes Absolute, Automated 0.03 0.00 - 0.50 x10*3/uL     Lymphocytes Absolute 1.02 0.80 - 3.00 x10*3/uL     Monocytes Absolute 0.62 0.05 - 0.80 x10*3/uL     Eosinophils Absolute 0.24 0.00 - 0.40 x10*3/uL     Basophils Absolute 0.05 0.00 - 0.10 x10*3/uL   Comprehensive metabolic panel   Result Value Ref Range     Glucose 79 74 - 99 mg/dL     Sodium 140 136 - 145 mmol/L     Potassium 4.8 3.5 - 5.3 mmol/L     Chloride 103 98 - 107 mmol/L     Bicarbonate 29 21 - 32 mmol/L     Anion Gap 13 10 - 20 mmol/L     Urea Nitrogen 38 (H) 6 - 23 mg/dL     Creatinine 1.61 (H) 0.50 - 1.05 mg/dL     eGFR 30 (L) >60 " mL/min/1.73m*2     Calcium 9.0 8.6 - 10.3 mg/dL     Albumin 3.7 3.4 - 5.0 g/dL     Alkaline Phosphatase 76 33 - 136 U/L     Total Protein 6.7 6.4 - 8.2 g/dL     AST 20 9 - 39 U/L     Bilirubin, Total 0.3 0.0 - 1.2 mg/dL     ALT 11 7 - 45 U/L   B-type natriuretic peptide   Result Value Ref Range      (H) 0 - 99 pg/mL   D-dimer, VTE Exclusion   Result Value Ref Range     D-Dimer, Quantitative VTE Exclusion 4,886 (H) <=500 ng/mL FEU   Vascular US lower extremity venous duplex left   Result Value Ref Range     BSA 1.59 m2         Imaging  Vascular US lower extremity venous duplex left     Result Date: 1/14/2025  Preliminary Cardiology Report          [place] [address] Tel [phone] and Fax [phone]       Preliminary Vascular Lab Report  VASC US LOWER EXTREMITY VENOUS DUPLEX LEFT  Patient Name:      SEAN MGNASIMA  Reading Physician:  [id number] Madeline Yanez MD Study Date:        1/14/2025    Ordering Physician: [id number] ZAMAN MRN/PID:           [id number]     Technologist:       Saniya Summers RVT Accession#:        [id number] Technologist 2: Date of Birth/Age: 12/11/1934   Encounter#:         [id number] Gender:            F Admission Status:  Emergency    Location Performed: [facility]  Diagnosis/ICD: Pain in left leg-M79.605 Procedure/CPT: 56147 Peripheral venous duplex scan for DVT Limited  PRELIMINARY CONCLUSIONS: Right Lower Venous: The right common femoral vein demonstrates normal spontaneous and respirophasic flow. Left Lower Venous: No evidence of acute deep vein thrombus visualized in the left lower extremity. Cannot rule out thrombus in non-visualized posterior tibial and peroneal veins due to edema.  Imaging & Doppler Findings:  Right        Flow CFV   Spontaneous/Phasic  Left                  Compress Thrombus        Flow Distal External Iliac   Yes      None   Spontaneous/Phasic CFV                     Yes      None    Spontaneous/Phasic PFV                     Yes      None FV Proximal             Yes      None   Spontaneous/Phasic FV Mid                  Yes      None FV Distal               Yes      None Popliteal               Yes      None   Spontaneous/Phasic VASCULAR PRELIMINARY REPORT completed by Saniya Summers RVT on 1/14/2025 at 9:47:01 AM  ** Final **      XR ankle bilateral 2 views     Result Date: 1/7/2025  Interpreted By:  Chelsey Merino, STUDY: XR ANKLE BILATERAL 2 VIEWS;  1/7/2025 1:15 pm   INDICATION: Signs/Symptoms:TTP.   COMPARISON: None.   ACCESSION NUMBER(S): [id number]   ORDERING CLINICIAN: EMIL BAILEY   FINDINGS: Left ankle: No acute fracture or dislocation is identified. The ankle mortise is congruent. There are degenerative changes in the visualized joints of the midfoot. A small plantar calcaneal enthesophyte is present. There is moderate-to-marked diffuse subcutaneous edema the imaged calf.   Right ankle: No acute fracture or dislocation is identified. The ankle mortise is congruent. There are degenerative changes in the visualized joints of the midfoot. A small plantar calcaneal enthesophyte is present. There is moderate-to-marked diffuse subcutaneous edema the imaged calf.        Bilateral calf edema without acute osseous abnormality.   MACRO None   Signed by: Chelsey Merino 1/7/2025 1:23 PM Dictation workstation:   [id number]      Home Medications                Prior to Admission medications    Medication Sig Start Date End Date Taking? Authorizing Provider   azelastine (Astelin) 137 mcg (0.1 %) nasal spray Administer 2 sprays into each nostril 2 times a day. Use in each nostril as directed  Patient taking differently: Administer 2 sprays into each nostril if needed. Use in each nostril as directed 11/21/24 11/21/25 Yes Lakia Johnson MD   dilTIAZem LA (Cardizem LA) 180 mg 24 hr tablet Take 1 tablet (180 mg) by mouth once daily.  Patient taking differently: Take 1 tablet (180 mg) by mouth  once daily in the morning. 11/5/24   Yes Lakia Johnson MD   ferrous sulfate 325 (65 Fe) MG EC tablet Take 1 tablet by mouth once daily with breakfast. Do not crush, chew, or split. 11/5/24 11/5/25 Yes Lakia Johnson MD   furosemide (Lasix) 20 mg tablet Take 1 tablet (20 mg) by mouth once daily.  Patient taking differently: Take 1 tablet (20 mg) by mouth once daily in the morning. 12/16/24 12/16/25 Yes Lakia Johnson MD   meloxicam (Mobic) 15 mg tablet Take 1 tablet (15 mg) by mouth once daily.  Patient taking differently: Take 1 tablet (15 mg) by mouth once daily with breakfast. 12/16/24   Yes Lakia Johnson MD   mv-min-FA-vit K-lutein-zeaxant (PreserVision AREDS 2 Plus MV) 200 mcg-15 mcg- 5 mg-1 mg capsule Take 1 capsule by mouth 2 times a day.     Yes Historical Provider, MD   oxygen (O2) gas therapy Inhale 3 L/min continuously.     Yes Historical Provider, MD   potassium chloride CR 20 mEq ER tablet Take 1 tablet (20 mEq) by mouth once daily in the morning.     Yes Historical Provider, MD   pramipexole (Mirapex) 1 mg tablet Take 1 tablet (1 mg) by mouth once daily at bedtime. 12/16/24 12/16/25 Yes Lakia Johnson MD   simvastatin (Zocor) 20 mg tablet Take 1 tablet (20 mg) by mouth once daily at bedtime. 12/16/24 12/16/25 Yes Lakia Johnson MD   traMADol (Ultram) 50 mg tablet Take 1 tablet (50 mg) by mouth every 8 hours if needed for severe pain (7 - 10) for up to 3 days. 1/9/25 1/14/25 Yes Jorge Mart DO   traZODone (Desyrel) 50 mg tablet Take 1.5 tablets (75 mg) by mouth as needed at bedtime for sleep. 1/9/25   Yes Jorge Mart DO   doxycycline (Vibramycin) 100 mg capsule Take 1 capsule (100 mg) by mouth 2 times a day for 3 days. Take with at least 8 ounces (large glass) of water, do not lie down for 30 minutes after  Patient not taking: Reported on 1/14/2025 1/9/25 1/12/25   Jorge Mart, DO   gabapentin (Neurontin) 100 mg capsule Take 1 capsule (100 mg) by mouth 2 times a day for 3  days.  Patient not taking: Reported on 1/14/2025 1/9/25 1/12/25   Jorge Mart DO   apixaban (Eliquis) 5 mg tablet Take 1 tablet (5 mg) by mouth 2 times a day.  Patient not taking: Reported on 1/7/2025 11/19/24 1/9/25   Jorge Bhandari MD   traZODone (Desyrel) 50 mg tablet Take 1 tablet (50 mg) by mouth as needed at bedtime for sleep.  Patient taking differently: Take 1 tablet (50 mg) by mouth once daily at bedtime. 12/20/24 1/9/25   Lakia Johnson MD   vit C,J-Lf-orqej-lutein-zeaxan (PreserVision AREDS-2) 250-90-40-1 mg capsule Take 1 capsule by mouth 2 times a day. 10/10/24 1/14/25   Kay Barcenas, APRN-CNP         Medications  Scheduled medications     Scheduled Medications   ampicillin-sulbactam, 3 g, intravenous, q12h  dilTIAZem CD, 180 mg, oral, Daily  [START ON 1/15/2025] ferrous sulfate (325 mg ferrous sulfate), 325 mg, oral, Daily with breakfast  furosemide, 20 mg, oral, q AM  heparin (porcine), 5,000 Units, subcutaneous, q8h  [START ON 1/15/2025] meloxicam, 7.5 mg, oral, Daily with breakfast  potassium chloride CR, 20 mEq, oral, q AM  pramipexole, 1 mg, oral, Nightly  simvastatin, 20 mg, oral, Nightly         Continuous medications  Continuous Medications          PRN medications  acetaminophen, 650 mg, q4h PRN   Or  acetaminophen, 650 mg, q4h PRN   Or  acetaminophen, 650 mg, q4h PRN  HYDROcodone-acetaminophen, 1 tablet, q6h PRN  traZODone, 75 mg, Nightly PRN                 Assessment/Plan     Assessment & Plan  Left leg cellulitis     Bilateral LE cellulitis  LLE ecchymosis  B/L LE edema, acute on chronic  -HDS  -Does not meets SIRS/sepsis   -No leukocytosis and afebrile  -Denies fever/chills  -Will initiate IV Unasyn, renally dosed  -Patient reports compliance with her home PO Lasix 20mg QD, which is continued  -B/L LE duplex ultrasound negative for DVT  -Bruising to LLE noted during last admission and XR of L ankle obtained showing no acute fracture with some degenerative changes  -Pt  reports difficulty ambulating secondary to pain  -PT/OT  -Up with assist and fall precautions     Chronic diastolic heart failure  Chronic respiratory failure  Hx of PE and DVT  -Lungs are CTA on exam  -Pt requiring 3L NC. Pt reports requiring 2-3L oxygen PRN/intermittently since she had her PE. States she has been using it more continuously lately because she gets more SOB. CT for PE obtained 1/7/2025 showing no acute PE or PNA.  -Echo obtained 1/3/2025 showing EF 62%, Grade I diastolic dysfunction, normal RV systolic function  -Monitor pulse ox  -Admitted from 10/2-10/7 for provoked multiple right sided pulmonary emboli with signs of right heart strain after having hip surgery              -Completed Eliquis just about at the New Year  -Will use SQ heparin for VTE PPx     Other chronic medical conditions:  HTN  CKDIII  AAA status post repair  Carotid disease  PAD  ITP  Dyslipidemia  Neuropathic pain  -Continue home medications as appropriate  -Monitor renal function     VTE PPx: SQ heparin     See additional orders for further plan of care.   Further evaluation and management per attending and consulting physicians.       Bahman Flores MD  Patient fully evaluated in the emergency room on January 14.  Continue present IV antibiotics and monitor.  Recheck labs in AM.                    Patient fully evaluated  01/15  for    Problem List Items Addressed This Visit         Closed fracture of neck of left femur with routine healing     Relevant Medications     traMADol (Ultram) 50 mg tablet     Neuropathy     Relevant Medications     acetaminophen (Tylenol) 325 mg tablet     cephalexin (Keflex) 250 mg capsule     DULoxetine (Cymbalta) 30 mg DR capsule (Start on 1/20/2025)     lactobacillus acidophilus tablet tablet (Start on 1/20/2025)     pantoprazole (ProtoNix) 40 mg EC tablet (Start on 1/20/2025)     Neuropathy of left foot     Relevant Medications     gabapentin (Neurontin) 100 mg capsule     * (Principal) Left  leg cellulitis - Primary      Other Visit Diagnoses         Pain in left leg                 Patient seen resting in bed with head of bed elevated, no s/s or c/o acute difficulties at this time.  Vital signs for last 24 hours Temp:  [36.6 °C (97.9 °F)-37 °C (98.6 °F)] 36.7 °C (98.1 °F)  Heart Rate:  [55-58] 58  Resp:  [18] 18  BP: (129-152)/(61-68) 137/61    No intake/output data recorded.  Patient still requiring frequent cardiac and SPO2 monitoring. Continue aggressive pulmonary hygiene and oral hygiene. Off loading as tolerated for skin integrity. Medications and labs reviewed-   No results found for this or any previous visit (from the past 24 hours).     Patient recently received an antibiotic (last 12 hours)         Date/Time Action Medication Dose Rate     01/15/25 0339 New Bag    ampicillin-sulbactam (Unasyn) 3 g in sodium chloride 0.9%  mL 3 g 200 mL/hr             Plan discussed with interdisciplinary team, double layer tubi  to BLE, diligent wound care, elevate BLE as tolerated. Will continue IV antibiotics - Unasyn, titrate supplemental oxygen, patient room air at baseline, repeat labs including iron levels, and chest xray in the AM. Incentive spirometry ordered. Patient awake, alert, and appropriate, states she is from home with a home health aide that assists with ADLs at home. No acute events overnight, patient denies chest pain, worsening SOB at this time.continue current and repeat labs in the AM.      Discharge planning discussed with patient and care team. Therapy evaluations ordered. Anticipate [facility] at discharge. Patient aware and agreeable to current plan, continue plan as above.      I spent a total of 50 minutes on the date of the service which included preparing to see the patient, face-to-face patient care, completing clinical documentation, obtaining and/or reviewing separately obtained history, performing a medically appropriate examination, counseling and educating the  patient/family/caregiver, ordering medications, tests, or procedures, communicating with other HCPs (not separately reported), independently interpreting results (not separately reported), communicating results to the patient/family/caregiver, and care coordination (not separately reported).      Patient fully evaluated  01/16  for    Problem List Items Addressed This Visit         Closed fracture of neck of left femur with routine healing     Relevant Medications     traMADol (Ultram) 50 mg tablet     Neuropathy     Relevant Medications     acetaminophen (Tylenol) 325 mg tablet     cephalexin (Keflex) 250 mg capsule     DULoxetine (Cymbalta) 30 mg DR capsule (Start on 1/20/2025)     lactobacillus acidophilus tablet tablet (Start on 1/20/2025)     pantoprazole (ProtoNix) 40 mg EC tablet (Start on 1/20/2025)     Neuropathy of left foot     Relevant Medications     gabapentin (Neurontin) 100 mg capsule     * (Principal) Left leg cellulitis - Primary      Other Visit Diagnoses         Pain in left leg                 Patient seen resting in bed with head of bed elevated, no s/s or c/o acute difficulties at this time.  Vital signs for last 24 hours Temp:  [36.6 °C (97.9 °F)-37 °C (98.6 °F)] 36.7 °C (98.1 °F)  Heart Rate:  [55-58] 58  Resp:  [18] 18  BP: (129-152)/(61-68) 137/61    No intake/output data recorded.  Patient still requiring frequent cardiac and SPO2 monitoring. Continue aggressive pulmonary hygiene and oral hygiene. Off loading as tolerated for skin integrity. Medications and labs reviewed-   No results found for this or any previous visit (from the past 24 hours).     Patient recently received an antibiotic (last 12 hours)         Date/Time Action Medication Dose Rate     01/16/25 1453 New Bag    ampicillin-sulbactam (Unasyn) 3 g in sodium chloride 0.9%  mL 3 g 200 mL/hr             No acute events overnight, patient denies chest pain, worsening SOB at this time. Patient on supplemental oxygen at  baseline but would like to titrate as tolerated, continue titration. Plan discussed with interdisciplinary team, patient with significant pain to left ankle, will apply lidocaine patch to site, cymbalta started, will monitor overnight, continue current and repeat labs in the AM.      Discharge planning discussed with patient and care team. Therapy evaluations ordered. Anticipate [facility] at discharge, per [facility] able to accept patient.  Patient aware and agreeable to current plan, continue plan as above.      I spent a total of 50 minutes on the date of the service which included preparing to see the patient, face-to-face patient care, completing clinical documentation, obtaining and/or reviewing separately obtained history, performing a medically appropriate examination, counseling and educating the patient/family/caregiver, ordering medications, tests, or procedures, communicating with other HCPs (not separately reported), independently interpreting results (not separately reported), communicating results to the patient/family/caregiver, and care coordination (not separately reported).      Patient fully evaluated 1/17  for    Problem List Items Addressed This Visit         Closed fracture of neck of left femur with routine healing     Relevant Medications     traMADol (Ultram) 50 mg tablet     Neuropathy     Relevant Medications     acetaminophen (Tylenol) 325 mg tablet     cephalexin (Keflex) 250 mg capsule     DULoxetine (Cymbalta) 30 mg DR capsule (Start on 1/20/2025)     lactobacillus acidophilus tablet tablet (Start on 1/20/2025)     pantoprazole (ProtoNix) 40 mg EC tablet (Start on 1/20/2025)     Neuropathy of left foot     Relevant Medications     gabapentin (Neurontin) 100 mg capsule     * (Principal) Left leg cellulitis - Primary      Other Visit Diagnoses         Pain in left leg                 Patient seen resting in bed with head of bed elevated, no s/s or c/o acute difficulties at  this time.  Vital signs for last 24 hours Temp:  [36.6 °C (97.9 °F)-37 °C (98.6 °F)] 36.7 °C (98.1 °F)  Heart Rate:  [55-58] 58  Resp:  [18] 18  BP: (129-152)/(61-68) 137/61    No intake/output data recorded.  Patient still requiring frequent cardiac and SPO2 monitoring. Continue aggressive pulmonary hygiene and oral hygiene. Off loading as tolerated for skin integrity. Medications and labs reviewed-   No results found for this or any previous visit (from the past 24 hours).     Patient recently received an antibiotic (last 12 hours)         Date/Time Action Medication Dose Rate     01/17/25 1306 Given    doxycycline (Vibra-Tabs) tablet 100 mg 100 mg       01/17/25 0253 New Bag    ampicillin-sulbactam (Unasyn) 3 g in sodium chloride 0.9%  mL 3 g 200 mL/hr             No acute events overnight, patient denies chest pain, worsening SOB at this time. Patient on supplemental oxygen at baseline 3L but would like to titrate as tolerated, continue titration. Plan discussed with interdisciplinary team, patient with significant pain to left ankle, will apply lidocaine patch to site. Patient complaining of nausea today, will discontinue the iron to see if that helps. Will also discontinue Unasyn and start patient on Keflex. Also starting patient on Protonix. Will monitor overnight, continue current and repeat labs in the AM.      Discharge planning discussed with patient and care team. Therapy evaluations ordered. [facility], anticipate HHC/SNF at discharge. Patient aware and agreeable to current plan, continue plan as above.      I spent a total of 50 minutes on the date of the service which included preparing to see the patient, face-to-face patient care, completing clinical documentation, obtaining and/or reviewing separately obtained history, performing a medically appropriate examination, counseling and educating the patient/family/caregiver, ordering medications, tests, or procedures, communicating with other HCPs  (not separately reported), independently interpreting results (not separately reported), communicating results to the patient/family/caregiver, and care coordination (not separately reported).         Patient fully evaluated  01/19  for    Problem List Items Addressed This Visit         Closed fracture of neck of left femur with routine healing     Relevant Medications     traMADol (Ultram) 50 mg tablet     Neuropathy     Relevant Medications     acetaminophen (Tylenol) 325 mg tablet     cephalexin (Keflex) 250 mg capsule     DULoxetine (Cymbalta) 30 mg DR capsule (Start on 1/20/2025)     lactobacillus acidophilus tablet tablet (Start on 1/20/2025)     pantoprazole (ProtoNix) 40 mg EC tablet (Start on 1/20/2025)     Neuropathy of left foot     Relevant Medications     gabapentin (Neurontin) 100 mg capsule     * (Principal) Left leg cellulitis - Primary      Other Visit Diagnoses         Pain in left leg                 Patient seen resting in bed with head of bed elevated, no s/s or c/o acute difficulties at this time.  Vital signs for last 24 hours Temp:  [36.6 °C (97.9 °F)-37 °C (98.6 °F)] 36.7 °C (98.1 °F)  Heart Rate:  [55-58] 58  Resp:  [18] 18  BP: (129-152)/(61-68) 137/61    No intake/output data recorded.  Patient still requiring frequent cardiac and SPO2 monitoring. Continue aggressive pulmonary hygiene and oral hygiene. Off loading as tolerated for skin integrity. Medications and labs reviewed- No results found for this or any previous visit (from the past 24 hours).   Patient recently received an antibiotic (last 12 hours)         Date/Time Action Medication Dose     01/19/25 0620 Given    cephalexin (Keflex) capsule 250 mg 250 mg          Patient with c/o nausea, keflex discontinued, Bactroban to left ankle tid x 10 days, restart gabapentin 100 mg tid. Will monitor overnight, no labs in the AM per patient request. Will continue current   Plan, discussed with interdisciplinary team, continue current and  repeat labs in the AM.      Discharge planning discussed with patient and care team. Therapy evaluations ordered. Anticipate [facility] at discharge. Patient aware and agreeable to current plan, continue plan as above.      I spent a total of 50 minutes on the date of the service which included preparing to see the patient, face-to-face patient care, completing clinical documentation, obtaining and/or reviewing separately obtained history, performing a medically appropriate examination, counseling and educating the patient/family/caregiver, ordering medications, tests, or procedures, communicating with other HCPs (not separately reported), independently interpreting results (not separately reported), communicating results to the patient/family/caregiver, and care coordination (not separately reported).   Katerina Fitzgerald                      Revision History         Pertinent Physical Exam At Time of Discharge  Physical Exam  Patient fully evaluated  01/20  for    Problem List Items Addressed This Visit       Closed fracture of neck of left femur with routine healing    Relevant Medications    traMADol (Ultram) 50 mg tablet    Neuropathy    Relevant Medications    acetaminophen (Tylenol) 325 mg tablet    DULoxetine (Cymbalta) 30 mg DR capsule    lactobacillus acidophilus tablet tablet    pantoprazole (ProtoNix) 40 mg EC tablet    gabapentin (Neurontin) 100 mg capsule    Neuropathy of left foot    Relevant Medications    gabapentin (Neurontin) 100 mg capsule    * (Principal) Left leg cellulitis - Primary    Relevant Medications    mupirocin (Bactroban) 2 % ointment    levoFLOXacin (Levaquin) 750 mg tablet (Start on 1/22/2025)     Other Visit Diagnoses       Pain in left leg              Patient seen resting in bed with head of bed elevated, no s/s or c/o acute difficulties at this time.  Vital signs for last 24 hours Temp:  [36.2 °C (97.2 °F)-37 °C (98.6 °F)] 36.2 °C (97.2 °F)  Heart Rate:  [53-60] 58  Resp:  [18] 18  BP:  (114-138)/(55-65) 115/57    I/O this shift:  In: -   Out: 200 [Urine:200]  Patient still requiring frequent cardiac and SPO2 monitoring. Continue aggressive pulmonary hygiene and oral hygiene. Off loading as tolerated for skin integrity. Medications and labs reviewed- No results found for this or any previous visit (from the past 24 hours).   Patient recently received an antibiotic (last 12 hours)       Date/Time Action Medication Dose    01/20/25 1256 Given    levoFLOXacin (Levaquin) tablet 750 mg 750 mg    01/20/25 0905 Given    mupirocin (Bactroban) 2 % ointment            No acute events overnight, patient denies chest pain, worsening SOB at this time. Plan discussed with interdisciplinary team, levaquin oral x 10 days/5 doses for cellulitis, will continue current and repeat labs in the AM.     Discharge planning discussed with patient and care team. Therapy evaluations ordered. Anticipate HHC at discharge. Patient aware and agreeable to current plan, continue plan as above.     I spent a total of 50 minutes on the date of the service which included preparing to see the patient, face-to-face patient care, completing clinical documentation, obtaining and/or reviewing separately obtained history, performing a medically appropriate examination, counseling and educating the patient/family/caregiver, ordering medications, tests, or procedures, communicating with other HCPs (not separately reported), independently interpreting results (not separately reported), communicating results to the patient/family/caregiver, and care coordination (not separately reported).   Outpatient Follow-Up  Future Appointments   Date Time Provider Department Center   1/23/2025  3:30 PM Lakia Johnson MD [id number] Jaron Fitzgerald   (0) swallows foods/liquids without difficulty

## 2025-01-21 ENCOUNTER — PATIENT OUTREACH (OUTPATIENT)
Dept: PRIMARY CARE | Facility: CLINIC | Age: OVER 89
End: 2025-01-21
Payer: MEDICARE

## 2025-01-21 NOTE — PROGRESS NOTES
Discharge Facility:[facility]  Discharge Diagnosis: Left leg cellulitis  Admission Date:1.14.25  Discharge Date: 1.20.25    PCP Appointment Date:1.23.25-patient may change  Specialist Appointment Date:   Hospital Encounter and Summary Linked: Yes  See discharge assessment below for further details   Engagement  Call Start Time: 1332 (1/21/2025  1:32 PM)    Medications  Medications reviewed with patient/caregiver?: Yes (1/21/2025  1:32 PM)  Is the patient having any side effects they believe may be caused by any medication additions or changes?: No (1/21/2025  1:32 PM)  Does the patient have all medications ordered at discharge?: Yes (1/21/2025  1:32 PM)  Care Management Interventions: No intervention needed (1/21/2025  1:32 PM)  Prescription Comments: START taking these medications   o acetaminophen 325 mg tablet; Commonly known as: Tylenol; Take 2 tablets   (650 mg) by mouth every 4 hours if needed for mild pain (1 - 3).  o DULoxetine 30 mg DR capsule; Commonly known as: Cymbalta; Take 1 capsule   (30 mg) by mouth once daily. Do not crush or chew.  o lactobacillus acidophilus tablet tablet; Take 1 tablet by mouth once   daily.  o levoFLOXacin 750 mg tablet; Commonly known as: Levaquin; Take 1 tablet   (750 mg) by mouth every other day for 10 days.; Start taking on: January 22, 2025  o mupirocin 2 % ointment; Commonly known as: Bactroban; Apply topically 3   times a day for 10 days.  o pantoprazole 40 mg EC tablet; Commonly known as: ProtoNix; Take 1 tablet   (40 mg) by mouth once daily in the morning. Take before meals. Do not   crush, chew, or split.  CHANGE how you take these medications   o azelastine 137 mcg (0.1 %) nasal spray; Commonly known as: Astelin;   Administer 2 sprays into each nostril 2 times a day. Use in each nostril   as directed; What changed: when to take this, reasons to take this  o dilTIAZem  mg 24 hr tablet; Commonly known as: Cardizem LA; Take 1   tablet (180 mg) by mouth once daily.;  What changed: when to take this  o furosemide 20 mg tablet; Commonly known as: Lasix; Take 1 tablet (20 mg)   by mouth once daily.; What changed: when to take this  o * gabapentin 100 mg capsule; Commonly known as: Neurontin; Take 1   capsule (100 mg) by mouth 2 times a day.; What changed: Another medication   with the same name was added. Make sure you understand how and when to   take each.  o * gabapentin 100 mg capsule; Commonly known as: Neurontin; Take 1   capsule (100 mg) by mouth 3 times a day.; What changed: You were already   taking a medication with the same name, and this prescription was added.   Make sure you understand how and when to take each.  o meloxicam 15 mg tablet; Commonly known as: Mobic; Take 1 tablet (15 mg)   by mouth once daily.; What changed: when to take this  * This list has 2 medication(s) that are the same as other medications   prescribed for you. Read the directions carefully, and ask your doctor or   other care provider to review them with you. (1/21/2025  1:32 PM)  Is the patient taking all medications as directed (includes completed medication regime)?: Yes (1/21/2025  1:32 PM)  Care Management Interventions: Provided patient education (1/21/2025  1:32 PM)  Medication Comments: Patient verbalized understanding of discharge medications. (1/21/2025  1:32 PM)    Appointments  Does the patient have a primary care provider?: Yes (1/21/2025  1:32 PM)  Care Management Interventions: Verified appointment date/time/provider (1/21/2025  1:32 PM)  Has the patient kept scheduled appointments due by today?: Yes (1/21/2025  1:32 PM)  Care Management Interventions: Advised patient to keep appointment; Educated on importance of keeping appointment (1/21/2025  1:32 PM)    Self Management  What is the home health agency?: [facility] (1/21/2025  1:32 PM)  Has home health visited the patient within 72 hours of discharge?: -- (dcd 1.20.25) (1/21/2025  1:32 PM)  What Durable Medical Equipment  (DME) was ordered?: n/a (1/21/2025  1:32 PM)    Patient Teaching  Does the patient have access to their discharge instructions?: Yes (1/21/2025  1:32 PM)  Care Management Interventions: Reviewed instructions with patient (1/21/2025  1:32 PM)  What is the patient's perception of their health status since discharge?: Improving (1/21/2025  1:32 PM)  Is the patient/caregiver able to teach back the hierarchy of who to call/visit for symptoms/problems? PCP, Specialist, Home Health nurse, Urgent Care, ED, 911: Yes (1/21/2025  1:32 PM)  Patient/Caregiver Education Comments: Spoke with patient. States cellulitis improved. Leg swelling decreased per patient. Still complains of a burning sensation. Reminded of PCP follow up. States she would like to change the appt. Message sent to office as I didnt see anything available within 14 days of dc. Reviewed med changes. Messaged office for potassium refill per patient request. (1/21/2025  1:32 PM)

## 2025-01-23 ENCOUNTER — APPOINTMENT (OUTPATIENT)
Dept: PRIMARY CARE | Facility: CLINIC | Age: OVER 89
End: 2025-01-23
Payer: MEDICARE

## 2025-01-29 ENCOUNTER — TELEPHONE (OUTPATIENT)
Dept: PRIMARY CARE | Facility: CLINIC | Age: OVER 89
End: 2025-01-29
Payer: MEDICARE

## 2025-02-04 ENCOUNTER — PATIENT OUTREACH (OUTPATIENT)
Dept: PRIMARY CARE | Facility: CLINIC | Age: OVER 89
End: 2025-02-04
Payer: MEDICARE

## 2025-02-04 NOTE — PROGRESS NOTES
Call after hospital stay: States her cellulitis is starting to burn again, george around the ankle. Offered appt with PCP. She stated she wanted to try the cream that Dr Flores prescribed first. Son to  from pharmacy.

## 2025-02-18 ENCOUNTER — PATIENT OUTREACH (OUTPATIENT)
Dept: PRIMARY CARE | Facility: CLINIC | Age: OVER 89
End: 2025-02-18
Payer: MEDICARE

## 2025-02-18 NOTE — PROGRESS NOTES
Call placed regarding one month post discharge follow up call.              At time of outreach call the patient feels as if their condition has               Improved.              Questions or concerns regarding recovery period addressed at this time.               Reviewed any PCP or specialists progress notes/labs/radiology reports if applicable              and addressed any questions or concerns.

## 2025-02-27 ENCOUNTER — APPOINTMENT (OUTPATIENT)
Dept: RADIOLOGY | Facility: HOSPITAL | Age: OVER 89
End: 2025-02-27
Payer: MEDICARE

## 2025-02-27 ENCOUNTER — APPOINTMENT (OUTPATIENT)
Dept: CARDIOLOGY | Facility: HOSPITAL | Age: OVER 89
End: 2025-02-27
Payer: MEDICARE

## 2025-02-27 ENCOUNTER — HOSPITAL ENCOUNTER (INPATIENT)
Facility: HOSPITAL | Age: OVER 89
End: 2025-02-27
Attending: EMERGENCY MEDICINE | Admitting: INTERNAL MEDICINE
Payer: MEDICARE

## 2025-02-27 DIAGNOSIS — R79.89 ELEVATED D-DIMER: ICD-10-CM

## 2025-02-27 DIAGNOSIS — G47.09 OTHER INSOMNIA: ICD-10-CM

## 2025-02-27 DIAGNOSIS — H35.30 MACULAR DEGENERATION, UNSPECIFIED LATERALITY, UNSPECIFIED TYPE: ICD-10-CM

## 2025-02-27 DIAGNOSIS — K59.00 CONSTIPATION, UNSPECIFIED CONSTIPATION TYPE: ICD-10-CM

## 2025-02-27 DIAGNOSIS — Z86.711 HISTORY OF PULMONARY EMBOLISM: Primary | ICD-10-CM

## 2025-02-27 DIAGNOSIS — R06.00 DYSPNEA, UNSPECIFIED TYPE: ICD-10-CM

## 2025-02-27 LAB
ALBUMIN SERPL BCP-MCNC: 4.1 G/DL (ref 3.4–5)
ALP SERPL-CCNC: 89 U/L (ref 33–136)
ALT SERPL W P-5'-P-CCNC: 12 U/L (ref 7–45)
ANION GAP BLDV CALCULATED.4IONS-SCNC: 12 MMOL/L (ref 10–25)
ANION GAP SERPL CALC-SCNC: 14 MMOL/L (ref 10–20)
AST SERPL W P-5'-P-CCNC: 21 U/L (ref 9–39)
BASE EXCESS BLDV CALC-SCNC: 1.5 MMOL/L (ref -2–3)
BASOPHILS # BLD AUTO: 0.05 X10*3/UL (ref 0–0.1)
BASOPHILS NFR BLD AUTO: 0.8 %
BILIRUB SERPL-MCNC: 0.3 MG/DL (ref 0–1.2)
BNP SERPL-MCNC: 98 PG/ML (ref 0–99)
BODY TEMPERATURE: 37 DEGREES CELSIUS
BUN SERPL-MCNC: 54 MG/DL (ref 6–23)
CA-I BLDV-SCNC: 1.19 MMOL/L (ref 1.1–1.33)
CALCIUM SERPL-MCNC: 9.1 MG/DL (ref 8.6–10.3)
CARDIAC TROPONIN I PNL SERPL HS: 12 NG/L (ref 0–13)
CHLORIDE BLDV-SCNC: 101 MMOL/L (ref 98–107)
CHLORIDE SERPL-SCNC: 102 MMOL/L (ref 98–107)
CO2 SERPL-SCNC: 25 MMOL/L (ref 21–32)
CREAT SERPL-MCNC: 1.94 MG/DL (ref 0.5–1.05)
EGFRCR SERPLBLD CKD-EPI 2021: 24 ML/MIN/1.73M*2
EOSINOPHIL # BLD AUTO: 0.17 X10*3/UL (ref 0–0.4)
EOSINOPHIL NFR BLD AUTO: 2.6 %
ERYTHROCYTE [DISTWIDTH] IN BLOOD BY AUTOMATED COUNT: 14.8 % (ref 11.5–14.5)
GLUCOSE BLDV-MCNC: 75 MG/DL (ref 74–99)
GLUCOSE SERPL-MCNC: 79 MG/DL (ref 74–99)
HCO3 BLDV-SCNC: 27.2 MMOL/L (ref 22–26)
HCT VFR BLD AUTO: 39.1 % (ref 36–46)
HCT VFR BLD EST: 39 % (ref 36–46)
HGB BLD-MCNC: 12.1 G/DL (ref 12–16)
HGB BLDV-MCNC: 12.9 G/DL (ref 12–16)
IMM GRANULOCYTES # BLD AUTO: 0.01 X10*3/UL (ref 0–0.5)
IMM GRANULOCYTES NFR BLD AUTO: 0.2 % (ref 0–0.9)
INHALED O2 CONCENTRATION: 21 %
LACTATE BLDV-SCNC: 1 MMOL/L (ref 0.4–2)
LYMPHOCYTES # BLD AUTO: 2.1 X10*3/UL (ref 0.8–3)
LYMPHOCYTES NFR BLD AUTO: 32.3 %
MAGNESIUM SERPL-MCNC: 2.61 MG/DL (ref 1.6–2.4)
MCH RBC QN AUTO: 26.9 PG (ref 26–34)
MCHC RBC AUTO-ENTMCNC: 30.9 G/DL (ref 32–36)
MCV RBC AUTO: 87 FL (ref 80–100)
MONOCYTES # BLD AUTO: 0.73 X10*3/UL (ref 0.05–0.8)
MONOCYTES NFR BLD AUTO: 11.2 %
NEUTROPHILS # BLD AUTO: 3.44 X10*3/UL (ref 1.6–5.5)
NEUTROPHILS NFR BLD AUTO: 52.9 %
NRBC BLD-RTO: 0 /100 WBCS (ref 0–0)
OXYHGB MFR BLDV: 76.2 % (ref 45–75)
PCO2 BLDV: 46 MM HG (ref 41–51)
PH BLDV: 7.38 PH (ref 7.33–7.43)
PLATELET # BLD AUTO: 186 X10*3/UL (ref 150–450)
PO2 BLDV: 50 MM HG (ref 35–45)
POTASSIUM BLDV-SCNC: 5 MMOL/L (ref 3.5–5.3)
POTASSIUM SERPL-SCNC: 4.9 MMOL/L (ref 3.5–5.3)
PROT SERPL-MCNC: 7.1 G/DL (ref 6.4–8.2)
RBC # BLD AUTO: 4.49 X10*6/UL (ref 4–5.2)
SAO2 % BLDV: 78 % (ref 45–75)
SODIUM BLDV-SCNC: 135 MMOL/L (ref 136–145)
SODIUM SERPL-SCNC: 136 MMOL/L (ref 136–145)
WBC # BLD AUTO: 6.5 X10*3/UL (ref 4.4–11.3)

## 2025-02-27 PROCEDURE — 36415 COLL VENOUS BLD VENIPUNCTURE: CPT | Performed by: NURSE PRACTITIONER

## 2025-02-27 PROCEDURE — 84484 ASSAY OF TROPONIN QUANT: CPT | Performed by: NURSE PRACTITIONER

## 2025-02-27 PROCEDURE — 85025 COMPLETE CBC W/AUTO DIFF WBC: CPT | Performed by: NURSE PRACTITIONER

## 2025-02-27 PROCEDURE — 71250 CT THORAX DX C-: CPT

## 2025-02-27 PROCEDURE — 99285 EMERGENCY DEPT VISIT HI MDM: CPT | Mod: 25 | Performed by: EMERGENCY MEDICINE

## 2025-02-27 PROCEDURE — 71046 X-RAY EXAM CHEST 2 VIEWS: CPT

## 2025-02-27 PROCEDURE — 84132 ASSAY OF SERUM POTASSIUM: CPT | Performed by: NURSE PRACTITIONER

## 2025-02-27 PROCEDURE — 71046 X-RAY EXAM CHEST 2 VIEWS: CPT | Performed by: RADIOLOGY

## 2025-02-27 PROCEDURE — 87637 SARSCOV2&INF A&B&RSV AMP PRB: CPT | Performed by: NURSE PRACTITIONER

## 2025-02-27 PROCEDURE — 83735 ASSAY OF MAGNESIUM: CPT | Performed by: NURSE PRACTITIONER

## 2025-02-27 PROCEDURE — 83880 ASSAY OF NATRIURETIC PEPTIDE: CPT | Performed by: NURSE PRACTITIONER

## 2025-02-27 PROCEDURE — 93005 ELECTROCARDIOGRAM TRACING: CPT

## 2025-02-27 ASSESSMENT — COLUMBIA-SUICIDE SEVERITY RATING SCALE - C-SSRS
1. IN THE PAST MONTH, HAVE YOU WISHED YOU WERE DEAD OR WISHED YOU COULD GO TO SLEEP AND NOT WAKE UP?: NO
2. HAVE YOU ACTUALLY HAD ANY THOUGHTS OF KILLING YOURSELF?: NO
6. HAVE YOU EVER DONE ANYTHING, STARTED TO DO ANYTHING, OR PREPARED TO DO ANYTHING TO END YOUR LIFE?: NO

## 2025-02-27 ASSESSMENT — LIFESTYLE VARIABLES
TOTAL SCORE: 0
HAVE PEOPLE ANNOYED YOU BY CRITICIZING YOUR DRINKING: NO
EVER HAD A DRINK FIRST THING IN THE MORNING TO STEADY YOUR NERVES TO GET RID OF A HANGOVER: NO
EVER FELT BAD OR GUILTY ABOUT YOUR DRINKING: NO
HAVE YOU EVER FELT YOU SHOULD CUT DOWN ON YOUR DRINKING: NO

## 2025-02-27 ASSESSMENT — PAIN - FUNCTIONAL ASSESSMENT: PAIN_FUNCTIONAL_ASSESSMENT: 0-10

## 2025-02-27 ASSESSMENT — PAIN SCALES - GENERAL: PAINLEVEL_OUTOF10: 0 - NO PAIN

## 2025-02-27 NOTE — ED TRIAGE NOTES
This patient was seen and examined in triage    HPI:  Patient is a nontoxic-appearing 89-year-old female who presents emergency room today for complaint of shortness of breath.  Patient states shortness of breath has become worse over the past week and states she has been requiring home O2 which she states she normally does not wear.  Patient states that she has been tolerating 3 L nasal cannula.  Patient states she was diagnosed with a blood clot in the lungs several months ago and was on anticoagulants however states she stopped this 1 month ago.  Patient denies any chest pain.  Patient states she was told to go to emergency room by primary care provider for concern of volume overload.  Patient denies any cough or congestion and states she does having some swelling to the lower extremities.  Patient states she also takes furosemide and has not missed any recent doses.  Patient denies any abdominal pain, nausea, ongoing diarrhea or constipation, fever, shaking, or chills.  Patient states she has been more fatigued as of lately.    Focused PE:  Gen: Well-appearing, not in acute distress  Cardiovascular: Regular rate, normal rhythm, no murmur, no gallop  Respiratory: No adventitious lung sounds auscultated.  Abdomen: No reproducible abdominal tenderness upon palpation,  physical exam may be limited by patient positioning sitting up in a chair  Neuro:  Alert and Oriented, speech clear and coherent    Plan:  Lab work ordered from triage including EKG, chest x-ray and CT PE study.    For the remainder the patient's work-up and ED course, please see the main ED provider note.  We discussed need for diagnostic testing including lab studies and imaging.  We also discussed that they may be asked to wait in the waiting room while these test are pending.  They understand that if they choose to leave without having the testing completed or resulted that we cannot rule out acute life-threatening illnesses and the risks involved  to lead to worsening condition, permanent disability or even death.

## 2025-02-28 ENCOUNTER — APPOINTMENT (OUTPATIENT)
Dept: RADIOLOGY | Facility: HOSPITAL | Age: OVER 89
End: 2025-02-28
Payer: MEDICARE

## 2025-02-28 PROBLEM — R06.02 SHORTNESS OF BREATH: Status: ACTIVE | Noted: 2025-02-28

## 2025-02-28 PROBLEM — N17.9 AKI (ACUTE KIDNEY INJURY) (CMS-HCC): Status: ACTIVE | Noted: 2025-02-28

## 2025-02-28 PROBLEM — R79.89 ELEVATED D-DIMER: Status: ACTIVE | Noted: 2025-02-28

## 2025-02-28 LAB
ANION GAP SERPL CALC-SCNC: 15 MMOL/L (ref 10–20)
APPEARANCE UR: CLEAR
ATRIAL RATE: 90 BPM
BACTERIA #/AREA URNS AUTO: ABNORMAL /HPF
BILIRUB UR STRIP.AUTO-MCNC: NEGATIVE MG/DL
BUN SERPL-MCNC: 54 MG/DL (ref 6–23)
CALCIUM SERPL-MCNC: 8.4 MG/DL (ref 8.6–10.3)
CHLORIDE SERPL-SCNC: 103 MMOL/L (ref 98–107)
CO2 SERPL-SCNC: 24 MMOL/L (ref 21–32)
COLOR UR: ABNORMAL
CREAT SERPL-MCNC: 2.16 MG/DL (ref 0.5–1.05)
D DIMER PPP FEU-MCNC: ABNORMAL NG/ML FEU
EGFRCR SERPLBLD CKD-EPI 2021: 21 ML/MIN/1.73M*2
ERYTHROCYTE [DISTWIDTH] IN BLOOD BY AUTOMATED COUNT: 14.8 % (ref 11.5–14.5)
FLUAV RNA RESP QL NAA+PROBE: NOT DETECTED
FLUBV RNA RESP QL NAA+PROBE: NOT DETECTED
GLUCOSE SERPL-MCNC: 151 MG/DL (ref 74–99)
GLUCOSE UR STRIP.AUTO-MCNC: NORMAL MG/DL
HCT VFR BLD AUTO: 33.1 % (ref 36–46)
HGB BLD-MCNC: 10.3 G/DL (ref 12–16)
HOLD SPECIMEN: NORMAL
HOLD SPECIMEN: NORMAL
KETONES UR STRIP.AUTO-MCNC: NEGATIVE MG/DL
LEUKOCYTE ESTERASE UR QL STRIP.AUTO: ABNORMAL
MCH RBC QN AUTO: 27 PG (ref 26–34)
MCHC RBC AUTO-ENTMCNC: 31.1 G/DL (ref 32–36)
MCV RBC AUTO: 87 FL (ref 80–100)
MUCOUS THREADS #/AREA URNS AUTO: ABNORMAL /LPF
NITRITE UR QL STRIP.AUTO: NEGATIVE
NRBC BLD-RTO: 0 /100 WBCS (ref 0–0)
P AXIS: 75 DEGREES
PH UR STRIP.AUTO: 5 [PH]
PLATELET # BLD AUTO: 140 X10*3/UL (ref 150–450)
POTASSIUM SERPL-SCNC: 4.3 MMOL/L (ref 3.5–5.3)
PR INTERVAL: 208 MS
PROT UR STRIP.AUTO-MCNC: NEGATIVE MG/DL
Q ONSET: 212 MS
QRS COUNT: 15 BEATS
QRS DURATION: 76 MS
QT INTERVAL: 356 MS
QTC CALCULATION(BAZETT): 435 MS
QTC FREDERICIA: 407 MS
R AXIS: -81 DEGREES
RBC # BLD AUTO: 3.82 X10*6/UL (ref 4–5.2)
RBC # UR STRIP.AUTO: NEGATIVE MG/DL
RBC #/AREA URNS AUTO: ABNORMAL /HPF
RSV RNA RESP QL NAA+PROBE: NOT DETECTED
SARS-COV-2 RNA RESP QL NAA+PROBE: NOT DETECTED
SODIUM SERPL-SCNC: 138 MMOL/L (ref 136–145)
SP GR UR STRIP.AUTO: 1.02
SQUAMOUS #/AREA URNS AUTO: ABNORMAL /HPF
T AXIS: 67 DEGREES
T OFFSET: 390 MS
TRANS CELLS #/AREA UR COMP ASSIST: ABNORMAL /HPF
UFH PPP CHRO-ACNC: 0.6 IU/ML
UFH PPP CHRO-ACNC: 0.7 IU/ML
UFH PPP CHRO-ACNC: 0.8 IU/ML
UFH PPP CHRO-ACNC: 1.3 IU/ML
UROBILINOGEN UR STRIP.AUTO-MCNC: NORMAL MG/DL
VENTRICULAR RATE: 90 BPM
WBC # BLD AUTO: 6.4 X10*3/UL (ref 4.4–11.3)
WBC #/AREA URNS AUTO: ABNORMAL /HPF

## 2025-02-28 PROCEDURE — 85379 FIBRIN DEGRADATION QUANT: CPT | Performed by: PHYSICIAN ASSISTANT

## 2025-02-28 PROCEDURE — 78803 RP LOCLZJ TUM SPECT 1 AREA: CPT

## 2025-02-28 PROCEDURE — 96365 THER/PROPH/DIAG IV INF INIT: CPT

## 2025-02-28 PROCEDURE — 85520 HEPARIN ASSAY: CPT | Performed by: INTERNAL MEDICINE

## 2025-02-28 PROCEDURE — G0378 HOSPITAL OBSERVATION PER HR: HCPCS

## 2025-02-28 PROCEDURE — 93970 EXTREMITY STUDY: CPT | Performed by: RADIOLOGY

## 2025-02-28 PROCEDURE — 85520 HEPARIN ASSAY: CPT | Performed by: EMERGENCY MEDICINE

## 2025-02-28 PROCEDURE — 85027 COMPLETE CBC AUTOMATED: CPT

## 2025-02-28 PROCEDURE — 36415 COLL VENOUS BLD VENIPUNCTURE: CPT

## 2025-02-28 PROCEDURE — 87086 URINE CULTURE/COLONY COUNT: CPT | Mod: PARLAB

## 2025-02-28 PROCEDURE — 78803 RP LOCLZJ TUM SPECT 1 AREA: CPT | Performed by: RADIOLOGY

## 2025-02-28 PROCEDURE — 85520 HEPARIN ASSAY: CPT | Performed by: PHYSICIAN ASSISTANT

## 2025-02-28 PROCEDURE — 71250 CT THORAX DX C-: CPT | Performed by: RADIOLOGY

## 2025-02-28 PROCEDURE — 99223 1ST HOSP IP/OBS HIGH 75: CPT

## 2025-02-28 PROCEDURE — 96366 THER/PROPH/DIAG IV INF ADDON: CPT

## 2025-02-28 PROCEDURE — 2500000004 HC RX 250 GENERAL PHARMACY W/ HCPCS (ALT 636 FOR OP/ED): Performed by: INTERNAL MEDICINE

## 2025-02-28 PROCEDURE — 2500000004 HC RX 250 GENERAL PHARMACY W/ HCPCS (ALT 636 FOR OP/ED): Performed by: PHYSICIAN ASSISTANT

## 2025-02-28 PROCEDURE — 96375 TX/PRO/DX INJ NEW DRUG ADDON: CPT

## 2025-02-28 PROCEDURE — 3430000001 HC RX 343 DIAGNOSTIC RADIOPHARMACEUTICALS: Performed by: EMERGENCY MEDICINE

## 2025-02-28 PROCEDURE — 80048 BASIC METABOLIC PNL TOTAL CA: CPT

## 2025-02-28 PROCEDURE — 36415 COLL VENOUS BLD VENIPUNCTURE: CPT | Performed by: PHYSICIAN ASSISTANT

## 2025-02-28 PROCEDURE — 93970 EXTREMITY STUDY: CPT

## 2025-02-28 PROCEDURE — 81001 URINALYSIS AUTO W/SCOPE: CPT

## 2025-02-28 PROCEDURE — A9540 TC99M MAA: HCPCS | Performed by: EMERGENCY MEDICINE

## 2025-02-28 PROCEDURE — 2500000001 HC RX 250 WO HCPCS SELF ADMINISTERED DRUGS (ALT 637 FOR MEDICARE OP): Performed by: INTERNAL MEDICINE

## 2025-02-28 PROCEDURE — 96376 TX/PRO/DX INJ SAME DRUG ADON: CPT

## 2025-02-28 RX ORDER — POLYETHYLENE GLYCOL 3350 17 G/17G
17 POWDER, FOR SOLUTION ORAL DAILY
Status: DISCONTINUED | OUTPATIENT
Start: 2025-02-28 | End: 2025-03-05 | Stop reason: HOSPADM

## 2025-02-28 RX ORDER — SIMVASTATIN 20 MG/1
20 TABLET, FILM COATED ORAL NIGHTLY
Status: DISCONTINUED | OUTPATIENT
Start: 2025-03-01 | End: 2025-03-05 | Stop reason: HOSPADM

## 2025-02-28 RX ORDER — SODIUM CHLORIDE 9 MG/ML
75 INJECTION, SOLUTION INTRAVENOUS CONTINUOUS
Status: ACTIVE | OUTPATIENT
Start: 2025-02-28 | End: 2025-03-01

## 2025-02-28 RX ORDER — VIT C/E/ZN/COPPR/LUTEIN/ZEAXAN 250MG-90MG
1 CAPSULE ORAL 2 TIMES DAILY
Qty: 180 CAPSULE | Refills: 0 | Status: ON HOLD | OUTPATIENT
Start: 2025-02-28

## 2025-02-28 RX ORDER — MIRTAZAPINE 15 MG/1
15 TABLET, FILM COATED ORAL NIGHTLY
Status: DISCONTINUED | OUTPATIENT
Start: 2025-03-01 | End: 2025-03-05 | Stop reason: HOSPADM

## 2025-02-28 RX ORDER — PRAMIPEXOLE DIHYDROCHLORIDE 1 MG/1
1 TABLET ORAL NIGHTLY
Status: DISCONTINUED | OUTPATIENT
Start: 2025-03-01 | End: 2025-03-05 | Stop reason: HOSPADM

## 2025-02-28 RX ORDER — DILTIAZEM HYDROCHLORIDE 180 MG/1
180 CAPSULE, COATED, EXTENDED RELEASE ORAL DAILY
Status: DISCONTINUED | OUTPATIENT
Start: 2025-03-01 | End: 2025-03-05 | Stop reason: HOSPADM

## 2025-02-28 RX ORDER — HEPARIN SODIUM 10000 [USP'U]/100ML
0-4500 INJECTION, SOLUTION INTRAVENOUS CONTINUOUS
Status: DISCONTINUED | OUTPATIENT
Start: 2025-02-28 | End: 2025-02-28

## 2025-02-28 RX ORDER — ACETAMINOPHEN 500 MG
5 TABLET ORAL ONCE
Status: DISCONTINUED | OUTPATIENT
Start: 2025-03-01 | End: 2025-03-05 | Stop reason: HOSPADM

## 2025-02-28 RX ORDER — ACETAMINOPHEN 325 MG/1
650 TABLET ORAL EVERY 4 HOURS PRN
Status: DISCONTINUED | OUTPATIENT
Start: 2025-02-28 | End: 2025-03-05 | Stop reason: HOSPADM

## 2025-02-28 RX ORDER — TRAMADOL HYDROCHLORIDE 50 MG/1
50 TABLET ORAL DAILY PRN
COMMUNITY

## 2025-02-28 RX ORDER — MIRTAZAPINE 15 MG/1
15 TABLET, FILM COATED ORAL NIGHTLY PRN
COMMUNITY
Start: 2025-02-07

## 2025-02-28 RX ADMIN — SIMVASTATIN 20 MG: 20 TABLET, FILM COATED ORAL at 23:53

## 2025-02-28 RX ADMIN — APIXABAN 10 MG: 5 TABLET, FILM COATED ORAL at 22:26

## 2025-02-28 RX ADMIN — SODIUM CHLORIDE 75 ML/HR: 9 INJECTION, SOLUTION INTRAVENOUS at 17:02

## 2025-02-28 RX ADMIN — KIT FOR THE PREPARATION OF TECHNETIUM TC 99M ALBUMIN AGGREGATED 4.1 MILLICURIE: 2.5 INJECTION, POWDER, FOR SOLUTION INTRAVENOUS at 09:20

## 2025-02-28 RX ADMIN — HEPARIN SODIUM 1000 UNITS/HR: 10000 INJECTION, SOLUTION INTRAVENOUS at 02:09

## 2025-02-28 NOTE — PROGRESS NOTES
Pharmacy Medication History Review    Estefanía Contreras is a 89 y.o. female admitted for Shortness of breath. Pharmacy reviewed the patient's eldnk-hc-zkrvtcohl medications and allergies for accuracy.    The list below reflectives the updated PTA list. Please review each medication in order reconciliation for additional clarification and justification.  Prior to Admission medications    Medication Sig Start Date End Date Authorizing Provider   mirtazapine (Remeron) 15 mg tablet Take 1 tablet (15 mg) by mouth as needed at bedtime. 2/7/25  Historical Provider, MD   azelastine (Astelin) 137 mcg (0.1 %) nasal spray Administer 2 sprays into each nostril 2 times a day as needed.   Lakia Johnson MD   dilTIAZem LA (Cardizem LA) 180 mg 24 hr tablet Take 1 tablet (180 mg) by mouth once daily in the morning.   Lakia Johnson MD   furosemide (Lasix) 20 mg tablet Take 2 tablets (40 mg) by mouth once daily in the morning.   Lakia Johnson MD   meloxicam (Mobic) 15 mg tablet Take 1 tablet (15 mg) by mouth once daily with breakfast.   Lakia Johnson MD   mv-min-FA-vit K-lutein-zeaxant (PreserVision AREDS 2 Plus MV) 200 mcg-15 mcg- 5 mg-1 mg capsule Take 1 capsule by mouth 2 times a day.   Historical Provider, MD   oxygen (O2) gas therapy Inhale 3 L/min continuously.   Historical Provider, MD   potassium chloride CR 20 mEq ER tablet Take 1 tablet (20 mEq) by mouth once daily in the morning. Splits in half and takes at same time   Historical Provider, MD   pramipexole (Mirapex) 1 mg tablet Take 1 tablet (1 mg) by mouth once daily at bedtime.   Lakia Johnson MD   simvastatin (Zocor) 20 mg tablet Take 1 tablet (20 mg) by mouth once daily at bedtime.   Lakia Johnson MD   traMADol (Ultram) 50 mg tablet Take 1 tablet (50 mg) by mouth once daily as needed for severe pain (7 - 10).   Historical Provider, MD        The list below reflectives the updated allergy list. Please review each documented allergy for additional clarification and  justification.  Allergies  Reviewed by Loren Egan RN on 2/27/2025   No Known Allergies         Below are additional concerns with the patient's PTA list.      Tenisha Matute

## 2025-02-28 NOTE — ED PROVIDER NOTES
HPI   Chief Complaint   Patient presents with    Shortness of Breath     PT PRESENTS TO ED FROM HOME VIA PRIVATE AUTO FOR SOB X 1 WEEK. PT USES O2 INTERMITTENTLY. PT CURRENTLY ON 3 L O2 ON ARRIVAL TO ED. PT SENT BY PCP WITH CONCERNS FOR FLUID ON LUNGS. DENIES CHEST PAIN. ENDORSES INCREASED LEG SWELLING.        89-year-old female with a significant past medical history of pulmonary embolism not currently anticoagulated presents complaining of shortness of breath.  Patient states shortness of breath on exertion for approximately 1 week.  She states that she was formally on supplemental oxygen however she was able to work herself off of it.  She denies any cough.  No reports of chest pain.  She states slight edema to her lower extremities which she describes as symmetrical.  No reports of hemoptysis.  She states that she spoke with her primary care physician who  recommended being evaluated further at the emergency department.              Patient History   Past Medical History:   Diagnosis Date    H/O heart artery stent     H/O shoulder surgery     PE (pulmonary thromboembolism) (Multi)      History reviewed. No pertinent surgical history.  No family history on file.  Social History     Tobacco Use    Smoking status: Former     Types: Cigarettes     Passive exposure: Never    Smokeless tobacco: Never   Vaping Use    Vaping status: Never Used   Substance Use Topics    Alcohol use: Not Currently    Drug use: Never       Physical Exam   ED Triage Vitals   Temperature Heart Rate Respirations BP   02/27/25 1824 02/27/25 1824 02/27/25 1824 02/27/25 1824   36.2 °C (97.2 °F) 94 18 (!) 164/101      Pulse Ox Temp Source Heart Rate Source Patient Position   02/27/25 1824 02/27/25 1824 02/27/25 1824 02/27/25 2330   96 % Temporal Monitor Lying      BP Location FiO2 (%)     02/27/25 2330 --     Right arm        Physical Exam  Vitals and nursing note reviewed.   Constitutional:       General: She is not in acute distress.      Appearance: Normal appearance. She is normal weight. She is not ill-appearing, toxic-appearing or diaphoretic.   HENT:      Head: Normocephalic.      Nose: Nose normal.      Mouth/Throat:      Mouth: Mucous membranes are moist.   Eyes:      Extraocular Movements: Extraocular movements intact.      Conjunctiva/sclera: Conjunctivae normal.   Cardiovascular:      Rate and Rhythm: Normal rate and regular rhythm.      Pulses: Normal pulses.   Pulmonary:      Effort: Pulmonary effort is normal. No respiratory distress.      Breath sounds: Normal breath sounds.   Abdominal:      General: Abdomen is flat. There is no distension.      Palpations: Abdomen is soft.      Tenderness: There is no abdominal tenderness. There is no guarding or rebound.   Musculoskeletal:         General: Normal range of motion.      Cervical back: Normal range of motion and neck supple.   Skin:     General: Skin is warm and dry.      Capillary Refill: Capillary refill takes less than 2 seconds.   Neurological:      General: No focal deficit present.      Mental Status: She is alert and oriented to person, place, and time.   Psychiatric:         Mood and Affect: Mood normal.         Behavior: Behavior normal.         Thought Content: Thought content normal.         Judgment: Judgment normal.           ED Course & MDM   ED Course as of 02/28/25 0121   Fri Feb 28, 2025   0022 WBC: 6.5 [DS]   0022 HEMOGLOBIN: 12.1 [DS]   0022 BNP: 98 [DS]   0022 Troponin I, High Sensitivity: 12 [DS]   0022 CXR IMPRESSION:  1.  Features suggesting mild edema. Senescent changes.   [DS]   0121 D-Dimer Non VTE, Quant (ng/mL FEU)(!): 12,657 [DS]   0121 POCT pH, Venous: 7.38 [DS]      ED Course User Index  [DS] Georgi Kaplan PA-C                 No data recorded     [place] Coma Scale Score: 15 (02/27/25 1818 : Loren Egan, RAMANA)                           Medical Decision Making    Medical Decision Making & ED Course  Medical Decision Making:  On arrival the patient  was found to have an oxygen saturation of 96% on room air.  During my exam the patient had no increased work of breathing or conversational dyspnea.  Patient was initially evaluated by the triage provider who ordered blood work along with imaging studies.  Patient was found to have no evidence of leukocytosis or concerning anemia.  Troponin negative.  BNP 98.  Chest x-ray revealed findings suggesting of mild edema.  EKG was obtained and interpreted by myself revealing normal sinus rhythm with with a sinus arrhythmia at a rate of 90.  No evidence of acute STEMI.  QTc 435.  Venous pH was found to be within normal limits.  D-dimer was ordered and found to be greater than 12,000.  Unfortunately due to the patient's serum creatinine andelevated GFR the patient could not receive a CT PE study.  I spoke with the radiology tech in regard to this.  Ultrasonography of the lower extremities were obtained.  I spoke with the patient's primary care physician who request that the patient be started on heparin and then have a VQ scan in the morning.  This was discussed with the patient who was agreeable.    --  Scoring Tools Utilized: None    Differential diagnoses considered include but are not limited to: Acute CHF, COVID-19, pneumonia, pulmonary embolism     Social Determinants of Health which Significantly Impact Care: None identified The following actions were taken to address these social determinants: None    EKG Independent Interpretation: EKG interpreted by myself. Please see ED Course for full interpretation.    Independent Result Review and Interpretation: Relevant laboratory and radiographic results were reviewed and independently interpreted by myself.  As necessary, they are commented on in the ED Course.    Chronic conditions affecting the patient's care: As documented above in MDM    The patient was discussed with the following consultants/services: Hospitalist/Admitting Provider who accepted the patient for  admission    Care Considerations: Considered ordering CT PE study, but chose not to because patient's poor kidney function.    ED Course:  ED Course as of 02/28/25 0120  ------------------------------------------------------------  Time: 02/28 0022  Value: WBC: 6.5  Comment: (Reviewed)  By: Georgi Kaplan PA-C  ------------------------------------------------------------  Time: 02/28 0022  Value: HEMOGLOBIN: 12.1  Comment: (Reviewed)  By: Georgi Kaplan PA-C  ------------------------------------------------------------  Time: 02/28 0022  Value: BNP: 98  Comment: (Reviewed)  By: Georgi Kaplan PA-C  ------------------------------------------------------------  Time: 02/28 0022  Value: Troponin I, High Sensitivity: 12  Comment: (Reviewed)  By: DENNY Strickland-C  ------------------------------------------------------------  Time: 02/28 0022  Comment: CXR IMPRESSION:1.  Features suggesting mild edema. Senescent changes.  By: Georgi Kaplan PA-C     Disposition  As a result of their workup, the patient will require admission to the hospital.  The patient was informed of her diagnosis.  The patient was given the opportunity to ask questions and I answered them. The patient agreed to be admitted to the hospital.      This was a shared visit with an ED attending.  The patient was seen and discussed with the ED attending    Georgi Kaplan PA-C  Emergency Medicine            Procedure  Procedures     Georgi Kaplan PA-C  02/28/25 0141       Georgi Kaplan PA-C  02/28/25 0143

## 2025-02-28 NOTE — H&P
History Of Present Illness  Estefanía Conrteras is a 89 y.o. female with a past medical history of HFpEF (EF 62% 1/6/25), HLD, HTN, CKD3b, AAA (s/p repair), PAD, ITP, and provoked PE (left hip fracture, completed 3 months of apixaban, on 2-3L NC PRN), anxiety, who presented to [facility] ED today from home with shortness of breath. Patient has shortness of breath with exertion for about a week. She was able to wean herself off oxygen at home after her PE, but has been using the oxygen again this week which has helped her shortness of breath. She is not currently on anticoagulation. States she has a mild nonproductive cough. Denies chest pain, fever, chills, abdominal pain, nausea, vomiting, urinary symptoms, diarrhea, or constipation. States she has slight edema of lower extremities. She called her PCP who recommended her to be evaluated in ED. She was admitted to this hospital on 1/14/25 with left leg cellulitis. States she still has pain in her LLE similar to when she was admitted with the cellulitis. PCP: Dr. Flores.    ER Course: Afebrile, /101 HR 94, RR 18, 96% on RA. EKG unavailable for my review. Labs: BUN 54, crearinine 1.94, GFR 24.BNP 98. Troponin 12. D-dimer 12,657. Covid-19/influenza/RSV negative. Blood gas: pH 7.38, pCO2 46, pO2 50, sO2 78. See imaging results below. Duplex BLE ordered. Unable to do CTA chest for PE due to PALLAVI. Heparin infusion started in ED. Patient will be admitted under the care of Dr. Flores who will continue to follow. I was asked to H&P and place initial admission orders.      Past Medical History  Past Medical History:   Diagnosis Date    H/O heart artery stent     H/O shoulder surgery     PE (pulmonary thromboembolism) (Multi)    As above    Surgical History  As above     Social History  She reports that she has quit smoking. Her smoking use included cigarettes. She has never been exposed to tobacco smoke. She has never used smokeless tobacco. She reports that she does not currently  "use alcohol. She reports that she does not use drugs. Lives alone. Ambulates with walker.    Family History  No family history on file.     Allergies  Patient has no known allergies.    Review of Systems  10 point review of systems negative except as noted above.    Physical Exam  Constitutional:       Appearance: Normal appearance.      Comments: Pleasant elderly female   HENT:      Head: Normocephalic and atraumatic.      Nose: Nose normal.      Mouth/Throat:      Mouth: Mucous membranes are moist.      Pharynx: Oropharynx is clear.   Eyes:      Extraocular Movements: Extraocular movements intact.      Conjunctiva/sclera: Conjunctivae normal.      Pupils: Pupils are equal, round, and reactive to light.   Cardiovascular:      Rate and Rhythm: Regular rhythm. Bradycardia present.      Pulses: Normal pulses.      Heart sounds: Normal heart sounds.   Pulmonary:      Effort: Pulmonary effort is normal.      Breath sounds: Normal breath sounds.   Abdominal:      General: Abdomen is flat. Bowel sounds are normal.      Palpations: Abdomen is soft.   Musculoskeletal:         General: Normal range of motion.   Skin:     General: Skin is warm and dry.      Capillary Refill: Capillary refill takes less than 2 seconds.      Comments: Dry skin to LLE with rash from previous cellulitis admission   Neurological:      General: No focal deficit present.      Mental Status: She is alert and oriented to person, place, and time.   Psychiatric:         Mood and Affect: Mood normal.         Behavior: Behavior normal.         Thought Content: Thought content normal.         Judgment: Judgment normal.          Last Recorded Vitals  Blood pressure 113/56, pulse 58, temperature 36.2 °C (97.2 °F), temperature source Temporal, resp. rate 18, height 1.6 m (5' 3\"), weight 56.7 kg (125 lb), SpO2 94%.    Relevant Results  Results for orders placed or performed during the hospital encounter of 02/27/25 (from the past 24 hours)   ECG 12 Lead "   Result Value Ref Range    Ventricular Rate 90 BPM    Atrial Rate 90 BPM    TX Interval 208 ms    QRS Duration 76 ms    QT Interval 356 ms    QTC Calculation(Bazett) 435 ms    P Axis 75 degrees    R Axis -81 degrees    T Axis 67 degrees    QRS Count 15 beats    Q Onset 212 ms    T Offset 390 ms    QTC Fredericia 407 ms   CBC and Auto Differential   Result Value Ref Range    WBC 6.5 4.4 - 11.3 x10*3/uL    nRBC 0.0 0.0 - 0.0 /100 WBCs    RBC 4.49 4.00 - 5.20 x10*6/uL    Hemoglobin 12.1 12.0 - 16.0 g/dL    Hematocrit 39.1 36.0 - 46.0 %    MCV 87 80 - 100 fL    MCH 26.9 26.0 - 34.0 pg    MCHC 30.9 (L) 32.0 - 36.0 g/dL    RDW 14.8 (H) 11.5 - 14.5 %    Platelets 186 150 - 450 x10*3/uL    Neutrophils % 52.9 40.0 - 80.0 %    Immature Granulocytes %, Automated 0.2 0.0 - 0.9 %    Lymphocytes % 32.3 13.0 - 44.0 %    Monocytes % 11.2 2.0 - 10.0 %    Eosinophils % 2.6 0.0 - 6.0 %    Basophils % 0.8 0.0 - 2.0 %    Neutrophils Absolute 3.44 1.60 - 5.50 x10*3/uL    Immature Granulocytes Absolute, Automated 0.01 0.00 - 0.50 x10*3/uL    Lymphocytes Absolute 2.10 0.80 - 3.00 x10*3/uL    Monocytes Absolute 0.73 0.05 - 0.80 x10*3/uL    Eosinophils Absolute 0.17 0.00 - 0.40 x10*3/uL    Basophils Absolute 0.05 0.00 - 0.10 x10*3/uL   Comprehensive Metabolic Panel   Result Value Ref Range    Glucose 79 74 - 99 mg/dL    Sodium 136 136 - 145 mmol/L    Potassium 4.9 3.5 - 5.3 mmol/L    Chloride 102 98 - 107 mmol/L    Bicarbonate 25 21 - 32 mmol/L    Anion Gap 14 10 - 20 mmol/L    Urea Nitrogen 54 (H) 6 - 23 mg/dL    Creatinine 1.94 (H) 0.50 - 1.05 mg/dL    eGFR 24 (L) >60 mL/min/1.73m*2    Calcium 9.1 8.6 - 10.3 mg/dL    Albumin 4.1 3.4 - 5.0 g/dL    Alkaline Phosphatase 89 33 - 136 U/L    Total Protein 7.1 6.4 - 8.2 g/dL    AST 21 9 - 39 U/L    Bilirubin, Total 0.3 0.0 - 1.2 mg/dL    ALT 12 7 - 45 U/L   B-Type Natriuretic Peptide   Result Value Ref Range    BNP 98 0 - 99 pg/mL   Troponin I, High Sensitivity   Result Value Ref Range     Troponin I, High Sensitivity 12 0 - 13 ng/L   Magnesium   Result Value Ref Range    Magnesium 2.61 (H) 1.60 - 2.40 mg/dL   Blood Gas Venous Full Panel   Result Value Ref Range    POCT pH, Venous 7.38 7.33 - 7.43 pH    POCT pCO2, Venous 46 41 - 51 mm Hg    POCT pO2, Venous 50 (H) 35 - 45 mm Hg    POCT SO2, Venous 78 (H) 45 - 75 %    POCT Oxy Hemoglobin, Venous 76.2 (H) 45.0 - 75.0 %    POCT Hematocrit Calculated, Venous 39.0 36.0 - 46.0 %    POCT Sodium, Venous 135 (L) 136 - 145 mmol/L    POCT Potassium, Venous 5.0 3.5 - 5.3 mmol/L    POCT Chloride, Venous 101 98 - 107 mmol/L    POCT Ionized Calicum, Venous 1.19 1.10 - 1.33 mmol/L    POCT Glucose, Venous 75 74 - 99 mg/dL    POCT Lactate, Venous 1.0 0.4 - 2.0 mmol/L    POCT Base Excess, Venous 1.5 -2.0 - 3.0 mmol/L    POCT HCO3 Calculated, Venous 27.2 (H) 22.0 - 26.0 mmol/L    POCT Hemoglobin, Venous 12.9 12.0 - 16.0 g/dL    POCT Anion Gap, Venous 12.0 10.0 - 25.0 mmol/L    Patient Temperature 37.0 degrees Celsius    FiO2 21 %   Influenza A, and B PCR   Result Value Ref Range    Flu A Result Not Detected Not Detected    Flu B Result Not Detected Not Detected   RSV PCR   Result Value Ref Range    RSV PCR Not Detected Not Detected   Sars-CoV-2 PCR   Result Value Ref Range    Coronavirus 2019, PCR Not Detected Not Detected   D-dimer, quantitative   Result Value Ref Range    D-Dimer Non VTE, Quant (ng/mL FEU) 12,657 (H) <=500 ng/mL FEU   CBC   Result Value Ref Range    WBC 6.4 4.4 - 11.3 x10*3/uL    nRBC 0.0 0.0 - 0.0 /100 WBCs    RBC 3.82 (L) 4.00 - 5.20 x10*6/uL    Hemoglobin 10.3 (L) 12.0 - 16.0 g/dL    Hematocrit 33.1 (L) 36.0 - 46.0 %    MCV 87 80 - 100 fL    MCH 27.0 26.0 - 34.0 pg    MCHC 31.1 (L) 32.0 - 36.0 g/dL    RDW 14.8 (H) 11.5 - 14.5 %    Platelets 140 (L) 150 - 450 x10*3/uL   Basic metabolic panel   Result Value Ref Range    Glucose 151 (H) 74 - 99 mg/dL    Sodium 138 136 - 145 mmol/L    Potassium 4.3 3.5 - 5.3 mmol/L    Chloride 103 98 - 107 mmol/L     Bicarbonate 24 21 - 32 mmol/L    Anion Gap 15 10 - 20 mmol/L    Urea Nitrogen 54 (H) 6 - 23 mg/dL    Creatinine 2.16 (H) 0.50 - 1.05 mg/dL    eGFR 21 (L) >60 mL/min/1.73m*2    Calcium 8.4 (L) 8.6 - 10.3 mg/dL   Heparin Assay, UFH   Result Value Ref Range    Heparin Unfractionated 1.3 (HH) See Comment Below for Therapeutic Ranges IU/mL   Urinalysis with Reflex Culture and Microscopic   Result Value Ref Range    Color, Urine Light-Yellow Light-Yellow, Yellow, Dark-Yellow    Appearance, Urine Clear Clear    Specific Gravity, Urine 1.017 1.005 - 1.035    pH, Urine 5.0 5.0, 5.5, 6.0, 6.5, 7.0, 7.5, 8.0    Protein, Urine NEGATIVE NEGATIVE, 10 (TRACE), 20 (TRACE) mg/dL    Glucose, Urine Normal Normal mg/dL    Blood, Urine NEGATIVE NEGATIVE mg/dL    Ketones, Urine NEGATIVE NEGATIVE mg/dL    Bilirubin, Urine NEGATIVE NEGATIVE mg/dL    Urobilinogen, Urine Normal Normal mg/dL    Nitrite, Urine NEGATIVE NEGATIVE    Leukocyte Esterase, Urine 500 Yaron/uL (A) NEGATIVE   Microscopic Only, Urine   Result Value Ref Range    WBC, Urine 11-20 (A) 1-5, NONE /HPF    RBC, Urine 1-2 NONE, 1-2, 3-5 /HPF    Squamous Epithelial Cells, Urine 1-9 (SPARSE) Reference range not established. /HPF    Transitional Epithelial Cells, Urine 1-2 (FEW) Reference range not established. /HPF    Bacteria, Urine 1+ (A) NONE SEEN /HPF    Mucus, Urine FEW Reference range not established. /LPF   Heparin Assay, UFH   Result Value Ref Range    Heparin Unfractionated 0.8 See Comment Below for Therapeutic Ranges IU/mL     Vascular US lower extremity venous duplex bilateral    Result Date: 2/28/2025  Interpreted By:  Mak Vázquez, STUDY: [facility] US LOWER EXTREMITY VENOUS DUPLEX BILATERAL  2/28/2025 1:50 am   INDICATION: 89 y/o   F with  Signs/Symptoms:concern for dvt. LMP:  Unknown.       COMPARISON: None.   ACCESSION NUMBER(S): [id number]   ORDERING CLINICIAN: TONE LUEVANO   TECHNIQUE: Routine ultrasound of the  bilateral lower extremity was performed with duplex  Doppler (color and spectral) evaluation.   Static images were obtained for remote interpretation.   FINDINGS: THIGH VEINS:  The common femoral, femoral, popliteal, proximal medial saphenous, and deep femoral veins are patent and free of thrombus. The veins are normally compressible.  They demonstrate normal phasic flow and augmentation response.   CALF VEINS:  The paired peroneal and posterior tibial calf veins are patent. Please note the left calf veins not well visualized.       Negative study.  No deep venous thrombosis of the  bilateral lower extremity.   MACRO: None   Signed by: Mak Vázquez 2/28/2025 2:19 AM Dictation workstation:   [id number]    CT chest wo IV contrast    Result Date: 2/28/2025  Interpreted By:  Finkelstein, Evan, STUDY: CT CHEST WO IV CONTRAST;  2/28/2025 12:26 am   INDICATION: Signs/Symptoms:Persistent short of breath, previous history of PE several months ago, stopped anticoagulants 1 month ago.     COMPARISON: CT chest 01/07/2025   ACCESSION NUMBER(S): [id number]   ORDERING CLINICIAN: OSWALDO MONTES DE OCA   TECHNIQUE: Axial CT images of the chest obtained  without IV contrast.  Sagittal and coronal reconstructions were created..   FINDINGS: CHEST WALL AND LOWER NECK: Within normal limits. UPPER ABDOMEN: No acute abnormality of the partially visualized abdomen.   VESSELS: Aorta and pulmonary artery are normal caliber. Atherosclerotic calcifications in the aorta and coronary arteries. Partially imaged abdominal aorta stent. HEART: Normal size. No pericardial effusion. MEDIASTINUM AND UCHE: Calcified hilar lymph nodes. Opacities in the lingula and left lower lobe favored to represent atelectasis. LUNG, PLEURA, AND LARGE AIRWAYS: Emphysematous changes throughout the lungs. Redemonstrated calcified nodule in the left upper lobe measuring up to 1.1 cm.   BONES: No acute osseous abnormality. Incompletely visualized left shoulder arthroplasty hardware.       Opacities in the lingula and left lower  lobe are favored to represent atelectasis. Persistent emphysematous changes throughout the lungs.   MACRO: None.   Signed by: Evan Finkelstein 2/28/2025 12:47 AM Dictation workstation:   [id number]    XR chest 2 views    Result Date: 2/27/2025  Interpreted By:  Mak Vázquez, STUDY: XR CHEST 2 VIEWS;  2/27/2025 6:54 pm   INDICATION: Signs/Symptoms:Shortness of breath.     COMPARISON: 01/19/2025   ACCESSION NUMBER(S): [id number]   ORDERING CLINICIAN: OSWALDO MONTES DE OCA   FINDINGS: Hyperinflation. Heart size is enlarged. Vascular calcification. No localizing infiltrate. Vascular stent. Small amount of fluid seen in the left fissure. Left upper lobe calcified granuloma. No pneumothorax trace left pleural effusion.       1.  Features suggesting mild edema. Senescent changes.       MACRO: None   Signed by: Mak Vázquez 2/27/2025 7:02 PM Dictation workstation:   [id number]        Assessment/Plan   Assessment & Plan  Shortness of breath    PALLAVI (acute kidney injury) (CMS-HCC)    Elevated d-dimer    89 year old female who presented to ED today with dyspnea with exertion for a week. Concern for PE. VQ scan ordered. Heparin infusion initiated in ED. Patient to be admitted to hospital for further medical management.      Rule out PE  Elevated d-dimer  Dyspnea with exertion  Hx of PE and DVT  Admit to inpatient/telemetry to Dr. Flores   Defer consults to attending pending clinical course per request  See imaging results above   VQ scan ordered  Duplex ultrasound BLE negative for DVT  Continue Heparin infusion   Oxygen as needed to maintain sats>92%  Repeat labs in AM     PALLAVI on CKD  UA ordered  Hold nephrotoxic medications  Continue to monitor labs     Chronic conditions   #diastolic heart failure, HLD, HTN, AAA, PAD, ITP, anxiety, neuropathic pain  Continue home medications as appropriate when nursing completes home med rec   DNAR/DNI     DVT prophylaxis   Continue heparin infusion   SCD's as tolerated   Patient fully  evaluated on February 28.  Pulmonary consultation obtained.  Awaiting workup for respiratory issues more likely may be an early pneumonia.  Pulmonary consultation obtained.  Recheck labs in AM.  I spent 60 minutes in the professional and overall care of this patient.    Bahman Flores MD

## 2025-02-28 NOTE — CONSULTS
Assessment and Plan  Patient is 89 y.o. female  with the following medical Problems:  Acute hypoxic respiratory failure requiring supplemental oxygen.  History of  PE (off anticoagulation)(October 2, 2024)  Exertional dyspnea.  Atelectasis.    Plan of Care:  Patient's VQ scan was intermediate for PE however patient had a recent history of PE in October 2024.  Likely patient would require lifelong anticoagulation however risk and benefit will be evaluated.  It is unclear why anticoagulation was discontinued in the past.  Continue with heparin drip and monitor kidney function with hydration.  Ceftriaxone for UTI.  Protonix for GI prophylaxis.  Incentive spirometer.  Will evaluate the possibility of getting CTA after hydration.  Bilateral lower extremities ultrasound ruled out DVTs.      History of Present Illness:   Estefanía Contreras is a 89 y.o. female with a past medical history of HFpEF (EF 62% 1/6/25), HLD, HTN, CKD3b, AAA (s/p repair), PAD, ITP, and provoked PE (left hip fracture, completed 3 months of apixaban, on 2-3L NC PRN), anxiety, who presented to [facility] ED today from home with shortness of breath. Patient has shortness of breath with exertion for about a week. She was able to wean herself off oxygen at home after her PE, but has been using the oxygen again this week which has helped her shortness of breath. She is not currently on anticoagulation. States she has a mild nonproductive cough. Denies chest pain, fever, chills, abdominal pain, nausea, vomiting, urinary symptoms, diarrhea, or constipation. States she has slight edema of lower extremities. She called her PCP who recommended her to be evaluated in ED. She was admitted to this hospital on 1/14/25 with left leg cellulitis. States she still has pain in her LLE similar to when she was admitted with the cellulitis.      ER Course: Afebrile, /101 HR 94, RR 18, 96% on RA. EKG unavailable for my review. Labs: BUN 54, crearinine 1.94, GFR 24.BNP 98.  Troponin 12. D-dimer 12,657. Covid-19/influenza/RSV negative. Blood gas: pH 7.38, pCO2 46, pO2 50, sO2 78. See imaging results below. Duplex BLE ordered. Unable to do CTA chest for PE due to PALLAVI. Heparin infusion started in ED.     Past Medical/Surgical History:   Past Medical History:   Diagnosis Date    H/O heart artery stent     H/O shoulder surgery     PE (pulmonary thromboembolism) (Multi)      History reviewed. No pertinent surgical history.    Family History:   No relevant family history has been documented for this patient.    Allergies:     No Known Allergies      Social history:   reports that she has quit smoking. Her smoking use included cigarettes. She has never been exposed to tobacco smoke. She has never used smokeless tobacco. She reports that she does not currently use alcohol. She reports that she does not use drugs.    Current Medications:   No recently discontinued medications to reconcile    Review of Systems:   General: denies weight gain, denies loss of appetite, fever, chills, night sweats.  HEENT: denies headaches, dizziness, head trauma, visual changes, eye pain, tinnitus, nosebleeds, hoarseness or throat pain    Respiratory: denies chest pain, +ve dyspnea, cough but no hemoptysis  Cardiovascular: denies orthopnea, paroxysmal nocturnal dyspnea, leg swelling, and previous heart attack.    Gastrointestinal: denies pain, nausea vomiting, diarrhea, constipation, melena or bleeding.  Genitourinary: denies hematuria, frequency, urgency or dysuria  Neurology: denies syncope, seizures, paralysis, paraesthesia   Endocrine: denies polyuria, polydipsia, skin or hair changes, and heat or cold intolerance  Musculoskeletal: denies joint pain, swelling, arthritis or myalgia  Hematologic: denies bleeding, adenopathy and easy bruising  Skin: denies rashes and skin discoloration  Psychiatry: denies depression    Physical Exam:   Vital Signs:   Vitals:    02/28/25 1430   BP: 113/56   Pulse: 58   Resp: 18    Temp:    SpO2: 94%       Input/Output:  No intake or output data in the 24 hours ending 02/28/25 1445    Oxygen requirements:    Ventilator Information:             General appearance: Ill looking, Not in pain but in mild   respiratory distress    HEENT: Atraumatic/normocephalic, EOMI, BRAYAN, pharynx clear, moist mucosa, redness of the uvula appreciated,   Neck: Supple, no jugular venous distension, lymphadenopathy, thyromegaly or carotid bruits  Chest: Equal normal breath sounds, no wheezing, no crackles and no tenderness over ribs   Cardiovascular: Normal S1, S2, regular rate and rhythm, no murmur, rub or gallop  Abdomen: Normal sounds present, soft, lax with no tenderness, no hepatosplenomegaly, and no masses  Extremities: No edema. Pulses are equally present.   Skin: intact, no rashes   Neurologic: Alert and oriented x 3, No focal deficit     Investigations:  Labs, radiological imaging and cardiac work up were personally reviewed          .       STAFF PHYSICIAN NOTE OF PERSONAL INVOLVEMENT IN CARE  As the attending physician, I certify that I personally reviewed the patient's history and personally examined the patient to confirm the physical findings described above, and that I reviewed the relevant imaging studies and available reports.  I also discussed the differential diagnosis and all of the proposed management plans with the patient and individuals accompanying the patient to this visit.  They had the opportunity to ask questions about the proposed management plans and to have those questions answered.

## 2025-02-28 NOTE — H&P
History Of Present Illness  Estefanía Contreras is a 89 y.o. female with a past medical history of HFpEF (EF 62% 1/6/25), HLD, HTN, CKD3b, AAA (s/p repair), PAD, ITP, and provoked PE (left hip fracture, completed 3 months of apixaban, on 2-3L NC PRN), anxiety, who presented to [facility] ED today from home with shortness of breath. Patient has shortness of breath with exertion for about a week. She was able to wean herself off oxygen at home after her PE, but has been using the oxygen again this week which has helped her shortness of breath. She is not currently on anticoagulation. States she has a mild nonproductive cough. Denies chest pain, fever, chills, abdominal pain, nausea, vomiting, urinary symptoms, diarrhea, or constipation. States she has slight edema of lower extremities. She called her PCP who recommended her to be evaluated in ED. She was admitted to this hospital on 1/14/25 with left leg cellulitis. States she still has pain in her LLE similar to when she was admitted with the cellulitis. PCP: Dr. Flores.    ER Course: Afebrile, /101 HR 94, RR 18, 96% on RA. EKG unavailable for my review. Labs: BUN 54, crearinine 1.94, GFR 24.BNP 98. Troponin 12. D-dimer 12,657. Covid-19/influenza/RSV negative. Blood gas: pH 7.38, pCO2 46, pO2 50, sO2 78. See imaging results below. Duplex BLE ordered. Unable to do CTA chest for PE due to PALLAVI. Heparin infusion started in ED. Patient will be admitted under the care of Dr. Flores who will continue to follow. I was asked to H&P and place initial admission orders.      Past Medical History  Past Medical History:   Diagnosis Date    H/O heart artery stent     H/O shoulder surgery     PE (pulmonary thromboembolism) (Multi)    As above    Surgical History  As above     Social History  She reports that she has quit smoking. Her smoking use included cigarettes. She has never been exposed to tobacco smoke. She has never used smokeless tobacco. She reports that she does not currently  "use alcohol. She reports that she does not use drugs. Lives alone. Ambulates with walker.    Family History  No family history on file.     Allergies  Patient has no known allergies.    Review of Systems  10 point review of systems negative except as noted above.    Physical Exam  Constitutional:       Appearance: Normal appearance.      Comments: Pleasant elderly female   HENT:      Head: Normocephalic and atraumatic.      Nose: Nose normal.      Mouth/Throat:      Mouth: Mucous membranes are moist.      Pharynx: Oropharynx is clear.   Eyes:      Extraocular Movements: Extraocular movements intact.      Conjunctiva/sclera: Conjunctivae normal.      Pupils: Pupils are equal, round, and reactive to light.   Cardiovascular:      Rate and Rhythm: Regular rhythm. Bradycardia present.      Pulses: Normal pulses.      Heart sounds: Normal heart sounds.   Pulmonary:      Effort: Pulmonary effort is normal.      Breath sounds: Normal breath sounds.   Abdominal:      General: Abdomen is flat. Bowel sounds are normal.      Palpations: Abdomen is soft.   Musculoskeletal:         General: Normal range of motion.   Skin:     General: Skin is warm and dry.      Capillary Refill: Capillary refill takes less than 2 seconds.      Comments: Dry skin to LLE with rash from previous cellulitis admission   Neurological:      General: No focal deficit present.      Mental Status: She is alert and oriented to person, place, and time.   Psychiatric:         Mood and Affect: Mood normal.         Behavior: Behavior normal.         Thought Content: Thought content normal.         Judgment: Judgment normal.          Last Recorded Vitals  Blood pressure 144/63, pulse 59, temperature 36.2 °C (97.2 °F), temperature source Temporal, resp. rate 17, height 1.6 m (5' 3\"), weight 56.7 kg (125 lb), SpO2 (!) 93%.    Relevant Results  Results for orders placed or performed during the hospital encounter of 02/27/25 (from the past 24 hours)   CBC and Auto " Differential   Result Value Ref Range    WBC 6.5 4.4 - 11.3 x10*3/uL    nRBC 0.0 0.0 - 0.0 /100 WBCs    RBC 4.49 4.00 - 5.20 x10*6/uL    Hemoglobin 12.1 12.0 - 16.0 g/dL    Hematocrit 39.1 36.0 - 46.0 %    MCV 87 80 - 100 fL    MCH 26.9 26.0 - 34.0 pg    MCHC 30.9 (L) 32.0 - 36.0 g/dL    RDW 14.8 (H) 11.5 - 14.5 %    Platelets 186 150 - 450 x10*3/uL    Neutrophils % 52.9 40.0 - 80.0 %    Immature Granulocytes %, Automated 0.2 0.0 - 0.9 %    Lymphocytes % 32.3 13.0 - 44.0 %    Monocytes % 11.2 2.0 - 10.0 %    Eosinophils % 2.6 0.0 - 6.0 %    Basophils % 0.8 0.0 - 2.0 %    Neutrophils Absolute 3.44 1.60 - 5.50 x10*3/uL    Immature Granulocytes Absolute, Automated 0.01 0.00 - 0.50 x10*3/uL    Lymphocytes Absolute 2.10 0.80 - 3.00 x10*3/uL    Monocytes Absolute 0.73 0.05 - 0.80 x10*3/uL    Eosinophils Absolute 0.17 0.00 - 0.40 x10*3/uL    Basophils Absolute 0.05 0.00 - 0.10 x10*3/uL   Comprehensive Metabolic Panel   Result Value Ref Range    Glucose 79 74 - 99 mg/dL    Sodium 136 136 - 145 mmol/L    Potassium 4.9 3.5 - 5.3 mmol/L    Chloride 102 98 - 107 mmol/L    Bicarbonate 25 21 - 32 mmol/L    Anion Gap 14 10 - 20 mmol/L    Urea Nitrogen 54 (H) 6 - 23 mg/dL    Creatinine 1.94 (H) 0.50 - 1.05 mg/dL    eGFR 24 (L) >60 mL/min/1.73m*2    Calcium 9.1 8.6 - 10.3 mg/dL    Albumin 4.1 3.4 - 5.0 g/dL    Alkaline Phosphatase 89 33 - 136 U/L    Total Protein 7.1 6.4 - 8.2 g/dL    AST 21 9 - 39 U/L    Bilirubin, Total 0.3 0.0 - 1.2 mg/dL    ALT 12 7 - 45 U/L   B-Type Natriuretic Peptide   Result Value Ref Range    BNP 98 0 - 99 pg/mL   Troponin I, High Sensitivity   Result Value Ref Range    Troponin I, High Sensitivity 12 0 - 13 ng/L   Magnesium   Result Value Ref Range    Magnesium 2.61 (H) 1.60 - 2.40 mg/dL   Blood Gas Venous Full Panel   Result Value Ref Range    POCT pH, Venous 7.38 7.33 - 7.43 pH    POCT pCO2, Venous 46 41 - 51 mm Hg    POCT pO2, Venous 50 (H) 35 - 45 mm Hg    POCT SO2, Venous 78 (H) 45 - 75 %    POCT Oxy  Hemoglobin, Venous 76.2 (H) 45.0 - 75.0 %    POCT Hematocrit Calculated, Venous 39.0 36.0 - 46.0 %    POCT Sodium, Venous 135 (L) 136 - 145 mmol/L    POCT Potassium, Venous 5.0 3.5 - 5.3 mmol/L    POCT Chloride, Venous 101 98 - 107 mmol/L    POCT Ionized Calicum, Venous 1.19 1.10 - 1.33 mmol/L    POCT Glucose, Venous 75 74 - 99 mg/dL    POCT Lactate, Venous 1.0 0.4 - 2.0 mmol/L    POCT Base Excess, Venous 1.5 -2.0 - 3.0 mmol/L    POCT HCO3 Calculated, Venous 27.2 (H) 22.0 - 26.0 mmol/L    POCT Hemoglobin, Venous 12.9 12.0 - 16.0 g/dL    POCT Anion Gap, Venous 12.0 10.0 - 25.0 mmol/L    Patient Temperature 37.0 degrees Celsius    FiO2 21 %   Influenza A, and B PCR   Result Value Ref Range    Flu A Result Not Detected Not Detected    Flu B Result Not Detected Not Detected   RSV PCR   Result Value Ref Range    RSV PCR Not Detected Not Detected   Sars-CoV-2 PCR   Result Value Ref Range    Coronavirus 2019, PCR Not Detected Not Detected   D-dimer, quantitative   Result Value Ref Range    D-Dimer Non VTE, Quant (ng/mL FEU) 12,657 (H) <=500 ng/mL FEU     Vascular US lower extremity venous duplex bilateral    Result Date: 2/28/2025  Interpreted By:  Mak Vázquez, STUDY: [facility] US LOWER EXTREMITY VENOUS DUPLEX BILATERAL  2/28/2025 1:50 am   INDICATION: 89 y/o   F with  Signs/Symptoms:concern for dvt. LMP:  Unknown.       COMPARISON: None.   ACCESSION NUMBER(S): [id number]   ORDERING CLINICIAN: TONE LUEVANO   TECHNIQUE: Routine ultrasound of the  bilateral lower extremity was performed with duplex Doppler (color and spectral) evaluation.   Static images were obtained for remote interpretation.   FINDINGS: THIGH VEINS:  The common femoral, femoral, popliteal, proximal medial saphenous, and deep femoral veins are patent and free of thrombus. The veins are normally compressible.  They demonstrate normal phasic flow and augmentation response.   CALF VEINS:  The paired peroneal and posterior tibial calf veins are patent. Please  note the left calf veins not well visualized.       Negative study.  No deep venous thrombosis of the  bilateral lower extremity.   MACRO: None   Signed by: Mak Vázquez 2/28/2025 2:19 AM Dictation workstation:   [id number]    CT chest wo IV contrast    Result Date: 2/28/2025  Interpreted By:  Finkelstein, Evan, STUDY: CT CHEST WO IV CONTRAST;  2/28/2025 12:26 am   INDICATION: Signs/Symptoms:Persistent short of breath, previous history of PE several months ago, stopped anticoagulants 1 month ago.     COMPARISON: CT chest 01/07/2025   ACCESSION NUMBER(S): [id number]   ORDERING CLINICIAN: OSWALDO MONTES DE OCA   TECHNIQUE: Axial CT images of the chest obtained  without IV contrast.  Sagittal and coronal reconstructions were created..   FINDINGS: CHEST WALL AND LOWER NECK: Within normal limits. UPPER ABDOMEN: No acute abnormality of the partially visualized abdomen.   VESSELS: Aorta and pulmonary artery are normal caliber. Atherosclerotic calcifications in the aorta and coronary arteries. Partially imaged abdominal aorta stent. HEART: Normal size. No pericardial effusion. MEDIASTINUM AND UCHE: Calcified hilar lymph nodes. Opacities in the lingula and left lower lobe favored to represent atelectasis. LUNG, PLEURA, AND LARGE AIRWAYS: Emphysematous changes throughout the lungs. Redemonstrated calcified nodule in the left upper lobe measuring up to 1.1 cm.   BONES: No acute osseous abnormality. Incompletely visualized left shoulder arthroplasty hardware.       Opacities in the lingula and left lower lobe are favored to represent atelectasis. Persistent emphysematous changes throughout the lungs.   MACRO: None.   Signed by: Evan Finkelstein 2/28/2025 12:47 AM Dictation workstation:   [id number]    XR chest 2 views    Result Date: 2/27/2025  Interpreted By:  Mak Vázquez, STUDY: XR CHEST 2 VIEWS;  2/27/2025 6:54 pm   INDICATION: Signs/Symptoms:Shortness of breath.     COMPARISON: 01/19/2025   ACCESSION NUMBER(S):  [id number]   ORDERING CLINICIAN: OSWALDO MONTES DE OCA   FINDINGS: Hyperinflation. Heart size is enlarged. Vascular calcification. No localizing infiltrate. Vascular stent. Small amount of fluid seen in the left fissure. Left upper lobe calcified granuloma. No pneumothorax trace left pleural effusion.       1.  Features suggesting mild edema. Senescent changes.       MACRO: None   Signed by: Mak Vázquez 2/27/2025 7:02 PM Dictation workstation:   [id number]        Assessment/Plan   Assessment & Plan  Shortness of breath    PALLAVI (acute kidney injury) (CMS-HCC)    Elevated d-dimer    89 year old female who presented to ED today with dyspnea with exertion for a week. Concern for PE. VQ scan ordered. Heparin infusion initiated in ED. Patient to be admitted to hospital for further medical management.      Rule out PE  Elevated d-dimer  Dyspnea with exertion  Hx of PE and DVT  Admit to inpatient/telemetry to Dr. Flores   Defer consults to attending pending clinical course per request  See imaging results above   VQ scan ordered  Duplex ultrasound BLE negative for DVT  Continue Heparin infusion   Oxygen as needed to maintain sats>92%  Repeat labs in AM     PALLAVI on CKD  UA ordered  Hold nephrotoxic medications  Continue to monitor labs     Chronic conditions   #diastolic heart failure, HLD, HTN, AAA, PAD, ITP, anxiety, neuropathic pain  Continue home medications as appropriate when nursing completes home med rec   DNAR/DNI     DVT prophylaxis   Continue heparin infusion   SCD's as tolerated     I spent 40 minutes in the professional and overall care of this patient.    Arianna Sorensen, APRN-CNP

## 2025-03-01 PROBLEM — Z86.711 HISTORY OF PULMONARY EMBOLISM: Status: ACTIVE | Noted: 2025-03-01

## 2025-03-01 LAB
ANION GAP SERPL CALC-SCNC: 9 MMOL/L (ref 10–20)
BACTERIA UR CULT: NORMAL
BUN SERPL-MCNC: 47 MG/DL (ref 6–23)
CALCIUM SERPL-MCNC: 8.1 MG/DL (ref 8.6–10.3)
CHLORIDE SERPL-SCNC: 106 MMOL/L (ref 98–107)
CO2 SERPL-SCNC: 27 MMOL/L (ref 21–32)
CREAT SERPL-MCNC: 1.58 MG/DL (ref 0.5–1.05)
EGFRCR SERPLBLD CKD-EPI 2021: 31 ML/MIN/1.73M*2
ERYTHROCYTE [DISTWIDTH] IN BLOOD BY AUTOMATED COUNT: 14.9 % (ref 11.5–14.5)
GLUCOSE SERPL-MCNC: 90 MG/DL (ref 74–99)
HCT VFR BLD AUTO: 32.9 % (ref 36–46)
HGB BLD-MCNC: 9.9 G/DL (ref 12–16)
HOLD SPECIMEN: NORMAL
MCH RBC QN AUTO: 27 PG (ref 26–34)
MCHC RBC AUTO-ENTMCNC: 30.1 G/DL (ref 32–36)
MCV RBC AUTO: 90 FL (ref 80–100)
NRBC BLD-RTO: 0 /100 WBCS (ref 0–0)
PLATELET # BLD AUTO: 138 X10*3/UL (ref 150–450)
POTASSIUM SERPL-SCNC: 4.4 MMOL/L (ref 3.5–5.3)
RBC # BLD AUTO: 3.67 X10*6/UL (ref 4–5.2)
SODIUM SERPL-SCNC: 138 MMOL/L (ref 136–145)
WBC # BLD AUTO: 4.4 X10*3/UL (ref 4.4–11.3)

## 2025-03-01 PROCEDURE — 2500000001 HC RX 250 WO HCPCS SELF ADMINISTERED DRUGS (ALT 637 FOR MEDICARE OP): Performed by: INTERNAL MEDICINE

## 2025-03-01 PROCEDURE — 1200000002 HC GENERAL ROOM WITH TELEMETRY DAILY

## 2025-03-01 PROCEDURE — 2500000004 HC RX 250 GENERAL PHARMACY W/ HCPCS (ALT 636 FOR OP/ED): Performed by: INTERNAL MEDICINE

## 2025-03-01 PROCEDURE — 85027 COMPLETE CBC AUTOMATED: CPT | Performed by: NURSE PRACTITIONER

## 2025-03-01 PROCEDURE — 80048 BASIC METABOLIC PNL TOTAL CA: CPT | Performed by: NURSE PRACTITIONER

## 2025-03-01 PROCEDURE — 2500000001 HC RX 250 WO HCPCS SELF ADMINISTERED DRUGS (ALT 637 FOR MEDICARE OP): Performed by: NURSE PRACTITIONER

## 2025-03-01 PROCEDURE — 2500000004 HC RX 250 GENERAL PHARMACY W/ HCPCS (ALT 636 FOR OP/ED)

## 2025-03-01 PROCEDURE — 2500000002 HC RX 250 W HCPCS SELF ADMINISTERED DRUGS (ALT 637 FOR MEDICARE OP, ALT 636 FOR OP/ED): Performed by: NURSE PRACTITIONER

## 2025-03-01 RX ORDER — AZELASTINE 1 MG/ML
2 SPRAY, METERED NASAL 2 TIMES DAILY PRN
Status: DISCONTINUED | OUTPATIENT
Start: 2025-03-01 | End: 2025-03-05 | Stop reason: HOSPADM

## 2025-03-01 RX ORDER — DULOXETIN HYDROCHLORIDE 30 MG/1
30 CAPSULE, DELAYED RELEASE ORAL DAILY
Status: DISCONTINUED | OUTPATIENT
Start: 2025-03-02 | End: 2025-03-05 | Stop reason: HOSPADM

## 2025-03-01 RX ORDER — TRAMADOL HYDROCHLORIDE 50 MG/1
50 TABLET ORAL DAILY PRN
Status: DISCONTINUED | OUTPATIENT
Start: 2025-03-01 | End: 2025-03-05 | Stop reason: HOSPADM

## 2025-03-01 RX ORDER — SODIUM CHLORIDE 9 MG/ML
75 INJECTION, SOLUTION INTRAVENOUS CONTINUOUS
Status: ACTIVE | OUTPATIENT
Start: 2025-03-01 | End: 2025-03-02

## 2025-03-01 RX ORDER — ACETAMINOPHEN 500 MG
5 TABLET ORAL NIGHTLY PRN
Status: DISCONTINUED | OUTPATIENT
Start: 2025-03-01 | End: 2025-03-05 | Stop reason: HOSPADM

## 2025-03-01 RX ORDER — POTASSIUM CHLORIDE 20 MEQ/1
20 TABLET, EXTENDED RELEASE ORAL EVERY MORNING
Status: DISCONTINUED | OUTPATIENT
Start: 2025-03-02 | End: 2025-03-05 | Stop reason: HOSPADM

## 2025-03-01 RX ADMIN — SODIUM CHLORIDE 75 ML/HR: 9 INJECTION, SOLUTION INTRAVENOUS at 13:53

## 2025-03-01 RX ADMIN — PRAMIPEXOLE DIHYDROCHLORIDE 1 MG: 1 TABLET ORAL at 20:35

## 2025-03-01 RX ADMIN — TRAMADOL HYDROCHLORIDE 50 MG: 50 TABLET, COATED ORAL at 21:56

## 2025-03-01 RX ADMIN — Medication 1 CAPSULE: at 20:39

## 2025-03-01 RX ADMIN — SIMVASTATIN 20 MG: 20 TABLET, FILM COATED ORAL at 20:35

## 2025-03-01 RX ADMIN — SODIUM CHLORIDE 75 ML/HR: 9 INJECTION, SOLUTION INTRAVENOUS at 23:21

## 2025-03-01 RX ADMIN — PRAMIPEXOLE DIHYDROCHLORIDE 1 MG: 1 TABLET ORAL at 00:00

## 2025-03-01 RX ADMIN — DILTIAZEM HYDROCHLORIDE 180 MG: 180 CAPSULE, COATED, EXTENDED RELEASE ORAL at 09:29

## 2025-03-01 RX ADMIN — Medication 5 MG: at 21:57

## 2025-03-01 RX ADMIN — SODIUM CHLORIDE 75 ML/HR: 9 INJECTION, SOLUTION INTRAVENOUS at 13:38

## 2025-03-01 RX ADMIN — APIXABAN 2.5 MG: 2.5 TABLET, FILM COATED ORAL at 20:35

## 2025-03-01 RX ADMIN — POLYETHYLENE GLYCOL 3350 17 G: 17 POWDER, FOR SOLUTION ORAL at 09:29

## 2025-03-01 RX ADMIN — APIXABAN 10 MG: 5 TABLET, FILM COATED ORAL at 09:29

## 2025-03-01 ASSESSMENT — COGNITIVE AND FUNCTIONAL STATUS - GENERAL
DRESSING REGULAR LOWER BODY CLOTHING: A LITTLE
TOILETING: A LITTLE
DRESSING REGULAR UPPER BODY CLOTHING: A LITTLE
STANDING UP FROM CHAIR USING ARMS: A LITTLE
HELP NEEDED FOR BATHING: A LITTLE
MOVING TO AND FROM BED TO CHAIR: A LITTLE
MOBILITY SCORE: 17
DRESSING REGULAR LOWER BODY CLOTHING: A LITTLE
DAILY ACTIVITIY SCORE: 20
MOBILITY SCORE: 17
PATIENT BASELINE BEDBOUND: NO
WALKING IN HOSPITAL ROOM: A LITTLE
MOVING TO AND FROM BED TO CHAIR: A LITTLE
TURNING FROM BACK TO SIDE WHILE IN FLAT BAD: A LITTLE
TOILETING: A LITTLE
STANDING UP FROM CHAIR USING ARMS: A LITTLE
MOVING FROM LYING ON BACK TO SITTING ON SIDE OF FLAT BED WITH BEDRAILS: A LITTLE
DAILY ACTIVITIY SCORE: 20
HELP NEEDED FOR BATHING: A LITTLE
DRESSING REGULAR UPPER BODY CLOTHING: A LITTLE
TURNING FROM BACK TO SIDE WHILE IN FLAT BAD: A LITTLE
CLIMB 3 TO 5 STEPS WITH RAILING: A LOT
WALKING IN HOSPITAL ROOM: A LITTLE
CLIMB 3 TO 5 STEPS WITH RAILING: A LOT
MOVING FROM LYING ON BACK TO SITTING ON SIDE OF FLAT BED WITH BEDRAILS: A LITTLE

## 2025-03-01 ASSESSMENT — ACTIVITIES OF DAILY LIVING (ADL)
HEARING - LEFT EAR: FUNCTIONAL
JUDGMENT_ADEQUATE_SAFELY_COMPLETE_DAILY_ACTIVITIES: YES
LACK_OF_TRANSPORTATION: NO
FEEDING YOURSELF: INDEPENDENT
WALKS IN HOME: INDEPENDENT
BATHING: INDEPENDENT
TOILETING: INDEPENDENT
ADEQUATE_TO_COMPLETE_ADL: YES
HEARING - RIGHT EAR: FUNCTIONAL
DRESSING YOURSELF: INDEPENDENT
GROOMING: INDEPENDENT
PATIENT'S MEMORY ADEQUATE TO SAFELY COMPLETE DAILY ACTIVITIES?: YES
ASSISTIVE_DEVICE: WALKER

## 2025-03-01 ASSESSMENT — PAIN DESCRIPTION - ORIENTATION: ORIENTATION: LEFT

## 2025-03-01 ASSESSMENT — PAIN SCALES - GENERAL
PAINLEVEL_OUTOF10: 2
PAINLEVEL_OUTOF10: 0 - NO PAIN
PAINLEVEL_OUTOF10: 8
PAINLEVEL_OUTOF10: 0 - NO PAIN

## 2025-03-01 ASSESSMENT — PAIN SCALES - WONG BAKER: WONGBAKER_NUMERICALRESPONSE: NO HURT

## 2025-03-01 ASSESSMENT — PAIN - FUNCTIONAL ASSESSMENT: PAIN_FUNCTIONAL_ASSESSMENT: 0-10

## 2025-03-01 ASSESSMENT — PAIN DESCRIPTION - LOCATION: LOCATION: HAND

## 2025-03-01 ASSESSMENT — PAIN DESCRIPTION - DESCRIPTORS: DESCRIPTORS: ACHING;BURNING

## 2025-03-01 NOTE — CARE PLAN
Over the shift, the patient did  make progress toward the following goals.       Problem: Safety - Adult  Goal: Free from fall injury  Outcome: Progressing

## 2025-03-01 NOTE — PROGRESS NOTES
Estefanía Contreras is a 89 y.o. female on day 0 of admission presenting with Shortness of breath.      Subjective   No events overnight.  Patient seen and examined at bedside.  She has no acute issues or complaints.         Objective     Last Recorded Vitals  /62 (BP Location: Left arm, Patient Position: Sitting)   Pulse 57   Temp 36.6 °C (97.9 °F) (Temporal)   Resp 16   Wt 56.7 kg (125 lb)   SpO2 95%   Intake/Output last 3 Shifts:    Intake/Output Summary (Last 24 hours) at 3/1/2025 1419  Last data filed at 3/1/2025 1042  Gross per 24 hour   Intake 1478.75 ml   Output 750 ml   Net 728.75 ml       Admission Weight  Weight: 56.7 kg (125 lb) (02/27/25 1824)    Daily Weight  02/27/25 : 56.7 kg (125 lb)    Image Results  ECG 12 Lead  Normal sinus rhythm with sinus arrhythmia  Left anterior fascicular block  Possible Inferior infarct , age undetermined  Possible Anterior infarct , age undetermined  Abnormal ECG  When compared with ECG of 14-JAN-2025 07:05,  Borderline criteria for Anterior infarct are now Present  Borderline criteria for Inferior infarct are now Present  See ED provider note for full interpretation and clinical correlation  Confirmed by Verónica Castanon ([id number]) on 2/28/2025 2:42:41 PM  NM Lung perfusion with spect  Narrative: Interpreted By:  Ky Boone and Hofer Lindsay   STUDY:  NM LUNG PERFUSION WITH SPECT;  2/28/2025 9:20 am      INDICATION:  Signs/Symptoms:rule out PE.      COMPARISON:  Chest x-ray from      ACCESSION NUMBER(S):  [id number]      ORDERING CLINICIAN:  ZHANG DYKES      TECHNIQUE:  DIVISION OF NUCLEAR MEDICINE  PERFUSION LUNG SCANS      Multiple perfusion images of the lungs were acquired after the  intravenous administration of 4.1 mCi of Tc-99m macroaggregated  albumin (MAA). In addition, SPECT of the chest was performed.      FINDINGS:  Significant heterogeneity throughout the bilateral lungs with several  segmental and non-segmental perfusion defects, particularly  within  the right lung.      Impression: Several segmental and non-segmental perfusion defects throughout the  bilateral lungs, particularly within the right lung. These findings  are intermediate to high probability for acute pulmonary embolism.  Recommend further evaluation and treatment.      The interpretation above is based on modified PIOPED II and PISAPED  criteria.      I personally reviewed the images/study and resident's interpretation  and I agree with the findings as stated by Pema Szymanski MD (resident  radiologist). This study was analyzed and interpreted at [facility], [place], [place].      Critical Finding:  See findings. Notification was initiated on  2/28/2025 at 10:05 am by  Pema Szymanski.  (**-YCF-**) Instructions:          Signed by: Ky Boone 2/28/2025 10:11 AM  Dictation workstation:   [id number]  Vascular US lower extremity venous duplex bilateral  Narrative: Interpreted By:  Mak Vázquez,   STUDY:  [facility] US LOWER EXTREMITY VENOUS DUPLEX BILATERAL  2/28/2025 1:50 am      INDICATION:  89 y/o   F with  Signs/Symptoms:concern for dvt. LMP:  Unknown.              COMPARISON:  None.      ACCESSION NUMBER(S):  [id number]      ORDERING CLINICIAN:  TONE LUEVANO      TECHNIQUE:  Routine ultrasound of the  bilateral lower extremity was performed  with duplex Doppler (color and spectral) evaluation.   Static images  were obtained for remote interpretation.      FINDINGS:  THIGH VEINS:  The common femoral, femoral, popliteal, proximal medial  saphenous, and deep femoral veins are patent and free of thrombus.  The veins are normally compressible.  They demonstrate normal phasic  flow and augmentation response.      CALF VEINS:  The paired peroneal and posterior tibial calf veins are  patent. Please note the left calf veins not well visualized.      Impression: Negative study.  No deep venous thrombosis of the  bilateral lower  extremity.      MACRO:  None       Signed by: Mak Vázquez 2/28/2025 2:19 AM  Dictation workstation:   [id number]  CT chest wo IV contrast  Narrative: Interpreted By:  Finkelstein, Evan,   STUDY:  CT CHEST WO IV CONTRAST;  2/28/2025 12:26 am      INDICATION:  Signs/Symptoms:Persistent short of breath, previous history of PE  several months ago, stopped anticoagulants 1 month ago.          COMPARISON:  CT chest 01/07/2025      ACCESSION NUMBER(S):  [id number]      ORDERING CLINICIAN:  OSWALDO MONTES DE OCA      TECHNIQUE:  Axial CT images of the chest obtained  without IV contrast.  Sagittal  and coronal reconstructions were created..      FINDINGS:  CHEST WALL AND LOWER NECK: Within normal limits.  UPPER ABDOMEN: No acute abnormality of the partially visualized  abdomen.      VESSELS: Aorta and pulmonary artery are normal caliber.  Atherosclerotic calcifications in the aorta and coronary arteries.  Partially imaged abdominal aorta stent. HEART: Normal size. No  pericardial effusion. MEDIASTINUM AND UCHE: Calcified hilar lymph  nodes. Opacities in the lingula and left lower lobe favored to  represent atelectasis. LUNG, PLEURA, AND LARGE AIRWAYS: Emphysematous  changes throughout the lungs. Redemonstrated calcified nodule in the  left upper lobe measuring up to 1.1 cm.      BONES: No acute osseous abnormality. Incompletely visualized left  shoulder arthroplasty hardware.      Impression: Opacities in the lingula and left lower lobe are favored to represent  atelectasis. Persistent emphysematous changes throughout the lungs.      MACRO:  None.      Signed by: Evan Finkelstein 2/28/2025 12:47 AM  Dictation workstation:   [id number]      Physical Exam  Physical Exam  Constitutional:       Appearance: Normal appearance.      Comments: Pleasant elderly female   HENT:      Head: Normocephalic and atraumatic.      Nose: Nose normal.      Mouth/Throat:      Mouth: Mucous membranes are moist.      Pharynx: Oropharynx is clear.   Eyes:      Extraocular  Movements: Extraocular movements intact.      Conjunctiva/sclera: Conjunctivae normal.      Pupils: Pupils are equal, round, and reactive to light.   Cardiovascular:      Rate and Rhythm: Regular rhythm. Bradycardia present.      Pulses: Normal pulses.      Heart sounds: Normal heart sounds.   Pulmonary:      Effort: Pulmonary effort is normal.      Breath sounds: Normal breath sounds.   Abdominal:      General: Abdomen is flat. Bowel sounds are normal.      Palpations: Abdomen is soft.   Musculoskeletal:         General: Normal range of motion.   Skin:     General: Skin is warm and dry.      Capillary Refill: Capillary refill takes less than 2 seconds.      Comments: Dry skin to LLE with rash from previous cellulitis admission   Neurological:      General: No focal deficit present.      Mental Status: She is alert and oriented to person, place, and time.   Psychiatric:         Mood and Affect: Mood normal.         Behavior: Behavior normal.         Thought Content: Thought content normal.         Judgment: Judgment normal.      Relevant Results               Assessment/Plan                  Assessment & Plan  Shortness of breath    PALLAVI (acute kidney injury) (CMS-[facility])    Elevated d-dimer    History of pulmonary embolism    89 year old female who presented to ED today with dyspnea with exertion for a week. Concern for PE. VQ scan ordered. Heparin infusion initiated in ED. Patient to be admitted to hospital for further medical management.       Rule out PE  Elevated d-dimer  Dyspnea with exertion  Hx of PE and DVT  Admit to inpatient/telemetry to Dr. Flores   Defer consults to attending pending clinical course per request  See imaging results above   VQ scan ordered  Duplex ultrasound BLE negative for DVT  Continue Heparin infusion   Oxygen as needed to maintain sats>92%  Repeat labs in AM      PALLAVI on CKD  UA ordered  Hold nephrotoxic medications  Continue to monitor labs     Chronic conditions   #diastolic heart  failure, HLD, HTN, AAA, PAD, ITP, anxiety, neuropathic pain  Continue home medications as appropriate when nursing completes home med rec   DNAR/DNI     DVT prophylaxis   Continue heparin infusion   SCD's as tolerated     3/1: VQ scan with intermediate probability of PE.  Pulmonology consulted.  Patient will require lifelong anticoagulation per pulmonology.  She has been placed on Eliquis 2.5 mg twice a day.  Creatinine improved from 2.1 down to 1.5.  Continue IV fluids and recheck labs tomorrow.  Urine culture is negative.  PT OT eval ordered.            Darnell Ruggiero, DO

## 2025-03-01 NOTE — CARE PLAN
The patient's goals for the shift include      The clinical goals for the shift include maintain patient safety    Over the shift, the patient did not make progress toward the following goals. Barriers to progression include SOB, oxygen usage, IV fluids. Recommendations to address these barriers include frequent rounding, call light use encouragement, bed/chair alarm.

## 2025-03-01 NOTE — PROGRESS NOTES
Assessment and Plan  Patient is 89 y.o. female  with the following medical Problems:  Acute hypoxic respiratory failure requiring supplemental oxygen.  History of  PE (off anticoagulation)(October 2, 2024)  Exertional dyspnea.  Atelectasis.    Plan of Care:  Patient's VQ scan was intermediate for PE however patient had a recent history of PE in October 2024.  Likely patient would require lifelong anticoagulation however risk and benefit will be evaluated.  It is unclear why anticoagulation was discontinued in the past.  Patient was switched to Eliquis 2.5 mg twice a day.  Continue with IV fluid.  CTA will be ordered once creatinine improves.  Protonix for GI prophylaxis.  Incentive spirometer.  Will evaluate the possibility of getting CTA after hydration.  Bilateral lower extremities ultrasound ruled out DVTs.      History of Present Illness:   Estefanía Contreras is a 89 y.o. female with a past medical history of HFpEF (EF 62% 1/6/25), HLD, HTN, CKD3b, AAA (s/p repair), PAD, ITP, and provoked PE (left hip fracture, completed 3 months of apixaban, on 2-3L NC PRN), anxiety, who presented to [facility] ED today from home with shortness of breath. Patient has shortness of breath with exertion for about a week. She was able to wean herself off oxygen at home after her PE, but has been using the oxygen again this week which has helped her shortness of breath. She is not currently on anticoagulation. States she has a mild nonproductive cough. Denies chest pain, fever, chills, abdominal pain, nausea, vomiting, urinary symptoms, diarrhea, or constipation. States she has slight edema of lower extremities. She called her PCP who recommended her to be evaluated in ED. She was admitted to this hospital on 1/14/25 with left leg cellulitis. States she still has pain in her LLE similar to when she was admitted with the cellulitis.      ER Course: Afebrile, /101 HR 94, RR 18, 96% on RA. EKG unavailable for my review. Labs: BUN 54,  crearinine 1.94, GFR 24.BNP 98. Troponin 12. D-dimer 12,657. Covid-19/influenza/RSV negative. Blood gas: pH 7.38, pCO2 46, pO2 50, sO2 78. See imaging results below. Duplex BLE ordered. Unable to do CTA chest for PE due to PALLAVI. Heparin infusion started in ED.     Daily progress:  March 1, 2025: Patient continues to be awake and interactive and has been weaned off supplemental oxygen.  She has no fever, chills, rigors.  Patient was started on Eliquis and was taken off heparin drip.  Kidney function is improving with hydration.  Will consider repeating CTA of the chest with IV contrast to evaluate for PE once creatinine normalizes.    Past Medical/Surgical History:   Past Medical History:   Diagnosis Date    H/O heart artery stent     H/O shoulder surgery     PE (pulmonary thromboembolism) (Multi)      History reviewed. No pertinent surgical history.    Family History:   No relevant family history has been documented for this patient.    Allergies:     No Known Allergies      Social history:   reports that she has quit smoking. Her smoking use included cigarettes. She has never been exposed to tobacco smoke. She has never used smokeless tobacco. She reports that she does not currently use alcohol. She reports that she does not use drugs.    Current Medications:   No recently discontinued medications to reconcile    Review of Systems:   General: denies weight gain, denies loss of appetite, fever, chills, night sweats.  HEENT: denies headaches, dizziness, head trauma, visual changes, eye pain, tinnitus, nosebleeds, hoarseness or throat pain    Respiratory: denies chest pain, +ve dyspnea, cough but no hemoptysis  Cardiovascular: denies orthopnea, paroxysmal nocturnal dyspnea, leg swelling, and previous heart attack.    Gastrointestinal: denies pain, nausea vomiting, diarrhea, constipation, melena or bleeding.  Genitourinary: denies hematuria, frequency, urgency or dysuria  Neurology: denies syncope, seizures, paralysis,  paraesthesia   Endocrine: denies polyuria, polydipsia, skin or hair changes, and heat or cold intolerance  Musculoskeletal: denies joint pain, swelling, arthritis or myalgia  Hematologic: denies bleeding, adenopathy and easy bruising  Skin: denies rashes and skin discoloration  Psychiatry: denies depression    Physical Exam:   Vital Signs:   Vitals:    03/01/25 0105   BP: 147/69   Pulse: 62   Resp: 16   Temp: 36.9 °C (98.4 °F)   SpO2: 100%       Input/Output:    Intake/Output Summary (Last 24 hours) at 3/1/2025 0114  Last data filed at 2/28/2025 2050  Gross per 24 hour   Intake 590 ml   Output --   Net 590 ml       Oxygen requirements:    Ventilator Information:             General appearance: Ill looking, Not in pain but in mild   respiratory distress    HEENT: Atraumatic/normocephalic, EOMI, BRAYAN, pharynx clear, moist mucosa, redness of the uvula appreciated,   Neck: Supple, no jugular venous distension, lymphadenopathy, thyromegaly or carotid bruits  Chest: Equal normal breath sounds, no wheezing, no crackles and no tenderness over ribs   Cardiovascular: Normal S1, S2, regular rate and rhythm, no murmur, rub or gallop  Abdomen: Normal sounds present, soft, lax with no tenderness, no hepatosplenomegaly, and no masses  Extremities: No edema. Pulses are equally present.   Skin: intact, no rashes   Neurologic: Alert and oriented x 3, No focal deficit     Investigations:  Labs, radiological imaging and cardiac work up were personally reviewed          .       STAFF PHYSICIAN NOTE OF PERSONAL INVOLVEMENT IN CARE  As the attending physician, I certify that I personally reviewed the patient's history and personally examined the patient to confirm the physical findings described above, and that I reviewed the relevant imaging studies and available reports.  I also discussed the differential diagnosis and all of the proposed management plans with the patient and individuals accompanying the patient to this visit.  They  had the opportunity to ask questions about the proposed management plans and to have those questions answered.

## 2025-03-02 ENCOUNTER — APPOINTMENT (OUTPATIENT)
Dept: RADIOLOGY | Facility: HOSPITAL | Age: OVER 89
End: 2025-03-02
Payer: MEDICARE

## 2025-03-02 VITALS
SYSTOLIC BLOOD PRESSURE: 133 MMHG | RESPIRATION RATE: 16 BRPM | OXYGEN SATURATION: 97 % | HEART RATE: 59 BPM | WEIGHT: 125 LBS | BODY MASS INDEX: 22.15 KG/M2 | DIASTOLIC BLOOD PRESSURE: 62 MMHG | HEIGHT: 63 IN | TEMPERATURE: 97.5 F

## 2025-03-02 LAB
ANION GAP SERPL CALC-SCNC: 8 MMOL/L (ref 10–20)
BUN SERPL-MCNC: 28 MG/DL (ref 6–23)
CALCIUM SERPL-MCNC: 8.2 MG/DL (ref 8.6–10.3)
CHLORIDE SERPL-SCNC: 107 MMOL/L (ref 98–107)
CO2 SERPL-SCNC: 27 MMOL/L (ref 21–32)
CREAT SERPL-MCNC: 1.33 MG/DL (ref 0.5–1.05)
EGFRCR SERPLBLD CKD-EPI 2021: 38 ML/MIN/1.73M*2
ERYTHROCYTE [DISTWIDTH] IN BLOOD BY AUTOMATED COUNT: 14.5 % (ref 11.5–14.5)
GLUCOSE SERPL-MCNC: 88 MG/DL (ref 74–99)
HCT VFR BLD AUTO: 33.2 % (ref 36–46)
HGB BLD-MCNC: 10.1 G/DL (ref 12–16)
HOLD SPECIMEN: NORMAL
MCH RBC QN AUTO: 27.3 PG (ref 26–34)
MCHC RBC AUTO-ENTMCNC: 30.4 G/DL (ref 32–36)
MCV RBC AUTO: 90 FL (ref 80–100)
NRBC BLD-RTO: 0 /100 WBCS (ref 0–0)
PLATELET # BLD AUTO: 136 X10*3/UL (ref 150–450)
POTASSIUM SERPL-SCNC: 4.5 MMOL/L (ref 3.5–5.3)
RBC # BLD AUTO: 3.7 X10*6/UL (ref 4–5.2)
SODIUM SERPL-SCNC: 137 MMOL/L (ref 136–145)
WBC # BLD AUTO: 4.4 X10*3/UL (ref 4.4–11.3)

## 2025-03-02 PROCEDURE — 2500000001 HC RX 250 WO HCPCS SELF ADMINISTERED DRUGS (ALT 637 FOR MEDICARE OP): Performed by: NURSE PRACTITIONER

## 2025-03-02 PROCEDURE — 2500000001 HC RX 250 WO HCPCS SELF ADMINISTERED DRUGS (ALT 637 FOR MEDICARE OP): Performed by: INTERNAL MEDICINE

## 2025-03-02 PROCEDURE — 85027 COMPLETE CBC AUTOMATED: CPT | Performed by: NURSE PRACTITIONER

## 2025-03-02 PROCEDURE — 74018 RADEX ABDOMEN 1 VIEW: CPT | Performed by: STUDENT IN AN ORGANIZED HEALTH CARE EDUCATION/TRAINING PROGRAM

## 2025-03-02 PROCEDURE — 2500000004 HC RX 250 GENERAL PHARMACY W/ HCPCS (ALT 636 FOR OP/ED): Performed by: INTERNAL MEDICINE

## 2025-03-02 PROCEDURE — 2500000004 HC RX 250 GENERAL PHARMACY W/ HCPCS (ALT 636 FOR OP/ED)

## 2025-03-02 PROCEDURE — 80048 BASIC METABOLIC PNL TOTAL CA: CPT | Performed by: NURSE PRACTITIONER

## 2025-03-02 PROCEDURE — 74018 RADEX ABDOMEN 1 VIEW: CPT

## 2025-03-02 PROCEDURE — 1200000002 HC GENERAL ROOM WITH TELEMETRY DAILY

## 2025-03-02 PROCEDURE — 2500000004 HC RX 250 GENERAL PHARMACY W/ HCPCS (ALT 636 FOR OP/ED): Performed by: NURSE PRACTITIONER

## 2025-03-02 PROCEDURE — 2500000002 HC RX 250 W HCPCS SELF ADMINISTERED DRUGS (ALT 637 FOR MEDICARE OP, ALT 636 FOR OP/ED): Performed by: NURSE PRACTITIONER

## 2025-03-02 RX ORDER — SODIUM CHLORIDE 9 MG/ML
75 INJECTION, SOLUTION INTRAVENOUS CONTINUOUS
Status: DISCONTINUED | OUTPATIENT
Start: 2025-03-02 | End: 2025-03-02

## 2025-03-02 RX ORDER — ONDANSETRON HYDROCHLORIDE 2 MG/ML
4 INJECTION, SOLUTION INTRAVENOUS EVERY 6 HOURS PRN
Status: DISCONTINUED | OUTPATIENT
Start: 2025-03-02 | End: 2025-03-05 | Stop reason: HOSPADM

## 2025-03-02 RX ORDER — SODIUM CHLORIDE 9 MG/ML
75 INJECTION, SOLUTION INTRAVENOUS CONTINUOUS
Status: ACTIVE | OUTPATIENT
Start: 2025-03-02 | End: 2025-03-03

## 2025-03-02 RX ORDER — ONDANSETRON HYDROCHLORIDE 2 MG/ML
4 INJECTION, SOLUTION INTRAVENOUS ONCE
Status: COMPLETED | OUTPATIENT
Start: 2025-03-02 | End: 2025-03-02

## 2025-03-02 RX ADMIN — POTASSIUM CHLORIDE 20 MEQ: 1500 TABLET, EXTENDED RELEASE ORAL at 09:09

## 2025-03-02 RX ADMIN — MIRTAZAPINE 15 MG: 15 TABLET, FILM COATED ORAL at 19:57

## 2025-03-02 RX ADMIN — POLYETHYLENE GLYCOL 3350 17 G: 17 POWDER, FOR SOLUTION ORAL at 09:10

## 2025-03-02 RX ADMIN — ONDANSETRON 4 MG: 2 INJECTION INTRAMUSCULAR; INTRAVENOUS at 13:52

## 2025-03-02 RX ADMIN — SODIUM CHLORIDE 75 ML/HR: 9 INJECTION, SOLUTION INTRAVENOUS at 15:33

## 2025-03-02 RX ADMIN — SODIUM CHLORIDE 75 ML/HR: 900 INJECTION, SOLUTION INTRAVENOUS at 16:12

## 2025-03-02 RX ADMIN — SIMVASTATIN 20 MG: 20 TABLET, FILM COATED ORAL at 19:57

## 2025-03-02 RX ADMIN — Medication 1 CAPSULE: at 19:57

## 2025-03-02 RX ADMIN — DILTIAZEM HYDROCHLORIDE 180 MG: 180 CAPSULE, COATED, EXTENDED RELEASE ORAL at 09:09

## 2025-03-02 RX ADMIN — DULOXETINE HYDROCHLORIDE 30 MG: 30 CAPSULE, DELAYED RELEASE ORAL at 09:09

## 2025-03-02 RX ADMIN — APIXABAN 2.5 MG: 2.5 TABLET, FILM COATED ORAL at 19:57

## 2025-03-02 RX ADMIN — APIXABAN 2.5 MG: 2.5 TABLET, FILM COATED ORAL at 09:09

## 2025-03-02 RX ADMIN — ONDANSETRON 4 MG: 2 INJECTION INTRAMUSCULAR; INTRAVENOUS at 02:14

## 2025-03-02 RX ADMIN — Medication 1 CAPSULE: at 09:10

## 2025-03-02 RX ADMIN — PRAMIPEXOLE DIHYDROCHLORIDE 1 MG: 1 TABLET ORAL at 19:57

## 2025-03-02 ASSESSMENT — PAIN - FUNCTIONAL ASSESSMENT: PAIN_FUNCTIONAL_ASSESSMENT: 0-10

## 2025-03-02 ASSESSMENT — COGNITIVE AND FUNCTIONAL STATUS - GENERAL
DRESSING REGULAR LOWER BODY CLOTHING: A LITTLE
DAILY ACTIVITIY SCORE: 20
MOVING TO AND FROM BED TO CHAIR: A LITTLE
CLIMB 3 TO 5 STEPS WITH RAILING: A LOT
HELP NEEDED FOR BATHING: A LITTLE
WALKING IN HOSPITAL ROOM: A LITTLE
MOBILITY SCORE: 17
TURNING FROM BACK TO SIDE WHILE IN FLAT BAD: A LITTLE
MOVING FROM LYING ON BACK TO SITTING ON SIDE OF FLAT BED WITH BEDRAILS: A LITTLE
TOILETING: A LITTLE
DRESSING REGULAR UPPER BODY CLOTHING: A LITTLE
STANDING UP FROM CHAIR USING ARMS: A LITTLE

## 2025-03-02 ASSESSMENT — PAIN SCALES - GENERAL
PAINLEVEL_OUTOF10: 0 - NO PAIN
PAINLEVEL_OUTOF10: 0 - NO PAIN

## 2025-03-02 NOTE — PROGRESS NOTES
Assessment and Plan  Patient is 89 y.o. female  with the following medical Problems:  Acute hypoxic respiratory failure requiring supplemental oxygen.  History of  PE (off anticoagulation)(October 2, 2024)  Exertional dyspnea.  Atelectasis.    Plan of Care:  Patient's VQ scan was intermediate for PE however patient had a recent history of PE in October 2024.  Likely patient would require lifelong anticoagulation however risk and benefit will be evaluated.  It is unclear why anticoagulation was discontinued in the past.  Patient was switched to Eliquis 2.5 mg twice a day.  Continue with IV fluid.  CTA will be ordered once creatinine allows.  Protonix for GI prophylaxis.  Incentive spirometer.  Will evaluate the possibility of getting CTA after hydration.  Bilateral lower extremities ultrasound ruled out DVTs.      History of Present Illness:   Estefanía Contreras is a 89 y.o. female with a past medical history of HFpEF (EF 62% 1/6/25), HLD, HTN, CKD3b, AAA (s/p repair), PAD, ITP, and provoked PE (left hip fracture, completed 3 months of apixaban, on 2-3L NC PRN), anxiety, who presented to [facility] ED today from home with shortness of breath. Patient has shortness of breath with exertion for about a week. She was able to wean herself off oxygen at home after her PE, but has been using the oxygen again this week which has helped her shortness of breath. She is not currently on anticoagulation. States she has a mild nonproductive cough. Denies chest pain, fever, chills, abdominal pain, nausea, vomiting, urinary symptoms, diarrhea, or constipation. States she has slight edema of lower extremities. She called her PCP who recommended her to be evaluated in ED. She was admitted to this hospital on 1/14/25 with left leg cellulitis. States she still has pain in her LLE similar to when she was admitted with the cellulitis.      ER Course: Afebrile, /101 HR 94, RR 18, 96% on RA. EKG unavailable for my review. Labs: BUN 54,  crearinine 1.94, GFR 24.BNP 98. Troponin 12. D-dimer 12,657. Covid-19/influenza/RSV negative. Blood gas: pH 7.38, pCO2 46, pO2 50, sO2 78. See imaging results below. Duplex BLE ordered. Unable to do CTA chest for PE due to PALLAVI. Heparin infusion started in ED.     Daily progress:  March 1, 2025: Patient continues to be awake and interactive and has been weaned off supplemental oxygen.  She has no fever, chills, rigors.  Patient was started on Eliquis and was taken off heparin drip.  Kidney function is improving with hydration.  Will consider repeating CTA of the chest with IV contrast to evaluate for PE once creatinine normalizes.    March 2, 2025: Patient's oxygen requirement continues to fluctuate.  She is currently on 2 L nasal cannula.  She has no fever, chills, rigors.  Creatinine has improved to 1.33.  For some reason Eliquis was discontinued at some point during patient presents at the nursing home.    Past Medical/Surgical History:   Past Medical History:   Diagnosis Date    H/O heart artery stent     H/O shoulder surgery     PE (pulmonary thromboembolism) (Multi)      History reviewed. No pertinent surgical history.    Family History:   No relevant family history has been documented for this patient.    Allergies:     No Known Allergies      Social history:   reports that she has quit smoking. Her smoking use included cigarettes. She has never been exposed to tobacco smoke. She has never used smokeless tobacco. She reports that she does not currently use alcohol. She reports that she does not use drugs.    Current Medications:   No recently discontinued medications to reconcile    Review of Systems:   General: denies weight gain, denies loss of appetite, fever, chills, night sweats.  HEENT: denies headaches, dizziness, head trauma, visual changes, eye pain, tinnitus, nosebleeds, hoarseness or throat pain    Respiratory: denies chest pain, +ve dyspnea, cough but no hemoptysis  Cardiovascular: denies  orthopnea, paroxysmal nocturnal dyspnea, leg swelling, and previous heart attack.    Gastrointestinal: denies pain, nausea vomiting, diarrhea, constipation, melena or bleeding.  Genitourinary: denies hematuria, frequency, urgency or dysuria  Neurology: denies syncope, seizures, paralysis, paraesthesia   Endocrine: denies polyuria, polydipsia, skin or hair changes, and heat or cold intolerance  Musculoskeletal: denies joint pain, swelling, arthritis or myalgia  Hematologic: denies bleeding, adenopathy and easy bruising  Skin: denies rashes and skin discoloration  Psychiatry: denies depression    Physical Exam:   Vital Signs:   Vitals:    03/02/25 0209   BP: 156/67   Pulse: 63   Resp: 16   Temp: 36.7 °C (98.1 °F)   SpO2: 99%       Input/Output:    Intake/Output Summary (Last 24 hours) at 3/2/2025 0536  Last data filed at 3/1/2025 2200  Gross per 24 hour   Intake 200 ml   Output 650 ml   Net -450 ml       Oxygen requirements:    Ventilator Information:             General appearance: Ill looking, Not in pain but in mild   respiratory distress    HEENT: Atraumatic/normocephalic, EOMI, BRAYAN, pharynx clear, moist mucosa, redness of the uvula appreciated,   Neck: Supple, no jugular venous distension, lymphadenopathy, thyromegaly or carotid bruits  Chest: Equal normal breath sounds, no wheezing, no crackles and no tenderness over ribs   Cardiovascular: Normal S1, S2, regular rate and rhythm, no murmur, rub or gallop  Abdomen: Normal sounds present, soft, lax with no tenderness, no hepatosplenomegaly, and no masses  Extremities: No edema. Pulses are equally present.   Skin: intact, no rashes   Neurologic: Alert and oriented x 3, No focal deficit     Investigations:  Labs, radiological imaging and cardiac work up were personally reviewed          .       STAFF PHYSICIAN NOTE OF PERSONAL INVOLVEMENT IN CARE  As the attending physician, I certify that I personally reviewed the patient's history and personally examined the  patient to confirm the physical findings described above, and that I reviewed the relevant imaging studies and available reports.  I also discussed the differential diagnosis and all of the proposed management plans with the patient and individuals accompanying the patient to this visit.  They had the opportunity to ask questions about the proposed management plans and to have those questions answered.

## 2025-03-02 NOTE — CARE PLAN
The patient's goals for the shift include      The clinical goals for the shift include Maintain patient safety    Over the shift, the patient did not make progress toward the following goals. Barriers to progression include sob, intermittent o2 use, weakness, PE's. Recommendations to address these barriers include frequent rounding, call light use encouragement, bed/chair alarm.

## 2025-03-03 LAB
ANION GAP SERPL CALC-SCNC: 7 MMOL/L (ref 10–20)
BUN SERPL-MCNC: 24 MG/DL (ref 6–23)
CALCIUM SERPL-MCNC: 8.1 MG/DL (ref 8.6–10.3)
CHLORIDE SERPL-SCNC: 109 MMOL/L (ref 98–107)
CO2 SERPL-SCNC: 26 MMOL/L (ref 21–32)
CREAT SERPL-MCNC: 1.23 MG/DL (ref 0.5–1.05)
EGFRCR SERPLBLD CKD-EPI 2021: 42 ML/MIN/1.73M*2
ERYTHROCYTE [DISTWIDTH] IN BLOOD BY AUTOMATED COUNT: 14.4 % (ref 11.5–14.5)
GLUCOSE SERPL-MCNC: 98 MG/DL (ref 74–99)
HCT VFR BLD AUTO: 34.7 % (ref 36–46)
HGB BLD-MCNC: 10.1 G/DL (ref 12–16)
MCH RBC QN AUTO: 26.9 PG (ref 26–34)
MCHC RBC AUTO-ENTMCNC: 29.1 G/DL (ref 32–36)
MCV RBC AUTO: 93 FL (ref 80–100)
NRBC BLD-RTO: 0 /100 WBCS (ref 0–0)
PLATELET # BLD AUTO: 134 X10*3/UL (ref 150–450)
POTASSIUM SERPL-SCNC: 5 MMOL/L (ref 3.5–5.3)
RBC # BLD AUTO: 3.75 X10*6/UL (ref 4–5.2)
SODIUM SERPL-SCNC: 137 MMOL/L (ref 136–145)
WBC # BLD AUTO: 5.1 X10*3/UL (ref 4.4–11.3)

## 2025-03-03 PROCEDURE — 97161 PT EVAL LOW COMPLEX 20 MIN: CPT | Mod: GP

## 2025-03-03 PROCEDURE — 2500000002 HC RX 250 W HCPCS SELF ADMINISTERED DRUGS (ALT 637 FOR MEDICARE OP, ALT 636 FOR OP/ED): Performed by: NURSE PRACTITIONER

## 2025-03-03 PROCEDURE — 2500000004 HC RX 250 GENERAL PHARMACY W/ HCPCS (ALT 636 FOR OP/ED)

## 2025-03-03 PROCEDURE — 2500000001 HC RX 250 WO HCPCS SELF ADMINISTERED DRUGS (ALT 637 FOR MEDICARE OP): Performed by: NURSE PRACTITIONER

## 2025-03-03 PROCEDURE — 1100000001 HC PRIVATE ROOM DAILY

## 2025-03-03 PROCEDURE — 97165 OT EVAL LOW COMPLEX 30 MIN: CPT | Mod: GO

## 2025-03-03 PROCEDURE — 85027 COMPLETE CBC AUTOMATED: CPT | Performed by: INTERNAL MEDICINE

## 2025-03-03 PROCEDURE — 2500000001 HC RX 250 WO HCPCS SELF ADMINISTERED DRUGS (ALT 637 FOR MEDICARE OP): Performed by: INTERNAL MEDICINE

## 2025-03-03 PROCEDURE — 36415 COLL VENOUS BLD VENIPUNCTURE: CPT | Performed by: INTERNAL MEDICINE

## 2025-03-03 PROCEDURE — 80048 BASIC METABOLIC PNL TOTAL CA: CPT | Performed by: INTERNAL MEDICINE

## 2025-03-03 RX ORDER — ADHESIVE BANDAGE
30 BANDAGE TOPICAL ONCE
Status: COMPLETED | OUTPATIENT
Start: 2025-03-03 | End: 2025-03-03

## 2025-03-03 RX ORDER — BISACODYL 10 MG/1
10 SUPPOSITORY RECTAL ONCE
Status: COMPLETED | OUTPATIENT
Start: 2025-03-03 | End: 2025-03-03

## 2025-03-03 RX ADMIN — APIXABAN 2.5 MG: 2.5 TABLET, FILM COATED ORAL at 21:31

## 2025-03-03 RX ADMIN — BISACODYL 10 MG: 10 SUPPOSITORY RECTAL at 15:34

## 2025-03-03 RX ADMIN — SIMVASTATIN 20 MG: 20 TABLET, FILM COATED ORAL at 21:31

## 2025-03-03 RX ADMIN — APIXABAN 2.5 MG: 2.5 TABLET, FILM COATED ORAL at 08:12

## 2025-03-03 RX ADMIN — DILTIAZEM HYDROCHLORIDE 180 MG: 180 CAPSULE, COATED, EXTENDED RELEASE ORAL at 08:12

## 2025-03-03 RX ADMIN — POLYETHYLENE GLYCOL 3350 17 G: 17 POWDER, FOR SOLUTION ORAL at 08:12

## 2025-03-03 RX ADMIN — DULOXETINE HYDROCHLORIDE 30 MG: 30 CAPSULE, DELAYED RELEASE ORAL at 08:12

## 2025-03-03 RX ADMIN — Medication 1 CAPSULE: at 08:10

## 2025-03-03 RX ADMIN — Medication 1 CAPSULE: at 21:32

## 2025-03-03 RX ADMIN — MAGNESIUM HYDROXIDE 30 ML: 400 SUSPENSION ORAL at 17:17

## 2025-03-03 RX ADMIN — PRAMIPEXOLE DIHYDROCHLORIDE 1 MG: 1 TABLET ORAL at 21:32

## 2025-03-03 RX ADMIN — MIRTAZAPINE 15 MG: 15 TABLET, FILM COATED ORAL at 21:32

## 2025-03-03 ASSESSMENT — COGNITIVE AND FUNCTIONAL STATUS - GENERAL
MOBILITY SCORE: 17
WALKING IN HOSPITAL ROOM: A LITTLE
DRESSING REGULAR UPPER BODY CLOTHING: A LITTLE
MOVING TO AND FROM BED TO CHAIR: A LITTLE
DAILY ACTIVITIY SCORE: 17
STANDING UP FROM CHAIR USING ARMS: A LITTLE
CLIMB 3 TO 5 STEPS WITH RAILING: TOTAL
DRESSING REGULAR LOWER BODY CLOTHING: A LOT
TURNING FROM BACK TO SIDE WHILE IN FLAT BAD: A LITTLE
TOILETING: A LOT
HELP NEEDED FOR BATHING: A LOT

## 2025-03-03 ASSESSMENT — ACTIVITIES OF DAILY LIVING (ADL)
ADL_ASSISTANCE: NEEDS ASSISTANCE
BATHING_ASSISTANCE: MODERATE

## 2025-03-03 ASSESSMENT — PAIN SCALES - GENERAL
PAINLEVEL_OUTOF10: 0 - NO PAIN
PAINLEVEL_OUTOF10: 3
PAINLEVEL_OUTOF10: 0 - NO PAIN

## 2025-03-03 ASSESSMENT — PAIN - FUNCTIONAL ASSESSMENT
PAIN_FUNCTIONAL_ASSESSMENT: 0-10

## 2025-03-03 ASSESSMENT — PAIN SCALES - WONG BAKER: WONGBAKER_NUMERICALRESPONSE: NO HURT

## 2025-03-03 NOTE — PROGRESS NOTES
Assessment and Plan  Patient is 89 y.o. female  with the following medical Problems:  Acute hypoxic respiratory failure requiring supplemental oxygen.  History of  PE (off anticoagulation)(October 2, 2024)  Exertional dyspnea.  Atelectasis.    Plan of Care:  Patient has probable PE based on VQ scan findings, symptomology, she is restarted on anticoagulation.  May transition to p.o. direct oral anticoagulant  Recommend lifelong anticoagulation if risk of bleeding/falling is reasonable patient uses a walker but she lives independently still.    Ceftriaxone for UTI.  Protonix for GI prophylaxis.  Incentive spirometer.  Will evaluate the possibility of getting CTA after hydration.  Bilateral lower extremities ultrasound ruled out DVTs.    Daily progress:  3/3/2025: Patient remains short of breath on exertion on 2 L  History of Present Illness:   Estefanía Contreras is a 89 y.o. female with a past medical history of HFpEF (EF 62% 1/6/25), HLD, HTN, CKD3b, AAA (s/p repair), PAD, ITP, and provoked PE (left hip fracture, completed 3 months of apixaban, on 2-3L NC PRN), anxiety, who presented to [facility] ED today from home with shortness of breath. Patient has shortness of breath with exertion for about a week. She was able to wean herself off oxygen at home after her PE, but has been using the oxygen again this week which has helped her shortness of breath. She is not currently on anticoagulation. States she has a mild nonproductive cough. Denies chest pain, fever, chills, abdominal pain, nausea, vomiting, urinary symptoms, diarrhea, or constipation. States she has slight edema of lower extremities. She called her PCP who recommended her to be evaluated in ED. She was admitted to this hospital on 1/14/25 with left leg cellulitis. States she still has pain in her LLE similar to when she was admitted with the cellulitis.      ER Course: Afebrile, /101 HR 94, RR 18, 96% on RA. EKG unavailable for my review. Labs: BUN 54,  crearinine 1.94, GFR 24.BNP 98. Troponin 12. D-dimer 12,657. Covid-19/influenza/RSV negative. Blood gas: pH 7.38, pCO2 46, pO2 50, sO2 78. See imaging results below. Duplex BLE ordered. Unable to do CTA chest for PE due to PALLAVI. Heparin infusion started in ED.     Past Medical/Surgical History:   Past Medical History:   Diagnosis Date    H/O heart artery stent     H/O shoulder surgery     PE (pulmonary thromboembolism) (Multi)      History reviewed. No pertinent surgical history.    Family History:   No relevant family history has been documented for this patient.    Allergies:     No Known Allergies      Social history:   reports that she has quit smoking. Her smoking use included cigarettes. She has never been exposed to tobacco smoke. She has never used smokeless tobacco. She reports that she does not currently use alcohol. She reports that she does not use drugs.    Current Medications:   No recently discontinued medications to reconcile    Review of Systems:   General: denies weight gain, denies loss of appetite, fever, chills, night sweats.  HEENT: denies headaches, dizziness, head trauma, visual changes, eye pain, tinnitus, nosebleeds, hoarseness or throat pain    Respiratory: denies chest pain, +ve dyspnea, cough but no hemoptysis  Cardiovascular: denies orthopnea, paroxysmal nocturnal dyspnea, leg swelling, and previous heart attack.    Gastrointestinal: denies pain, nausea vomiting, diarrhea, constipation, melena or bleeding.  Genitourinary: denies hematuria, frequency, urgency or dysuria  Neurology: denies syncope, seizures, paralysis, paraesthesia   Endocrine: denies polyuria, polydipsia, skin or hair changes, and heat or cold intolerance  Musculoskeletal: denies joint pain, swelling, arthritis or myalgia  Hematologic: denies bleeding, adenopathy and easy bruising  Skin: denies rashes and skin discoloration  Psychiatry: denies depression    Physical Exam:   Vital Signs:   Vitals:    03/03/25 0950   BP:  151/65   Pulse:    Resp: 16   Temp: 36.4 °C (97.5 °F)   SpO2: 93%       Input/Output:    Intake/Output Summary (Last 24 hours) at 3/3/2025 1010  Last data filed at 3/3/2025 0640  Gross per 24 hour   Intake 456.25 ml   Output 1450 ml   Net -993.75 ml       Oxygen requirements:    Ventilator Information:             General appearance: Ill looking, Not in pain but in mild   respiratory distress    HEENT: Atraumatic/normocephalic, EOMI, BRAYAN, pharynx clear, moist mucosa, redness of the uvula appreciated,   Neck: Supple, no jugular venous distension, lymphadenopathy, thyromegaly or carotid bruits  Chest: Equal normal breath sounds, no wheezing, no crackles and no tenderness over ribs   Cardiovascular: Normal S1, S2, regular rate and rhythm, no murmur, rub or gallop  Abdomen: Normal sounds present, soft, lax with no tenderness, no hepatosplenomegaly, and no masses  Extremities: No edema. Pulses are equally present.   Skin: intact, no rashes   Neurologic: Alert and oriented x 3, No focal deficit     Investigations:  Labs, radiological imaging and cardiac work up were personally reviewed          .       STAFF PHYSICIAN NOTE OF PERSONAL INVOLVEMENT IN CARE  As the attending physician, I certify that I personally reviewed the patient's history and personally examined the patient to confirm the physical findings described above, and that I reviewed the relevant imaging studies and available reports.  I also discussed the differential diagnosis and all of the proposed management plans with the patient and individuals accompanying the patient to this visit.  They had the opportunity to ask questions about the proposed management plans and to have those questions answered.

## 2025-03-03 NOTE — PROGRESS NOTES
Estefanía Contreras is a 89 y.o. female on day 1 of admission presenting with Shortness of breath.      Subjective   No events overnight.  Patient seen and examined at bedside.  She vomited earlier. Vomitus was undigested food. Symptoms resolved with IV Zofran.         Objective     Last Recorded Vitals  /62 (BP Location: Left arm, Patient Position: Lying)   Pulse 59   Temp 36.4 °C (97.5 °F) (Temporal)   Resp 16   Wt 56.7 kg (125 lb)   SpO2 97%   Intake/Output last 3 Shifts:    Intake/Output Summary (Last 24 hours) at 3/2/2025 2233  Last data filed at 3/2/2025 1958  Gross per 24 hour   Intake 456.25 ml   Output 1000 ml   Net -543.75 ml       Admission Weight  Weight: 56.7 kg (125 lb) (02/27/25 1824)    Daily Weight  02/27/25 : 56.7 kg (125 lb)    Image Results  ECG 12 Lead  Normal sinus rhythm with sinus arrhythmia  Left anterior fascicular block  Possible Inferior infarct , age undetermined  Possible Anterior infarct , age undetermined  Abnormal ECG  When compared with ECG of 14-JAN-2025 07:05,  Borderline criteria for Anterior infarct are now Present  Borderline criteria for Inferior infarct are now Present  See ED provider note for full interpretation and clinical correlation  Confirmed by Verónica Castanon ([id number]) on 2/28/2025 2:42:41 PM  NM Lung perfusion with spect  Narrative: Interpreted By:  Ky Boone and Hofer Lindsay   STUDY:  NM LUNG PERFUSION WITH SPECT;  2/28/2025 9:20 am      INDICATION:  Signs/Symptoms:rule out PE.      COMPARISON:  Chest x-ray from      ACCESSION NUMBER(S):  [id number]      ORDERING CLINICIAN:  ZHANG DYKES      TECHNIQUE:  DIVISION OF NUCLEAR MEDICINE  PERFUSION LUNG SCANS      Multiple perfusion images of the lungs were acquired after the  intravenous administration of 4.1 mCi of Tc-99m macroaggregated  albumin (MAA). In addition, SPECT of the chest was performed.      FINDINGS:  Significant heterogeneity throughout the bilateral lungs with several  segmental and  non-segmental perfusion defects, particularly within  the right lung.      Impression: Several segmental and non-segmental perfusion defects throughout the  bilateral lungs, particularly within the right lung. These findings  are intermediate to high probability for acute pulmonary embolism.  Recommend further evaluation and treatment.      The interpretation above is based on modified PIOPED II and PISAPED  criteria.      I personally reviewed the images/study and resident's interpretation  and I agree with the findings as stated by Pema Szymanski MD (resident  radiologist). This study was analyzed and interpreted at [place], [place].      Critical Finding:  See findings. Notification was initiated on  2/28/2025 at 10:05 am by  Pema Szymanski.  (**-YCF-**) Instructions:          Signed by: Ky Boone 2/28/2025 10:11 AM  Dictation workstation:   [id number]  Vascular US lower extremity venous duplex bilateral  Narrative: Interpreted By:  Mak Vázquez,   STUDY:  [facility] US LOWER EXTREMITY VENOUS DUPLEX BILATERAL  2/28/2025 1:50 am      INDICATION:  89 y/o   F with  Signs/Symptoms:concern for dvt. LMP:  Unknown.              COMPARISON:  None.      ACCESSION NUMBER(S):  [id number]      ORDERING CLINICIAN:  TONE LUEVANO      TECHNIQUE:  Routine ultrasound of the  bilateral lower extremity was performed  with duplex Doppler (color and spectral) evaluation.   Static images  were obtained for remote interpretation.      FINDINGS:  THIGH VEINS:  The common femoral, femoral, popliteal, proximal medial  saphenous, and deep femoral veins are patent and free of thrombus.  The veins are normally compressible.  They demonstrate normal phasic  flow and augmentation response.      CALF VEINS:  The paired peroneal and posterior tibial calf veins are  patent. Please note the left calf veins not well visualized.      Impression: Negative study.  No deep venous thrombosis of the   bilateral lower  extremity.      MACRO:  None      Signed by: Mak Vázquez 2/28/2025 2:19 AM  Dictation workstation:   [id number]  CT chest wo IV contrast  Narrative: Interpreted By:  Finkelstein, Evan,   STUDY:  CT CHEST WO IV CONTRAST;  2/28/2025 12:26 am      INDICATION:  Signs/Symptoms:Persistent short of breath, previous history of PE  several months ago, stopped anticoagulants 1 month ago.          COMPARISON:  CT chest 01/07/2025      ACCESSION NUMBER(S):  [id number]      ORDERING CLINICIAN:  OSWALDO MONTES DE OCA      TECHNIQUE:  Axial CT images of the chest obtained  without IV contrast.  Sagittal  and coronal reconstructions were created..      FINDINGS:  CHEST WALL AND LOWER NECK: Within normal limits.  UPPER ABDOMEN: No acute abnormality of the partially visualized  abdomen.      VESSELS: Aorta and pulmonary artery are normal caliber.  Atherosclerotic calcifications in the aorta and coronary arteries.  Partially imaged abdominal aorta stent. HEART: Normal size. No  pericardial effusion. MEDIASTINUM AND UCHE: Calcified hilar lymph  nodes. Opacities in the lingula and left lower lobe favored to  represent atelectasis. LUNG, PLEURA, AND LARGE AIRWAYS: Emphysematous  changes throughout the lungs. Redemonstrated calcified nodule in the  left upper lobe measuring up to 1.1 cm.      BONES: No acute osseous abnormality. Incompletely visualized left  shoulder arthroplasty hardware.      Impression: Opacities in the lingula and left lower lobe are favored to represent  atelectasis. Persistent emphysematous changes throughout the lungs.      MACRO:  None.      Signed by: Evan Finkelstein 2/28/2025 12:47 AM  Dictation workstation:   [id number]      Physical Exam  Physical Exam  Constitutional:       Appearance: Normal appearance.      Comments: Pleasant elderly female   HENT:      Head: Normocephalic and atraumatic.      Nose: Nose normal.      Mouth/Throat:      Mouth: Mucous membranes are moist.      Pharynx:  Oropharynx is clear.   Eyes:      Extraocular Movements: Extraocular movements intact.      Conjunctiva/sclera: Conjunctivae normal.      Pupils: Pupils are equal, round, and reactive to light.   Cardiovascular:      Rate and Rhythm: Regular rhythm. Bradycardia present.      Pulses: Normal pulses.      Heart sounds: Normal heart sounds.   Pulmonary:      Effort: Pulmonary effort is normal.      Breath sounds: Normal breath sounds.   Abdominal:      General: Abdomen is flat. Bowel sounds are normal.      Palpations: Abdomen is soft.   Musculoskeletal:         General: Normal range of motion.   Skin:     General: Skin is warm and dry.      Capillary Refill: Capillary refill takes less than 2 seconds.      Comments: Dry skin to LLE with rash from previous cellulitis admission   Neurological:      General: No focal deficit present.      Mental Status: She is alert and oriented to person, place, and time.   Psychiatric:         Mood and Affect: Mood normal.         Behavior: Behavior normal.         Thought Content: Thought content normal.         Judgment: Judgment normal.      Relevant Results               Assessment/Plan                  Assessment & Plan  Shortness of breath    PALLAVI (acute kidney injury) (CMS-HCC)    Elevated d-dimer    History of pulmonary embolism    89 year old female who presented to ED today with dyspnea with exertion for a week. Concern for PE. VQ scan ordered. Heparin infusion initiated in ED. Patient to be admitted to hospital for further medical management.       Rule out PE  Elevated d-dimer  Dyspnea with exertion  Hx of PE and DVT  Admit to inpatient/telemetry to Dr. Flores   Defer consults to attending pending clinical course per request  See imaging results above   VQ scan ordered  Duplex ultrasound BLE negative for DVT  Continue Heparin infusion   Oxygen as needed to maintain sats>92%  Repeat labs in AM      PALLAVI on CKD  UA ordered  Hold nephrotoxic medications  Continue to monitor  labs     Chronic conditions   #diastolic heart failure, HLD, HTN, AAA, PAD, ITP, anxiety, neuropathic pain  Continue home medications as appropriate when nursing completes home med rec   DNAR/DNI     DVT prophylaxis   Continue heparin infusion   SCD's as tolerated     3/1: VQ scan with intermediate probability of PE.  Pulmonology consulted.  Patient will require lifelong anticoagulation per pulmonology.  She has been placed on Eliquis 2.5 mg twice a day.  Creatinine improved from 2.1 down to 1.5.  Continue IV fluids and recheck labs tomorrow.  Urine culture is negative.  PT OT eval ordered.    3/2: Creatinine improved from 1.5 down to 1.3.  IV fluids ordered to continue.  IV Zofran for nausea and vomiting.  Check KUB.  Liberalize diet per patient request.  Add boost twice daily.              Darnell Ruggiero, DO

## 2025-03-03 NOTE — CARE PLAN
The patient's goals for the shift include  breathing    The clinical goals for the shift include pt will not be SOB during the shift    Over the shift, the patient did not make progress toward the following goals. Recommendations to address these barriers include continue to monitor.

## 2025-03-03 NOTE — CARE PLAN
The patient's goals for the shift include  comfort and rest    The clinical goals for the shift include pt will not be SOB during the shift

## 2025-03-03 NOTE — PROGRESS NOTES
25 1527   Discharge Planning   Living Arrangements Alone   Support Systems Children   Assistance Needed No   Type of Residence Private residence   Number of Stairs to Enter Residence 0   Number of Stairs Within Residence 0   Do you have animals or pets at home? No   Home or Post Acute Services In home services   Type of Home Care Services Home health aide   Expected Discharge Disposition Home Health   Does the patient need discharge transport arranged? No     TCC Note: Met with pt. at bedside, introduced self and role on the Transition of Care Team.  Verified name, , demographics, insurance, PCP is Dr. Bahman Flores. Emergency contact is Sharan falk Phone: [phone]. Pt. lives alone in an apartment, so lives across the street. Pt has an aide that comes is 4 times per week from 8:30 AM to 1:30 PM and son assists pt when Aide leaves at any time that she needs with cooking, chores, transportation, etc.  Pt. denies any recent falls and does use a walker as an assistive device for ambulation. Pt. is not diabetic and does use home O2 3L NC or any other home services/supplies. Preferred pharmacy is [facility] on [address] and [address].  No problems affording or obtaining medications. Pt. Would like [facility] for PT/OT at time of discharge. Transitions of Care will continue to follow until discharge. Shaniqua Borja, MSN, RN, TCC.

## 2025-03-03 NOTE — PROGRESS NOTES
Occupational Therapy    Occupational Therapy    Evaluation    Patient Name: Estefanía Contreras  MRN: [id number]  Today's Date: 3/3/2025  Time Calculation  Start Time: 1118  Stop Time: 1143  Time Calculation (min): 25 min  336/336-A    Assessment  IP OT Assessment  OT Assessment: Pt. presents with a decline in self-care, mobility, and safety and would benefit from skilled OT services to maximize independence and promote return to prior level of function.  Prognosis: Good  Barriers to Discharge Home: No anticipated barriers  Evaluation/Treatment Tolerance: Patient tolerated treatment well  Medical Staff Made Aware: Yes  End of Session Communication: Bedside nurse  End of Session Patient Position: Bed, 3 rail up, Alarm on (call light in reach. all needs met)    Plan:  Treatment Interventions: ADL retraining, Functional transfer training, Endurance training, Patient/family training, Equipment evaluation/education  OT Frequency: 3 times per week  OT Discharge Recommendations: Low intensity level of continued care (with initial 24 hr. support/supervision for safety)  OT - OK to Discharge: Yes (once cleared by medical team)    Subjective     Current Problem:  1. History of pulmonary embolism        2. Elevated d-dimer        3. Dyspnea, unspecified type            General:  General  Reason for Referral: OT eval and treat for adls  Referred By: Dr. Bahman Flores  Past Medical History Relevant to Rehab: Pt. is a 89 y.o. female with a past medical history of HFpEF (EF 62% 1/6/25), HLD, HTN, CKD3b, AAA (s/p repair), PAD, ITP, and provoked PE (left hip fracture on 2-3L NC PRN), anxiety, who presented to [facility] ED today from home with shortness of breath, LLE cellulitis  Family/Caregiver Present: No  Co-Treatment: PT  Co-Treatment Reason: to maximize pt. safety and mobility  Prior to Session Communication: Bedside nurse  Patient Position Received: Bed, 3 rail up, Alarm off, not on at start of session  General Comment: pt. agreeable to  therapy    Precautions:  Medical Precautions: Fall precautions, Oxygen therapy device and L/min  Braces Applied: glove for L hand  Precautions Comment: purewick    Pain:  Pain Assessment  Pain Assessment: 0-10  0-10 (Numeric) Pain Score: 3    Objective     Cognition:  Overall Cognitive Status: Within Functional Limits     Home Living:  Type of Home: Apartment  Lives With: Alone (son lives across the street. pt. has a [facility] aide 4x/week for 5 hrs. a day)  Home Adaptive Equipment: Walker rolling or standard, Cane, Wheelchair-manual (bedrail)  Home Layout: One level  Bathroom Shower/Tub: Tub/shower unit (chair, hhs, grab bar)  Bathroom Toilet: Standard (bsc ordered per pt.)     Prior Function:  Level of [place]: Needs assistance with homemaking, Needs assistance with ADLs (ind. with simple meal prep. son also assists pt. with cooking, driving, shopping.  aide assists pt. with showering and with donning socks, doing laundry.)  Receives Help From: Personal care attendant, Family  ADL Assistance: Needs assistance  Homemaking Assistance: Needs assistance  Ambulatory Assistance: Independent (ind. with rolling walker)  Hand Dominance: Right    IADL History:  IADL Comments: pt. no longer drives. son does driving/shopping. [facility] aide assists with laundry, showering pt.    ADL:  Eating Assistance: Independent  Grooming Assistance: Independent  Bathing Assistance: Moderate  UE Dressing Assistance: Minimal  LE Dressing Assistance: Maximal  Toileting Assistance with Device: Total    Activity Tolerance:  Endurance: Decreased tolerance for upright activites    Bed Mobility/Transfers:   Bed Mobility  Bed Mobility:  (sba supine <>sitting to eob)  Transfers  Transfer:  (pt. sit to stand with sba)    Ambulation/Gait Training:  Functional Mobility  Functional Mobility Performed:  (pt. ambulated with rolling walker with sba and cues)    Sitting Balance:  Static Sitting Balance  Static Sitting-Level of Assistance: Modified  Independent    Standing Balance:  Static Standing Balance  Static Standing-Level of Assistance:  (sba)    Sensation:  Light Touch: No apparent deficits    Strength:  Strength Comments: bilat.  strength wfl    Coordination:  Movements are Fluid and Coordinated: Yes     Hand Function:  Hand Function  Gross Grasp: Functional  Coordination: Functional    Extremities:   RUE :  (decreased shoulder flexion, distally wfl)  LUE:  (decreased shoulder flexion, distally wfl)    Outcome Measures: [facility] Daily Activity  Putting on and taking off regular lower body clothing: A lot  Bathing (including washing, rinsing, drying): A lot  Putting on and taking off regular upper body clothing: A little  Toileting, which includes using toilet, bedpan or urinal: A lot  Taking care of personal grooming such as brushing teeth: None  Eating Meals: None  Daily Activity - Total Score: 17     EDUCATION:     Education Documentation  ADL Training, taught by Maria Ines Amaro OT at 3/3/2025  2:58 PM.  Learner: Patient  Readiness: Acceptance  Method: Explanation  Response: Verbalizes Understanding    Mobility Training, taught by Maria Ines Amaro OT at 3/3/2025  2:58 PM.  Learner: Patient  Readiness: Acceptance  Method: Explanation  Response: Verbalizes Understanding    Education Comments  No comments found.        Goals:   Encounter Problems       Encounter Problems (Active)       OT Goals       Pt. will perform lower body bathing and dressing with sba using adaptive equipment as needed.  (Progressing)       Start:  03/03/25    Expected End:  03/17/25            Pt. will perform toileting with mod. ind.using dme/adaptive equipment as needed.  (Progressing)       Start:  03/03/25    Expected End:  03/17/25            Pt. will perform bed mobility/chair/commode transfers ind.'ly and tubseat transfers with supervision. (Progressing)       Start:  03/03/25    Expected End:  03/17/25            Pt. will perform functional mobility with rolling walker with  mod. ind. in prep. for homemaking tasks.  (Progressing)       Start:  03/03/25    Expected End:  03/17/25            Pt. will tolerate 8-10 min. of standing tasks with mod. ind. in preparation for grooming at sink.  (Progressing)       Start:  03/03/25    Expected End:  03/17/25

## 2025-03-03 NOTE — PROGRESS NOTES
Physical Therapy    Physical Therapy Evaluation    Patient Name: Estefanía Contreras  MRN: [id number]  Today's Date: 3/3/2025   Time Calculation  Start Time: 1119  Stop Time: 1144  Time Calculation (min): 25 min  336/336-A    Assessment/Plan   PT Assessment  PT Assessment Results: Decreased strength, Impaired balance, Decreased mobility  Rehab Prognosis: Fair  Barriers to Discharge Home: Physical needs  Physical Needs: Stair navigation into home limited by function/safety  Evaluation/Treatment Tolerance: Patient tolerated treatment well  Medical Staff Made Aware: Yes  Strengths: Attitude of self  Barriers to Participation: Comorbidities  End of Session Communication: Bedside nurse  Assessment Comment: pt tolerated session. pt required assist during functional mobility to maintain safety. pt presented with decreased functional mobility, strength, and balance. pt would benefit from low int and 24/7 supervision therapy in order to return to PLOF.  End of Session Patient Position: Bed, 3 rail up, Alarm on (call light in reach)  IP OR SWING BED PT PLAN  Inpatient or Swing Bed: Inpatient  PT Plan  Treatment/Interventions: Bed mobility, Transfer training, Gait training, Balance training, Strengthening, Therapeutic exercise, Therapeutic activity  PT Plan: Ongoing PT  PT Frequency: 3 times per week  PT Discharge Recommendations: Low intensity level of continued care (24/7 supervision)  PT Recommended Transfer Status: Assist x1  PT - OK to Discharge: Yes (once medically cleared)    Subjective     Current Problem:  1. History of pulmonary embolism        2. Elevated d-dimer        3. Dyspnea, unspecified type          Patient Active Problem List   Diagnosis    Closed fracture of neck of left femur with routine healing    Stage 3b chronic kidney disease (Multi)    Status post reverse arthroplasty of left shoulder    Thrombocytopenia (CMS-HCC)    HTN (hypertension)    Peripheral vascular disease (CMS-HCC)    Cellulitis of great toe of  right foot    Onychocryptosis    Anemia    Neuropathy    HLD (hyperlipidemia)    Anorexia    Constipation    Anxiety    Change in mental status    Dysuria    Pulmonary embolism, other, unspecified chronicity, unspecified whether acute cor pulmonale present (Multi)    Dermatochalasis of left upper eyelid    Abdominal aortic aneurysm without rupture (CMS-HCC)    Age-related osteoporosis with current pathological fracture, left humerus, subsequent encounter for fracture with routine healing    Carotid stenosis    Neuropathy of left foot    Presence of left artificial shoulder joint    Hypervolemia, unspecified hypervolemia type    Left leg cellulitis    Shortness of breath    PALLAVI (acute kidney injury) (CMS-HCC)    Elevated d-dimer    History of pulmonary embolism       General Visit Information:  General  Reason for Referral: PT eval (pt admitted on 2/27 with SOB and elevated d-dimer; recently was admitted 1/14 for LLE cellulitis. BLE duplex (-))  Referred By: Mark  Past Medical History Relevant to Rehab: anxiety, PE, HFpEF, PAD, AAA, ITP, 2-3 L NC PRN  Co-Treatment: OT  Co-Treatment Reason: to maximize pt safety and mobility  Prior to Session Communication: Bedside nurse  Patient Position Received: Alarm off, not on at start of session, Bed, 3 rail up  General Comment: pt agreeable to therapy    Home Living:  Home Living  Home Living Comments: pt lives alone in apt with 2 + 4 NITHIN with rails. son lives across the street. first floor set up. pt has tub shower, HHS, chair, GB and std toilet. pt amb with w/w. pt has [facility] aide 4xd/wk x 5 hrs to assist with ADLs. IND in cooking and cleaning. aide completes laundry. amb with w/w. owns w/c and cane. son drives/shops.    Prior Level of Function:  Prior Function Per Pt/Caregiver Report  Level of [place]: Independent with ADLs and functional transfers, Independent with homemaking with ambulation    Precautions:  Precautions  Precautions Comment: falls, amb with  assist         Objective     Pain:  Pain Assessment  Pain Assessment: 0-10  0-10 (Numeric) Pain Score: 0 - No pain    Cognition:  Cognition  Overall Cognitive Status: Within Functional Limits    General Assessments:         Sensation  Light Touch: No apparent deficits  Strength  Strength Comments: BLE strength 4-/5 grossly. BLE ROM WFL for pts age                   Functional Assessments:     Bed Mobility  Bed Mobility: Yes  Bed Mobility 1  Bed Mobility 1: Supine to sitting, Sitting to supine  Bed Mobility Comments 1: SBA  Transfers  Transfer: Yes  Transfer 1  Transfer From 1: Bed to, Sit to, Stand to  Transfer Device 1: Walker  Trials/Comments 1: SBA  Ambulation/Gait Training  Ambulation/Gait Training Performed: Yes  Ambulation/Gait Training 1  Surface 1: Level tile  Device 1: Rolling walker  Comments/Distance (ft) 1: pt completed 15 ft of amb with SBA; pt easily distractable and impulsive          Outcome Measures:     [facility] Basic Mobility  Turning from your back to your side while in a flat bed without using bedrails: None  Moving from lying on your back to sitting on the side of a flat bed without using bedrails: A little  Moving to and from bed to chair (including a wheelchair): A little  Standing up from a chair using your arms (e.g. wheelchair or bedside chair): A little  To walk in hospital room: A little  Climbing 3-5 steps with railing: Total  Basic Mobility - Total Score: 17             Goals:  Encounter Problems       Encounter Problems (Active)       PT Problem       STG - Pt will perform a B LE ther ex program of 2-3 sets of 10  (Progressing)       Start:  03/03/25    Expected End:  03/17/25            pt will complete 5 x STS < 16 secs  (Progressing)       Start:  03/03/25    Expected End:  03/17/25            STG - Pt will amb 100' using w/w with SBA  (Progressing)       Start:  03/03/25    Expected End:  03/17/25            STG -  Pt will navigate 4 stairs using rail  with SBA  (Progressing)        Start:  03/03/25    Expected End:  03/17/25               Pain - Adult            Education Documentation  Mobility Training, taught by Sweta Butt PT at 3/3/2025  2:20 PM.  Learner: Patient  Readiness: Acceptance  Method: Explanation  Response: Verbalizes Understanding    Education Comments  No comments found.

## 2025-03-04 LAB
ANION GAP SERPL CALC-SCNC: 8 MMOL/L (ref 10–20)
BUN SERPL-MCNC: 22 MG/DL (ref 6–23)
CALCIUM SERPL-MCNC: 8.4 MG/DL (ref 8.6–10.3)
CHLORIDE SERPL-SCNC: 106 MMOL/L (ref 98–107)
CO2 SERPL-SCNC: 30 MMOL/L (ref 21–32)
CREAT SERPL-MCNC: 1.26 MG/DL (ref 0.5–1.05)
EGFRCR SERPLBLD CKD-EPI 2021: 41 ML/MIN/1.73M*2
ERYTHROCYTE [DISTWIDTH] IN BLOOD BY AUTOMATED COUNT: 14.3 % (ref 11.5–14.5)
GLUCOSE SERPL-MCNC: 77 MG/DL (ref 74–99)
HCT VFR BLD AUTO: 37.2 % (ref 36–46)
HGB BLD-MCNC: 11 G/DL (ref 12–16)
MCH RBC QN AUTO: 26.6 PG (ref 26–34)
MCHC RBC AUTO-ENTMCNC: 29.6 G/DL (ref 32–36)
MCV RBC AUTO: 90 FL (ref 80–100)
NRBC BLD-RTO: 0 /100 WBCS (ref 0–0)
PLATELET # BLD AUTO: 146 X10*3/UL (ref 150–450)
POTASSIUM SERPL-SCNC: 5.3 MMOL/L (ref 3.5–5.3)
RBC # BLD AUTO: 4.13 X10*6/UL (ref 4–5.2)
SODIUM SERPL-SCNC: 139 MMOL/L (ref 136–145)
WBC # BLD AUTO: 5.7 X10*3/UL (ref 4.4–11.3)

## 2025-03-04 PROCEDURE — 82374 ASSAY BLOOD CARBON DIOXIDE: CPT | Performed by: INTERNAL MEDICINE

## 2025-03-04 PROCEDURE — 85027 COMPLETE CBC AUTOMATED: CPT | Performed by: INTERNAL MEDICINE

## 2025-03-04 PROCEDURE — 2500000001 HC RX 250 WO HCPCS SELF ADMINISTERED DRUGS (ALT 637 FOR MEDICARE OP): Performed by: NURSE PRACTITIONER

## 2025-03-04 PROCEDURE — 1100000001 HC PRIVATE ROOM DAILY

## 2025-03-04 PROCEDURE — 36415 COLL VENOUS BLD VENIPUNCTURE: CPT | Performed by: INTERNAL MEDICINE

## 2025-03-04 PROCEDURE — 2500000002 HC RX 250 W HCPCS SELF ADMINISTERED DRUGS (ALT 637 FOR MEDICARE OP, ALT 636 FOR OP/ED): Performed by: NURSE PRACTITIONER

## 2025-03-04 PROCEDURE — 2500000001 HC RX 250 WO HCPCS SELF ADMINISTERED DRUGS (ALT 637 FOR MEDICARE OP): Performed by: INTERNAL MEDICINE

## 2025-03-04 RX ORDER — POLYETHYLENE GLYCOL 3350 17 G/17G
17 POWDER, FOR SOLUTION ORAL DAILY
Qty: 30 PACKET | Refills: 0 | Status: SHIPPED | OUTPATIENT
Start: 2025-03-04 | End: 2025-04-03

## 2025-03-04 RX ORDER — ACETAMINOPHEN 500 MG
5 TABLET ORAL DAILY
Qty: 30 TABLET | Refills: 0 | Status: SHIPPED | OUTPATIENT
Start: 2025-03-04 | End: 2025-04-03

## 2025-03-04 RX ADMIN — DILTIAZEM HYDROCHLORIDE 180 MG: 180 CAPSULE, COATED, EXTENDED RELEASE ORAL at 09:48

## 2025-03-04 RX ADMIN — SIMVASTATIN 20 MG: 20 TABLET, FILM COATED ORAL at 20:53

## 2025-03-04 RX ADMIN — APIXABAN 2.5 MG: 2.5 TABLET, FILM COATED ORAL at 20:53

## 2025-03-04 RX ADMIN — APIXABAN 2.5 MG: 2.5 TABLET, FILM COATED ORAL at 09:47

## 2025-03-04 RX ADMIN — DULOXETINE HYDROCHLORIDE 30 MG: 30 CAPSULE, DELAYED RELEASE ORAL at 09:47

## 2025-03-04 RX ADMIN — Medication 5 MG: at 20:53

## 2025-03-04 RX ADMIN — TRAMADOL HYDROCHLORIDE 50 MG: 50 TABLET, COATED ORAL at 20:53

## 2025-03-04 RX ADMIN — Medication 1 CAPSULE: at 20:51

## 2025-03-04 ASSESSMENT — COGNITIVE AND FUNCTIONAL STATUS - GENERAL
TURNING FROM BACK TO SIDE WHILE IN FLAT BAD: A LITTLE
CLIMB 3 TO 5 STEPS WITH RAILING: A LOT
HELP NEEDED FOR BATHING: A LITTLE
CLIMB 3 TO 5 STEPS WITH RAILING: A LOT
PERSONAL GROOMING: A LITTLE
STANDING UP FROM CHAIR USING ARMS: A LITTLE
PERSONAL GROOMING: A LITTLE
MOVING TO AND FROM BED TO CHAIR: A LITTLE
MOVING TO AND FROM BED TO CHAIR: A LITTLE
TOILETING: A LITTLE
DRESSING REGULAR UPPER BODY CLOTHING: A LITTLE
DAILY ACTIVITIY SCORE: 19
DRESSING REGULAR UPPER BODY CLOTHING: A LITTLE
DRESSING REGULAR LOWER BODY CLOTHING: A LITTLE
STANDING UP FROM CHAIR USING ARMS: A LITTLE
TOILETING: A LITTLE
DRESSING REGULAR LOWER BODY CLOTHING: A LITTLE
TURNING FROM BACK TO SIDE WHILE IN FLAT BAD: A LITTLE
WALKING IN HOSPITAL ROOM: A LITTLE
STANDING UP FROM CHAIR USING ARMS: A LITTLE
DAILY ACTIVITIY SCORE: 19
MOVING FROM LYING ON BACK TO SITTING ON SIDE OF FLAT BED WITH BEDRAILS: A LITTLE
CLIMB 3 TO 5 STEPS WITH RAILING: A LOT
MOBILITY SCORE: 17
TURNING FROM BACK TO SIDE WHILE IN FLAT BAD: A LITTLE
TOILETING: A LITTLE
MOVING FROM LYING ON BACK TO SITTING ON SIDE OF FLAT BED WITH BEDRAILS: A LITTLE
PERSONAL GROOMING: A LITTLE
WALKING IN HOSPITAL ROOM: A LITTLE
MOVING FROM LYING ON BACK TO SITTING ON SIDE OF FLAT BED WITH BEDRAILS: A LITTLE
MOVING TO AND FROM BED TO CHAIR: A LITTLE
DRESSING REGULAR LOWER BODY CLOTHING: A LITTLE
WALKING IN HOSPITAL ROOM: A LITTLE
MOBILITY SCORE: 17
MOBILITY SCORE: 17
DAILY ACTIVITIY SCORE: 20

## 2025-03-04 ASSESSMENT — PAIN DESCRIPTION - ORIENTATION: ORIENTATION: LEFT

## 2025-03-04 ASSESSMENT — PAIN SCALES - GENERAL
PAINLEVEL_OUTOF10: 10 - WORST POSSIBLE PAIN
PAINLEVEL_OUTOF10: 0 - NO PAIN

## 2025-03-04 NOTE — CARE PLAN
The patient's goals for the shift include  Rest    The clinical goals for the shift include Patient will be free from injury during shift      Problem: Pain - Adult  Goal: Verbalizes/displays adequate comfort level or baseline comfort level  Outcome: Progressing     Problem: Safety - Adult  Goal: Free from fall injury  Outcome: Progressing     Problem: Skin  Goal: Prevent/minimize sheer/friction injuries  Outcome: Progressing  Flowsheets (Taken 3/4/2025 0144)  Prevent/minimize sheer/friction injuries: HOB 30 degrees or less  Goal: Promote/optimize nutrition  Outcome: Progressing  Flowsheets (Taken 3/4/2025 0144)  Promote/optimize nutrition: Monitor/record intake including meals     Problem: Skin  Goal: Promote/optimize nutrition  Outcome: Progressing  Flowsheets (Taken 3/4/2025 0144)  Promote/optimize nutrition: Monitor/record intake including meals     Problem: Fall/Injury  Goal: Not fall by end of shift  Outcome: Progressing  Goal: Be free from injury by end of the shift  Outcome: Progressing     Problem: Pain  Goal: Takes deep breaths with improved pain control throughout the shift  Outcome: Progressing  Goal: Turns in bed with improved pain control throughout the shift  Outcome: Progressing  Goal: Walks with improved pain control throughout the shift  Outcome: Progressing

## 2025-03-04 NOTE — PROGRESS NOTES
Estefanía Contreras is a 89 y.o. female on day 2 of admission presenting with Shortness of breath.      Subjective   No events overnight.  Patient seen and examined at bedside.  She has now new complaints.         Objective     Last Recorded Vitals  /60   Pulse 71   Temp 37 °C (98.6 °F)   Resp 16   Wt 56.7 kg (125 lb)   SpO2 90%   Intake/Output last 3 Shifts:    Intake/Output Summary (Last 24 hours) at 3/3/2025 2211  Last data filed at 3/3/2025 0640  Gross per 24 hour   Intake --   Output 450 ml   Net -450 ml       Admission Weight  Weight: 56.7 kg (125 lb) (02/27/25 1824)    Daily Weight  02/27/25 : 56.7 kg (125 lb)    Image Results  XR abdomen 1 view  Narrative: Interpreted By:  Justen Lunsford,   STUDY:  XR ABDOMEN 1 VIEW;  3/2/2025 4:51 pm      INDICATION:  Signs/Symptoms:vomiting.          COMPARISON:  None.      ACCESSION NUMBER(S):  [id number]      ORDERING CLINICIAN:  ANNETTE ALANIS      FINDINGS:  Multiple mild gas distended small and large bowel loops. Limited  evaluation of pneumoperitoneum on supine imaging, however no gross  evidence of free air is noted. Right-sided colonic stool burden.      Visualized lungs are clear.      Thoracolumbar spine scoliotic deformity with degenerative changes.  Left hip arthroplasty. Prior aorto bi iliac vascular stents.      Impression: 1. Multiple gas distended small and large bowel loops could represent  ileus. For continued surveillance. Right-sided colonic stool burden      MACRO:  None      Signed by: Justen Lunsford 3/3/2025 7:14 AM  Dictation workstation:   [id number]      Physical Exam  Physical Exam  Constitutional:       Appearance: Normal appearance.      Comments: Pleasant elderly female   HENT:      Head: Normocephalic and atraumatic.      Nose: Nose normal.      Mouth/Throat:      Mouth: Mucous membranes are moist.      Pharynx: Oropharynx is clear.   Eyes:      Extraocular Movements: Extraocular movements intact.      Conjunctiva/sclera: Conjunctivae  normal.      Pupils: Pupils are equal, round, and reactive to light.   Cardiovascular:      Rate and Rhythm: Regular rhythm. Bradycardia present.      Pulses: Normal pulses.      Heart sounds: Normal heart sounds.   Pulmonary:      Effort: Pulmonary effort is normal.      Breath sounds: Normal breath sounds.   Abdominal:      General: Abdomen is flat. Bowel sounds are normal.      Palpations: Abdomen is soft.   Musculoskeletal:         General: Normal range of motion.   Skin:     General: Skin is warm and dry.      Capillary Refill: Capillary refill takes less than 2 seconds.      Comments: Dry skin to LLE with rash from previous cellulitis admission   Neurological:      General: No focal deficit present.      Mental Status: She is alert and oriented to person, place, and time.   Psychiatric:         Mood and Affect: Mood normal.         Behavior: Behavior normal.         Thought Content: Thought content normal.         Judgment: Judgment normal.      Relevant Results               Assessment/Plan                  Assessment & Plan  Shortness of breath    PALLAVI (acute kidney injury) (CMS-HCC)    Elevated d-dimer    History of pulmonary embolism    89 year old female who presented to ED today with dyspnea with exertion for a week. Concern for PE. VQ scan ordered. Heparin infusion initiated in ED. Patient to be admitted to hospital for further medical management.       Rule out PE  Elevated d-dimer  Dyspnea with exertion  Hx of PE and DVT  Admit to inpatient/telemetry to Dr. Flores   Defer consults to attending pending clinical course per request  See imaging results above   VQ scan ordered  Duplex ultrasound BLE negative for DVT  Continue Heparin infusion   Oxygen as needed to maintain sats>92%  Repeat labs in AM      PALLAVI on CKD  UA ordered  Hold nephrotoxic medications  Continue to monitor labs     Chronic conditions   #diastolic heart failure, HLD, HTN, AAA, PAD, ITP, anxiety, neuropathic pain  Continue home  medications as appropriate when nursing completes home med rec   DNAR/DNI     DVT prophylaxis   Continue heparin infusion   SCD's as tolerated     3/1: VQ scan with intermediate probability of PE.  Pulmonology consulted.  Patient will require lifelong anticoagulation per pulmonology.  She has been placed on Eliquis 2.5 mg twice a day.  Creatinine improved from 2.1 down to 1.5.  Continue IV fluids and recheck labs tomorrow.  Urine culture is negative.  PT OT eval ordered.    3/2: Creatinine improved from 1.5 down to 1.3.  IV fluids ordered to continue.  IV Zofran for nausea and vomiting.  Check KUB.  Liberalize diet per patient request.  Add boost twice daily.    3/3: Creatinine improved from 1.3 down to 1.2. Repeat labs tomorrow. Resume home diuretic at discharge. SpO2 93-97%, wean O2. KUB with large stool burden, particularly on right side. Milk of magnesia and dulcolax suppository ordered. Patient will likely discharge home with [facility] tomorrow.              Darnell Ruggiero, DO

## 2025-03-04 NOTE — CARE PLAN
The patient's goals for the shift include      The clinical goals for the shift include Patient will remain injury free    Over the shift, the patient did not make progress toward the following goals. Barriers to progression include calling for assistance. Recommendations to address these barriers include frequent rounds.

## 2025-03-05 VITALS
RESPIRATION RATE: 18 BRPM | DIASTOLIC BLOOD PRESSURE: 63 MMHG | BODY MASS INDEX: 22.15 KG/M2 | TEMPERATURE: 97.5 F | HEART RATE: 79 BPM | SYSTOLIC BLOOD PRESSURE: 132 MMHG | WEIGHT: 125 LBS | OXYGEN SATURATION: 99 % | HEIGHT: 63 IN

## 2025-03-05 PROCEDURE — 2500000001 HC RX 250 WO HCPCS SELF ADMINISTERED DRUGS (ALT 637 FOR MEDICARE OP): Performed by: INTERNAL MEDICINE

## 2025-03-05 PROCEDURE — 2500000001 HC RX 250 WO HCPCS SELF ADMINISTERED DRUGS (ALT 637 FOR MEDICARE OP): Performed by: NURSE PRACTITIONER

## 2025-03-05 RX ADMIN — Medication 1 CAPSULE: at 10:30

## 2025-03-05 RX ADMIN — DULOXETINE HYDROCHLORIDE 30 MG: 30 CAPSULE, DELAYED RELEASE ORAL at 10:30

## 2025-03-05 RX ADMIN — DILTIAZEM HYDROCHLORIDE 180 MG: 180 CAPSULE, COATED, EXTENDED RELEASE ORAL at 10:30

## 2025-03-05 RX ADMIN — APIXABAN 2.5 MG: 2.5 TABLET, FILM COATED ORAL at 10:30

## 2025-03-05 ASSESSMENT — COGNITIVE AND FUNCTIONAL STATUS - GENERAL
MOVING FROM LYING ON BACK TO SITTING ON SIDE OF FLAT BED WITH BEDRAILS: A LITTLE
DAILY ACTIVITIY SCORE: 20
TOILETING: A LITTLE
CLIMB 3 TO 5 STEPS WITH RAILING: A LOT
DRESSING REGULAR LOWER BODY CLOTHING: A LITTLE
MOBILITY SCORE: 17
MOVING TO AND FROM BED TO CHAIR: A LITTLE
WALKING IN HOSPITAL ROOM: A LITTLE
HELP NEEDED FOR BATHING: A LITTLE
TURNING FROM BACK TO SIDE WHILE IN FLAT BAD: A LITTLE
PERSONAL GROOMING: A LITTLE
STANDING UP FROM CHAIR USING ARMS: A LITTLE

## 2025-03-05 ASSESSMENT — PAIN SCALES - GENERAL: PAINLEVEL_OUTOF10: 0 - NO PAIN

## 2025-03-05 ASSESSMENT — PAIN - FUNCTIONAL ASSESSMENT: PAIN_FUNCTIONAL_ASSESSMENT: 0-10

## 2025-03-05 NOTE — CARE PLAN
The clinical goals for the shift include Patient will be safe during shift    Over the shift, the patient did make progress toward the following goals.       Problem: Safety - Adult  Goal: Free from fall injury  Outcome: Progressing

## 2025-03-05 NOTE — DISCHARGE SUMMARY
Discharge Diagnosis  Shortness of breath    Issues Requiring Follow-Up  PE    Discharge Meds     Medication List      START taking these medications     apixaban 2.5 mg tablet; Commonly known as: Eliquis; Take 1 tablet (2.5   mg) by mouth 2 times a day.   melatonin 5 mg tablet; Take 1 tablet (5 mg) by mouth once daily.   polyethylene glycol 17 gram packet; Commonly known as: Glycolax,   Miralax; Take 17 g by mouth once daily.   PreserVision AREDS-2 250-90-40-1 mg capsule; Generic drug: vit   C,P-Nz-vxisu-lutein-zeaxan; TAKE ONE CAPSULE BY MOUTH TWICE A DAY     CHANGE how you take these medications     azelastine 137 mcg (0.1 %) nasal spray; Commonly known as: Astelin;   Administer 2 sprays into each nostril 2 times a day. Use in each nostril   as directed   dilTIAZem  mg 24 hr tablet; Commonly known as: Cardizem LA; Take 1   tablet (180 mg) by mouth once daily.; What changed: when to take this   furosemide 20 mg tablet; Commonly known as: Lasix; Take 1 tablet (20 mg)   by mouth once daily.; What changed: how much to take, when to take this   meloxicam 15 mg tablet; Commonly known as: Mobic; Take 1 tablet (15 mg)   by mouth once daily.; What changed: when to take this     CONTINUE taking these medications     DULoxetine 30 mg DR capsule; Commonly known as: Cymbalta; Take 1 capsule   (30 mg) by mouth once daily. Do not crush or chew.   mirtazapine 15 mg tablet; Commonly known as: Remeron   oxygen gas therapy; Commonly known as: O2   potassium chloride CR 20 mEq ER tablet; Commonly known as: Klor-Con M20   pramipexole 1 mg tablet; Commonly known as: Mirapex; Take 1 tablet (1   mg) by mouth once daily at bedtime.   PreserVision AREDS 2 Plus  mcg-15 mcg- 5 mg-1 mg capsule; Generic   drug: mv-min-FA-vit K-lutein-zeaxant   simvastatin 20 mg tablet; Commonly known as: Zocor; Take 1 tablet (20   mg) by mouth once daily at bedtime.   traMADol 50 mg tablet; Commonly known as: Ultram     STOP taking these  medications     acetaminophen 325 mg tablet; Commonly known as: Tylenol   ferrous sulfate 325 (65 Fe) mg EC tablet   gabapentin 100 mg capsule; Commonly known as: Neurontin   pantoprazole 40 mg EC tablet; Commonly known as: ProtoNix   traZODone 50 mg tablet; Commonly known as: Desyrel       Test Results Pending At Discharge  Pending Labs       No current pending labs.            Hospital Course   89 year old female who presented to ED today with dyspnea with exertion for a week. Concern for PE. VQ scan ordered. Heparin infusion initiated in ED. Patient to be admitted to hospital for further medical management.       Rule out PE  Elevated d-dimer  Dyspnea with exertion  Hx of PE and DVT  Admit to inpatient/telemetry to Dr. Flores   Defer consults to attending pending clinical course per request  See imaging results above   VQ scan ordered  Duplex ultrasound BLE negative for DVT  Continue Heparin infusion   Oxygen as needed to maintain sats>92%  Repeat labs in AM      PALLAVI on CKD  UA ordered  Hold nephrotoxic medications  Continue to monitor labs     Chronic conditions   #diastolic heart failure, HLD, HTN, AAA, PAD, ITP, anxiety, neuropathic pain  Continue home medications as appropriate when nursing completes home med rec   DNAR/DNI     DVT prophylaxis   Continue heparin infusion   SCD's as tolerated      3/1: VQ scan with intermediate probability of PE.  Pulmonology consulted.  Patient will require lifelong anticoagulation per pulmonology.  She has been placed on Eliquis 2.5 mg twice a day.  Creatinine improved from 2.1 down to 1.5.  Continue IV fluids and recheck labs tomorrow.  Urine culture is negative.  PT OT eval ordered.     3/2: Creatinine improved from 1.5 down to 1.3.  IV fluids ordered to continue.  IV Zofran for nausea and vomiting.  Check KUB.  Liberalize diet per patient request.  Add boost twice daily.     3/3: Creatinine improved from 1.3 down to 1.2. Repeat labs tomorrow. Resume home diuretic at  discharge. SpO2 93-97%, wean O2. KUB with large stool burden, particularly on right side. Milk of magnesia and dulcolax suppository ordered. Patient will likely discharge home with [facility] tomorrow.     3/4: Patient had a bowel movement.  Patient discharged home with home health care to continue anticoagulation.    Pertinent Physical Exam At Time of Discharge  Physical Exam  Constitutional:       Appearance: Normal appearance.      Comments: Pleasant elderly female   HENT:      Head: Normocephalic and atraumatic.      Nose: Nose normal.      Mouth/Throat:      Mouth: Mucous membranes are moist.      Pharynx: Oropharynx is clear.   Eyes:      Extraocular Movements: Extraocular movements intact.      Conjunctiva/sclera: Conjunctivae normal.      Pupils: Pupils are equal, round, and reactive to light.   Cardiovascular:      Rate and Rhythm: Regular rhythm. Bradycardia present.      Pulses: Normal pulses.      Heart sounds: Normal heart sounds.   Pulmonary:      Effort: Pulmonary effort is normal.      Breath sounds: Normal breath sounds.   Abdominal:      General: Abdomen is flat. Bowel sounds are normal.      Palpations: Abdomen is soft.   Musculoskeletal:         General: Normal range of motion.   Skin:     General: Skin is warm and dry.      Capillary Refill: Capillary refill takes less than 2 seconds.      Comments: Dry skin to LLE with rash from previous cellulitis admission   Neurological:      General: No focal deficit present.      Mental Status: She is alert and oriented to person, place, and time.   Psychiatric:         Mood and Affect: Mood normal.         Behavior: Behavior normal.         Thought Content: Thought content normal.         Judgment: Judgment normal.      Outpatient Follow-Up  Future Appointments   Date Time Provider Department Center   7/17/2025 10:00 AM Pito Henao MD [id number] [place]         Darnell Ruggiero DO

## 2025-03-06 ENCOUNTER — PATIENT OUTREACH (OUTPATIENT)
Dept: PRIMARY CARE | Facility: CLINIC | Age: OVER 89
End: 2025-03-06
Payer: MEDICARE

## 2025-03-06 NOTE — NURSING NOTE
Discharge planning reviewed with patient.  Medications, new and existing discussed with patient to ensure accuracy and understanding.  Follow up appointments, new and existing also reviewed.  Verified and entered PCP and encouraged follow up as well.  IV and Tele removed.  AVS printed and highlighted.  Patient voiced understanding of all discussed and also referred to ask nursing with [facility] as they follow up to direct any other questions concerning her discharge when at home.  Home Health Care in place as well.  Nurse updated on all.  Patient transported home by family.

## 2025-03-08 NOTE — PROGRESS NOTES
Estefanía Contreras is a 89 y.o. female on day 4 of admission presenting with Shortness of breath.      Subjective   No events overnight.  Patient seen and examined at bedside.  She has now new complaints.       Objective     Last Recorded Vitals  /63 (BP Location: Right arm, Patient Position: Lying)   Pulse 79   Temp 36.4 °C (97.5 °F)   Resp 18   Wt 56.7 kg (125 lb)   SpO2 99%   Intake/Output last 3 Shifts:  No intake or output data in the 24 hours ending 03/07/25 2115      Admission Weight  Weight: 56.7 kg (125 lb) (02/27/25 1824)    Daily Weight  02/27/25 : 56.7 kg (125 lb)    Image Results  XR abdomen 1 view  Narrative: Interpreted By:  Justen Lunsford,   STUDY:  XR ABDOMEN 1 VIEW;  3/2/2025 4:51 pm      INDICATION:  Signs/Symptoms:vomiting.          COMPARISON:  None.      ACCESSION NUMBER(S):  [id number]      ORDERING CLINICIAN:  ANNETTE ALANIS      FINDINGS:  Multiple mild gas distended small and large bowel loops. Limited  evaluation of pneumoperitoneum on supine imaging, however no gross  evidence of free air is noted. Right-sided colonic stool burden.      Visualized lungs are clear.      Thoracolumbar spine scoliotic deformity with degenerative changes.  Left hip arthroplasty. Prior aorto bi iliac vascular stents.      Impression: 1. Multiple gas distended small and large bowel loops could represent  ileus. For continued surveillance. Right-sided colonic stool burden      MACRO:  None      Signed by: Justen Lunsford 3/3/2025 7:14 AM  Dictation workstation:   [id number]      Physical Exam  Physical Exam  Constitutional:       Appearance: Normal appearance.      Comments: Pleasant elderly female   HENT:      Head: Normocephalic and atraumatic.      Nose: Nose normal.      Mouth/Throat:      Mouth: Mucous membranes are moist.      Pharynx: Oropharynx is clear.   Eyes:      Extraocular Movements: Extraocular movements intact.      Conjunctiva/sclera: Conjunctivae normal.      Pupils: Pupils are equal,  round, and reactive to light.   Cardiovascular:      Rate and Rhythm: Regular rhythm. Bradycardia present.      Pulses: Normal pulses.      Heart sounds: Normal heart sounds.   Pulmonary:      Effort: Pulmonary effort is normal.      Breath sounds: Normal breath sounds.   Abdominal:      General: Abdomen is flat. Bowel sounds are normal.      Palpations: Abdomen is soft.   Musculoskeletal:         General: Normal range of motion.   Skin:     General: Skin is warm and dry.      Capillary Refill: Capillary refill takes less than 2 seconds.      Comments: Dry skin to LLE with rash from previous cellulitis admission   Neurological:      General: No focal deficit present.      Mental Status: She is alert and oriented to person, place, and time.   Psychiatric:         Mood and Affect: Mood normal.         Behavior: Behavior normal.         Thought Content: Thought content normal.         Judgment: Judgment normal.      Relevant Results               Assessment/Plan                  Assessment & Plan  Shortness of breath    PALLAVI (acute kidney injury) (CMS-[facility])    Elevated d-dimer    History of pulmonary embolism    89 year old female who presented to ED today with dyspnea with exertion for a week. Concern for PE. VQ scan ordered. Heparin infusion initiated in ED. Patient to be admitted to hospital for further medical management.       Rule out PE  Elevated d-dimer  Dyspnea with exertion  Hx of PE and DVT  Admit to inpatient/telemetry to Dr. Flores   Defer consults to attending pending clinical course per request  See imaging results above   VQ scan ordered  Duplex ultrasound BLE negative for DVT  Continue Heparin infusion   Oxygen as needed to maintain sats>92%  Repeat labs in AM      PALLAVI on CKD  UA ordered  Hold nephrotoxic medications  Continue to monitor labs     Chronic conditions   #diastolic heart failure, HLD, HTN, AAA, PAD, ITP, anxiety, neuropathic pain  Continue home medications as appropriate when nursing  completes home med rec   DNAR/DNI     DVT prophylaxis   Continue heparin infusion   SCD's as tolerated     3/1: VQ scan with intermediate probability of PE.  Pulmonology consulted.  Patient will require lifelong anticoagulation per pulmonology.  She has been placed on Eliquis 2.5 mg twice a day.  Creatinine improved from 2.1 down to 1.5.  Continue IV fluids and recheck labs tomorrow.  Urine culture is negative.  PT OT eval ordered.    3/2: Creatinine improved from 1.5 down to 1.3.  IV fluids ordered to continue.  IV Zofran for nausea and vomiting.  Check KUB.  Liberalize diet per patient request.  Add boost twice daily.    3/3: Creatinine improved from 1.3 down to 1.2. Repeat labs tomorrow. Resume home diuretic at discharge. SpO2 93-97%, wean O2. KUB with large stool burden, particularly on right side. Milk of magnesia and dulcolax suppository ordered. Patient will likely discharge home with [facility] tomorrow.    3/4: Patient discharged.               Darnell Ruggiero, DO

## 2025-03-10 ENCOUNTER — TELEPHONE (OUTPATIENT)
Dept: PRIMARY CARE | Facility: CLINIC | Age: OVER 89
End: 2025-03-10
Payer: MEDICARE

## 2025-03-10 NOTE — TELEPHONE ENCOUNTER
----- Message from Macy Lawton sent at 3/6/2025  3:10 PM EST -----  Regarding: hosp dc  Discharge facility:[facility]  Discharge diagnosis:   PE  Date of discharge:  3.5.25     Unsuccessful attempts x2 to reach patient for PCP Follow-up  Please have office staff reach out to patient and schedule an appointment within 14 days from discharge date.

## 2025-03-17 ENCOUNTER — PATIENT OUTREACH (OUTPATIENT)
Dept: PRIMARY CARE | Facility: CLINIC | Age: OVER 89
End: 2025-03-17
Payer: MEDICARE

## 2025-04-17 ENCOUNTER — PATIENT OUTREACH (OUTPATIENT)
Dept: PRIMARY CARE | Facility: CLINIC | Age: OVER 89
End: 2025-04-17
Payer: MEDICARE

## 2025-06-04 ENCOUNTER — PATIENT OUTREACH (OUTPATIENT)
Age: OVER 89
End: 2025-06-04
Payer: MEDICARE

## 2025-06-04 NOTE — PROGRESS NOTES
Call placed regarding 90 days discharge follow up call.              At time of outreach call the patient feels as if their hospitalized condition has               returned to baseline.              Questions or concerns regarding recovery period addressed at this time.               Reviewed any PCP or specialists progress notes/labs/radiology reports if applicable              and addressed any questions or concerns.

## 2025-06-21 ENCOUNTER — APPOINTMENT (OUTPATIENT)
Dept: RADIOLOGY | Facility: HOSPITAL | Age: OVER 89
End: 2025-06-21
Payer: MEDICARE

## 2025-06-21 ENCOUNTER — HOSPITAL ENCOUNTER (OUTPATIENT)
Facility: HOSPITAL | Age: OVER 89
Setting detail: OBSERVATION
End: 2025-06-21
Attending: EMERGENCY MEDICINE | Admitting: INTERNAL MEDICINE
Payer: MEDICARE

## 2025-06-21 ENCOUNTER — APPOINTMENT (OUTPATIENT)
Dept: CARDIOLOGY | Facility: HOSPITAL | Age: OVER 89
End: 2025-06-21
Payer: MEDICARE

## 2025-06-21 DIAGNOSIS — Z86.79 HISTORY OF CHF (CONGESTIVE HEART FAILURE): ICD-10-CM

## 2025-06-21 DIAGNOSIS — Z86.711 HISTORY OF PULMONARY EMBOLUS (PE): ICD-10-CM

## 2025-06-21 DIAGNOSIS — R11.0 NAUSEA: ICD-10-CM

## 2025-06-21 DIAGNOSIS — K59.00 CONSTIPATION, UNSPECIFIED CONSTIPATION TYPE: ICD-10-CM

## 2025-06-21 DIAGNOSIS — N39.0 URINARY TRACT INFECTION WITHOUT HEMATURIA, SITE UNSPECIFIED: ICD-10-CM

## 2025-06-21 DIAGNOSIS — R06.02 SHORTNESS OF BREATH: Primary | ICD-10-CM

## 2025-06-21 DIAGNOSIS — G62.9 NEUROPATHY: ICD-10-CM

## 2025-06-21 PROBLEM — R10.819 ABDOMINAL TENDERNESS: Status: ACTIVE | Noted: 2025-06-21

## 2025-06-21 PROBLEM — R09.89 SUSPECTED CHF (CONGESTIVE HEART FAILURE): Status: ACTIVE | Noted: 2025-06-21

## 2025-06-21 LAB
ALBUMIN SERPL BCP-MCNC: 4.2 G/DL (ref 3.4–5)
ALP SERPL-CCNC: 81 U/L (ref 33–136)
ALT SERPL W P-5'-P-CCNC: 8 U/L (ref 7–45)
ANION GAP SERPL CALC-SCNC: 13 MMOL/L (ref 10–20)
AST SERPL W P-5'-P-CCNC: 17 U/L (ref 9–39)
BASOPHILS # BLD AUTO: 0.06 X10*3/UL (ref 0–0.1)
BASOPHILS NFR BLD AUTO: 1.1 %
BILIRUB SERPL-MCNC: 0.4 MG/DL (ref 0–1.2)
BNP SERPL-MCNC: 133 PG/ML (ref 0–99)
BUN SERPL-MCNC: 35 MG/DL (ref 6–23)
CALCIUM SERPL-MCNC: 9.1 MG/DL (ref 8.6–10.3)
CARDIAC TROPONIN I PNL SERPL HS: 11 NG/L (ref 0–13)
CHLORIDE SERPL-SCNC: 105 MMOL/L (ref 98–107)
CO2 SERPL-SCNC: 28 MMOL/L (ref 21–32)
CREAT SERPL-MCNC: 1.85 MG/DL (ref 0.5–1.05)
EGFRCR SERPLBLD CKD-EPI 2021: 26 ML/MIN/1.73M*2
EOSINOPHIL # BLD AUTO: 0.18 X10*3/UL (ref 0–0.4)
EOSINOPHIL NFR BLD AUTO: 3.2 %
ERYTHROCYTE [DISTWIDTH] IN BLOOD BY AUTOMATED COUNT: 16.8 % (ref 11.5–14.5)
FLUAV RNA RESP QL NAA+PROBE: NOT DETECTED
FLUBV RNA RESP QL NAA+PROBE: NOT DETECTED
GLUCOSE SERPL-MCNC: 110 MG/DL (ref 74–99)
HCT VFR BLD AUTO: 41.1 % (ref 36–46)
HGB BLD-MCNC: 12.2 G/DL (ref 12–16)
IMM GRANULOCYTES # BLD AUTO: 0.01 X10*3/UL (ref 0–0.5)
IMM GRANULOCYTES NFR BLD AUTO: 0.2 % (ref 0–0.9)
INR PPP: 1.2 (ref 0.9–1.1)
LYMPHOCYTES # BLD AUTO: 1.5 X10*3/UL (ref 0.8–3)
LYMPHOCYTES NFR BLD AUTO: 26.8 %
MAGNESIUM SERPL-MCNC: 2.71 MG/DL (ref 1.6–2.4)
MCH RBC QN AUTO: 26.9 PG (ref 26–34)
MCHC RBC AUTO-ENTMCNC: 29.7 G/DL (ref 32–36)
MCV RBC AUTO: 91 FL (ref 80–100)
MONOCYTES # BLD AUTO: 0.5 X10*3/UL (ref 0.05–0.8)
MONOCYTES NFR BLD AUTO: 8.9 %
NEUTROPHILS # BLD AUTO: 3.35 X10*3/UL (ref 1.6–5.5)
NEUTROPHILS NFR BLD AUTO: 59.8 %
NRBC BLD-RTO: 0 /100 WBCS (ref 0–0)
PLATELET # BLD AUTO: 157 X10*3/UL (ref 150–450)
POTASSIUM SERPL-SCNC: 4.9 MMOL/L (ref 3.5–5.3)
PROT SERPL-MCNC: 7.5 G/DL (ref 6.4–8.2)
PROTHROMBIN TIME: 13.1 SECONDS (ref 9.8–12.4)
RBC # BLD AUTO: 4.53 X10*6/UL (ref 4–5.2)
RSV RNA RESP QL NAA+PROBE: NOT DETECTED
SARS-COV-2 RNA RESP QL NAA+PROBE: NOT DETECTED
SODIUM SERPL-SCNC: 141 MMOL/L (ref 136–145)
WBC # BLD AUTO: 5.6 X10*3/UL (ref 4.4–11.3)

## 2025-06-21 PROCEDURE — 94640 AIRWAY INHALATION TREATMENT: CPT

## 2025-06-21 PROCEDURE — 83880 ASSAY OF NATRIURETIC PEPTIDE: CPT | Performed by: NURSE PRACTITIONER

## 2025-06-21 PROCEDURE — 93970 EXTREMITY STUDY: CPT

## 2025-06-21 PROCEDURE — 2500000005 HC RX 250 GENERAL PHARMACY W/O HCPCS: Performed by: NURSE PRACTITIONER

## 2025-06-21 PROCEDURE — G0378 HOSPITAL OBSERVATION PER HR: HCPCS

## 2025-06-21 PROCEDURE — 36415 COLL VENOUS BLD VENIPUNCTURE: CPT | Performed by: NURSE PRACTITIONER

## 2025-06-21 PROCEDURE — 99285 EMERGENCY DEPT VISIT HI MDM: CPT | Mod: 25 | Performed by: EMERGENCY MEDICINE

## 2025-06-21 PROCEDURE — 2500000001 HC RX 250 WO HCPCS SELF ADMINISTERED DRUGS (ALT 637 FOR MEDICARE OP)

## 2025-06-21 PROCEDURE — 74018 RADEX ABDOMEN 1 VIEW: CPT | Performed by: RADIOLOGY

## 2025-06-21 PROCEDURE — 2500000001 HC RX 250 WO HCPCS SELF ADMINISTERED DRUGS (ALT 637 FOR MEDICARE OP): Performed by: NURSE PRACTITIONER

## 2025-06-21 PROCEDURE — 2500000004 HC RX 250 GENERAL PHARMACY W/ HCPCS (ALT 636 FOR OP/ED): Performed by: NURSE PRACTITIONER

## 2025-06-21 PROCEDURE — 80053 COMPREHEN METABOLIC PANEL: CPT | Performed by: NURSE PRACTITIONER

## 2025-06-21 PROCEDURE — 84484 ASSAY OF TROPONIN QUANT: CPT | Performed by: NURSE PRACTITIONER

## 2025-06-21 PROCEDURE — 74018 RADEX ABDOMEN 1 VIEW: CPT

## 2025-06-21 PROCEDURE — 85610 PROTHROMBIN TIME: CPT | Performed by: NURSE PRACTITIONER

## 2025-06-21 PROCEDURE — 96374 THER/PROPH/DIAG INJ IV PUSH: CPT

## 2025-06-21 PROCEDURE — 93005 ELECTROCARDIOGRAM TRACING: CPT

## 2025-06-21 PROCEDURE — 83735 ASSAY OF MAGNESIUM: CPT | Performed by: NURSE PRACTITIONER

## 2025-06-21 PROCEDURE — 96361 HYDRATE IV INFUSION ADD-ON: CPT

## 2025-06-21 PROCEDURE — 2500000002 HC RX 250 W HCPCS SELF ADMINISTERED DRUGS (ALT 637 FOR MEDICARE OP, ALT 636 FOR OP/ED): Performed by: NURSE PRACTITIONER

## 2025-06-21 PROCEDURE — 71045 X-RAY EXAM CHEST 1 VIEW: CPT

## 2025-06-21 PROCEDURE — 93971 EXTREMITY STUDY: CPT | Performed by: STUDENT IN AN ORGANIZED HEALTH CARE EDUCATION/TRAINING PROGRAM

## 2025-06-21 PROCEDURE — 99223 1ST HOSP IP/OBS HIGH 75: CPT | Performed by: NURSE PRACTITIONER

## 2025-06-21 PROCEDURE — 87637 SARSCOV2&INF A&B&RSV AMP PRB: CPT | Performed by: NURSE PRACTITIONER

## 2025-06-21 PROCEDURE — 85025 COMPLETE CBC W/AUTO DIFF WBC: CPT | Performed by: NURSE PRACTITIONER

## 2025-06-21 PROCEDURE — 71045 X-RAY EXAM CHEST 1 VIEW: CPT | Performed by: RADIOLOGY

## 2025-06-21 RX ORDER — POLYETHYLENE GLYCOL 3350 17 G/17G
17 POWDER, FOR SOLUTION ORAL DAILY
Status: DISCONTINUED | OUTPATIENT
Start: 2025-06-21 | End: 2025-06-24 | Stop reason: HOSPADM

## 2025-06-21 RX ORDER — SIMVASTATIN 20 MG/1
20 TABLET, FILM COATED ORAL NIGHTLY
Status: DISCONTINUED | OUTPATIENT
Start: 2025-06-21 | End: 2025-06-24 | Stop reason: HOSPADM

## 2025-06-21 RX ORDER — FUROSEMIDE 10 MG/ML
40 INJECTION INTRAMUSCULAR; INTRAVENOUS ONCE
Status: COMPLETED | OUTPATIENT
Start: 2025-06-21 | End: 2025-06-21

## 2025-06-21 RX ORDER — FUROSEMIDE 20 MG/1
20 TABLET ORAL EVERY MORNING
Status: DISCONTINUED | OUTPATIENT
Start: 2025-06-22 | End: 2025-06-24 | Stop reason: HOSPADM

## 2025-06-21 RX ORDER — POTASSIUM CHLORIDE 20 MEQ/1
20 TABLET, EXTENDED RELEASE ORAL EVERY MORNING
Status: DISCONTINUED | OUTPATIENT
Start: 2025-06-22 | End: 2025-06-22

## 2025-06-21 RX ORDER — BISACODYL 5 MG
5 TABLET, DELAYED RELEASE (ENTERIC COATED) ORAL DAILY PRN
Status: DISCONTINUED | OUTPATIENT
Start: 2025-06-21 | End: 2025-06-24 | Stop reason: HOSPADM

## 2025-06-21 RX ORDER — SODIUM CHLORIDE 9 MG/ML
75 INJECTION, SOLUTION INTRAVENOUS CONTINUOUS
Status: DISCONTINUED | OUTPATIENT
Start: 2025-06-21 | End: 2025-06-21

## 2025-06-21 RX ORDER — ACETAMINOPHEN 325 MG/1
650 TABLET ORAL EVERY 6 HOURS PRN
Status: DISCONTINUED | OUTPATIENT
Start: 2025-06-21 | End: 2025-06-24 | Stop reason: HOSPADM

## 2025-06-21 RX ORDER — BISACODYL 5 MG
5 TABLET, DELAYED RELEASE (ENTERIC COATED) ORAL ONCE
Status: COMPLETED | OUTPATIENT
Start: 2025-06-21 | End: 2025-06-21

## 2025-06-21 RX ORDER — PRAMIPEXOLE DIHYDROCHLORIDE 1 MG/1
1 TABLET ORAL NIGHTLY
Status: DISCONTINUED | OUTPATIENT
Start: 2025-06-21 | End: 2025-06-24 | Stop reason: HOSPADM

## 2025-06-21 RX ORDER — MELOXICAM 15 MG/1
15 TABLET ORAL
Status: DISCONTINUED | OUTPATIENT
Start: 2025-06-22 | End: 2025-06-24 | Stop reason: HOSPADM

## 2025-06-21 RX ORDER — IPRATROPIUM BROMIDE AND ALBUTEROL SULFATE 2.5; .5 MG/3ML; MG/3ML
3 SOLUTION RESPIRATORY (INHALATION) ONCE
Status: COMPLETED | OUTPATIENT
Start: 2025-06-21 | End: 2025-06-21

## 2025-06-21 RX ORDER — ACETAMINOPHEN 500 MG
5 TABLET ORAL ONCE
Status: COMPLETED | OUTPATIENT
Start: 2025-06-21 | End: 2025-06-21

## 2025-06-21 RX ORDER — DILTIAZEM HYDROCHLORIDE 180 MG/1
180 CAPSULE, COATED, EXTENDED RELEASE ORAL EVERY MORNING
Status: DISCONTINUED | OUTPATIENT
Start: 2025-06-22 | End: 2025-06-24 | Stop reason: HOSPADM

## 2025-06-21 RX ORDER — AMOXICILLIN 250 MG
1 CAPSULE ORAL NIGHTLY
Status: DISCONTINUED | OUTPATIENT
Start: 2025-06-21 | End: 2025-06-24 | Stop reason: HOSPADM

## 2025-06-21 RX ORDER — TRAMADOL HYDROCHLORIDE 50 MG/1
50 TABLET, FILM COATED ORAL DAILY PRN
Status: DISCONTINUED | OUTPATIENT
Start: 2025-06-21 | End: 2025-06-24 | Stop reason: HOSPADM

## 2025-06-21 RX ADMIN — SIMVASTATIN 20 MG: 20 TABLET, FILM COATED ORAL at 19:59

## 2025-06-21 RX ADMIN — SENNOSIDES AND DOCUSATE SODIUM 1 TABLET: 50; 8.6 TABLET ORAL at 19:59

## 2025-06-21 RX ADMIN — SODIUM CHLORIDE 75 ML/HR: 0.9 INJECTION, SOLUTION INTRAVENOUS at 14:27

## 2025-06-21 RX ADMIN — IPRATROPIUM BROMIDE AND ALBUTEROL SULFATE 3 ML: .5; 3 SOLUTION RESPIRATORY (INHALATION) at 12:17

## 2025-06-21 RX ADMIN — BISACODYL 5 MG: 5 TABLET, COATED ORAL at 18:02

## 2025-06-21 RX ADMIN — APIXABAN 2.5 MG: 2.5 TABLET, FILM COATED ORAL at 19:59

## 2025-06-21 RX ADMIN — PRAMIPEXOLE DIHYDROCHLORIDE 1 MG: 1 TABLET ORAL at 19:58

## 2025-06-21 RX ADMIN — Medication 5 MG: at 22:32

## 2025-06-21 RX ADMIN — Medication 3 L/MIN: at 18:03

## 2025-06-21 RX ADMIN — FUROSEMIDE 40 MG: 10 INJECTION, SOLUTION INTRAMUSCULAR; INTRAVENOUS at 18:02

## 2025-06-21 SDOH — SOCIAL STABILITY: SOCIAL INSECURITY: DOES ANYONE TRY TO KEEP YOU FROM HAVING/CONTACTING OTHER FRIENDS OR DOING THINGS OUTSIDE YOUR HOME?: NO

## 2025-06-21 SDOH — ECONOMIC STABILITY: HOUSING INSECURITY: IN THE PAST 12 MONTHS, HOW MANY TIMES HAVE YOU MOVED WHERE YOU WERE LIVING?: 0

## 2025-06-21 SDOH — ECONOMIC STABILITY: FOOD INSECURITY: WITHIN THE PAST 12 MONTHS, THE FOOD YOU BOUGHT JUST DIDN'T LAST AND YOU DIDN'T HAVE MONEY TO GET MORE.: NEVER TRUE

## 2025-06-21 SDOH — ECONOMIC STABILITY: FOOD INSECURITY: HOW HARD IS IT FOR YOU TO PAY FOR THE VERY BASICS LIKE FOOD, HOUSING, MEDICAL CARE, AND HEATING?: NOT VERY HARD

## 2025-06-21 SDOH — SOCIAL STABILITY: SOCIAL INSECURITY: WITHIN THE LAST YEAR, HAVE YOU BEEN AFRAID OF YOUR PARTNER OR EX-PARTNER?: NO

## 2025-06-21 SDOH — ECONOMIC STABILITY: FOOD INSECURITY: WITHIN THE PAST 12 MONTHS, YOU WORRIED THAT YOUR FOOD WOULD RUN OUT BEFORE YOU GOT THE MONEY TO BUY MORE.: NEVER TRUE

## 2025-06-21 SDOH — ECONOMIC STABILITY: HOUSING INSECURITY: AT ANY TIME IN THE PAST 12 MONTHS, WERE YOU HOMELESS OR LIVING IN A SHELTER (INCLUDING NOW)?: NO

## 2025-06-21 SDOH — ECONOMIC STABILITY: INCOME INSECURITY: IN THE PAST 12 MONTHS HAS THE ELECTRIC, GAS, OIL, OR WATER COMPANY THREATENED TO SHUT OFF SERVICES IN YOUR HOME?: NO

## 2025-06-21 SDOH — SOCIAL STABILITY: SOCIAL INSECURITY: HAVE YOU HAD THOUGHTS OF HARMING ANYONE ELSE?: NO

## 2025-06-21 SDOH — SOCIAL STABILITY: SOCIAL INSECURITY: ABUSE: ADULT

## 2025-06-21 SDOH — SOCIAL STABILITY: SOCIAL INSECURITY: WITHIN THE LAST YEAR, HAVE YOU BEEN HUMILIATED OR EMOTIONALLY ABUSED IN OTHER WAYS BY YOUR PARTNER OR EX-PARTNER?: NO

## 2025-06-21 SDOH — SOCIAL STABILITY: SOCIAL INSECURITY: DO YOU FEEL ANYONE HAS EXPLOITED OR TAKEN ADVANTAGE OF YOU FINANCIALLY OR OF YOUR PERSONAL PROPERTY?: NO

## 2025-06-21 SDOH — SOCIAL STABILITY: SOCIAL INSECURITY: DO YOU FEEL UNSAFE GOING BACK TO THE PLACE WHERE YOU ARE LIVING?: NO

## 2025-06-21 SDOH — ECONOMIC STABILITY: HOUSING INSECURITY: IN THE LAST 12 MONTHS, WAS THERE A TIME WHEN YOU WERE NOT ABLE TO PAY THE MORTGAGE OR RENT ON TIME?: NO

## 2025-06-21 SDOH — SOCIAL STABILITY: SOCIAL INSECURITY: HAS ANYONE EVER THREATENED TO HURT YOUR FAMILY OR YOUR PETS?: NO

## 2025-06-21 SDOH — SOCIAL STABILITY: SOCIAL INSECURITY: ARE YOU OR HAVE YOU BEEN THREATENED OR ABUSED PHYSICALLY, EMOTIONALLY, OR SEXUALLY BY ANYONE?: NO

## 2025-06-21 SDOH — ECONOMIC STABILITY: TRANSPORTATION INSECURITY: IN THE PAST 12 MONTHS, HAS LACK OF TRANSPORTATION KEPT YOU FROM MEDICAL APPOINTMENTS OR FROM GETTING MEDICATIONS?: NO

## 2025-06-21 SDOH — SOCIAL STABILITY: SOCIAL INSECURITY: HAVE YOU HAD ANY THOUGHTS OF HARMING ANYONE ELSE?: NO

## 2025-06-21 SDOH — SOCIAL STABILITY: SOCIAL INSECURITY: WERE YOU ABLE TO COMPLETE ALL THE BEHAVIORAL HEALTH SCREENINGS?: YES

## 2025-06-21 SDOH — SOCIAL STABILITY: SOCIAL INSECURITY: ARE THERE ANY APPARENT SIGNS OF INJURIES/BEHAVIORS THAT COULD BE RELATED TO ABUSE/NEGLECT?: NO

## 2025-06-21 ASSESSMENT — ACTIVITIES OF DAILY LIVING (ADL)
ADEQUATE_TO_COMPLETE_ADL: YES
PATIENT'S MEMORY ADEQUATE TO SAFELY COMPLETE DAILY ACTIVITIES?: YES
HEARING - RIGHT EAR: FUNCTIONAL
LACK_OF_TRANSPORTATION: NO
ASSISTIVE_DEVICE: WALKER
JUDGMENT_ADEQUATE_SAFELY_COMPLETE_DAILY_ACTIVITIES: YES
GROOMING: INDEPENDENT
DRESSING YOURSELF: INDEPENDENT
TOILETING: INDEPENDENT
BATHING: NEEDS ASSISTANCE
FEEDING YOURSELF: INDEPENDENT
WALKS IN HOME: INDEPENDENT
HEARING - LEFT EAR: FUNCTIONAL
LACK_OF_TRANSPORTATION: NO

## 2025-06-21 ASSESSMENT — COGNITIVE AND FUNCTIONAL STATUS - GENERAL
MOBILITY SCORE: 21
PATIENT BASELINE BEDBOUND: NO
HELP NEEDED FOR BATHING: A LITTLE
CLIMB 3 TO 5 STEPS WITH RAILING: A LOT
DAILY ACTIVITIY SCORE: 22
TOILETING: A LITTLE
WALKING IN HOSPITAL ROOM: A LITTLE
WALKING IN HOSPITAL ROOM: A LITTLE
DAILY ACTIVITIY SCORE: 23
MOBILITY SCORE: 21
CLIMB 3 TO 5 STEPS WITH RAILING: A LOT
HELP NEEDED FOR BATHING: A LITTLE

## 2025-06-21 ASSESSMENT — PAIN SCALES - GENERAL
PAINLEVEL_OUTOF10: 0 - NO PAIN
PAINLEVEL_OUTOF10: 0 - NO PAIN

## 2025-06-21 ASSESSMENT — PAIN - FUNCTIONAL ASSESSMENT: PAIN_FUNCTIONAL_ASSESSMENT: 0-10

## 2025-06-21 ASSESSMENT — LIFESTYLE VARIABLES
AUDIT-C TOTAL SCORE: 0
HOW MANY STANDARD DRINKS CONTAINING ALCOHOL DO YOU HAVE ON A TYPICAL DAY: PATIENT DOES NOT DRINK
AUDIT-C TOTAL SCORE: 0
HOW OFTEN DO YOU HAVE 6 OR MORE DRINKS ON ONE OCCASION: NEVER
SKIP TO QUESTIONS 9-10: 1
HOW OFTEN DO YOU HAVE A DRINK CONTAINING ALCOHOL: NEVER

## 2025-06-21 NOTE — H&P
History Of Present Illness  Estefanía Contreras is a 89 y.o. female with a past medical history of HFpEF (EF 62% 1/6/25), HLD, HTN, CKD3b, AAA (s/p repair), PAD, ITP, and provoked PE (on apixaban), anxiety, who presented to [facility] ED today from home with shortness of breath. Pt notes over the past few days she has become SOB with any type of exertion and now has been even with rest. She notes associated dry cough.  She notes she had home O2 available from when she was using it before and had to restart using it over the past few days as well.  She denies any fevers, chills, CP, weight gain, LE edema, abdominal pain, nausea, vomiting, diarrhea, or dysuria.  She denies any change in her chronic urinary frequency.  She denies any sick contacts.  She notes she lives alone but has a caregiver coming 4 days a week that helps her with cooking, cleaning, laundry etc.  CODE STATUS discussed and patient would like to be a DNR CCA with no intubation.    In the ED lab work, EKG, chest x-ray, and venous duplex bilateral lower extremities were performed. Labs revealed bun 35, creatinine 1.85 (slightly elevated from 22 and 1.26 on March 4, 2025), mag 2.71, , troponin within normal limits, INR 1.2, negative for flu, COVID, and RSV.  Chest x-ray revealed small left pleural effusion with associated atelectasis and/or pneumonitis. Venous duplex bilateral lower extremities negative for DVT. EKG interpreted by Dr. Rowley at 11:46 AM, normal sinus rhythm at a rate of 75, no ST segment depression or elevation consistent with ischemia or infarction.  Her vital signs were acceptable on admission.  She was given IV fluids and DuoNeb treatment and admitted for further evaluation and treatment under the care of Dr. Bahman Flores.    Record review indicates patient presented with similar symptoms in February of this year and a VQ scan was positive for PE, however it was difficult to tell if this was from patient's existing PE or new.  Given  patient's kidney function, a CTA chest was deferred and patient was empirically treated with Eliquis after risks and benefits were weighed, it was noted that patient would need to be on lifelong anticoagulation.      Echo 1/3/25    CONCLUSIONS:   1. The left ventricular systolic function is normal, with a Hall's biplane calculated ejection fraction of 62%.   2. Spectral Doppler shows a Grade I (impaired relaxation pattern) of left ventricular diastolic filling with normal left atrial filling pressure.   3. There is normal right ventricular global systolic function.   4. The left atrium is mildly dilated.     Past Medical History  Medical History[1]    Surgical History  Surgical History[2]     Social History  She reports that she has quit smoking. Her smoking use included cigarettes. She has never been exposed to tobacco smoke. She has never used smokeless tobacco. She reports that she does not currently use alcohol. She reports that she does not use drugs.    Family History  Family History[3]     Allergies  Patient has no known allergies.    10 point review of system performed and negative besides what is stated in HPI.     Physical Exam  Constitutional:       Appearance: Normal appearance.   HENT:      Head: Normocephalic and atraumatic.      Mouth/Throat:      Mouth: Mucous membranes are moist.   Eyes:      Conjunctiva/sclera: Conjunctivae normal.   Cardiovascular:      Rate and Rhythm: Normal rate and regular rhythm.      Heart sounds: Normal heart sounds.   Pulmonary:      Effort: Pulmonary effort is normal.      Comments: Coarse throughout  Abdominal:      General: Bowel sounds are normal.      Palpations: Abdomen is soft.      Tenderness: There is abdominal tenderness.   Musculoskeletal:         General: Normal range of motion.      Cervical back: Neck supple.      Right lower leg: Edema present.      Left lower leg: Edema present.      Comments: Nonpitting bilateral lower extremity edema   Neurological:     "  Mental Status: She is alert. Mental status is at baseline.   Psychiatric:      Comments: Anxious at time          Last Recorded Vitals  Blood pressure 175/74, pulse 65, temperature 36 °C (96.8 °F), resp. rate 18, height 1.626 m (5' 4\"), weight 55.3 kg (122 lb), SpO2 93%.    Relevant Results    Medications Ordered Prior to Encounter[4]     Results for orders placed or performed during the hospital encounter of 06/21/25 (from the past 24 hours)   CBC and Auto Differential   Result Value Ref Range    WBC 5.6 4.4 - 11.3 x10*3/uL    nRBC 0.0 0.0 - 0.0 /100 WBCs    RBC 4.53 4.00 - 5.20 x10*6/uL    Hemoglobin 12.2 12.0 - 16.0 g/dL    Hematocrit 41.1 36.0 - 46.0 %    MCV 91 80 - 100 fL    MCH 26.9 26.0 - 34.0 pg    MCHC 29.7 (L) 32.0 - 36.0 g/dL    RDW 16.8 (H) 11.5 - 14.5 %    Platelets 157 150 - 450 x10*3/uL    Neutrophils % 59.8 40.0 - 80.0 %    Immature Granulocytes %, Automated 0.2 0.0 - 0.9 %    Lymphocytes % 26.8 13.0 - 44.0 %    Monocytes % 8.9 2.0 - 10.0 %    Eosinophils % 3.2 0.0 - 6.0 %    Basophils % 1.1 0.0 - 2.0 %    Neutrophils Absolute 3.35 1.60 - 5.50 x10*3/uL    Immature Granulocytes Absolute, Automated 0.01 0.00 - 0.50 x10*3/uL    Lymphocytes Absolute 1.50 0.80 - 3.00 x10*3/uL    Monocytes Absolute 0.50 0.05 - 0.80 x10*3/uL    Eosinophils Absolute 0.18 0.00 - 0.40 x10*3/uL    Basophils Absolute 0.06 0.00 - 0.10 x10*3/uL   Magnesium   Result Value Ref Range    Magnesium 2.71 (H) 1.60 - 2.40 mg/dL   Comprehensive metabolic panel   Result Value Ref Range    Glucose 110 (H) 74 - 99 mg/dL    Sodium 141 136 - 145 mmol/L    Potassium 4.9 3.5 - 5.3 mmol/L    Chloride 105 98 - 107 mmol/L    Bicarbonate 28 21 - 32 mmol/L    Anion Gap 13 10 - 20 mmol/L    Urea Nitrogen 35 (H) 6 - 23 mg/dL    Creatinine 1.85 (H) 0.50 - 1.05 mg/dL    eGFR 26 (L) >60 mL/min/1.73m*2    Calcium 9.1 8.6 - 10.3 mg/dL    Albumin 4.2 3.4 - 5.0 g/dL    Alkaline Phosphatase 81 33 - 136 U/L    Total Protein 7.5 6.4 - 8.2 g/dL    AST 17 9 - 39 " U/L    Bilirubin, Total 0.4 0.0 - 1.2 mg/dL    ALT 8 7 - 45 U/L   Troponin I, High Sensitivity   Result Value Ref Range    Troponin I, High Sensitivity 11 0 - 13 ng/L   B-Type Natriuretic Peptide   Result Value Ref Range     (H) 0 - 99 pg/mL   Protime-INR   Result Value Ref Range    Protime 13.1 (H) 9.8 - 12.4 seconds    INR 1.2 (H) 0.9 - 1.1   Sars-CoV-2, Influenza A/B and RSV PCR   Result Value Ref Range    Coronavirus 2019, PCR Not Detected Not Detected    Flu A Result Not Detected Not Detected    Flu B Result Not Detected Not Detected    RSV PCR Not Detected Not Detected        Vascular US Lower Extremity Venous Duplex Bilateral  Result Date: 6/21/2025  Interpreted By:  Yasmin Wallace, STUDY: [facility] US LOWER EXTREMITY VENOUS DUPLEX BILATERAL;  6/21/2025 12:40 pm   INDICATION: Signs/Symptoms:Bilateral lower extremity pain, rule out DVT.     COMPARISON: 02/28/2025   ACCESSION NUMBER(S): [id number]   ORDERING CLINICIAN: DANDY WALL   TECHNIQUE: Vascular ultrasound of the bilateral lower extremities was performed. Real-time compression views as well as Gray scale, color Doppler and spectral Doppler waveform analysis was performed.   FINDINGS: Evaluation of the visualized portions of the bilateral common femoral vein, proximal, mid, and distal femoral vein, and popliteal vein were performed.  Evaluation of the visualized portions of the  posterior tibial and peroneal veins were also performed.     The evaluated veins demonstrate normal compressibility. There is intact venous flow demonstrating normal respiratory variability and normal augmentation of flow with calf compression. Therefore, there is no ultrasonographic evidence for deep vein thrombosis within the evaluated veins.   Respiratory variation and augmentation to calf pressure was noted.       No sonographic evidence for deep vein thrombosis within the evaluated veins of the bilateral lower extremities..   MACRO: None   Signed by: Yasmin Wallace  6/21/2025 12:57 PM Dictation workstation:   [id number]    XR chest 1 view  Result Date: 6/21/2025  Interpreted By:  Toan Haddad, STUDY: Chest dated  6/21/2025.   INDICATION: Signs/Symptoms:sob   COMPARISON: Radiographs dated 02/27/2025.   ACCESSION NUMBER(S): [id number]   ORDERING CLINICIAN: DANDY WALL   TECHNIQUE: One view of the chest.   FINDINGS: Linear opacities are seen at the left lung base with minimal blunting of the left costophrenic angle. A calcified granuloma is seen in the left mid lung zone.  No pneumothorax or effusion is evident. The cardiomediastinal silhouette is  not enlarged.Degenerative changes seen of the spine and shoulders. There is a left shoulder arthroplasty.       Small left pleural effusion with associated atelectasis and/or pneumonitis.   MACRO: None   Signed by: Toan Haddad 6/21/2025 12:17 PM Dictation workstation:   [id number]       Assessment & Plan  Shortness of breath    Suspected CHF (congestive heart failure)    PALLAVI (acute kidney injury)    Abdominal tenderness      Admit to telemetry  Observation  40 mg IV Lasix x 1  Resume home Lasix tomorrow  Hold home Mobic and Ultram  See recent echo results above  Repeat EKG and troponin  Continue oxygen  Daily weight  Intake and output  AM CBC, BMP, coags, mag  KUB  PT/OT    Chronic conditions: HFpEF (EF 62% 1/6/25), HLD, HTN, CKD3b, AAA (s/p repair), PAD, ITP, and provoked PE (on apixaban), anxiety    Continue home medications as listed above holding home Mobic and Ultram  Cardiac diet    DVT prophylaxis    SCDs  On Sp Evans, APRN-CNP         [1]   Past Medical History:  Diagnosis Date    H/O heart artery stent     H/O shoulder surgery     PE (pulmonary thromboembolism) (Multi)    [2] No past surgical history on file.  [3] No family history on file.  [4]   No current facility-administered medications on file prior to encounter.     Current Outpatient Medications on File Prior to Encounter   Medication Sig  Dispense Refill    apixaban (Eliquis) 2.5 mg tablet Take 1 tablet (2.5 mg) by mouth 2 times a day. 60 tablet 0    dilTIAZem LA (Cardizem LA) 180 mg 24 hr tablet Take 1 tablet (180 mg) by mouth once daily. (Patient taking differently: Take 1 tablet (180 mg) by mouth once daily in the morning.) 90 tablet 3    furosemide (Lasix) 20 mg tablet Take 1 tablet (20 mg) by mouth once daily. (Patient taking differently: Take 1 tablet (20 mg) by mouth once daily in the morning.) 90 tablet 1    meloxicam (Mobic) 15 mg tablet Take 1 tablet (15 mg) by mouth once daily. (Patient taking differently: Take 1 tablet (15 mg) by mouth once daily with breakfast.) 90 tablet 3    mv-min-FA-vit K-lutein-zeaxant (PreserVision AREDS 2 Plus MV) 200 mcg-15 mcg- 5 mg-1 mg capsule Take 1 capsule by mouth 2 times a day.      oxygen (O2) gas therapy Inhale 3 L/min continuously.      potassium chloride CR 20 mEq ER tablet Take 1 tablet (20 mEq) by mouth once daily in the morning. Splits in half and takes at same time      pramipexole (Mirapex) 1 mg tablet Take 1 tablet (1 mg) by mouth once daily at bedtime. 90 tablet 1    simvastatin (Zocor) 20 mg tablet Take 1 tablet (20 mg) by mouth once daily at bedtime. 90 tablet 0    traMADol (Ultram) 50 mg tablet Take 1 tablet (50 mg) by mouth once daily as needed for severe pain (7 - 10).      azelastine (Astelin) 137 mcg (0.1 %) nasal spray Administer 2 sprays into each nostril 2 times a day. Use in each nostril as directed (Patient not taking: Reported on 6/21/2025) 30 mL 2    DULoxetine (Cymbalta) 30 mg DR capsule Take 1 capsule (30 mg) by mouth once daily. Do not crush or chew. (Patient not taking: Reported on 6/21/2025) 30 capsule 0    mirtazapine (Remeron) 15 mg tablet Take 1 tablet (15 mg) by mouth as needed at bedtime. (Patient not taking: Reported on 6/21/2025)      PreserVision AREDS-2 250-90-40-1 mg capsule TAKE ONE CAPSULE BY MOUTH TWICE A DAY (Patient not taking: Reported on 6/21/2025) 180 capsule  0

## 2025-06-21 NOTE — ED PROVIDER NOTES
Limitations to History: None     HPI:      Estefanía Contreras is a 89 y.o. female with with a past medical history of HFpEF (EF 62% 1/6/25), HLD, HTN, CKD3b, AAA (s/p repair), PAD, ITP, and provoked PE (left hip fracture, completed 3 months of apixaban, on 2-3L NC PRN), and anxiety who was thought to have had PE in March as well, apixaban restarted presented to ED today for evaluation of shortness of breath.  Patient reports increasing SOB over the last 2 to 3 days with a dry cough.  Urinary continues on 2 L nasal cannula NC at baseline.  Patient states she was attempting to wean herself off the oxygen but she was unable to do it due to her increasing shortness of breath.  No sick contacts.  Has been compliant with Eliquis.  History of both PE and DVT, no recent travel, recent surgery, history of malignancy or use of exogenous hormones denies fever/chills, chest pain, nausea/vomiting, abdominal pain, urinary symptoms, change in bowel habits or any other complaints.  No smoking, EtOH or IV drugs.  PCP is Mark.     Additional History Obtained from: Triage/nursing notes reviewed    ------------------------------------------------------------------------------------------------------------------------------------------    VS: As documented in the triage note and EMR flowsheet from this visit were reviewed.    Physical Exam:  Gen: 89-year-old female, awake and alert.  Appears weak but nontoxic.  Oriented x 3.  Thin with some muscle wasting.  Well-hydrated.  Head/Neck: NCAT, neck w/ FROM  Eyes: EOMI, PERRL, anicteric sclerae, noninjected conjunctivae  Ears: TMs clear b/l without sign of infection  Nose: Nares patent w/o rhinorrhea  Mouth:  MMM, no OP lesions noted  Heart: RRR no MRG  Lungs: CTA b/l no RRW, no increased work of breathing.  No accessory muscle use.  2L NC  Abdomen: Round and soft, NT, ND, no HSM, no palpable masses.  No CVA tenderness.  Musculoskeletal: NEGRA x 4.  MSPs intact.  Skin intact.  No deformities.   Bilateral calf pain, no palpable cords.  Negative Homans sign.  Neurologic: Alert, symmetrical facies, phonates clearly, moves all extremities equally, responsive to touch, ambulates normally   Skin: Pink, warm and dry.  No erythema, edema or ecchymosis.  No rashes noted  Psychological: calm, no SI/HI      ------------------------------------------------------------------------------------------------------------------------------------------    Medical Decision Makin y.o. female with with a past medical history of HFpEF (EF 62% 25), HLD, HTN, CKD3b, AAA (s/p repair), PAD, ITP, and provoked PE (left hip fracture, completed 3 months of apixaban, on 2-3L NC PRN), and anxiety who was thought to have had another PE in March, apixaban restarted  He is evaluated at the bedside for increasing shortness of breath with cough over the last 2 to 3 days.  On arrival vitals within normal limits, afebrile.  Lungs slightly decreased in bilateral bases.  On 2L NC, no distress.    Differential includes but is not limited to pneumonia, viral illness, pneumothorax and PE.    IV established, continuous cardiac/O2 sat monitoring.  Basic labs, EKG, chest x-ray will be performed. DuoNeb treatment given.      ED Course as of 25 1316   Sat 2025   1303 Laboratory studies were reviewed, no leukocytosis or evidence of anemia.  Mildly elevated magnesium at 2.71.  Mild elevation of the BNP at 133.  Coags within normal limits.  Slight worsening CKD.  Normal LFTs, troponin and viral panel.  Venous duplex bilateral lower extremity shows no evidence of DVT.  Chest x-ray shows a small left pleural effusion. [SB]   1310 Patient is given normal saline at a maintenance rate due to her elevated creatinine.  No evidence of CHF exacerbation, ACS or pneumonia.  Final diagnosis today is shortness of breath, history of CHF and history of PE.  I discussed the case with Dr. Flores, will bring the patient in for shortness of breath and  observation status to telemetry.  House NP aware.  Patient is agreeable with this plan.  Condition stable.  Vital signs within normal limits.  Remains hemodynamically stable.  Resting comfortably. [SB]      ED Course User Index  [SB] ENOCH Diaz         Diagnoses as of 06/21/25 1316   Shortness of breath   History of pulmonary embolus (PE)   History of CHF (congestive heart failure)       EKG interpreted by Dr. Rowley at 11:46 AM, normal sinus rhythm at a rate of 75, no ST segment depression or elevation consistent with ischemia or infarction    Chronic Medical Conditions Significantly Affecting Care: None    External Records Reviewed: I reviewed recent and relevant outside records including: None    Discussion of Management with Other Providers: Seen and evaluated with ED attending physician, Dr. Rowley,  he agrees with the treatment plan and final disposition of the patient    I discussed the patient/results with: Dr. Flores and ENOCH Jain NP  06/21/25 1316

## 2025-06-21 NOTE — PROGRESS NOTES
Pharmacy Medication History Review    Estefanía Contreras is a 89 y.o. female admitted for Shortness of breath. Pharmacy reviewed the patient's xftwt-zl-kylocrnit medications and allergies for accuracy.    The list below reflectives the updated PTA list. Please review each medication in order reconciliation for additional clarification and justification.  Prior to Admission medications    Medication Sig Start Date End Date Taking? Authorizing Provider   apixaban (Eliquis) 2.5 mg tablet Take 1 tablet (2.5 mg) by mouth 2 times a day. 3/4/25 6/21/25 Yes Darnell Ruggiero DO   dilTIAZem LA (Cardizem LA) 180 mg 24 hr tablet Take 1 tablet (180 mg) by mouth once daily.  Patient taking differently: Take 1 tablet (180 mg) by mouth once daily in the morning. 11/5/24  Yes Lakia Johnson MD   furosemide (Lasix) 20 mg tablet Take 1 tablet (20 mg) by mouth once daily.  Patient taking differently: Take 1 tablet (20 mg) by mouth once daily in the morning. 12/16/24 12/16/25 Yes Lakia Johnson MD   meloxicam (Mobic) 15 mg tablet Take 1 tablet (15 mg) by mouth once daily.  Patient taking differently: Take 1 tablet (15 mg) by mouth once daily with breakfast. 12/16/24  Yes Lakia Johnson MD   mv-min-FA-vit K-lutein-zeaxant (PreserVision AREDS 2 Plus MV) 200 mcg-15 mcg- 5 mg-1 mg capsule Take 1 capsule by mouth 2 times a day.   Yes Historical Provider, MD   oxygen (O2) gas therapy Inhale 3 L/min continuously.   Yes Historical Provider, MD   potassium chloride CR 20 mEq ER tablet Take 1 tablet (20 mEq) by mouth once daily in the morning. Splits in half and takes at same time   Yes Historical Provider, MD   pramipexole (Mirapex) 1 mg tablet Take 1 tablet (1 mg) by mouth once daily at bedtime. 12/16/24 12/16/25 Yes Lakia Johnson MD   simvastatin (Zocor) 20 mg tablet Take 1 tablet (20 mg) by mouth once daily at bedtime. 12/16/24 12/16/25 Yes Lakia Johnson MD   traMADol (Ultram) 50 mg tablet Take 1 tablet (50 mg) by mouth once daily as  needed for severe pain (7 - 10).   Yes Historical Provider, MD   azelastine (Astelin) 137 mcg (0.1 %) nasal spray Administer 2 sprays into each nostril 2 times a day. Use in each nostril as directed  Patient not taking: Reported on 6/21/2025 11/21/24 11/21/25 no Lakia Johnson MD   DULoxetine (Cymbalta) 30 mg DR capsule Take 1 capsule (30 mg) by mouth once daily. Do not crush or chew.  Patient not taking: Reported on 6/21/2025 1/20/25 2/19/25 no Bahman Flores MD   mirtazapine (Remeron) 15 mg tablet Take 1 tablet (15 mg) by mouth as needed at bedtime.  Patient not taking: Reported on 6/21/2025 2/7/25  no Historical Provider, MD   PreserVision AREDS-2 250-90-40-1 mg capsule TAKE ONE CAPSULE BY MOUTH TWICE A DAY  Patient not taking: Reported on 6/21/2025 2/28/25  no Lakia Johnson MD        The list below reflectives the updated allergy list. Please review each documented allergy for additional clarification and justification.  Allergies  Reviewed by CARLEE Diaz-CNP on 6/21/2025   No Known Allergies         Below are additional concerns with the patient's PTA list.      Michelle Douglas

## 2025-06-21 NOTE — ED TRIAGE NOTES
Pt presents to the ED via EMS from home for increase SOB that has been happening for a couple of days but called today due to feeling worse. Hx of a recent admission for Blood clots. Respirations even & unlabored. On 4L NC at baseline. A&Ox4.

## 2025-06-21 NOTE — PROGRESS NOTES
Pharmacy Medication History Review    Estefanía Contreras is a 89 y.o. female admitted for Shortness of breath. Pharmacy reviewed the patient's aprfe-qz-idyiohpgg medications and allergies for accuracy.    The list below reflectives the updated PTA list. Please review each medication in order reconciliation for additional clarification and justification.  Prior to Admission medications    Medication Sig Start Date End Date Taking? Authorizing Provider   apixaban (Eliquis) 2.5 mg tablet Take 1 tablet (2.5 mg) by mouth 2 times a day. 3/4/25 6/21/25 Yes Darnell Ruggiero DO   azelastine (Astelin) 137 mcg (0.1 %) nasal spray Administer 2 sprays into each nostril 2 times a day. Use in each nostril as directed  Patient not taking: Reported on 6/21/2025 11/21/24 11/21/25 no Lakia Johnson MD   dilTIAZem LA (Cardizem LA) 180 mg 24 hr tablet Take 1 tablet (180 mg) by mouth once daily.  Patient taking differently: Take 1 tablet (180 mg) by mouth once daily in the morning. 11/5/24  Yes Lakia Johnson MD   DULoxetine (Cymbalta) 30 mg DR capsule Take 1 capsule (30 mg) by mouth once daily. Do not crush or chew.  Patient not taking: Reported on 6/21/2025 1/20/25 2/19/25 no Bahman Flores MD   furosemide (Lasix) 20 mg tablet Take 1 tablet (20 mg) by mouth once daily.  Patient taking differently: Take 1 tablet (20 mg) by mouth once daily in the morning. 12/16/24 12/16/25 Yes Lakia Johnson MD   meloxicam (Mobic) 15 mg tablet Take 1 tablet (15 mg) by mouth once daily.  Patient taking differently: Take 1 tablet (15 mg) by mouth once daily with breakfast. 12/16/24  Yes Lakia Johnson MD   mirtazapine (Remeron) 15 mg tablet Take 1 tablet (15 mg) by mouth as needed at bedtime. 2/7/25  Yes Historical Provider, MD moseley-min-FA-vit K-lutein-zeaxant (PreserVision AREDS 2 Plus MV) 200 mcg-15 mcg- 5 mg-1 mg capsule Take 1 capsule by mouth 2 times a day.   Yes Historical Provider, MD   oxygen (O2) gas therapy Inhale 3 L/min continuously.   Yes  Historical Provider, MD   potassium chloride CR 20 mEq ER tablet Take 1 tablet (20 mEq) by mouth once daily in the morning. Splits in half and takes at same time   Yes Historical Provider, MD   pramipexole (Mirapex) 1 mg tablet Take 1 tablet (1 mg) by mouth once daily at bedtime. 12/16/24 12/16/25 Yes Lakia Johnson MD   PreserVision AREDS-2 250-90-40-1 mg capsule TAKE ONE CAPSULE BY MOUTH TWICE A DAY  Patient not taking: Reported on 6/21/2025 2/28/25  duplicate Lakia Johnson MD   simvastatin (Zocor) 20 mg tablet Take 1 tablet (20 mg) by mouth once daily at bedtime. 12/16/24 12/16/25 Yes Lakia Johnson MD   traMADol (Ultram) 50 mg tablet Take 1 tablet (50 mg) by mouth once daily as needed for severe pain (7 - 10).   Yes Historical Provider, MD        The list below reflectives the updated allergy list. Please review each documented allergy for additional clarification and justification.  Allergies  Reviewed by CARLEE Diaz-CNP on 6/21/2025   No Known Allergies         Below are additional concerns with the patient's PTA list.      Michelle Douglas

## 2025-06-21 NOTE — CARE PLAN
The patient's goals for the shift include      The clinical goals for the shift include pt will get adequate rest throughout shift    Over the shift, the patient did not make progress toward the following goals. Barriers to progression include acute illness. Recommendations to address these barriers include communication.

## 2025-06-22 VITALS
HEIGHT: 64 IN | BODY MASS INDEX: 20.83 KG/M2 | HEART RATE: 71 BPM | SYSTOLIC BLOOD PRESSURE: 126 MMHG | RESPIRATION RATE: 16 BRPM | DIASTOLIC BLOOD PRESSURE: 60 MMHG | OXYGEN SATURATION: 95 % | TEMPERATURE: 99.7 F | WEIGHT: 122 LBS

## 2025-06-22 LAB
ANION GAP SERPL CALC-SCNC: 13 MMOL/L (ref 10–20)
BUN SERPL-MCNC: 39 MG/DL (ref 6–23)
CALCIUM SERPL-MCNC: 8.7 MG/DL (ref 8.6–10.3)
CHLORIDE SERPL-SCNC: 101 MMOL/L (ref 98–107)
CO2 SERPL-SCNC: 30 MMOL/L (ref 21–32)
CREAT SERPL-MCNC: 1.94 MG/DL (ref 0.5–1.05)
EGFRCR SERPLBLD CKD-EPI 2021: 24 ML/MIN/1.73M*2
ERYTHROCYTE [DISTWIDTH] IN BLOOD BY AUTOMATED COUNT: 16.5 % (ref 11.5–14.5)
GLUCOSE SERPL-MCNC: 105 MG/DL (ref 74–99)
HCT VFR BLD AUTO: 35.1 % (ref 36–46)
HGB BLD-MCNC: 10.7 G/DL (ref 12–16)
MAGNESIUM SERPL-MCNC: 2.52 MG/DL (ref 1.6–2.4)
MCH RBC QN AUTO: 27 PG (ref 26–34)
MCHC RBC AUTO-ENTMCNC: 30.5 G/DL (ref 32–36)
MCV RBC AUTO: 88 FL (ref 80–100)
NRBC BLD-RTO: 0 /100 WBCS (ref 0–0)
PLATELET # BLD AUTO: 142 X10*3/UL (ref 150–450)
POTASSIUM SERPL-SCNC: 4.7 MMOL/L (ref 3.5–5.3)
RBC # BLD AUTO: 3.97 X10*6/UL (ref 4–5.2)
SODIUM SERPL-SCNC: 139 MMOL/L (ref 136–145)
WBC # BLD AUTO: 6.3 X10*3/UL (ref 4.4–11.3)

## 2025-06-22 PROCEDURE — 97161 PT EVAL LOW COMPLEX 20 MIN: CPT | Mod: GP

## 2025-06-22 PROCEDURE — 80048 BASIC METABOLIC PNL TOTAL CA: CPT | Performed by: NURSE PRACTITIONER

## 2025-06-22 PROCEDURE — 2500000004 HC RX 250 GENERAL PHARMACY W/ HCPCS (ALT 636 FOR OP/ED): Performed by: NURSE PRACTITIONER

## 2025-06-22 PROCEDURE — G0378 HOSPITAL OBSERVATION PER HR: HCPCS

## 2025-06-22 PROCEDURE — 2500000001 HC RX 250 WO HCPCS SELF ADMINISTERED DRUGS (ALT 637 FOR MEDICARE OP): Performed by: NURSE PRACTITIONER

## 2025-06-22 PROCEDURE — 36415 COLL VENOUS BLD VENIPUNCTURE: CPT | Performed by: NURSE PRACTITIONER

## 2025-06-22 PROCEDURE — 2500000002 HC RX 250 W HCPCS SELF ADMINISTERED DRUGS (ALT 637 FOR MEDICARE OP, ALT 636 FOR OP/ED): Performed by: NURSE PRACTITIONER

## 2025-06-22 PROCEDURE — 85027 COMPLETE CBC AUTOMATED: CPT | Performed by: NURSE PRACTITIONER

## 2025-06-22 PROCEDURE — 2500000001 HC RX 250 WO HCPCS SELF ADMINISTERED DRUGS (ALT 637 FOR MEDICARE OP): Performed by: INTERNAL MEDICINE

## 2025-06-22 PROCEDURE — 83735 ASSAY OF MAGNESIUM: CPT | Performed by: NURSE PRACTITIONER

## 2025-06-22 PROCEDURE — 97165 OT EVAL LOW COMPLEX 30 MIN: CPT | Mod: GO

## 2025-06-22 RX ORDER — CEFUROXIME AXETIL 250 MG/1
250 TABLET ORAL 2 TIMES DAILY
Status: DISCONTINUED | OUTPATIENT
Start: 2025-06-22 | End: 2025-06-24 | Stop reason: HOSPADM

## 2025-06-22 RX ADMIN — APIXABAN 2.5 MG: 2.5 TABLET, FILM COATED ORAL at 20:04

## 2025-06-22 RX ADMIN — ACETAMINOPHEN 650 MG: 325 TABLET ORAL at 16:00

## 2025-06-22 RX ADMIN — PRAMIPEXOLE DIHYDROCHLORIDE 1 MG: 1 TABLET ORAL at 20:04

## 2025-06-22 RX ADMIN — CEFUROXIME AXETIL 250 MG: 250 TABLET, FILM COATED ORAL at 14:41

## 2025-06-22 RX ADMIN — SENNOSIDES AND DOCUSATE SODIUM 1 TABLET: 50; 8.6 TABLET ORAL at 20:04

## 2025-06-22 RX ADMIN — SIMVASTATIN 20 MG: 20 TABLET, FILM COATED ORAL at 20:04

## 2025-06-22 RX ADMIN — APIXABAN 2.5 MG: 2.5 TABLET, FILM COATED ORAL at 09:45

## 2025-06-22 RX ADMIN — FUROSEMIDE 20 MG: 20 TABLET ORAL at 09:44

## 2025-06-22 RX ADMIN — DILTIAZEM HYDROCHLORIDE 180 MG: 180 CAPSULE, COATED, EXTENDED RELEASE ORAL at 09:47

## 2025-06-22 RX ADMIN — POLYETHYLENE GLYCOL 3350 17 G: 17 POWDER, FOR SOLUTION ORAL at 09:44

## 2025-06-22 RX ADMIN — CEFUROXIME AXETIL 250 MG: 250 TABLET, FILM COATED ORAL at 20:49

## 2025-06-22 ASSESSMENT — COGNITIVE AND FUNCTIONAL STATUS - GENERAL
DAILY ACTIVITIY SCORE: 20
MOBILITY SCORE: 19
DAILY ACTIVITIY SCORE: 20
MOVING TO AND FROM BED TO CHAIR: A LITTLE
TOILETING: A LITTLE
DRESSING REGULAR LOWER BODY CLOTHING: A LITTLE
DAILY ACTIVITIY SCORE: 20
TURNING FROM BACK TO SIDE WHILE IN FLAT BAD: A LITTLE
CLIMB 3 TO 5 STEPS WITH RAILING: A LITTLE
MOBILITY SCORE: 17
STANDING UP FROM CHAIR USING ARMS: A LITTLE
TOILETING: A LITTLE
STANDING UP FROM CHAIR USING ARMS: A LITTLE
TURNING FROM BACK TO SIDE WHILE IN FLAT BAD: A LITTLE
CLIMB 3 TO 5 STEPS WITH RAILING: A LITTLE
TOILETING: A LITTLE
WALKING IN HOSPITAL ROOM: A LITTLE
CLIMB 3 TO 5 STEPS WITH RAILING: A LOT
WALKING IN HOSPITAL ROOM: A LITTLE
STANDING UP FROM CHAIR USING ARMS: A LITTLE
HELP NEEDED FOR BATHING: A LITTLE
MOVING TO AND FROM BED TO CHAIR: A LITTLE
MOBILITY SCORE: 19
HELP NEEDED FOR BATHING: A LITTLE
DRESSING REGULAR UPPER BODY CLOTHING: A LITTLE
MOVING TO AND FROM BED TO CHAIR: A LITTLE
DRESSING REGULAR LOWER BODY CLOTHING: A LITTLE
MOVING FROM LYING ON BACK TO SITTING ON SIDE OF FLAT BED WITH BEDRAILS: A LITTLE
WALKING IN HOSPITAL ROOM: A LITTLE
DRESSING REGULAR UPPER BODY CLOTHING: A LITTLE
DRESSING REGULAR LOWER BODY CLOTHING: A LITTLE
HELP NEEDED FOR BATHING: A LITTLE
TURNING FROM BACK TO SIDE WHILE IN FLAT BAD: A LITTLE
DRESSING REGULAR UPPER BODY CLOTHING: A LITTLE

## 2025-06-22 ASSESSMENT — PAIN DESCRIPTION - DESCRIPTORS: DESCRIPTORS: ACHING

## 2025-06-22 ASSESSMENT — PAIN - FUNCTIONAL ASSESSMENT
PAIN_FUNCTIONAL_ASSESSMENT: 0-10

## 2025-06-22 ASSESSMENT — PAIN SCALES - GENERAL
PAINLEVEL_OUTOF10: 0 - NO PAIN
PAINLEVEL_OUTOF10: 5 - MODERATE PAIN
PAINLEVEL_OUTOF10: 8
PAINLEVEL_OUTOF10: 0 - NO PAIN
PAINLEVEL_OUTOF10: 0 - NO PAIN

## 2025-06-22 ASSESSMENT — PAIN DESCRIPTION - LOCATION: LOCATION: SHOULDER

## 2025-06-22 ASSESSMENT — PAIN DESCRIPTION - ORIENTATION: ORIENTATION: LEFT

## 2025-06-22 NOTE — DISCHARGE SUMMARY
Discharge Diagnosis  Shortness of breath           Issues Requiring Follow-Up  06/22- Patient fully examined at the bedside. Brought to hospital - had concerns including Shortness of Breath.  Awake, more animated, with no acute events overnight, no new complaints. Slow but progressive improvements noted. Will continue on lifelong eliquis. Patient medically stable at time of exam, cleared for discharge today.  Goals of care emphasized with patient and family, will continue current plan of care.     Discharge Meds     Medication List      ASK your doctor about these medications     apixaban 2.5 mg tablet; Commonly known as: Eliquis; Take 1 tablet (2.5   mg) by mouth 2 times a day.   azelastine 137 mcg (0.1 %) nasal spray; Commonly known as: Astelin;   Administer 2 sprays into each nostril 2 times a day. Use in each nostril   as directed   dilTIAZem  mg 24 hr tablet; Commonly known as: Cardizem LA; Take 1   tablet (180 mg) by mouth once daily.   DULoxetine 30 mg DR capsule; Commonly known as: Cymbalta; Take 1 capsule   (30 mg) by mouth once daily. Do not crush or chew.   furosemide 20 mg tablet; Commonly known as: Lasix; Take 1 tablet (20 mg)   by mouth once daily.   meloxicam 15 mg tablet; Commonly known as: Mobic; Take 1 tablet (15 mg)   by mouth once daily.   mirtazapine 15 mg tablet; Commonly known as: Remeron   oxygen gas therapy; Commonly known as: O2   potassium chloride CR 20 mEq ER tablet; Commonly known as: Klor-Con M20   pramipexole 1 mg tablet; Commonly known as: Mirapex; Take 1 tablet (1   mg) by mouth once daily at bedtime.   PreserVision AREDS 2 Plus  mcg-15 mcg- 5 mg-1 mg capsule; Generic   drug: mv-min-FA-vit K-lutein-zeaxant   PreserVision AREDS-2 250-90-40-1 mg capsule; Generic drug: vit   C,A-Kn-cuqdd-lutein-zeaxan; TAKE ONE CAPSULE BY MOUTH TWICE A DAY   simvastatin 20 mg tablet; Commonly known as: Zocor; Take 1 tablet (20   mg) by mouth once daily at bedtime.   traMADol 50 mg tablet;  Commonly known as: Ultram       Test Results Pending At Discharge  Pending Labs       No current pending labs.            Hospital Course        Date of Service: 6/21/2025 12:43 PM              History Of Present Illness  Estefanía Contreras is a 89 y.o. female with a past medical history of HFpEF (EF 62% 1/6/25), HLD, HTN, CKD3b, AAA (s/p repair), PAD, ITP, and provoked PE (on apixaban), anxiety, who presented to [facility] ED today from home with shortness of breath. Pt notes over the past few days she has become SOB with any type of exertion and now has been even with rest. She notes associated dry cough.  She notes she had home O2 available from when she was using it before and had to restart using it over the past few days as well.  She denies any fevers, chills, CP, weight gain, LE edema, abdominal pain, nausea, vomiting, diarrhea, or dysuria.  She denies any change in her chronic urinary frequency.  She denies any sick contacts.  She notes she lives alone but has a caregiver coming 4 days a week that helps her with cooking, cleaning, laundry etc.  CODE STATUS discussed and patient would like to be a DNR CCA with no intubation.     In the ED lab work, EKG, chest x-ray, and venous duplex bilateral lower extremities were performed. Labs revealed bun 35, creatinine 1.85 (slightly elevated from 22 and 1.26 on March 4, 2025), mag 2.71, , troponin within normal limits, INR 1.2, negative for flu, COVID, and RSV.  Chest x-ray revealed small left pleural effusion with associated atelectasis and/or pneumonitis. Venous duplex bilateral lower extremities negative for DVT. EKG interpreted by Dr. Rowley at 11:46 AM, normal sinus rhythm at a rate of 75, no ST segment depression or elevation consistent with ischemia or infarction.  Her vital signs were acceptable on admission.  She was given IV fluids and DuoNeb treatment and admitted.     Record review indicates patient presented with similar symptoms in February of this year  and a VQ scan was positive for PE, however it was difficult to tell if this was from patient's existing PE or new.  Given patient's kidney function, a CTA chest was deferred and patient was empirically treated with Eliquis after risks and benefits were weighed, it was noted that patient would need to be on lifelong anticoagulation.        Echo 1/3/25     CONCLUSIONS:   1. The left ventricular systolic function is normal, with a Hall's biplane calculated ejection fraction of 62%.   2. Spectral Doppler shows a Grade I (impaired relaxation pattern) of left ventricular diastolic filling with normal left atrial filling pressure.   3. There is normal right ventricular global systolic function.   4. The left atrium is mildly dilated.     Past Medical History  [Medical History]    [Medical History]  Past Medical History       Diagnosis Date    H/O heart artery stent      H/O shoulder surgery      PE (pulmonary thromboembolism) (Multi)          Surgical History  [Surgical History]    [Surgical History]  Past Surgical History  No past surgical history on file.     Social History  She reports that she has quit smoking. Her smoking use included cigarettes. She has never been exposed to tobacco smoke. She has never used smokeless tobacco. She reports that she does not currently use alcohol. She reports that she does not use drugs.     Family History  [Family History]    [Family History]  No family history on file.     Allergies  Patient has no known allergies.     10 point review of system performed and negative besides what is stated in HPI.     Physical Exam  Constitutional:       Appearance: Normal appearance.   HENT:      Head: Normocephalic and atraumatic.      Mouth/Throat:      Mouth: Mucous membranes are moist.   Eyes:      Conjunctiva/sclera: Conjunctivae normal.   Cardiovascular:      Rate and Rhythm: Normal rate and regular rhythm.      Heart sounds: Normal heart sounds.   Pulmonary:      Effort: Pulmonary effort  "is normal.      Comments: Coarse throughout  Abdominal:      General: Bowel sounds are normal.      Palpations: Abdomen is soft.      Tenderness: There is abdominal tenderness.   Musculoskeletal:         General: Normal range of motion.      Cervical back: Neck supple.      Right lower leg: Edema present.      Left lower leg: Edema present.      Comments: Nonpitting bilateral lower extremity edema   Neurological:      Mental Status: She is alert. Mental status is at baseline.   Psychiatric:      Comments: Anxious at time            Last Recorded Vitals  Blood pressure 141/65, pulse 64, temperature 36.6 °C (97.9 °F), temperature source Temporal, resp. rate 18, height 1.626 m (5' 4\"), weight 55.3 kg (122 lb), SpO2 96%.     Relevant Results     [Medications Ordered Prior to Encounter]     [Medications Ordered Prior to Encounter]  No current facility-administered medications on file prior to encounter.             Current Outpatient Medications on File Prior to Encounter   Medication Sig Dispense Refill    apixaban (Eliquis) 2.5 mg tablet Take 1 tablet (2.5 mg) by mouth 2 times a day. 60 tablet 0    dilTIAZem LA (Cardizem LA) 180 mg 24 hr tablet Take 1 tablet (180 mg) by mouth once daily. (Patient taking differently: Take 1 tablet (180 mg) by mouth once daily in the morning.) 90 tablet 3    furosemide (Lasix) 20 mg tablet Take 1 tablet (20 mg) by mouth once daily. (Patient taking differently: Take 1 tablet (20 mg) by mouth once daily in the morning.) 90 tablet 1    meloxicam (Mobic) 15 mg tablet Take 1 tablet (15 mg) by mouth once daily. (Patient taking differently: Take 1 tablet (15 mg) by mouth once daily with breakfast.) 90 tablet 3    mv-min-FA-vit K-lutein-zeaxant (PreserVision AREDS 2 Plus MV) 200 mcg-15 mcg- 5 mg-1 mg capsule Take 1 capsule by mouth 2 times a day.        oxygen (O2) gas therapy Inhale 3 L/min continuously.        potassium chloride CR 20 mEq ER tablet Take 1 tablet (20 mEq) by mouth once daily in " the morning. Splits in half and takes at same time        pramipexole (Mirapex) 1 mg tablet Take 1 tablet (1 mg) by mouth once daily at bedtime. 90 tablet 1    simvastatin (Zocor) 20 mg tablet Take 1 tablet (20 mg) by mouth once daily at bedtime. 90 tablet 0    traMADol (Ultram) 50 mg tablet Take 1 tablet (50 mg) by mouth once daily as needed for severe pain (7 - 10).        azelastine (Astelin) 137 mcg (0.1 %) nasal spray Administer 2 sprays into each nostril 2 times a day. Use in each nostril as directed (Patient not taking: Reported on 6/21/2025) 30 mL 2    DULoxetine (Cymbalta) 30 mg DR capsule Take 1 capsule (30 mg) by mouth once daily. Do not crush or chew. (Patient not taking: Reported on 6/21/2025) 30 capsule 0    mirtazapine (Remeron) 15 mg tablet Take 1 tablet (15 mg) by mouth as needed at bedtime. (Patient not taking: Reported on 6/21/2025)        PreserVision AREDS-2 250-90-40-1 mg capsule TAKE ONE CAPSULE BY MOUTH TWICE A DAY (Patient not taking: Reported on 6/21/2025) 180 capsule 0        No results found for this or any previous visit (from the past 24 hours).        XR abdomen 1 view  Result Date: 6/21/2025  Interpreted By:  Toan Haddad, STUDY: Abdominal series dated 6/21/2025.   INDICATION: Abdominal tenderness   COMPARISON: Radiographs dated 03/02/2025   ACCESSION NUMBER(S): [id number]   ORDERING CLINICIAN: VANDANA MOORE   TECHNIQUE: Three AP radiographs of the abdomen.   FINDINGS: There is a large volume of stool in the colon particularly at the rectal vault in cecum and ascending colon. No frankly dilated loops of small bowel are evident. No free air is evident below the diaphragm as seen on these radiographs. Lung bases are clear. Cardiomediastinal silhouette is enlarged. S shaped curvature is seen of the spine. Degenerative changes seen of the spine. Degenerative changes seen of the right hip. There is partial visualization of a left hip bipolar hemiarthroplasty. There is note of an  aorto bi-iliac stent graft.        Prominent amount of stool in the colon as can be seen with constipation. Clinical correlation is recommended. Bowel-gas pattern is nonobstructive.   Signed by: Toan Haddad 6/21/2025 3:57 PM Dictation workstation:   [id number]     Vascular US Lower Extremity Venous Duplex Bilateral  Result Date: 6/21/2025  Interpreted By:  Yasmin Wallace, STUDY: VASC US LOWER EXTREMITY VENOUS DUPLEX BILATERAL;  6/21/2025 12:40 pm   INDICATION: Signs/Symptoms:Bilateral lower extremity pain, rule out DVT.     COMPARISON: 02/28/2025   ACCESSION NUMBER(S): [id number]   ORDERING CLINICIAN: DANDY WALL   TECHNIQUE: Vascular ultrasound of the bilateral lower extremities was performed. Real-time compression views as well as Gray scale, color Doppler and spectral Doppler waveform analysis was performed.   FINDINGS: Evaluation of the visualized portions of the bilateral common femoral vein, proximal, mid, and distal femoral vein, and popliteal vein were performed.  Evaluation of the visualized portions of the  posterior tibial and peroneal veins were also performed.     The evaluated veins demonstrate normal compressibility. There is intact venous flow demonstrating normal respiratory variability and normal augmentation of flow with calf compression. Therefore, there is no ultrasonographic evidence for deep vein thrombosis within the evaluated veins.   Respiratory variation and augmentation to calf pressure was noted.        No sonographic evidence for deep vein thrombosis within the evaluated veins of the bilateral lower extremities..   MACRO: None   Signed by: Yasmin Wallace 6/21/2025 12:57 PM Dictation workstation:   [id number]     XR chest 1 view  Result Date: 6/21/2025  Interpreted By:  Toan Haddad, STUDY: Chest dated  6/21/2025.   INDICATION: Signs/Symptoms:sob   COMPARISON: Radiographs dated 02/27/2025.   ACCESSION NUMBER(S): [id number]   ORDERING CLINICIAN: DANDY WALL   TECHNIQUE: One  view of the chest.   FINDINGS: Linear opacities are seen at the left lung base with minimal blunting of the left costophrenic angle. A calcified granuloma is seen in the left mid lung zone.  No pneumothorax or effusion is evident. The cardiomediastinal silhouette is  not enlarged.Degenerative changes seen of the spine and shoulders. There is a left shoulder arthroplasty.        Small left pleural effusion with associated atelectasis and/or pneumonitis.   MACRO: None   Signed by: Toan Haddad 6/21/2025 12:17 PM Dictation workstation:   [id number]        Assessment & Plan  Shortness of breath     Suspected CHF (congestive heart failure)     PALLAVI (acute kidney injury)     Abdominal tenderness        Admit to telemetry  Observation  40 mg IV Lasix x 1  Resume home Lasix tomorrow  Hold home Mobic and Ultram  See recent echo results above  Repeat EKG and troponin  Continue oxygen  Daily weight  Intake and output  AM CBC, BMP, coags, mag  KUB  PT/OT     Chronic conditions: HFpEF (EF 62% 1/6/25), HLD, HTN, CKD3b, AAA (s/p repair), PAD, ITP, and provoked PE (on apixaban), anxiety     Continue home medications as listed above holding home Mobic and Ultram  Cardiac diet     DVT prophylaxis     SCDs  On Eliquis             Patient fully evaluated 06/22, thorough record review performed of previous labs and notes from prior encounters. Plan discussed with interdisciplinary team, consults placed, appreciate input. Will continue current and repeat labs in the AM.          Discharge planning discussed with patient and care team. Therapy evaluations ordered. Patient aware and agreeable to current plan, continue plan as above.      I spent a total of 75 minutes on the date of the service which included preparing to see the patient, face-to-face patient care, completing clinical documentation, obtaining and/or reviewing separately obtained history, performing a medically appropriate examination, counseling and educating the  patient/family/caregiver, ordering medications, tests, or procedures, communicating with other HCPs (not separately reported), independently interpreting results (not separately reported), communicating results to the patient/family/caregiver, and care coordination (not separately reported).                     Patient fully evaluated  06/22 for   Assessment & Plan  Shortness of breath    Suspected CHF (congestive heart failure)    PALLAVI (acute kidney injury)    Abdominal tenderness      Patient with significant clinical improvement noted, patient medically cleared for discharge today. Patient seen resting in bed with head of bed elevated, no s/s or c/o acute difficulties at this time. Vital signs for last 24 hours:  Temp:  [35.9 °C (96.6 °F)-37.2 °C (99 °F)] 37.2 °C (99 °F)  Heart Rate:  [60-75] 75  Resp:  [18] 18  BP: (122-175)/(58-74) 122/59 Medications and labs reviewed-   No results found for this or any previous visit (from the past 24 hours).   Patient recently received an antibiotic (last 12 hours)       None           No results found for the last 90 days.       Continue aggressive pulmonary hygiene and oral hygiene. Off loading as tolerated for skin integrity. No new complaints per patient, stable at time of exam.  Plan discussed with interdisciplinary team, no acute events overnight, patient denies chest pain, worsening SOB at this time. Ok to discharge, will continue current and repeat labs in the AM if patient still hospitalized. Patient aware and agreeable to current plan, continue plan as above.     I spent 60 minutes on the date of the service which included preparing to see the patient, face-to-face patient care, completing clinical documentation, obtaining and/or reviewing separately obtained history, performing a medically appropriate examination, counseling and educating the patient/family/caregiver, ordering medications, tests, or procedures, communicating with other HCPs (not separately  reported), independently interpreting results (not separately reported), communicating results to the patient/family/caregiver, and care coordination (not separately reported    Pertinent Physical Exam At Time of Discharge  Physical Exam  no acute events overnight, patient denies chest pain, worsening SOB at this time.  Outpatient Follow-Up  Future Appointments   Date Time Provider Department Center   7/7/2025 10:15 AM Shira Hill OD [id number] [place]   7/17/2025 10:00 AM Pito Henao MD [id number] [place]         Katerina Fitzgerald

## 2025-06-22 NOTE — CARE PLAN
The patient's goals for the shift include  breathing    The clinical goals for the shift include pt will no be sob throughout the the shift    Over the shift, the patient did not make progress toward the following goals.  Recommendations to address these barriers include continue to monitor.

## 2025-06-22 NOTE — PROGRESS NOTES
06/22/25 0944   Discharge Planning   Living Arrangements Alone   Support Systems Children   Assistance Needed assistance needed; patient stated that she has a caregiver that comes to her home 4x a week to help with adls and housework   Type of Residence Private residence   Number of Stairs to Enter Residence 0   Intensity of Service   Intensity of Service 0-30 min     Care transitions at bedside to complete assessment with patient.  TCC introduced self and explained role.  Patient demographics reviewed and verified.  Patient stated that she lives alone and has a caregiver that comes to her home 4x week.  Pending PT/OT evaluations at this time.  Care transitions to follow.      PCP: Dr Flores  Insurance: [facility] Medicare  Pharmacy: [facility] on [address]

## 2025-06-22 NOTE — H&P
History Of Present Illness  Estefanía Contreras is a 89 y.o. female with a past medical history of HFpEF (EF 62% 1/6/25), HLD, HTN, CKD3b, AAA (s/p repair), PAD, ITP, and provoked PE (on apixaban), anxiety, who presented to [facility] ED today from home with shortness of breath. Pt notes over the past few days she has become SOB with any type of exertion and now has been even with rest. She notes associated dry cough.  She notes she had home O2 available from when she was using it before and had to restart using it over the past few days as well.  She denies any fevers, chills, CP, weight gain, LE edema, abdominal pain, nausea, vomiting, diarrhea, or dysuria.  She denies any change in her chronic urinary frequency.  She denies any sick contacts.  She notes she lives alone but has a caregiver coming 4 days a week that helps her with cooking, cleaning, laundry etc.  CODE STATUS discussed and patient would like to be a DNR CCA with no intubation.    In the ED lab work, EKG, chest x-ray, and venous duplex bilateral lower extremities were performed. Labs revealed bun 35, creatinine 1.85 (slightly elevated from 22 and 1.26 on March 4, 2025), mag 2.71, , troponin within normal limits, INR 1.2, negative for flu, COVID, and RSV.  Chest x-ray revealed small left pleural effusion with associated atelectasis and/or pneumonitis. Venous duplex bilateral lower extremities negative for DVT. EKG interpreted by Dr. Rowley at 11:46 AM, normal sinus rhythm at a rate of 75, no ST segment depression or elevation consistent with ischemia or infarction.  Her vital signs were acceptable on admission.  She was given IV fluids and DuoNeb treatment and admitted.    Record review indicates patient presented with similar symptoms in February of this year and a VQ scan was positive for PE, however it was difficult to tell if this was from patient's existing PE or new.  Given patient's kidney function, a CTA chest was deferred and patient was  empirically treated with Eliquis after risks and benefits were weighed, it was noted that patient would need to be on lifelong anticoagulation.      Echo 1/3/25    CONCLUSIONS:   1. The left ventricular systolic function is normal, with a Hall's biplane calculated ejection fraction of 62%.   2. Spectral Doppler shows a Grade I (impaired relaxation pattern) of left ventricular diastolic filling with normal left atrial filling pressure.   3. There is normal right ventricular global systolic function.   4. The left atrium is mildly dilated.     Past Medical History  Medical History[1]    Surgical History  Surgical History[2]     Social History  She reports that she has quit smoking. Her smoking use included cigarettes. She has never been exposed to tobacco smoke. She has never used smokeless tobacco. She reports that she does not currently use alcohol. She reports that she does not use drugs.    Family History  Family History[3]     Allergies  Patient has no known allergies.    10 point review of system performed and negative besides what is stated in HPI.     Physical Exam  Constitutional:       Appearance: Normal appearance.   HENT:      Head: Normocephalic and atraumatic.      Mouth/Throat:      Mouth: Mucous membranes are moist.   Eyes:      Conjunctiva/sclera: Conjunctivae normal.   Cardiovascular:      Rate and Rhythm: Normal rate and regular rhythm.      Heart sounds: Normal heart sounds.   Pulmonary:      Effort: Pulmonary effort is normal.      Comments: Coarse throughout  Abdominal:      General: Bowel sounds are normal.      Palpations: Abdomen is soft.      Tenderness: There is abdominal tenderness.   Musculoskeletal:         General: Normal range of motion.      Cervical back: Neck supple.      Right lower leg: Edema present.      Left lower leg: Edema present.      Comments: Nonpitting bilateral lower extremity edema   Neurological:      Mental Status: She is alert. Mental status is at baseline.  "  Psychiatric:      Comments: Anxious at time          Last Recorded Vitals  Blood pressure 141/65, pulse 64, temperature 36.6 °C (97.9 °F), temperature source Temporal, resp. rate 18, height 1.626 m (5' 4\"), weight 55.3 kg (122 lb), SpO2 96%.    Relevant Results    Medications Ordered Prior to Encounter[4]     No results found for this or any previous visit (from the past 24 hours).       XR abdomen 1 view  Result Date: 6/21/2025  Interpreted By:  Toan Haddad, STUDY: Abdominal series dated 6/21/2025.   INDICATION: Abdominal tenderness   COMPARISON: Radiographs dated 03/02/2025   ACCESSION NUMBER(S): [id number]   ORDERING CLINICIAN: VANDANA MOORE   TECHNIQUE: Three AP radiographs of the abdomen.   FINDINGS: There is a large volume of stool in the colon particularly at the rectal vault in cecum and ascending colon. No frankly dilated loops of small bowel are evident. No free air is evident below the diaphragm as seen on these radiographs. Lung bases are clear. Cardiomediastinal silhouette is enlarged. S shaped curvature is seen of the spine. Degenerative changes seen of the spine. Degenerative changes seen of the right hip. There is partial visualization of a left hip bipolar hemiarthroplasty. There is note of an aorto bi-iliac stent graft.       Prominent amount of stool in the colon as can be seen with constipation. Clinical correlation is recommended. Bowel-gas pattern is nonobstructive.   Signed by: Toan Haddad 6/21/2025 3:57 PM Dictation workstation:   [id number]    Vascular US Lower Extremity Venous Duplex Bilateral  Result Date: 6/21/2025  Interpreted By:  Yasmin Wallace, STUDY: VASC US LOWER EXTREMITY VENOUS DUPLEX BILATERAL;  6/21/2025 12:40 pm   INDICATION: Signs/Symptoms:Bilateral lower extremity pain, rule out DVT.     COMPARISON: 02/28/2025   ACCESSION NUMBER(S): [id number]   ORDERING CLINICIAN: DANDY WALL   TECHNIQUE: Vascular ultrasound of the bilateral lower extremities was performed. " Real-time compression views as well as Gray scale, color Doppler and spectral Doppler waveform analysis was performed.   FINDINGS: Evaluation of the visualized portions of the bilateral common femoral vein, proximal, mid, and distal femoral vein, and popliteal vein were performed.  Evaluation of the visualized portions of the  posterior tibial and peroneal veins were also performed.     The evaluated veins demonstrate normal compressibility. There is intact venous flow demonstrating normal respiratory variability and normal augmentation of flow with calf compression. Therefore, there is no ultrasonographic evidence for deep vein thrombosis within the evaluated veins.   Respiratory variation and augmentation to calf pressure was noted.       No sonographic evidence for deep vein thrombosis within the evaluated veins of the bilateral lower extremities..   MACRO: None   Signed by: Yasmin Wallace 6/21/2025 12:57 PM Dictation workstation:   [id number]    XR chest 1 view  Result Date: 6/21/2025  Interpreted By:  Toan Haddad, STUDY: Chest dated  6/21/2025.   INDICATION: Signs/Symptoms:sob   COMPARISON: Radiographs dated 02/27/2025.   ACCESSION NUMBER(S): [id number]   ORDERING CLINICIAN: DANDY WALL   TECHNIQUE: One view of the chest.   FINDINGS: Linear opacities are seen at the left lung base with minimal blunting of the left costophrenic angle. A calcified granuloma is seen in the left mid lung zone.  No pneumothorax or effusion is evident. The cardiomediastinal silhouette is  not enlarged.Degenerative changes seen of the spine and shoulders. There is a left shoulder arthroplasty.       Small left pleural effusion with associated atelectasis and/or pneumonitis.   MACRO: None   Signed by: Toan Haddad 6/21/2025 12:17 PM Dictation workstation:   [id number]       Assessment & Plan  Shortness of breath    Suspected CHF (congestive heart failure)    PALLAVI (acute kidney injury)    Abdominal tenderness      Admit to  telemetry  Observation  40 mg IV Lasix x 1  Resume home Lasix tomorrow  Hold home Mobic and Ultram  See recent echo results above  Repeat EKG and troponin  Continue oxygen  Daily weight  Intake and output  AM CBC, BMP, coags, mag  KUB  PT/OT    Chronic conditions: HFpEF (EF 62% 1/6/25), HLD, HTN, CKD3b, AAA (s/p repair), PAD, ITP, and provoked PE (on apixaban), anxiety    Continue home medications as listed above holding home Mobic and Ultram  Cardiac diet    DVT prophylaxis    SCDs  On Eliquis          Patient fully evaluated 06/22, thorough record review performed of previous labs and notes from prior encounters. Plan discussed with interdisciplinary team, consults placed, appreciate input. Will continue current and repeat labs in the AM.        Discharge planning discussed with patient and care team. Therapy evaluations ordered. Patient aware and agreeable to current plan, continue plan as above.     I spent a total of 75 minutes on the date of the service which included preparing to see the patient, face-to-face patient care, completing clinical documentation, obtaining and/or reviewing separately obtained history, performing a medically appropriate examination, counseling and educating the patient/family/caregiver, ordering medications, tests, or procedures, communicating with other HCPs (not separately reported), independently interpreting results (not separately reported), communicating results to the patient/family/caregiver, and care coordination (not separately reported).     Katerina Fitzgerald         [1]   Past Medical History:  Diagnosis Date    H/O heart artery stent     H/O shoulder surgery     PE (pulmonary thromboembolism) (Multi)    [2] No past surgical history on file.  [3] No family history on file.  [4]   No current facility-administered medications on file prior to encounter.     Current Outpatient Medications on File Prior to Encounter   Medication Sig Dispense Refill    apixaban (Eliquis) 2.5 mg  tablet Take 1 tablet (2.5 mg) by mouth 2 times a day. 60 tablet 0    dilTIAZem LA (Cardizem LA) 180 mg 24 hr tablet Take 1 tablet (180 mg) by mouth once daily. (Patient taking differently: Take 1 tablet (180 mg) by mouth once daily in the morning.) 90 tablet 3    furosemide (Lasix) 20 mg tablet Take 1 tablet (20 mg) by mouth once daily. (Patient taking differently: Take 1 tablet (20 mg) by mouth once daily in the morning.) 90 tablet 1    meloxicam (Mobic) 15 mg tablet Take 1 tablet (15 mg) by mouth once daily. (Patient taking differently: Take 1 tablet (15 mg) by mouth once daily with breakfast.) 90 tablet 3    mv-min-FA-vit K-lutein-zeaxant (PreserVision AREDS 2 Plus MV) 200 mcg-15 mcg- 5 mg-1 mg capsule Take 1 capsule by mouth 2 times a day.      oxygen (O2) gas therapy Inhale 3 L/min continuously.      potassium chloride CR 20 mEq ER tablet Take 1 tablet (20 mEq) by mouth once daily in the morning. Splits in half and takes at same time      pramipexole (Mirapex) 1 mg tablet Take 1 tablet (1 mg) by mouth once daily at bedtime. 90 tablet 1    simvastatin (Zocor) 20 mg tablet Take 1 tablet (20 mg) by mouth once daily at bedtime. 90 tablet 0    traMADol (Ultram) 50 mg tablet Take 1 tablet (50 mg) by mouth once daily as needed for severe pain (7 - 10).      azelastine (Astelin) 137 mcg (0.1 %) nasal spray Administer 2 sprays into each nostril 2 times a day. Use in each nostril as directed (Patient not taking: Reported on 6/21/2025) 30 mL 2    DULoxetine (Cymbalta) 30 mg DR capsule Take 1 capsule (30 mg) by mouth once daily. Do not crush or chew. (Patient not taking: Reported on 6/21/2025) 30 capsule 0    mirtazapine (Remeron) 15 mg tablet Take 1 tablet (15 mg) by mouth as needed at bedtime. (Patient not taking: Reported on 6/21/2025)      PreserVision AREDS-2 250-90-40-1 mg capsule TAKE ONE CAPSULE BY MOUTH TWICE A DAY (Patient not taking: Reported on 6/21/2025) 180 capsule 0

## 2025-06-22 NOTE — PROGRESS NOTES
Estefanía Contreras is a 89 y.o. female on day 0 of admission presenting with Shortness of breath.      Subjective   Patient fully evaluated on June 22.  Still slightly short of breath but improved.  Having significant abdominal distention.  Bladder scans and IV antibiotics to be continued for possible UTI.  Recheck labs in AM.       Objective     Last Recorded Vitals  /59 (Patient Position: Lying)   Pulse 75   Temp 37.2 °C (99 °F) (Temporal)   Resp 18   Wt 55.3 kg (122 lb)   SpO2 96%   Intake/Output last 3 Shifts:    Intake/Output Summary (Last 24 hours) at 6/22/2025 1431  Last data filed at 6/22/2025 0504  Gross per 24 hour   Intake 75 ml   Output 900 ml   Net -825 ml       Admission Weight  Weight: 55.3 kg (122 lb) (06/21/25 1152)    Daily Weight  06/21/25 : 55.3 kg (122 lb)    Image Results  XR abdomen 1 view  Narrative: Interpreted By:  Toan Haddad,   STUDY:  Abdominal series dated 6/21/2025.      INDICATION:  Abdominal tenderness      COMPARISON:  Radiographs dated 03/02/2025      ACCESSION NUMBER(S):  [id number]      ORDERING CLINICIAN:  VANDANA MOORE      TECHNIQUE:  Three AP radiographs of the abdomen.      FINDINGS:  There is a large volume of stool in the colon particularly at the  rectal vault in cecum and ascending colon. No frankly dilated loops  of small bowel are evident. No free air is evident below the  diaphragm as seen on these radiographs. Lung bases are clear.  Cardiomediastinal silhouette is enlarged. S shaped curvature is seen  of the spine. Degenerative changes seen of the spine. Degenerative  changes seen of the right hip. There is partial visualization of a  left hip bipolar hemiarthroplasty. There is note of an aorto bi-iliac  stent graft.      Impression: Prominent amount of stool in the colon as can be seen with  constipation. Clinical correlation is recommended. Bowel-gas pattern  is nonobstructive.      Signed by: Toan Haddad 6/21/2025 3:57 PM  Dictation workstation:    [id number]  Vascular US Lower Extremity Venous Duplex Bilateral  Narrative: Interpreted By:  Yasmin Wallace,   STUDY:  VASC US LOWER EXTREMITY VENOUS DUPLEX BILATERAL;  6/21/2025 12:40 pm      INDICATION:  Signs/Symptoms:Bilateral lower extremity pain, rule out DVT.          COMPARISON:  02/28/2025      ACCESSION NUMBER(S):  [id number]      ORDERING CLINICIAN:  DANDY WALL      TECHNIQUE:  Vascular ultrasound of the bilateral lower extremities was performed.  Real-time compression views as well as Gray scale, color Doppler and  spectral Doppler waveform analysis was performed.      FINDINGS:  Evaluation of the visualized portions of the bilateral common femoral  vein, proximal, mid, and distal femoral vein, and popliteal vein were  performed.  Evaluation of the visualized portions of the  posterior  tibial and peroneal veins were also performed.          The evaluated veins demonstrate normal compressibility. There is  intact venous flow demonstrating normal respiratory variability and  normal augmentation of flow with calf compression. Therefore, there  is no ultrasonographic evidence for deep vein thrombosis within the  evaluated veins.      Respiratory variation and augmentation to calf pressure was noted.      Impression: No sonographic evidence for deep vein thrombosis within the evaluated  veins of the bilateral lower extremities..      MACRO:  None      Signed by: Yasmin Wallace 6/21/2025 12:57 PM  Dictation workstation:   [id number]  XR chest 1 view  Narrative: Interpreted By:  Toan Haddad,   STUDY:  Chest dated  6/21/2025.      INDICATION:  Signs/Symptoms:sob      COMPARISON:  Radiographs dated 02/27/2025.      ACCESSION NUMBER(S):  [id number]      ORDERING CLINICIAN:  DANDY WALL      TECHNIQUE:  One view of the chest.      FINDINGS:  Linear opacities are seen at the left lung base with minimal blunting  of the left costophrenic angle. A calcified granuloma is seen in the  left mid lung zone.   No pneumothorax or effusion is evident. The  cardiomediastinal silhouette is  not enlarged.Degenerative changes  seen of the spine and shoulders. There is a left shoulder  arthroplasty.      Impression: Small left pleural effusion with associated atelectasis and/or  pneumonitis.      MACRO:  None      Signed by: Toan Haddad 6/21/2025 12:17 PM  Dictation workstation:   [id number]      Physical Exam    Relevant Results                              Assessment & Plan  Shortness of breath    Suspected CHF (congestive heart failure)    PALLAVI (acute kidney injury)    Abdominal tenderness       Katerina Fitzgerald   Coordinator  Internal Medicine     H&P      Incomplete     Date of Service: 6/21/2025 12:43 PM     Incomplete         History Of Present Illness  Estefanía Contreras is a 89 y.o. female with a past medical history of HFpEF (EF 62% 1/6/25), HLD, HTN, CKD3b, AAA (s/p repair), PAD, ITP, and provoked PE (on apixaban), anxiety, who presented to [facility] ED today from home with shortness of breath. Pt notes over the past few days she has become SOB with any type of exertion and now has been even with rest. She notes associated dry cough.  She notes she had home O2 available from when she was using it before and had to restart using it over the past few days as well.  She denies any fevers, chills, CP, weight gain, LE edema, abdominal pain, nausea, vomiting, diarrhea, or dysuria.  She denies any change in her chronic urinary frequency.  She denies any sick contacts.  She notes she lives alone but has a caregiver coming 4 days a week that helps her with cooking, cleaning, laundry etc.  CODE STATUS discussed and patient would like to be a DNR CCA with no intubation.     In the ED lab work, EKG, chest x-ray, and venous duplex bilateral lower extremities were performed. Labs revealed bun 35, creatinine 1.85 (slightly elevated from 22 and 1.26 on March 4, 2025), mag 2.71, , troponin within normal limits, INR 1.2, negative for flu,  COVID, and RSV.  Chest x-ray revealed small left pleural effusion with associated atelectasis and/or pneumonitis. Venous duplex bilateral lower extremities negative for DVT. EKG interpreted by Dr. Rowley at 11:46 AM, normal sinus rhythm at a rate of 75, no ST segment depression or elevation consistent with ischemia or infarction.  Her vital signs were acceptable on admission.  She was given IV fluids and DuoNeb treatment and admitted.     Record review indicates patient presented with similar symptoms in February of this year and a VQ scan was positive for PE, however it was difficult to tell if this was from patient's existing PE or new.  Given patient's kidney function, a CTA chest was deferred and patient was empirically treated with Eliquis after risks and benefits were weighed, it was noted that patient would need to be on lifelong anticoagulation.        Echo 1/3/25     CONCLUSIONS:   1. The left ventricular systolic function is normal, with a Hall's biplane calculated ejection fraction of 62%.   2. Spectral Doppler shows a Grade I (impaired relaxation pattern) of left ventricular diastolic filling with normal left atrial filling pressure.   3. There is normal right ventricular global systolic function.   4. The left atrium is mildly dilated.     Past Medical History  [Medical History]    [Medical History]  Past Medical History       Diagnosis Date    H/O heart artery stent      H/O shoulder surgery      PE (pulmonary thromboembolism) (Multi)          Surgical History  [Surgical History]    [Surgical History]  Past Surgical History  No past surgical history on file.     Social History  She reports that she has quit smoking. Her smoking use included cigarettes. She has never been exposed to tobacco smoke. She has never used smokeless tobacco. She reports that she does not currently use alcohol. She reports that she does not use drugs.     Family History  [Family History]    [Family History]  No family  "history on file.     Allergies  Patient has no known allergies.     10 point review of system performed and negative besides what is stated in HPI.     Physical Exam  Constitutional:       Appearance: Normal appearance.   HENT:      Head: Normocephalic and atraumatic.      Mouth/Throat:      Mouth: Mucous membranes are moist.   Eyes:      Conjunctiva/sclera: Conjunctivae normal.   Cardiovascular:      Rate and Rhythm: Normal rate and regular rhythm.      Heart sounds: Normal heart sounds.   Pulmonary:      Effort: Pulmonary effort is normal.      Comments: Coarse throughout  Abdominal:      General: Bowel sounds are normal.      Palpations: Abdomen is soft.      Tenderness: There is abdominal tenderness.   Musculoskeletal:         General: Normal range of motion.      Cervical back: Neck supple.      Right lower leg: Edema present.      Left lower leg: Edema present.      Comments: Nonpitting bilateral lower extremity edema   Neurological:      Mental Status: She is alert. Mental status is at baseline.   Psychiatric:      Comments: Anxious at time            Last Recorded Vitals  Blood pressure 141/65, pulse 64, temperature 36.6 °C (97.9 °F), temperature source Temporal, resp. rate 18, height 1.626 m (5' 4\"), weight 55.3 kg (122 lb), SpO2 96%.     Relevant Results     [Medications Ordered Prior to Encounter]     [Medications Ordered Prior to Encounter]  No current facility-administered medications on file prior to encounter.             Current Outpatient Medications on File Prior to Encounter   Medication Sig Dispense Refill    apixaban (Eliquis) 2.5 mg tablet Take 1 tablet (2.5 mg) by mouth 2 times a day. 60 tablet 0    dilTIAZem LA (Cardizem LA) 180 mg 24 hr tablet Take 1 tablet (180 mg) by mouth once daily. (Patient taking differently: Take 1 tablet (180 mg) by mouth once daily in the morning.) 90 tablet 3    furosemide (Lasix) 20 mg tablet Take 1 tablet (20 mg) by mouth once daily. (Patient taking differently: " Take 1 tablet (20 mg) by mouth once daily in the morning.) 90 tablet 1    meloxicam (Mobic) 15 mg tablet Take 1 tablet (15 mg) by mouth once daily. (Patient taking differently: Take 1 tablet (15 mg) by mouth once daily with breakfast.) 90 tablet 3    mv-min-FA-vit K-lutein-zeaxant (PreserVision AREDS 2 Plus MV) 200 mcg-15 mcg- 5 mg-1 mg capsule Take 1 capsule by mouth 2 times a day.        oxygen (O2) gas therapy Inhale 3 L/min continuously.        potassium chloride CR 20 mEq ER tablet Take 1 tablet (20 mEq) by mouth once daily in the morning. Splits in half and takes at same time        pramipexole (Mirapex) 1 mg tablet Take 1 tablet (1 mg) by mouth once daily at bedtime. 90 tablet 1    simvastatin (Zocor) 20 mg tablet Take 1 tablet (20 mg) by mouth once daily at bedtime. 90 tablet 0    traMADol (Ultram) 50 mg tablet Take 1 tablet (50 mg) by mouth once daily as needed for severe pain (7 - 10).        azelastine (Astelin) 137 mcg (0.1 %) nasal spray Administer 2 sprays into each nostril 2 times a day. Use in each nostril as directed (Patient not taking: Reported on 6/21/2025) 30 mL 2    DULoxetine (Cymbalta) 30 mg DR capsule Take 1 capsule (30 mg) by mouth once daily. Do not crush or chew. (Patient not taking: Reported on 6/21/2025) 30 capsule 0    mirtazapine (Remeron) 15 mg tablet Take 1 tablet (15 mg) by mouth as needed at bedtime. (Patient not taking: Reported on 6/21/2025)        PreserVision AREDS-2 250-90-40-1 mg capsule TAKE ONE CAPSULE BY MOUTH TWICE A DAY (Patient not taking: Reported on 6/21/2025) 180 capsule 0        No results found for this or any previous visit (from the past 24 hours).        XR abdomen 1 view  Result Date: 6/21/2025  Interpreted By:  Toan Haddad, STUDY: Abdominal series dated 6/21/2025.   INDICATION: Abdominal tenderness   COMPARISON: Radiographs dated 03/02/2025   ACCESSION NUMBER(S): [id number]   ORDERING CLINICIAN: VANDANA MOORE   TECHNIQUE: Three AP radiographs of the  abdomen.   FINDINGS: There is a large volume of stool in the colon particularly at the rectal vault in cecum and ascending colon. No frankly dilated loops of small bowel are evident. No free air is evident below the diaphragm as seen on these radiographs. Lung bases are clear. Cardiomediastinal silhouette is enlarged. S shaped curvature is seen of the spine. Degenerative changes seen of the spine. Degenerative changes seen of the right hip. There is partial visualization of a left hip bipolar hemiarthroplasty. There is note of an aorto bi-iliac stent graft.        Prominent amount of stool in the colon as can be seen with constipation. Clinical correlation is recommended. Bowel-gas pattern is nonobstructive.   Signed by: Toan Haddad 6/21/2025 3:57 PM Dictation workstation:   [id number]     Vascular US Lower Extremity Venous Duplex Bilateral  Result Date: 6/21/2025  Interpreted By:  Yasmin Wallace, STUDY: VASC US LOWER EXTREMITY VENOUS DUPLEX BILATERAL;  6/21/2025 12:40 pm   INDICATION: Signs/Symptoms:Bilateral lower extremity pain, rule out DVT.     COMPARISON: 02/28/2025   ACCESSION NUMBER(S): [id number]   ORDERING CLINICIAN: DANDY WALL   TECHNIQUE: Vascular ultrasound of the bilateral lower extremities was performed. Real-time compression views as well as Gray scale, color Doppler and spectral Doppler waveform analysis was performed.   FINDINGS: Evaluation of the visualized portions of the bilateral common femoral vein, proximal, mid, and distal femoral vein, and popliteal vein were performed.  Evaluation of the visualized portions of the  posterior tibial and peroneal veins were also performed.     The evaluated veins demonstrate normal compressibility. There is intact venous flow demonstrating normal respiratory variability and normal augmentation of flow with calf compression. Therefore, there is no ultrasonographic evidence for deep vein thrombosis within the evaluated veins.   Respiratory variation  and augmentation to calf pressure was noted.        No sonographic evidence for deep vein thrombosis within the evaluated veins of the bilateral lower extremities..   MACRO: None   Signed by: Yasmin Wallace 6/21/2025 12:57 PM Dictation workstation:   [id number]     XR chest 1 view  Result Date: 6/21/2025  Interpreted By:  Toan Haddad, STUDY: Chest dated  6/21/2025.   INDICATION: Signs/Symptoms:sob   COMPARISON: Radiographs dated 02/27/2025.   ACCESSION NUMBER(S): [id number]   ORDERING CLINICIAN: DANDY WLAL   TECHNIQUE: One view of the chest.   FINDINGS: Linear opacities are seen at the left lung base with minimal blunting of the left costophrenic angle. A calcified granuloma is seen in the left mid lung zone.  No pneumothorax or effusion is evident. The cardiomediastinal silhouette is  not enlarged.Degenerative changes seen of the spine and shoulders. There is a left shoulder arthroplasty.        Small left pleural effusion with associated atelectasis and/or pneumonitis.   MACRO: None   Signed by: Toan Haddad 6/21/2025 12:17 PM Dictation workstation:   [id number]        Assessment & Plan  Shortness of breath     Suspected CHF (congestive heart failure)     PALLAVI (acute kidney injury)     Abdominal tenderness        Admit to telemetry  Observation  40 mg IV Lasix x 1  Resume home Lasix tomorrow  Hold home Mobic and Ultram  See recent echo results above  Repeat EKG and troponin  Continue oxygen  Daily weight  Intake and output  AM CBC, BMP, coags, mag  KUB  PT/OT     Chronic conditions: HFpEF (EF 62% 1/6/25), HLD, HTN, CKD3b, AAA (s/p repair), PAD, ITP, and provoked PE (on apixaban), anxiety     Continue home medications as listed above holding home Mobic and Ultram  Cardiac diet     DVT prophylaxis     SCDs  On Eliquis             Patient fully evaluated 06/22, thorough record review performed of previous labs and notes from prior encounters. Plan discussed with interdisciplinary team, consults placed,  appreciate input. Will continue current and repeat labs in the AM.          Discharge planning discussed with patient and care team. Therapy evaluations ordered. Patient aware and agreeable to current plan, continue plan as above.      I spent a total of 75 minutes on the date of the service which included preparing to see the patient, face-to-face patient care, completing clinical documentation, obtaining and/or reviewing separately obtained history, performing a medically appropriate examination, counseling and educating the patient/family/caregiver, ordering medications, tests, or procedures, communicating with other HCPs (not separately reported), independently interpreting results (not separately reported), communicating results to the patient/family/caregiver, and care coordination (not separately reported).                                   Bahman Flores MD

## 2025-06-22 NOTE — PROGRESS NOTES
Physical Therapy    Physical Therapy Evaluation    Patient Name: Estefanía Contreras  MRN: [id number]  Today's Date: 6/22/2025   Time Calculation  Start Time: 0946  Stop Time: 0956  Time Calculation (min): 10 min  338/338-A    Assessment/Plan   PT Assessment  PT Assessment Results: Decreased strength, Decreased endurance, Impaired balance, Decreased mobility  Rehab Prognosis: Good  Barriers to Discharge Home: No anticipated barriers  End of Session Communication: Bedside nurse  Assessment Comment: Pt demonstrates impaired mobility throughout the session requiring increased level of assistance. Pt not at baseline and will benefit from further acute PT services and therapy s/p discharge to regain function and independence.  End of Session Patient Position: Bed, 2 rail up, Alarm off, not on at start of session (all needs in reach, no complaints noted, RN present)  IP OR SWING BED PT PLAN  Inpatient or Swing Bed: Inpatient  PT Plan  Treatment/Interventions: Bed mobility, Transfer training, Gait training, Stair training  PT Plan: Ongoing PT  PT Frequency: 4 times per week  PT Discharge Recommendations: Low intensity level of continued care (initial 24hr SUP)  PT Recommended Transfer Status: Assist x1  PT - OK to Discharge: Yes    Subjective     Current Problem:  1. Shortness of breath        2. History of pulmonary embolus (PE)        3. History of CHF (congestive heart failure)          Problem List[1]    General Visit Information:  General  Reason for Referral: PT Eval and Treat  Referred By: ENOCH Zuleta  Past Medical History Relevant to Rehab: 89 y.o. female with a past medical history of HFpEF (EF 62% 1/6/25), HLD, HTN, CKD3b, AAA (s/p repair), PAD, ITP, and provoked PE (on apixaban), anxiety, who presented to [facility] ED today from home with shortness of breath. Pt notes over the past few days she has become SOB with any type of exertion and now has been even with rest. She notes associated dry cough.  She notes  she had home O2 available from when she was using it before and had to restart using it over the past few days as well.  Family/Caregiver Present: No  Co-Treatment: OT  Co-Treatment Reason: maximize safety and functional mobility  Prior to Session Communication: Bedside nurse  Patient Position Received: Bed, 2 rail up, Alarm off, not on at start of session  General Comment: Pt agreeable to PT.    Home Living:  Home Living  Type of Home: Apartment  Lives With: Alone (HHA 4x/week for 5hrs each day, son lives across the street)  Home Adaptive Equipment: Walker rolling or standard, Cane, Wheelchair-manual  Home Layout: One level  Home Access:  (2+4 NITHIN B HRs)  Bathroom Shower/Tub: Tub/shower unit  Bathroom Toilet: Standard  Bathroom Equipment: Grab bars in shower, Shower chair with back, Hand-held shower hose  Home Living Comments: std. bed    Prior Level of Function:  Prior Function Per Pt/Caregiver Report  Level of [place]: Independent with ADLs and functional transfers (Pt amb with RW, independent with dressing, assist with showers, HHA does laundry, pt prepares simple meals and lightly cleans, son does driving and shopping)    Precautions:  Precautions  Medical Precautions: Fall precautions (2 LPM)    Vital Signs:  Vital Signs  Vital Signs Comment: VSS    Objective     Pain:  Pain Assessment  Pain Assessment:  (0/10)    Cognition:  Cognition  Overall Cognitive Status: Within Functional Limits    General Assessments:  Sensation  Light Touch: No apparent deficits  Strength  Strength Comments: B LE ROM WFL, B LE strength 4/5  Static Sitting Balance  Static Sitting-Level of Assistance: Distant supervision  Dynamic Sitting Balance  Dynamic Sitting-Level of Assistance: Close supervision  Static Standing Balance  Static Standing-Level of Assistance: Close supervision  Dynamic Standing Balance  Dynamic Standing-Level of Assistance: Contact guard    Functional Assessments:  Bed Mobility  Bed Mobility: Yes  Bed Mobility  1  Bed Mobility Comments 1: supine <> sitting: SBA  Transfers  Transfer: Yes  Transfer 1  Trials/Comments 1: STS from EOB: min A x 1  Ambulation/Gait Training  Ambulation/Gait Training Performed: Yes  Ambulation/Gait Training 1  Comments/Distance (ft) 1: Pt was able to amb 3' side stepping EOB using RW with CGA. Amb distances were limited 2/2 pt being fatigued from lack of sleep. Pt would have been able to amb further otherwise.    Outcome Measures:  [facility] Basic Mobility  Turning from your back to your side while in a flat bed without using bedrails: A little  Moving from lying on your back to sitting on the side of a flat bed without using bedrails: A little  Moving to and from bed to chair (including a wheelchair): A little  Standing up from a chair using your arms (e.g. wheelchair or bedside chair): A little  To walk in hospital room: A little  Climbing 3-5 steps with railing: A lot  Basic Mobility - Total Score: 17     Goals:  Encounter Problems       Encounter Problems (Active)       PT Problem       STG - Pt will transition supine <> sitting with SUP  (Progressing)       Start:  06/22/25    Expected End:  07/06/25            STG - Pt will transfer STS with SUP  (Progressing)       Start:  06/22/25    Expected End:  07/06/25            STG - Pt will amb 30' using RW with SUP  (Progressing)       Start:  06/22/25    Expected End:  07/06/25            STG -  Pt will navigate 4 stairs using HR with CGA  (Progressing)       Start:  06/22/25    Expected End:  07/06/25                   Pain - Adult            Education Documentation  Precautions, taught by Jagruti Vega PT at 6/22/2025 10:15 AM.  Learner: Patient  Readiness: Acceptance  Method: Explanation  Response: Verbalizes Understanding  Comment: PT POC    Mobility Training, taught by Jagruti Vega PT at 6/22/2025 10:15 AM.  Learner: Patient  Readiness: Acceptance  Method: Explanation  Response: Verbalizes Understanding  Comment: PT POC    Education  Comments  No comments found.               [1]   Patient Active Problem List  Diagnosis    Closed fracture of neck of left femur with routine healing    Stage 3b chronic kidney disease (Multi)    Status post reverse arthroplasty of left shoulder    Thrombocytopenia    HTN (hypertension)    Peripheral vascular disease    Cellulitis of great toe of right foot    Onychocryptosis    Anemia    Neuropathy    HLD (hyperlipidemia)    Anorexia    Constipation    Anxiety    Change in mental status    Dysuria    Pulmonary embolism, other, unspecified chronicity, unspecified whether acute cor pulmonale present (Multi)    Dermatochalasis of left upper eyelid    Abdominal aortic aneurysm without rupture    Age-related osteoporosis with current pathological fracture, left humerus, subsequent encounter for fracture with routine healing    Carotid stenosis    Neuropathy of left foot    Presence of left artificial shoulder joint    Hypervolemia, unspecified hypervolemia type    Left leg cellulitis    Shortness of breath    PALLAVI (acute kidney injury)    Elevated d-dimer    History of pulmonary embolism    Suspected CHF (congestive heart failure)    Abdominal tenderness

## 2025-06-22 NOTE — PROGRESS NOTES
Occupational Therapy    Evaluation    Patient Name: Estefanía Contreras  MRN: [id number]  Department: [facility]  Room: [address] [place] [address]  Today's Date: 6/22/2025  Time Calculation  Start Time: 0947  Stop Time: 0955  Time Calculation (min): 8 min    Assessment  IP OT Assessment  OT Assessment: Pt. presents with a decline in self-care, mobility and safety and would benefit from skilled OT services to maximize independence and promote return to prior level of function.  Prognosis: Good  Barriers to Discharge Home: No anticipated barriers  End of Session Communication: Bedside nurse  End of Session Patient Position: Bed, 2 rail up, Alarm off, not on at start of session  Plan:  Treatment Interventions: ADL retraining, Functional transfer training, Endurance training, Compensatory technique education  OT Frequency: 3 times per week  OT Discharge Recommendations: Low intensity level of continued care (with initial 24 hr supervision)  OT Recommended Transfer Status: Assist of 1  OT - OK to Discharge: Yes (to next level of care when cleared by medical team)    Subjective   Current Problem:  1. Shortness of breath        2. History of pulmonary embolus (PE)        3. History of CHF (congestive heart failure)          OT Visit Info:  OT Received On: 06/22/25  General Visit Info:  General  Reason for Referral: impaired adl; pt. admitted with sob with exertion, dx:  sob, chf, michelle  Referred By: Cristina  Past Medical History Relevant to Rehab: HFpEF (EF 62% 1/6/25), HLD, HTN, CKD3b, AAA (s/p repair), PAD, ITP, and provoked PE (on apixaban), anxiety  Family/Caregiver Present: No  Co-Treatment: PT  Co-Treatment Reason: to maximize patient safety/abilities  Prior to Session Communication: Bedside nurse  Patient Position Received: Bed, 2 rail up, Alarm off, not on at start of session  General Comment: pt. agreeable to therapy intervention  Precautions:  Precautions Comment: tele, falls           Pain:  Pain Assessment  Pain Assessment: 0-10  0-10 (Numeric)  Pain Score: 0 - No pain    Objective   Cognition:  Overall Cognitive Status: Within Functional Limits (pt. fatigued)           Home Living:  Home Living Comments: pt. lives in an apartment alone, 2+4 step entry with rails, 1st floor set up, tub/shower with hhs, chair, gb, st. toilet, owns wheelchair, st. cane, wh. walker   Prior Function:  Prior Function Comments: pt. independent with ambulation via wh. walker, able to dressing self/toilet self, [facility] aide visits 4x/wk, 5 hrs per day, assists with laundry and showers, pt. able to cook and clean for self  IADL History:     ADL:  ADL Comments: seated eob, pt. able to reach toward socks without difficulty to adjust, would anticipate cga/min assist for standing aspects of adl, set up for grooming/feeding  Activity Tolerance:  Endurance: Tolerates less than 10 min exercise, no significant change in vital signs  Bed Mobility/Transfers: Bed Mobility  Bed Mobility:  (sba supine <> sit)    Transfers  Transfer:  (sit<> stand from eob:  min assist x 1)      Functional Mobility:  Functional Mobility  Functional Mobility Performed:  (pt. able to sidestep via wh. walker with cga)  Sitting Balance:  Static Sitting Balance  Static Sitting-Comment/Number of Minutes: supervision seated eob  Sensation:  Sensation Comment: sensation intact  Strength:  Strength Comments: bue's at least 3/5 per observation       Coordination:  Movements are Fluid and Coordinated: Yes   Outcome Measures: [facility] Daily Activity  Putting on and taking off regular lower body clothing: A little  Bathing (including washing, rinsing, drying): A little  Putting on and taking off regular upper body clothing: A little  Toileting, which includes using toilet, bedpan or urinal: A little  Taking care of personal grooming such as brushing teeth: None  Eating Meals: None  Daily Activity - Total Score: 20      Education Documentation  ADL Training, taught by Katerina Scherer OT at 6/22/2025 12:34 PM.  Learner:  Patient  Readiness: Acceptance  Method: Explanation  Response: Verbalizes Understanding, Needs Reinforcement  Comment: OT POC    Precautions, taught by Katerina Scherer OT at 6/22/2025 12:34 PM.  Learner: Patient  Readiness: Acceptance  Method: Explanation  Response: Verbalizes Understanding, Needs Reinforcement  Comment: OT POC        Goals:   Encounter Problems       Encounter Problems (Active)       OT Goals       Increase functional mobility and  functional transfers to supervision for bed/chair/toilet with dme prn   (Progressing)       Start:  06/22/25    Expected End:  06/29/25            increase bue ther ex/activity x 7-10 minutes and increase standing tolerance x 3-5 minutes with supervision to promote greater activity tolerance for assist with adl.   (Progressing)       Start:  06/22/25    Expected End:  06/29/25            Increase ub/lb dressing to supervision with dme prn  (Progressing)       Start:  06/22/25    Expected End:  06/29/25            Increase toileting to supervision with dme prn  (Progressing)       Start:  06/22/25    Expected End:  06/29/25            pt. to apply ec/ws techniques with minimal cues to all mobility/transfer/adl to decrease fatigue/promote efficient use of energy toward completion of functional tasks.  (Progressing)       Start:  06/22/25    Expected End:  06/29/25

## 2025-06-22 NOTE — CARE PLAN
The patient's goals for the shift include      The clinical goals for the shift include pt will no be sob throughout the the shift      Problem: Pain - Adult  Goal: Verbalizes/displays adequate comfort level or baseline comfort level  Outcome: Progressing     Problem: Safety - Adult  Goal: Free from fall injury  Outcome: Progressing

## 2025-06-23 ENCOUNTER — APPOINTMENT (OUTPATIENT)
Dept: RADIOLOGY | Facility: HOSPITAL | Age: OVER 89
End: 2025-06-23
Payer: MEDICARE

## 2025-06-23 ENCOUNTER — APPOINTMENT (OUTPATIENT)
Dept: CARDIOLOGY | Facility: HOSPITAL | Age: OVER 89
End: 2025-06-23
Payer: MEDICARE

## 2025-06-23 LAB
ALBUMIN SERPL BCP-MCNC: 3.8 G/DL (ref 3.4–5)
ALP SERPL-CCNC: 71 U/L (ref 33–136)
ALT SERPL W P-5'-P-CCNC: 6 U/L (ref 7–45)
ANION GAP SERPL CALC-SCNC: 12 MMOL/L (ref 10–20)
APPEARANCE UR: ABNORMAL
AST SERPL W P-5'-P-CCNC: 14 U/L (ref 9–39)
BACTERIA #/AREA URNS AUTO: ABNORMAL /HPF
BASOPHILS # BLD AUTO: 0.05 X10*3/UL (ref 0–0.1)
BASOPHILS NFR BLD AUTO: 0.9 %
BILIRUB SERPL-MCNC: 0.4 MG/DL (ref 0–1.2)
BILIRUB UR STRIP.AUTO-MCNC: NEGATIVE MG/DL
BUN SERPL-MCNC: 37 MG/DL (ref 6–23)
CALCIUM SERPL-MCNC: 8.8 MG/DL (ref 8.6–10.3)
CHLORIDE SERPL-SCNC: 101 MMOL/L (ref 98–107)
CO2 SERPL-SCNC: 30 MMOL/L (ref 21–32)
COLOR UR: ABNORMAL
CREAT SERPL-MCNC: 1.95 MG/DL (ref 0.5–1.05)
EGFRCR SERPLBLD CKD-EPI 2021: 24 ML/MIN/1.73M*2
EOSINOPHIL # BLD AUTO: 0.14 X10*3/UL (ref 0–0.4)
EOSINOPHIL NFR BLD AUTO: 2.4 %
ERYTHROCYTE [DISTWIDTH] IN BLOOD BY AUTOMATED COUNT: 16.6 % (ref 11.5–14.5)
GLUCOSE SERPL-MCNC: 90 MG/DL (ref 74–99)
GLUCOSE UR STRIP.AUTO-MCNC: NORMAL MG/DL
HCT VFR BLD AUTO: 36.6 % (ref 36–46)
HGB BLD-MCNC: 11 G/DL (ref 12–16)
IMM GRANULOCYTES # BLD AUTO: 0.01 X10*3/UL (ref 0–0.5)
IMM GRANULOCYTES NFR BLD AUTO: 0.2 % (ref 0–0.9)
KETONES UR STRIP.AUTO-MCNC: NEGATIVE MG/DL
LEUKOCYTE ESTERASE UR QL STRIP.AUTO: ABNORMAL
LYMPHOCYTES # BLD AUTO: 1.64 X10*3/UL (ref 0.8–3)
LYMPHOCYTES NFR BLD AUTO: 28 %
MCH RBC QN AUTO: 27 PG (ref 26–34)
MCHC RBC AUTO-ENTMCNC: 30.1 G/DL (ref 32–36)
MCV RBC AUTO: 90 FL (ref 80–100)
MONOCYTES # BLD AUTO: 0.53 X10*3/UL (ref 0.05–0.8)
MONOCYTES NFR BLD AUTO: 9.1 %
MUCOUS THREADS #/AREA URNS AUTO: ABNORMAL /LPF
NEUTROPHILS # BLD AUTO: 3.48 X10*3/UL (ref 1.6–5.5)
NEUTROPHILS NFR BLD AUTO: 59.4 %
NITRITE UR QL STRIP.AUTO: NEGATIVE
NRBC BLD-RTO: 0 /100 WBCS (ref 0–0)
PH UR STRIP.AUTO: 5 [PH]
PLATELET # BLD AUTO: 143 X10*3/UL (ref 150–450)
POTASSIUM SERPL-SCNC: 4.2 MMOL/L (ref 3.5–5.3)
PROT SERPL-MCNC: 6.3 G/DL (ref 6.4–8.2)
PROT UR STRIP.AUTO-MCNC: NEGATIVE MG/DL
RBC # BLD AUTO: 4.08 X10*6/UL (ref 4–5.2)
RBC # UR STRIP.AUTO: NEGATIVE MG/DL
RBC #/AREA URNS AUTO: ABNORMAL /HPF
SODIUM SERPL-SCNC: 139 MMOL/L (ref 136–145)
SP GR UR STRIP.AUTO: 1.01
SQUAMOUS #/AREA URNS AUTO: ABNORMAL /HPF
UROBILINOGEN UR STRIP.AUTO-MCNC: NORMAL MG/DL
WBC # BLD AUTO: 5.9 X10*3/UL (ref 4.4–11.3)
WBC #/AREA URNS AUTO: >50 /HPF

## 2025-06-23 PROCEDURE — 81001 URINALYSIS AUTO W/SCOPE: CPT | Performed by: INTERNAL MEDICINE

## 2025-06-23 PROCEDURE — 2500000001 HC RX 250 WO HCPCS SELF ADMINISTERED DRUGS (ALT 637 FOR MEDICARE OP): Performed by: NURSE PRACTITIONER

## 2025-06-23 PROCEDURE — 2500000001 HC RX 250 WO HCPCS SELF ADMINISTERED DRUGS (ALT 637 FOR MEDICARE OP): Performed by: INTERNAL MEDICINE

## 2025-06-23 PROCEDURE — 74018 RADEX ABDOMEN 1 VIEW: CPT

## 2025-06-23 PROCEDURE — 93005 ELECTROCARDIOGRAM TRACING: CPT

## 2025-06-23 PROCEDURE — 2500000004 HC RX 250 GENERAL PHARMACY W/ HCPCS (ALT 636 FOR OP/ED): Performed by: NURSE PRACTITIONER

## 2025-06-23 PROCEDURE — 85025 COMPLETE CBC W/AUTO DIFF WBC: CPT | Performed by: INTERNAL MEDICINE

## 2025-06-23 PROCEDURE — 2500000001 HC RX 250 WO HCPCS SELF ADMINISTERED DRUGS (ALT 637 FOR MEDICARE OP)

## 2025-06-23 PROCEDURE — 36415 COLL VENOUS BLD VENIPUNCTURE: CPT | Performed by: INTERNAL MEDICINE

## 2025-06-23 PROCEDURE — 2500000002 HC RX 250 W HCPCS SELF ADMINISTERED DRUGS (ALT 637 FOR MEDICARE OP, ALT 636 FOR OP/ED): Performed by: NURSE PRACTITIONER

## 2025-06-23 PROCEDURE — 80053 COMPREHEN METABOLIC PANEL: CPT | Performed by: INTERNAL MEDICINE

## 2025-06-23 PROCEDURE — G0378 HOSPITAL OBSERVATION PER HR: HCPCS

## 2025-06-23 RX ORDER — ACETAMINOPHEN 500 MG
5 TABLET ORAL ONCE
Status: COMPLETED | OUTPATIENT
Start: 2025-06-23 | End: 2025-06-23

## 2025-06-23 RX ADMIN — ACETAMINOPHEN 650 MG: 325 TABLET ORAL at 22:55

## 2025-06-23 RX ADMIN — FUROSEMIDE 20 MG: 20 TABLET ORAL at 08:57

## 2025-06-23 RX ADMIN — DILTIAZEM HYDROCHLORIDE 180 MG: 180 CAPSULE, COATED, EXTENDED RELEASE ORAL at 08:57

## 2025-06-23 RX ADMIN — CEFUROXIME AXETIL 250 MG: 250 TABLET, FILM COATED ORAL at 10:49

## 2025-06-23 RX ADMIN — PRAMIPEXOLE DIHYDROCHLORIDE 1 MG: 1 TABLET ORAL at 22:54

## 2025-06-23 RX ADMIN — Medication 5 MG: at 22:54

## 2025-06-23 RX ADMIN — POLYETHYLENE GLYCOL 3350 17 G: 17 POWDER, FOR SOLUTION ORAL at 08:56

## 2025-06-23 RX ADMIN — SIMVASTATIN 20 MG: 20 TABLET, FILM COATED ORAL at 22:54

## 2025-06-23 RX ADMIN — APIXABAN 2.5 MG: 2.5 TABLET, FILM COATED ORAL at 22:54

## 2025-06-23 RX ADMIN — APIXABAN 2.5 MG: 2.5 TABLET, FILM COATED ORAL at 08:57

## 2025-06-23 RX ADMIN — ACETAMINOPHEN 650 MG: 325 TABLET ORAL at 16:11

## 2025-06-23 RX ADMIN — CEFUROXIME AXETIL 250 MG: 250 TABLET, FILM COATED ORAL at 22:55

## 2025-06-23 RX ADMIN — ACETAMINOPHEN 650 MG: 325 TABLET ORAL at 08:56

## 2025-06-23 ASSESSMENT — PAIN SCALES - GENERAL
PAINLEVEL_OUTOF10: 0 - NO PAIN
PAINLEVEL_OUTOF10: 5 - MODERATE PAIN
PAINLEVEL_OUTOF10: 0 - NO PAIN

## 2025-06-23 ASSESSMENT — PAIN - FUNCTIONAL ASSESSMENT
PAIN_FUNCTIONAL_ASSESSMENT: 0-10
PAIN_FUNCTIONAL_ASSESSMENT: 0-10

## 2025-06-23 ASSESSMENT — PAIN DESCRIPTION - DESCRIPTORS: DESCRIPTORS: SORE

## 2025-06-23 ASSESSMENT — PAIN DESCRIPTION - LOCATION: LOCATION: SHOULDER

## 2025-06-23 ASSESSMENT — PAIN DESCRIPTION - ORIENTATION: ORIENTATION: RIGHT

## 2025-06-23 NOTE — PROGRESS NOTES
Estefanía Contreras is a 89 y.o. female on day 0 of admission presenting with Shortness of breath.      Subjective   Patient fully evaluated on June 23.  Patient seen and fully examined at bedside, patient ill appearing, acutely complex, marked decline from baseline. Shortness of breath, Suspected CHF, PALLAVI, Abdominal tenderness. Kub ordered. UA C&S. Still slightly short of breath but improved.  Having significant abdominal distention.  Bladder scans and IV antibiotics to be continued for possible UTI.  Recheck labs in AM. Patient remains hospitalized for acute onset with complex medical and pharmacological management. Will continue antibiotics for UTI. Will continue with empiric coverage, cultures in process, await final results.  Continue to monitor overnight and repeat labs in the morning. Slow but progressive improvements noted.   High Complexity with updates to multiple problems, adjustments to treatment plan, and need for ongoing inpatient care.   Long discussion on goals of care with patient and family, will continue current and await diagnostic findings.  Patient reports: no new complaints, decline from baseline, weakness        Objective     Last Recorded Vitals  /58 (BP Location: Right arm, Patient Position: Lying)   Pulse 69   Temp 36.7 °C (98.1 °F) (Temporal)   Resp 20   Wt 55.3 kg (122 lb)   SpO2 94%   Intake/Output last 3 Shifts:    Intake/Output Summary (Last 24 hours) at 6/23/2025 1259  Last data filed at 6/23/2025 0813  Gross per 24 hour   Intake --   Output 700 ml   Net -700 ml       Admission Weight  Weight: 55.3 kg (122 lb) (06/21/25 1152)    Daily Weight  06/21/25 : 55.3 kg (122 lb)    Image Results  XR abdomen 1 view  Narrative: Interpreted By:  Toan Haddad,   STUDY:  Abdominal series dated 6/21/2025.      INDICATION:  Abdominal tenderness      COMPARISON:  Radiographs dated 03/02/2025      ACCESSION NUMBER(S):  [id number]      ORDERING CLINICIAN:  VANDANA MOORE      TECHNIQUE:  Three  AP radiographs of the abdomen.      FINDINGS:  There is a large volume of stool in the colon particularly at the  rectal vault in cecum and ascending colon. No frankly dilated loops  of small bowel are evident. No free air is evident below the  diaphragm as seen on these radiographs. Lung bases are clear.  Cardiomediastinal silhouette is enlarged. S shaped curvature is seen  of the spine. Degenerative changes seen of the spine. Degenerative  changes seen of the right hip. There is partial visualization of a  left hip bipolar hemiarthroplasty. There is note of an aorto bi-iliac  stent graft.      Impression: Prominent amount of stool in the colon as can be seen with  constipation. Clinical correlation is recommended. Bowel-gas pattern  is nonobstructive.      Signed by: Toan Haddad 6/21/2025 3:57 PM  Dictation workstation:   [id number]  Vascular US Lower Extremity Venous Duplex Bilateral  Narrative: Interpreted By:  Yasmin Wallace,   STUDY:  [facility] US LOWER EXTREMITY VENOUS DUPLEX BILATERAL;  6/21/2025 12:40 pm      INDICATION:  Signs/Symptoms:Bilateral lower extremity pain, rule out DVT.          COMPARISON:  02/28/2025      ACCESSION NUMBER(S):  [id number]      ORDERING CLINICIAN:  DANDY WALL      TECHNIQUE:  Vascular ultrasound of the bilateral lower extremities was performed.  Real-time compression views as well as Gray scale, color Doppler and  spectral Doppler waveform analysis was performed.      FINDINGS:  Evaluation of the visualized portions of the bilateral common femoral  vein, proximal, mid, and distal femoral vein, and popliteal vein were  performed.  Evaluation of the visualized portions of the  posterior  tibial and peroneal veins were also performed.          The evaluated veins demonstrate normal compressibility. There is  intact venous flow demonstrating normal respiratory variability and  normal augmentation of flow with calf compression. Therefore, there  is no ultrasonographic evidence  for deep vein thrombosis within the  evaluated veins.      Respiratory variation and augmentation to calf pressure was noted.      Impression: No sonographic evidence for deep vein thrombosis within the evaluated  veins of the bilateral lower extremities..      MACRO:  None      Signed by: Yasmin Wallace 6/21/2025 12:57 PM  Dictation workstation:   [id number]  XR chest 1 view  Narrative: Interpreted By:  Toan Haddad,   STUDY:  Chest dated  6/21/2025.      INDICATION:  Signs/Symptoms:sob      COMPARISON:  Radiographs dated 02/27/2025.      ACCESSION NUMBER(S):  [id number]      ORDERING CLINICIAN:  DANDY WALL      TECHNIQUE:  One view of the chest.      FINDINGS:  Linear opacities are seen at the left lung base with minimal blunting  of the left costophrenic angle. A calcified granuloma is seen in the  left mid lung zone.  No pneumothorax or effusion is evident. The  cardiomediastinal silhouette is  not enlarged.Degenerative changes  seen of the spine and shoulders. There is a left shoulder  arthroplasty.      Impression: Small left pleural effusion with associated atelectasis and/or  pneumonitis.      MACRO:  None      Signed by: Toan Haddad 6/21/2025 12:17 PM  Dictation workstation:   [id number]      Physical Exam    Relevant Results                              Assessment & Plan  Shortness of breath    Suspected CHF (congestive heart failure)    PALLAVI (acute kidney injury)    Abdominal tenderness       Katerina Fitzgerald   Coordinator  Internal Medicine     H&P      Incomplete     Date of Service: 6/21/2025 12:43 PM     Incomplete         History Of Present Illness  Estefanía Contreras is a 89 y.o. female with a past medical history of HFpEF (EF 62% 1/6/25), HLD, HTN, CKD3b, AAA (s/p repair), PAD, ITP, and provoked PE (on apixaban), anxiety, who presented to [facility] ED today from home with shortness of breath. Pt notes over the past few days she has become SOB with any type of exertion and now has been even with rest.  She notes associated dry cough.  She notes she had home O2 available from when she was using it before and had to restart using it over the past few days as well.  She denies any fevers, chills, CP, weight gain, LE edema, abdominal pain, nausea, vomiting, diarrhea, or dysuria.  She denies any change in her chronic urinary frequency.  She denies any sick contacts.  She notes she lives alone but has a caregiver coming 4 days a week that helps her with cooking, cleaning, laundry etc.  CODE STATUS discussed and patient would like to be a DNR CCA with no intubation.     In the ED lab work, EKG, chest x-ray, and venous duplex bilateral lower extremities were performed. Labs revealed bun 35, creatinine 1.85 (slightly elevated from 22 and 1.26 on March 4, 2025), mag 2.71, , troponin within normal limits, INR 1.2, negative for flu, COVID, and RSV.  Chest x-ray revealed small left pleural effusion with associated atelectasis and/or pneumonitis. Venous duplex bilateral lower extremities negative for DVT. EKG interpreted by Dr. Rowley at 11:46 AM, normal sinus rhythm at a rate of 75, no ST segment depression or elevation consistent with ischemia or infarction.  Her vital signs were acceptable on admission.  She was given IV fluids and DuoNeb treatment and admitted.     Record review indicates patient presented with similar symptoms in February of this year and a VQ scan was positive for PE, however it was difficult to tell if this was from patient's existing PE or new.  Given patient's kidney function, a CTA chest was deferred and patient was empirically treated with Eliquis after risks and benefits were weighed, it was noted that patient would need to be on lifelong anticoagulation.        Echo 1/3/25     CONCLUSIONS:   1. The left ventricular systolic function is normal, with a Hall's biplane calculated ejection fraction of 62%.   2. Spectral Doppler shows a Grade I (impaired relaxation pattern) of left ventricular  diastolic filling with normal left atrial filling pressure.   3. There is normal right ventricular global systolic function.   4. The left atrium is mildly dilated.     Past Medical History  [Medical History]    [Medical History]  Past Medical History       Diagnosis Date    H/O heart artery stent      H/O shoulder surgery      PE (pulmonary thromboembolism) (Multi)          Surgical History  [Surgical History]    [Surgical History]  Past Surgical History  No past surgical history on file.     Social History  She reports that she has quit smoking. Her smoking use included cigarettes. She has never been exposed to tobacco smoke. She has never used smokeless tobacco. She reports that she does not currently use alcohol. She reports that she does not use drugs.     Family History  [Family History]    [Family History]  No family history on file.     Allergies  Patient has no known allergies.     10 point review of system performed and negative besides what is stated in HPI.     Physical Exam  Constitutional:       Appearance: Normal appearance.   HENT:      Head: Normocephalic and atraumatic.      Mouth/Throat:      Mouth: Mucous membranes are moist.   Eyes:      Conjunctiva/sclera: Conjunctivae normal.   Cardiovascular:      Rate and Rhythm: Normal rate and regular rhythm.      Heart sounds: Normal heart sounds.   Pulmonary:      Effort: Pulmonary effort is normal.      Comments: Coarse throughout  Abdominal:      General: Bowel sounds are normal.      Palpations: Abdomen is soft.      Tenderness: There is abdominal tenderness.   Musculoskeletal:         General: Normal range of motion.      Cervical back: Neck supple.      Right lower leg: Edema present.      Left lower leg: Edema present.      Comments: Nonpitting bilateral lower extremity edema   Neurological:      Mental Status: She is alert. Mental status is at baseline.   Psychiatric:      Comments: Anxious at time            Last Recorded Vitals  Blood pressure  "141/65, pulse 64, temperature 36.6 °C (97.9 °F), temperature source Temporal, resp. rate 18, height 1.626 m (5' 4\"), weight 55.3 kg (122 lb), SpO2 96%.     Relevant Results     [Medications Ordered Prior to Encounter]     [Medications Ordered Prior to Encounter]  No current facility-administered medications on file prior to encounter.             Current Outpatient Medications on File Prior to Encounter   Medication Sig Dispense Refill    apixaban (Eliquis) 2.5 mg tablet Take 1 tablet (2.5 mg) by mouth 2 times a day. 60 tablet 0    dilTIAZem LA (Cardizem LA) 180 mg 24 hr tablet Take 1 tablet (180 mg) by mouth once daily. (Patient taking differently: Take 1 tablet (180 mg) by mouth once daily in the morning.) 90 tablet 3    furosemide (Lasix) 20 mg tablet Take 1 tablet (20 mg) by mouth once daily. (Patient taking differently: Take 1 tablet (20 mg) by mouth once daily in the morning.) 90 tablet 1    meloxicam (Mobic) 15 mg tablet Take 1 tablet (15 mg) by mouth once daily. (Patient taking differently: Take 1 tablet (15 mg) by mouth once daily with breakfast.) 90 tablet 3    mv-min-FA-vit K-lutein-zeaxant (PreserVision AREDS 2 Plus MV) 200 mcg-15 mcg- 5 mg-1 mg capsule Take 1 capsule by mouth 2 times a day.        oxygen (O2) gas therapy Inhale 3 L/min continuously.        potassium chloride CR 20 mEq ER tablet Take 1 tablet (20 mEq) by mouth once daily in the morning. Splits in half and takes at same time        pramipexole (Mirapex) 1 mg tablet Take 1 tablet (1 mg) by mouth once daily at bedtime. 90 tablet 1    simvastatin (Zocor) 20 mg tablet Take 1 tablet (20 mg) by mouth once daily at bedtime. 90 tablet 0    traMADol (Ultram) 50 mg tablet Take 1 tablet (50 mg) by mouth once daily as needed for severe pain (7 - 10).        azelastine (Astelin) 137 mcg (0.1 %) nasal spray Administer 2 sprays into each nostril 2 times a day. Use in each nostril as directed (Patient not taking: Reported on 6/21/2025) 30 mL 2    " DULoxetine (Cymbalta) 30 mg DR capsule Take 1 capsule (30 mg) by mouth once daily. Do not crush or chew. (Patient not taking: Reported on 6/21/2025) 30 capsule 0    mirtazapine (Remeron) 15 mg tablet Take 1 tablet (15 mg) by mouth as needed at bedtime. (Patient not taking: Reported on 6/21/2025)        PreserVision AREDS-2 250-90-40-1 mg capsule TAKE ONE CAPSULE BY MOUTH TWICE A DAY (Patient not taking: Reported on 6/21/2025) 180 capsule 0        No results found for this or any previous visit (from the past 24 hours).        XR abdomen 1 view  Result Date: 6/21/2025  Interpreted By:  Toan Haddad, STUDY: Abdominal series dated 6/21/2025.   INDICATION: Abdominal tenderness   COMPARISON: Radiographs dated 03/02/2025   ACCESSION NUMBER(S): [id number]   ORDERING CLINICIAN: VANDANA MOORE   TECHNIQUE: Three AP radiographs of the abdomen.   FINDINGS: There is a large volume of stool in the colon particularly at the rectal vault in cecum and ascending colon. No frankly dilated loops of small bowel are evident. No free air is evident below the diaphragm as seen on these radiographs. Lung bases are clear. Cardiomediastinal silhouette is enlarged. S shaped curvature is seen of the spine. Degenerative changes seen of the spine. Degenerative changes seen of the right hip. There is partial visualization of a left hip bipolar hemiarthroplasty. There is note of an aorto bi-iliac stent graft.        Prominent amount of stool in the colon as can be seen with constipation. Clinical correlation is recommended. Bowel-gas pattern is nonobstructive.   Signed by: Toan Haddad 6/21/2025 3:57 PM Dictation workstation:   [id number]     Vascular US Lower Extremity Venous Duplex Bilateral  Result Date: 6/21/2025  Interpreted By:  Yasmin Wallace, STUDY: VASC US LOWER EXTREMITY VENOUS DUPLEX BILATERAL;  6/21/2025 12:40 pm   INDICATION: Signs/Symptoms:Bilateral lower extremity pain, rule out DVT.     COMPARISON: 02/28/2025   ACCESSION  NUMBER(S): [id number]   ORDERING CLINICIAN: DANDY WALL   TECHNIQUE: Vascular ultrasound of the bilateral lower extremities was performed. Real-time compression views as well as Gray scale, color Doppler and spectral Doppler waveform analysis was performed.   FINDINGS: Evaluation of the visualized portions of the bilateral common femoral vein, proximal, mid, and distal femoral vein, and popliteal vein were performed.  Evaluation of the visualized portions of the  posterior tibial and peroneal veins were also performed.     The evaluated veins demonstrate normal compressibility. There is intact venous flow demonstrating normal respiratory variability and normal augmentation of flow with calf compression. Therefore, there is no ultrasonographic evidence for deep vein thrombosis within the evaluated veins.   Respiratory variation and augmentation to calf pressure was noted.        No sonographic evidence for deep vein thrombosis within the evaluated veins of the bilateral lower extremities..   MACRO: None   Signed by: Yasmin Wallace 6/21/2025 12:57 PM Dictation workstation:   [id number]     XR chest 1 view  Result Date: 6/21/2025  Interpreted By:  Toan Haddad, STUDY: Chest dated  6/21/2025.   INDICATION: Signs/Symptoms:sob   COMPARISON: Radiographs dated 02/27/2025.   ACCESSION NUMBER(S): [id number]   ORDERING CLINICIAN: DANDY WALL   TECHNIQUE: One view of the chest.   FINDINGS: Linear opacities are seen at the left lung base with minimal blunting of the left costophrenic angle. A calcified granuloma is seen in the left mid lung zone.  No pneumothorax or effusion is evident. The cardiomediastinal silhouette is  not enlarged.Degenerative changes seen of the spine and shoulders. There is a left shoulder arthroplasty.        Small left pleural effusion with associated atelectasis and/or pneumonitis.   MACRO: None   Signed by: Toan Haddad 6/21/2025 12:17 PM Dictation workstation:   [id number]         Assessment & Plan  Shortness of breath     Suspected CHF (congestive heart failure)     PALLAVI (acute kidney injury)     Abdominal tenderness        Admit to telemetry  Observation  40 mg IV Lasix x 1  Resume home Lasix tomorrow  Hold home Mobic and Ultram  See recent echo results above  Repeat EKG and troponin  Continue oxygen  Daily weight  Intake and output  AM CBC, BMP, coags, mag  KUB  PT/OT     Chronic conditions: HFpEF (EF 62% 1/6/25), HLD, HTN, CKD3b, AAA (s/p repair), PAD, ITP, and provoked PE (on apixaban), anxiety     Continue home medications as listed above holding home Mobic and Ultram  Cardiac diet     DVT prophylaxis     SCDs  On Eliquis             Patient fully evaluated 06/22, thorough record review performed of previous labs and notes from prior encounters. Plan discussed with interdisciplinary team, consults placed, appreciate input. Will continue current and repeat labs in the AM.          Discharge planning discussed with patient and care team. Therapy evaluations ordered. Patient aware and agreeable to current plan, continue plan as above.      I spent a total of 75 minutes on the date of the service which included preparing to see the patient, face-to-face patient care, completing clinical documentation, obtaining and/or reviewing separately obtained history, performing a medically appropriate examination, counseling and educating the patient/family/caregiver, ordering medications, tests, or procedures, communicating with other HCPs (not separately reported), independently interpreting results (not separately reported), communicating results to the patient/family/caregiver, and care coordination (not separately reported).                         General: in no acute distress  Eyes: PERRLA   HENT: Normo-cephalic, atraumatic.   CV: Regular rate, regular rhythm.   Resp: Breathing non-labored,  diminished to auscultation bilaterally  GI: BS x4   : monitor intake and output  MSK/Extremities: No  gross bony deformities. PT/OT on the case  Skin: Warm. Appropriate color, continue offloading  Neuro:  Face symmetric.   Psych: returning to baseline, improved     10 point ROS negative unless otherwise noted in HPI    Patient fully evaluated 06/23  for    Problem List Items Addressed This Visit       * (Principal) Shortness of breath - Primary     Other Visit Diagnoses         History of pulmonary embolus (PE)          History of CHF (congestive heart failure)              Brought to hospital - had concerns including Shortness of Breath.  Patient seen resting in bed with head of bed elevated, no s/s or c/o acute difficulties at this time.  Vital signs for last 24 hours Temp:  [36.7 °C (98.1 °F)-37.6 °C (99.7 °F)] 36.7 °C (98.1 °F)  Heart Rate:  [64-71] 69  Resp:  [16-20] 20  BP: (111-139)/(58-72) 119/58    I/O this shift:  In: -   Out: 200 [Urine:200]  Patient still requiring frequent cardiac and SPO2 monitoring. Continue aggressive pulmonary hygiene and oral hygiene. Off loading as tolerated for skin integrity. Medications and labs reviewed-   Results for orders placed or performed during the hospital encounter of 06/21/25 (from the past 24 hours)   Magnesium   Result Value Ref Range    Magnesium 2.52 (H) 1.60 - 2.40 mg/dL   CBC   Result Value Ref Range    WBC 6.3 4.4 - 11.3 x10*3/uL    nRBC 0.0 0.0 - 0.0 /100 WBCs    RBC 3.97 (L) 4.00 - 5.20 x10*6/uL    Hemoglobin 10.7 (L) 12.0 - 16.0 g/dL    Hematocrit 35.1 (L) 36.0 - 46.0 %    MCV 88 80 - 100 fL    MCH 27.0 26.0 - 34.0 pg    MCHC 30.5 (L) 32.0 - 36.0 g/dL    RDW 16.5 (H) 11.5 - 14.5 %    Platelets 142 (L) 150 - 450 x10*3/uL   Basic Metabolic Panel   Result Value Ref Range    Glucose 105 (H) 74 - 99 mg/dL    Sodium 139 136 - 145 mmol/L    Potassium 4.7 3.5 - 5.3 mmol/L    Chloride 101 98 - 107 mmol/L    Bicarbonate 30 21 - 32 mmol/L    Anion Gap 13 10 - 20 mmol/L    Urea Nitrogen 39 (H) 6 - 23 mg/dL    Creatinine 1.94 (H) 0.50 - 1.05 mg/dL    eGFR 24 (L) >60  mL/min/1.73m*2    Calcium 8.7 8.6 - 10.3 mg/dL   Comprehensive Metabolic Panel   Result Value Ref Range    Glucose 90 74 - 99 mg/dL    Sodium 139 136 - 145 mmol/L    Potassium 4.2 3.5 - 5.3 mmol/L    Chloride 101 98 - 107 mmol/L    Bicarbonate 30 21 - 32 mmol/L    Anion Gap 12 10 - 20 mmol/L    Urea Nitrogen 37 (H) 6 - 23 mg/dL    Creatinine 1.95 (H) 0.50 - 1.05 mg/dL    eGFR 24 (L) >60 mL/min/1.73m*2    Calcium 8.8 8.6 - 10.3 mg/dL    Albumin 3.8 3.4 - 5.0 g/dL    Alkaline Phosphatase 71 33 - 136 U/L    Total Protein 6.3 (L) 6.4 - 8.2 g/dL    AST 14 9 - 39 U/L    Bilirubin, Total 0.4 0.0 - 1.2 mg/dL    ALT 6 (L) 7 - 45 U/L   CBC and Auto Differential   Result Value Ref Range    WBC 5.9 4.4 - 11.3 x10*3/uL    nRBC 0.0 0.0 - 0.0 /100 WBCs    RBC 4.08 4.00 - 5.20 x10*6/uL    Hemoglobin 11.0 (L) 12.0 - 16.0 g/dL    Hematocrit 36.6 36.0 - 46.0 %    MCV 90 80 - 100 fL    MCH 27.0 26.0 - 34.0 pg    MCHC 30.1 (L) 32.0 - 36.0 g/dL    RDW 16.6 (H) 11.5 - 14.5 %    Platelets 143 (L) 150 - 450 x10*3/uL    Neutrophils % 59.4 40.0 - 80.0 %    Immature Granulocytes %, Automated 0.2 0.0 - 0.9 %    Lymphocytes % 28.0 13.0 - 44.0 %    Monocytes % 9.1 2.0 - 10.0 %    Eosinophils % 2.4 0.0 - 6.0 %    Basophils % 0.9 0.0 - 2.0 %    Neutrophils Absolute 3.48 1.60 - 5.50 x10*3/uL    Immature Granulocytes Absolute, Automated 0.01 0.00 - 0.50 x10*3/uL    Lymphocytes Absolute 1.64 0.80 - 3.00 x10*3/uL    Monocytes Absolute 0.53 0.05 - 0.80 x10*3/uL    Eosinophils Absolute 0.14 0.00 - 0.40 x10*3/uL    Basophils Absolute 0.05 0.00 - 0.10 x10*3/uL      Patient recently received an antibiotic (last 12 hours)       Date/Time Action Medication Dose    06/23/25 1049 Given    cefuroxime (Ceftin) tablet 250 mg 250 mg           Plan discussed with interdisciplinary team, continue current and repeat labs in the AM.       Discharge planning discussed with patient and care team. Therapy evaluations ordered.     Patient aware and agreeable to current  plan, continue plan as above.     I spent a total of 60 minutes on the date of the service which included preparing to see the patient, face-to-face patient care, completing clinical documentation, obtaining and/or reviewing separately obtained history, performing a medically appropriate examination, counseling and educating the patient/family/caregiver, ordering medications, tests, or procedures, communicating with other HCPs (not separately reported), independently interpreting results (not separately reported), communicating results to the patient/family/caregiver, and care coordination (not separately reported).             Katerina Fitzgerald

## 2025-06-23 NOTE — CARE PLAN
The patient's goals for the shift include      The clinical goals for the shift include patient will report a decrease in shortness of breath    Over the shift, the patient did not make progress toward the following goals. Barriers to progression include reporting shortness of breath. Recommendations to address these barriers include frequent rounds.

## 2025-06-23 NOTE — CARE PLAN
The patient's goals for the shift include  breathing    The clinical goals for the shift include pt will have decrease SOB during the shift    Over the shift, the patient did not make progress toward the following goals.  Recommendations to address these barriers include continue to monitor.

## 2025-06-24 ENCOUNTER — PHARMACY VISIT (OUTPATIENT)
Dept: PHARMACY | Facility: CLINIC | Age: OVER 89
End: 2025-06-24
Payer: COMMERCIAL

## 2025-06-24 VITALS
SYSTOLIC BLOOD PRESSURE: 145 MMHG | TEMPERATURE: 98.4 F | DIASTOLIC BLOOD PRESSURE: 62 MMHG | HEIGHT: 64 IN | WEIGHT: 121.03 LBS | RESPIRATION RATE: 16 BRPM | BODY MASS INDEX: 20.66 KG/M2 | HEART RATE: 73 BPM | OXYGEN SATURATION: 95 %

## 2025-06-24 PROBLEM — R11.0 NAUSEA: Status: ACTIVE | Noted: 2025-06-24

## 2025-06-24 PROBLEM — N39.0 UTI (URINARY TRACT INFECTION): Status: ACTIVE | Noted: 2025-06-24

## 2025-06-24 LAB
ALBUMIN SERPL BCP-MCNC: 3.6 G/DL (ref 3.4–5)
ALP SERPL-CCNC: 63 U/L (ref 33–136)
ALT SERPL W P-5'-P-CCNC: 6 U/L (ref 7–45)
ANION GAP SERPL CALC-SCNC: 14 MMOL/L (ref 10–20)
AST SERPL W P-5'-P-CCNC: 14 U/L (ref 9–39)
BASOPHILS # BLD AUTO: 0.05 X10*3/UL (ref 0–0.1)
BASOPHILS NFR BLD AUTO: 0.9 %
BILIRUB SERPL-MCNC: 0.3 MG/DL (ref 0–1.2)
BUN SERPL-MCNC: 39 MG/DL (ref 6–23)
CALCIUM SERPL-MCNC: 8.6 MG/DL (ref 8.6–10.3)
CHLORIDE SERPL-SCNC: 101 MMOL/L (ref 98–107)
CO2 SERPL-SCNC: 30 MMOL/L (ref 21–32)
CREAT SERPL-MCNC: 1.99 MG/DL (ref 0.5–1.05)
EGFRCR SERPLBLD CKD-EPI 2021: 23 ML/MIN/1.73M*2
EOSINOPHIL # BLD AUTO: 0.18 X10*3/UL (ref 0–0.4)
EOSINOPHIL NFR BLD AUTO: 3.1 %
ERYTHROCYTE [DISTWIDTH] IN BLOOD BY AUTOMATED COUNT: 16.4 % (ref 11.5–14.5)
GLUCOSE SERPL-MCNC: 90 MG/DL (ref 74–99)
HCT VFR BLD AUTO: 36.5 % (ref 36–46)
HGB BLD-MCNC: 11 G/DL (ref 12–16)
IMM GRANULOCYTES # BLD AUTO: 0.01 X10*3/UL (ref 0–0.5)
IMM GRANULOCYTES NFR BLD AUTO: 0.2 % (ref 0–0.9)
LYMPHOCYTES # BLD AUTO: 1.55 X10*3/UL (ref 0.8–3)
LYMPHOCYTES NFR BLD AUTO: 26.7 %
MCH RBC QN AUTO: 27 PG (ref 26–34)
MCHC RBC AUTO-ENTMCNC: 30.1 G/DL (ref 32–36)
MCV RBC AUTO: 90 FL (ref 80–100)
MONOCYTES # BLD AUTO: 0.54 X10*3/UL (ref 0.05–0.8)
MONOCYTES NFR BLD AUTO: 9.3 %
NEUTROPHILS # BLD AUTO: 3.47 X10*3/UL (ref 1.6–5.5)
NEUTROPHILS NFR BLD AUTO: 59.8 %
NRBC BLD-RTO: 0 /100 WBCS (ref 0–0)
PLATELET # BLD AUTO: 141 X10*3/UL (ref 150–450)
POTASSIUM SERPL-SCNC: 4.7 MMOL/L (ref 3.5–5.3)
PROT SERPL-MCNC: 6.3 G/DL (ref 6.4–8.2)
RBC # BLD AUTO: 4.08 X10*6/UL (ref 4–5.2)
SODIUM SERPL-SCNC: 140 MMOL/L (ref 136–145)
WBC # BLD AUTO: 5.8 X10*3/UL (ref 4.4–11.3)

## 2025-06-24 PROCEDURE — 96375 TX/PRO/DX INJ NEW DRUG ADDON: CPT

## 2025-06-24 PROCEDURE — 2500000001 HC RX 250 WO HCPCS SELF ADMINISTERED DRUGS (ALT 637 FOR MEDICARE OP): Performed by: NURSE PRACTITIONER

## 2025-06-24 PROCEDURE — 85025 COMPLETE CBC W/AUTO DIFF WBC: CPT | Performed by: INTERNAL MEDICINE

## 2025-06-24 PROCEDURE — 84075 ASSAY ALKALINE PHOSPHATASE: CPT | Performed by: INTERNAL MEDICINE

## 2025-06-24 PROCEDURE — 2500000004 HC RX 250 GENERAL PHARMACY W/ HCPCS (ALT 636 FOR OP/ED)

## 2025-06-24 PROCEDURE — 2500000001 HC RX 250 WO HCPCS SELF ADMINISTERED DRUGS (ALT 637 FOR MEDICARE OP): Performed by: INTERNAL MEDICINE

## 2025-06-24 PROCEDURE — RXMED WILLOW AMBULATORY MEDICATION CHARGE

## 2025-06-24 PROCEDURE — G0378 HOSPITAL OBSERVATION PER HR: HCPCS

## 2025-06-24 PROCEDURE — 36415 COLL VENOUS BLD VENIPUNCTURE: CPT | Performed by: INTERNAL MEDICINE

## 2025-06-24 RX ORDER — POLYETHYLENE GLYCOL 3350 17 G/17G
17 POWDER, FOR SOLUTION ORAL DAILY
Qty: 30 PACKET | Refills: 0 | Status: SHIPPED | OUTPATIENT
Start: 2025-06-25 | End: 2025-06-24 | Stop reason: HOSPADM

## 2025-06-24 RX ORDER — SIMETHICONE 80 MG
80 TABLET,CHEWABLE ORAL 4 TIMES DAILY
Qty: 120 TABLET | Refills: 0 | Status: SHIPPED | OUTPATIENT
Start: 2025-06-24 | End: 2025-06-24 | Stop reason: HOSPADM

## 2025-06-24 RX ORDER — ONDANSETRON HYDROCHLORIDE 2 MG/ML
4 INJECTION, SOLUTION INTRAVENOUS EVERY 8 HOURS PRN
Status: DISCONTINUED | OUTPATIENT
Start: 2025-06-24 | End: 2025-06-24 | Stop reason: HOSPADM

## 2025-06-24 RX ORDER — ACETAMINOPHEN 325 MG/1
650 TABLET ORAL EVERY 6 HOURS PRN
Start: 2025-06-24

## 2025-06-24 RX ORDER — ONDANSETRON 4 MG/1
4 TABLET, FILM COATED ORAL EVERY 8 HOURS PRN
Status: DISCONTINUED | OUTPATIENT
Start: 2025-06-24 | End: 2025-06-24 | Stop reason: HOSPADM

## 2025-06-24 RX ORDER — ONDANSETRON 4 MG/1
4 TABLET, FILM COATED ORAL EVERY 8 HOURS PRN
Qty: 20 TABLET | Refills: 0 | Status: SHIPPED | OUTPATIENT
Start: 2025-06-24 | End: 2025-06-24 | Stop reason: HOSPADM

## 2025-06-24 RX ORDER — CEFUROXIME AXETIL 250 MG/1
250 TABLET ORAL 2 TIMES DAILY
Qty: 14 TABLET | Refills: 0 | Status: SHIPPED | OUTPATIENT
Start: 2025-06-24 | End: 2025-06-24 | Stop reason: HOSPADM

## 2025-06-24 RX ORDER — SIMETHICONE 80 MG
80 TABLET,CHEWABLE ORAL 4 TIMES DAILY
Status: DISCONTINUED | OUTPATIENT
Start: 2025-06-24 | End: 2025-06-24 | Stop reason: HOSPADM

## 2025-06-24 RX ORDER — CEFUROXIME AXETIL 250 MG/1
250 TABLET ORAL 2 TIMES DAILY
Qty: 14 TABLET | Refills: 0 | Status: SHIPPED | OUTPATIENT
Start: 2025-06-24 | End: 2025-07-01

## 2025-06-24 RX ORDER — DULOXETIN HYDROCHLORIDE 30 MG/1
30 CAPSULE, DELAYED RELEASE ORAL DAILY
Qty: 30 CAPSULE | Refills: 0 | Status: SHIPPED | OUTPATIENT
Start: 2025-06-24 | End: 2025-07-24

## 2025-06-24 RX ORDER — POLYETHYLENE GLYCOL 3350 17 G/17G
17 POWDER, FOR SOLUTION ORAL DAILY
Qty: 510 G | Refills: 0 | Status: SHIPPED | OUTPATIENT
Start: 2025-06-24

## 2025-06-24 RX ORDER — AMOXICILLIN 250 MG
1 CAPSULE ORAL NIGHTLY
Start: 2025-06-24 | End: 2025-06-24 | Stop reason: HOSPADM

## 2025-06-24 RX ORDER — PANTOPRAZOLE SODIUM 40 MG/1
40 TABLET, DELAYED RELEASE ORAL 2 TIMES DAILY
Qty: 60 TABLET | Refills: 1 | Status: SHIPPED | OUTPATIENT
Start: 2025-06-24

## 2025-06-24 RX ORDER — SIMETHICONE 80 MG
80 TABLET,CHEWABLE ORAL EVERY 6 HOURS PRN
Qty: 30 TABLET | Refills: 0 | Status: SHIPPED | OUTPATIENT
Start: 2025-06-24

## 2025-06-24 RX ORDER — AMOXICILLIN 250 MG
1 CAPSULE ORAL DAILY
Qty: 30 TABLET | Refills: 0 | Status: SHIPPED | OUTPATIENT
Start: 2025-06-24 | End: 2025-07-24

## 2025-06-24 RX ORDER — ONDANSETRON 4 MG/1
4 TABLET, FILM COATED ORAL EVERY 8 HOURS PRN
Qty: 20 TABLET | Refills: 0 | Status: SHIPPED | OUTPATIENT
Start: 2025-06-24

## 2025-06-24 RX ORDER — PANTOPRAZOLE SODIUM 40 MG/1
40 TABLET, DELAYED RELEASE ORAL 2 TIMES DAILY
Qty: 60 TABLET | Refills: 0 | Status: SHIPPED | OUTPATIENT
Start: 2025-06-24 | End: 2025-07-24

## 2025-06-24 RX ADMIN — DILTIAZEM HYDROCHLORIDE 180 MG: 180 CAPSULE, COATED, EXTENDED RELEASE ORAL at 08:50

## 2025-06-24 RX ADMIN — APIXABAN 2.5 MG: 2.5 TABLET, FILM COATED ORAL at 08:50

## 2025-06-24 RX ADMIN — CEFUROXIME AXETIL 250 MG: 250 TABLET, FILM COATED ORAL at 08:50

## 2025-06-24 RX ADMIN — SIMETHICONE 80 MG: 80 TABLET, CHEWABLE ORAL at 13:31

## 2025-06-24 RX ADMIN — FUROSEMIDE 20 MG: 20 TABLET ORAL at 08:50

## 2025-06-24 RX ADMIN — ONDANSETRON 4 MG: 2 INJECTION INTRAMUSCULAR; INTRAVENOUS at 08:33

## 2025-06-24 NOTE — CARE PLAN
Pt remained safe and stable throughout shift. Pt reports breathing comfortably on 2 L NC. Bladder scans performed-no evidence of retention. Urinary output recorded prior to end of shift. No complaints or concerns by pt overnight. No acute events overnight.      Problem: Pain - Adult  Goal: Verbalizes/displays adequate comfort level or baseline comfort level  Outcome: Progressing     Problem: Safety - Adult  Goal: Free from fall injury  Outcome: Progressing     Problem: Discharge Planning  Goal: Discharge to home or other facility with appropriate resources  Outcome: Progressing     Problem: Chronic Conditions and Co-morbidities  Goal: Patient's chronic conditions and co-morbidity symptoms are monitored and maintained or improved  Outcome: Progressing     Problem: Nutrition  Goal: Nutrient intake appropriate for maintaining nutritional needs  Outcome: Progressing

## 2025-06-24 NOTE — DISCHARGE SUMMARY
Discharge Diagnosis  Shortness of breath     Issues Requiring Follow-Up  06/24- Patient fully examined at the bedside. Brought to hospital - had concerns including Shortness of Breath.  Mylicon ordered for GI upset, gas discomfort. Tolerating oral antibiotics. Awake, more animated, with no acute events overnight, no new complaints. Slow but progressive improvements noted. . Patient medically stable at time of exam, cleared for discharge today. Goals of care emphasized with patient and family, will continue current plan of care.     Discharge Meds     Medication List      ASK your doctor about these medications     apixaban 2.5 mg tablet; Commonly known as: Eliquis; Take 1 tablet (2.5   mg) by mouth 2 times a day.   azelastine 137 mcg (0.1 %) nasal spray; Commonly known as: Astelin;   Administer 2 sprays into each nostril 2 times a day. Use in each nostril   as directed   dilTIAZem  mg 24 hr tablet; Commonly known as: Cardizem LA; Take 1   tablet (180 mg) by mouth once daily.   DULoxetine 30 mg DR capsule; Commonly known as: Cymbalta; Take 1 capsule   (30 mg) by mouth once daily. Do not crush or chew.   furosemide 20 mg tablet; Commonly known as: Lasix; Take 1 tablet (20 mg)   by mouth once daily.   meloxicam 15 mg tablet; Commonly known as: Mobic; Take 1 tablet (15 mg)   by mouth once daily.   mirtazapine 15 mg tablet; Commonly known as: Remeron   oxygen gas therapy; Commonly known as: O2   potassium chloride CR 20 mEq ER tablet; Commonly known as: Klor-Con M20   pramipexole 1 mg tablet; Commonly known as: Mirapex; Take 1 tablet (1   mg) by mouth once daily at bedtime.   PreserVision AREDS 2 Plus  mcg-15 mcg- 5 mg-1 mg capsule; Generic   drug: mv-min-FA-vit K-lutein-zeaxant   PreserVision AREDS-2 250-90-40-1 mg capsule; Generic drug: vit   C,O-Ks-rccmh-lutein-zeaxan; TAKE ONE CAPSULE BY MOUTH TWICE A DAY   simvastatin 20 mg tablet; Commonly known as: Zocor; Take 1 tablet (20   mg) by mouth once daily at  bedtime.   traMADol 50 mg tablet; Commonly known as: Ultram       Test Results Pending At Discharge  Pending Labs       No current pending labs.            Hospital Course           Bahman Flores MD   Physician  Internal Medicine     Progress Notes      Signed     Date of Service: 6/23/2025 12:59 PM     Signed       Expand All Collapse All    Estefanía Contreras is a 89 y.o. female on day 0 of admission presenting with Shortness of breath.        Subjective  Patient fully evaluated on June 23.  Patient seen and fully examined at bedside, patient ill appearing, acutely complex, marked decline from baseline. Shortness of breath, Suspected CHF, PALLAVI, Abdominal tenderness. Kub ordered. UA C&S. Still slightly short of breath but improved.  Having significant abdominal distention.  Bladder scans and IV antibiotics to be continued for possible UTI.  Recheck labs in AM. Patient remains hospitalized for acute onset with complex medical and pharmacological management. Will continue antibiotics for UTI. Will continue with empiric coverage, cultures in process, await final results.  Continue to monitor overnight and repeat labs in the morning. Slow but progressive improvements noted.   High Complexity with updates to multiple problems, adjustments to treatment plan, and need for ongoing inpatient care.   Long discussion on goals of care with patient and family, will continue current and await diagnostic findings.  Patient reports: no new complaints, decline from baseline, weakness         Objective  Last Recorded Vitals  /58 (BP Location: Right arm, Patient Position: Lying)   Pulse 69   Temp 36.7 °C (98.1 °F) (Temporal)   Resp 20   Wt 55.3 kg (122 lb)   SpO2 94%   Intake/Output last 3 Shifts:     Intake/Output Summary (Last 24 hours) at 6/23/2025 1259  Last data filed at 6/23/2025 0813      Gross per 24 hour   Intake --   Output 700 ml   Net -700 ml         Admission Weight  Weight: 55.3 kg (122 lb) (06/21/25 1152)      Daily Weight  06/21/25 : 55.3 kg (122 lb)     Image Results  XR abdomen 1 view  Narrative: Interpreted By:  Toan Haddad,   STUDY:  Abdominal series dated 6/21/2025.      INDICATION:  Abdominal tenderness      COMPARISON:  Radiographs dated 03/02/2025      ACCESSION NUMBER(S):  [id number]      ORDERING CLINICIAN:  VANDANA MOORE      TECHNIQUE:  Three AP radiographs of the abdomen.      FINDINGS:  There is a large volume of stool in the colon particularly at the  rectal vault in cecum and ascending colon. No frankly dilated loops  of small bowel are evident. No free air is evident below the  diaphragm as seen on these radiographs. Lung bases are clear.  Cardiomediastinal silhouette is enlarged. S shaped curvature is seen  of the spine. Degenerative changes seen of the spine. Degenerative  changes seen of the right hip. There is partial visualization of a  left hip bipolar hemiarthroplasty. There is note of an aorto bi-iliac  stent graft.      Impression: Prominent amount of stool in the colon as can be seen with  constipation. Clinical correlation is recommended. Bowel-gas pattern  is nonobstructive.      Signed by: Toan Haddad 6/21/2025 3:57 PM  Dictation workstation:   [id number]  Vascular US Lower Extremity Venous Duplex Bilateral  Narrative: Interpreted By:  Yasmin Wallace,   STUDY:  VASC US LOWER EXTREMITY VENOUS DUPLEX BILATERAL;  6/21/2025 12:40 pm      INDICATION:  Signs/Symptoms:Bilateral lower extremity pain, rule out DVT.          COMPARISON:  02/28/2025      ACCESSION NUMBER(S):  [id number]      ORDERING CLINICIAN:  DANDY WALL      TECHNIQUE:  Vascular ultrasound of the bilateral lower extremities was performed.  Real-time compression views as well as Gray scale, color Doppler and  spectral Doppler waveform analysis was performed.      FINDINGS:  Evaluation of the visualized portions of the bilateral common femoral  vein, proximal, mid, and distal femoral vein, and popliteal vein  were  performed.  Evaluation of the visualized portions of the  posterior  tibial and peroneal veins were also performed.          The evaluated veins demonstrate normal compressibility. There is  intact venous flow demonstrating normal respiratory variability and  normal augmentation of flow with calf compression. Therefore, there  is no ultrasonographic evidence for deep vein thrombosis within the  evaluated veins.      Respiratory variation and augmentation to calf pressure was noted.      Impression: No sonographic evidence for deep vein thrombosis within the evaluated  veins of the bilateral lower extremities..      MACRO:  None      Signed by: Yasmin Wallace 6/21/2025 12:57 PM  Dictation workstation:   [id number]  XR chest 1 view  Narrative: Interpreted By:  Toan Haddad,   STUDY:  Chest dated  6/21/2025.      INDICATION:  Signs/Symptoms:sob      COMPARISON:  Radiographs dated 02/27/2025.      ACCESSION NUMBER(S):  [id number]      ORDERING CLINICIAN:  DANDY WALL      TECHNIQUE:  One view of the chest.      FINDINGS:  Linear opacities are seen at the left lung base with minimal blunting  of the left costophrenic angle. A calcified granuloma is seen in the  left mid lung zone.  No pneumothorax or effusion is evident. The  cardiomediastinal silhouette is  not enlarged.Degenerative changes  seen of the spine and shoulders. There is a left shoulder  arthroplasty.      Impression: Small left pleural effusion with associated atelectasis and/or  pneumonitis.      MACRO:  None      Signed by: Toan Haddad 6/21/2025 12:17 PM  Dictation workstation:   [id number]        Physical Exam     Relevant Results                                   Assessment & Plan  Shortness of breath     Suspected CHF (congestive heart failure)     PALLAVI (acute kidney injury)     Abdominal tenderness                      History Of Present Illness  Estefanía Contreras is a 89 y.o. female with a past medical history of HFpEF (EF 62% 1/6/25),  HLD, HTN, CKD3b, AAA (s/p repair), PAD, ITP, and provoked PE (on apixaban), anxiety, who presented to [facility] ED today from home with shortness of breath. Pt notes over the past few days she has become SOB with any type of exertion and now has been even with rest. She notes associated dry cough.  She notes she had home O2 available from when she was using it before and had to restart using it over the past few days as well.  She denies any fevers, chills, CP, weight gain, LE edema, abdominal pain, nausea, vomiting, diarrhea, or dysuria.  She denies any change in her chronic urinary frequency.  She denies any sick contacts.  She notes she lives alone but has a caregiver coming 4 days a week that helps her with cooking, cleaning, laundry etc.  CODE STATUS discussed and patient would like to be a DNR CCA with no intubation.     In the ED lab work, EKG, chest x-ray, and venous duplex bilateral lower extremities were performed. Labs revealed bun 35, creatinine 1.85 (slightly elevated from 22 and 1.26 on March 4, 2025), mag 2.71, , troponin within normal limits, INR 1.2, negative for flu, COVID, and RSV.  Chest x-ray revealed small left pleural effusion with associated atelectasis and/or pneumonitis. Venous duplex bilateral lower extremities negative for DVT. EKG interpreted by Dr. Rowley at 11:46 AM, normal sinus rhythm at a rate of 75, no ST segment depression or elevation consistent with ischemia or infarction.  Her vital signs were acceptable on admission.  She was given IV fluids and DuoNeb treatment and admitted.     Record review indicates patient presented with similar symptoms in February of this year and a VQ scan was positive for PE, however it was difficult to tell if this was from patient's existing PE or new.  Given patient's kidney function, a CTA chest was deferred and patient was empirically treated with Eliquis after risks and benefits were weighed, it was noted that patient would need to be on  lifelong anticoagulation.        Echo 1/3/25     CONCLUSIONS:   1. The left ventricular systolic function is normal, with a Hall's biplane calculated ejection fraction of 62%.   2. Spectral Doppler shows a Grade I (impaired relaxation pattern) of left ventricular diastolic filling with normal left atrial filling pressure.   3. There is normal right ventricular global systolic function.   4. The left atrium is mildly dilated.     Past Medical History  [Medical History]    [Medical History]  Past Medical History          Diagnosis Date    H/O heart artery stent      H/O shoulder surgery      PE (pulmonary thromboembolism) (Multi)           Surgical History  [Surgical History]    [Surgical History]  Past Surgical History  No past surgical history on file.     Social History  She reports that she has quit smoking. Her smoking use included cigarettes. She has never been exposed to tobacco smoke. She has never used smokeless tobacco. She reports that she does not currently use alcohol. She reports that she does not use drugs.     Family History  [Family History]    [Family History]  No family history on file.     Allergies  Patient has no known allergies.     10 point review of system performed and negative besides what is stated in HPI.     Physical Exam  Constitutional:       Appearance: Normal appearance.   HENT:      Head: Normocephalic and atraumatic.      Mouth/Throat:      Mouth: Mucous membranes are moist.   Eyes:      Conjunctiva/sclera: Conjunctivae normal.   Cardiovascular:      Rate and Rhythm: Normal rate and regular rhythm.      Heart sounds: Normal heart sounds.   Pulmonary:      Effort: Pulmonary effort is normal.      Comments: Coarse throughout  Abdominal:      General: Bowel sounds are normal.      Palpations: Abdomen is soft.      Tenderness: There is abdominal tenderness.   Musculoskeletal:         General: Normal range of motion.      Cervical back: Neck supple.      Right lower leg: Edema  "present.      Left lower leg: Edema present.      Comments: Nonpitting bilateral lower extremity edema   Neurological:      Mental Status: She is alert. Mental status is at baseline.   Psychiatric:      Comments: Anxious at time            Last Recorded Vitals  Blood pressure 141/65, pulse 64, temperature 36.6 °C (97.9 °F), temperature source Temporal, resp. rate 18, height 1.626 m (5' 4\"), weight 55.3 kg (122 lb), SpO2 96%.     Relevant Results     [Medications Ordered Prior to Encounter]     [Medications Ordered Prior to Encounter]  No current facility-administered medications on file prior to encounter.                  Current Outpatient Medications on File Prior to Encounter   Medication Sig Dispense Refill    apixaban (Eliquis) 2.5 mg tablet Take 1 tablet (2.5 mg) by mouth 2 times a day. 60 tablet 0    dilTIAZem LA (Cardizem LA) 180 mg 24 hr tablet Take 1 tablet (180 mg) by mouth once daily. (Patient taking differently: Take 1 tablet (180 mg) by mouth once daily in the morning.) 90 tablet 3    furosemide (Lasix) 20 mg tablet Take 1 tablet (20 mg) by mouth once daily. (Patient taking differently: Take 1 tablet (20 mg) by mouth once daily in the morning.) 90 tablet 1    meloxicam (Mobic) 15 mg tablet Take 1 tablet (15 mg) by mouth once daily. (Patient taking differently: Take 1 tablet (15 mg) by mouth once daily with breakfast.) 90 tablet 3    mv-min-FA-vit K-lutein-zeaxant (PreserVision AREDS 2 Plus MV) 200 mcg-15 mcg- 5 mg-1 mg capsule Take 1 capsule by mouth 2 times a day.        oxygen (O2) gas therapy Inhale 3 L/min continuously.        potassium chloride CR 20 mEq ER tablet Take 1 tablet (20 mEq) by mouth once daily in the morning. Splits in half and takes at same time        pramipexole (Mirapex) 1 mg tablet Take 1 tablet (1 mg) by mouth once daily at bedtime. 90 tablet 1    simvastatin (Zocor) 20 mg tablet Take 1 tablet (20 mg) by mouth once daily at bedtime. 90 tablet 0    traMADol (Ultram) 50 mg tablet " Take 1 tablet (50 mg) by mouth once daily as needed for severe pain (7 - 10).        azelastine (Astelin) 137 mcg (0.1 %) nasal spray Administer 2 sprays into each nostril 2 times a day. Use in each nostril as directed (Patient not taking: Reported on 6/21/2025) 30 mL 2    DULoxetine (Cymbalta) 30 mg DR capsule Take 1 capsule (30 mg) by mouth once daily. Do not crush or chew. (Patient not taking: Reported on 6/21/2025) 30 capsule 0    mirtazapine (Remeron) 15 mg tablet Take 1 tablet (15 mg) by mouth as needed at bedtime. (Patient not taking: Reported on 6/21/2025)        PreserVision AREDS-2 250-90-40-1 mg capsule TAKE ONE CAPSULE BY MOUTH TWICE A DAY (Patient not taking: Reported on 6/21/2025) 180 capsule 0         No results found for this or any previous visit (from the past 24 hours).        XR abdomen 1 view  Result Date: 6/21/2025  Interpreted By:  Toan Haddad, STUDY: Abdominal series dated 6/21/2025.   INDICATION: Abdominal tenderness   COMPARISON: Radiographs dated 03/02/2025   ACCESSION NUMBER(S): [id number]   ORDERING CLINICIAN: VANDANA MOORE   TECHNIQUE: Three AP radiographs of the abdomen.   FINDINGS: There is a large volume of stool in the colon particularly at the rectal vault in cecum and ascending colon. No frankly dilated loops of small bowel are evident. No free air is evident below the diaphragm as seen on these radiographs. Lung bases are clear. Cardiomediastinal silhouette is enlarged. S shaped curvature is seen of the spine. Degenerative changes seen of the spine. Degenerative changes seen of the right hip. There is partial visualization of a left hip bipolar hemiarthroplasty. There is note of an aorto bi-iliac stent graft.        Prominent amount of stool in the colon as can be seen with constipation. Clinical correlation is recommended. Bowel-gas pattern is nonobstructive.   Signed by: Toan Haddad 6/21/2025 3:57 PM Dictation workstation:   [id number]     Vascular US Lower  Extremity Venous Duplex Bilateral  Result Date: 6/21/2025  Interpreted By:  Yasmin Wallace, STUDY: VAS US LOWER EXTREMITY VENOUS DUPLEX BILATERAL;  6/21/2025 12:40 pm   INDICATION: Signs/Symptoms:Bilateral lower extremity pain, rule out DVT.     COMPARISON: 02/28/2025   ACCESSION NUMBER(S): [id number]   ORDERING CLINICIAN: DANDY WALL   TECHNIQUE: Vascular ultrasound of the bilateral lower extremities was performed. Real-time compression views as well as Gray scale, color Doppler and spectral Doppler waveform analysis was performed.   FINDINGS: Evaluation of the visualized portions of the bilateral common femoral vein, proximal, mid, and distal femoral vein, and popliteal vein were performed.  Evaluation of the visualized portions of the  posterior tibial and peroneal veins were also performed.     The evaluated veins demonstrate normal compressibility. There is intact venous flow demonstrating normal respiratory variability and normal augmentation of flow with calf compression. Therefore, there is no ultrasonographic evidence for deep vein thrombosis within the evaluated veins.   Respiratory variation and augmentation to calf pressure was noted.        No sonographic evidence for deep vein thrombosis within the evaluated veins of the bilateral lower extremities..   MACRO: None   Signed by: Yasmin Wallace 6/21/2025 12:57 PM Dictation workstation:   [id number]     XR chest 1 view  Result Date: 6/21/2025  Interpreted By:  Toan Haddad, STUDY: Chest dated  6/21/2025.   INDICATION: Signs/Symptoms:sob   COMPARISON: Radiographs dated 02/27/2025.   ACCESSION NUMBER(S): [id number]   ORDERING CLINICIAN: DANDY WALL   TECHNIQUE: One view of the chest.   FINDINGS: Linear opacities are seen at the left lung base with minimal blunting of the left costophrenic angle. A calcified granuloma is seen in the left mid lung zone.  No pneumothorax or effusion is evident. The cardiomediastinal silhouette is  not  enlarged.Degenerative changes seen of the spine and shoulders. There is a left shoulder arthroplasty.        Small left pleural effusion with associated atelectasis and/or pneumonitis.   MACRO: None   Signed by: Toan Haddad 6/21/2025 12:17 PM Dictation workstation:   [id number]        Assessment & Plan  Shortness of breath     Suspected CHF (congestive heart failure)     PALLAVI (acute kidney injury)     Abdominal tenderness        Admit to telemetry  Observation  40 mg IV Lasix x 1  Resume home Lasix tomorrow  Hold home Mobic and Ultram  See recent echo results above  Repeat EKG and troponin  Continue oxygen  Daily weight  Intake and output  AM CBC, BMP, coags, mag  KUB  PT/OT     Chronic conditions: HFpEF (EF 62% 1/6/25), HLD, HTN, CKD3b, AAA (s/p repair), PAD, ITP, and provoked PE (on apixaban), anxiety     Continue home medications as listed above holding home Mobic and Ultram  Cardiac diet     DVT prophylaxis     SCDs  On Eliquis             Patient fully evaluated 06/22, thorough record review performed of previous labs and notes from prior encounters. Plan discussed with interdisciplinary team, consults placed, appreciate input. Will continue current and repeat labs in the AM.          Discharge planning discussed with patient and care team. Therapy evaluations ordered. Patient aware and agreeable to current plan, continue plan as above.      I spent a total of 75 minutes on the date of the service which included preparing to see the patient, face-to-face patient care, completing clinical documentation, obtaining and/or reviewing separately obtained history, performing a medically appropriate examination, counseling and educating the patient/family/caregiver, ordering medications, tests, or procedures, communicating with other HCPs (not separately reported), independently interpreting results (not separately reported), communicating results to the patient/family/caregiver, and care coordination (not  separately reported).                            General: in no acute distress  Eyes: PERRLA   HENT: Normo-cephalic, atraumatic.   CV: Regular rate, regular rhythm.   Resp: Breathing non-labored,  diminished to auscultation bilaterally  GI: BS x4   : monitor intake and output  MSK/Extremities: No gross bony deformities. PT/OT on the case  Skin: Warm. Appropriate color, continue offloading  Neuro:  Face symmetric.   Psych: returning to baseline, improved      10 point ROS negative unless otherwise noted in HPI     Patient fully evaluated 06/23  for    Problem List Items Addressed This Visit         * (Principal) Shortness of breath - Primary      Other Visit Diagnoses           History of pulmonary embolus (PE)           History of CHF (congestive heart failure)                 Brought to hospital - had concerns including Shortness of Breath.  Patient seen resting in bed with head of bed elevated, no s/s or c/o acute difficulties at this time.  Vital signs for last 24 hours Temp:  [36.7 °C (98.1 °F)-37.6 °C (99.7 °F)] 36.7 °C (98.1 °F)  Heart Rate:  [64-71] 69  Resp:  [16-20] 20  BP: (111-139)/(58-72) 119/58    I/O this shift:  In: -   Out: 200 [Urine:200]  Patient still requiring frequent cardiac and SPO2 monitoring. Continue aggressive pulmonary hygiene and oral hygiene. Off loading as tolerated for skin integrity. Medications and labs reviewed-         Results for orders placed or performed during the hospital encounter of 06/21/25 (from the past 24 hours)   Magnesium   Result Value Ref Range     Magnesium 2.52 (H) 1.60 - 2.40 mg/dL   CBC   Result Value Ref Range     WBC 6.3 4.4 - 11.3 x10*3/uL     nRBC 0.0 0.0 - 0.0 /100 WBCs     RBC 3.97 (L) 4.00 - 5.20 x10*6/uL     Hemoglobin 10.7 (L) 12.0 - 16.0 g/dL     Hematocrit 35.1 (L) 36.0 - 46.0 %     MCV 88 80 - 100 fL     MCH 27.0 26.0 - 34.0 pg     MCHC 30.5 (L) 32.0 - 36.0 g/dL     RDW 16.5 (H) 11.5 - 14.5 %     Platelets 142 (L) 150 - 450 x10*3/uL   Basic  Metabolic Panel   Result Value Ref Range     Glucose 105 (H) 74 - 99 mg/dL     Sodium 139 136 - 145 mmol/L     Potassium 4.7 3.5 - 5.3 mmol/L     Chloride 101 98 - 107 mmol/L     Bicarbonate 30 21 - 32 mmol/L     Anion Gap 13 10 - 20 mmol/L     Urea Nitrogen 39 (H) 6 - 23 mg/dL     Creatinine 1.94 (H) 0.50 - 1.05 mg/dL     eGFR 24 (L) >60 mL/min/1.73m*2     Calcium 8.7 8.6 - 10.3 mg/dL   Comprehensive Metabolic Panel   Result Value Ref Range     Glucose 90 74 - 99 mg/dL     Sodium 139 136 - 145 mmol/L     Potassium 4.2 3.5 - 5.3 mmol/L     Chloride 101 98 - 107 mmol/L     Bicarbonate 30 21 - 32 mmol/L     Anion Gap 12 10 - 20 mmol/L     Urea Nitrogen 37 (H) 6 - 23 mg/dL     Creatinine 1.95 (H) 0.50 - 1.05 mg/dL     eGFR 24 (L) >60 mL/min/1.73m*2     Calcium 8.8 8.6 - 10.3 mg/dL     Albumin 3.8 3.4 - 5.0 g/dL     Alkaline Phosphatase 71 33 - 136 U/L     Total Protein 6.3 (L) 6.4 - 8.2 g/dL     AST 14 9 - 39 U/L     Bilirubin, Total 0.4 0.0 - 1.2 mg/dL     ALT 6 (L) 7 - 45 U/L   CBC and Auto Differential   Result Value Ref Range     WBC 5.9 4.4 - 11.3 x10*3/uL     nRBC 0.0 0.0 - 0.0 /100 WBCs     RBC 4.08 4.00 - 5.20 x10*6/uL     Hemoglobin 11.0 (L) 12.0 - 16.0 g/dL     Hematocrit 36.6 36.0 - 46.0 %     MCV 90 80 - 100 fL     MCH 27.0 26.0 - 34.0 pg     MCHC 30.1 (L) 32.0 - 36.0 g/dL     RDW 16.6 (H) 11.5 - 14.5 %     Platelets 143 (L) 150 - 450 x10*3/uL     Neutrophils % 59.4 40.0 - 80.0 %     Immature Granulocytes %, Automated 0.2 0.0 - 0.9 %     Lymphocytes % 28.0 13.0 - 44.0 %     Monocytes % 9.1 2.0 - 10.0 %     Eosinophils % 2.4 0.0 - 6.0 %     Basophils % 0.9 0.0 - 2.0 %     Neutrophils Absolute 3.48 1.60 - 5.50 x10*3/uL     Immature Granulocytes Absolute, Automated 0.01 0.00 - 0.50 x10*3/uL     Lymphocytes Absolute 1.64 0.80 - 3.00 x10*3/uL     Monocytes Absolute 0.53 0.05 - 0.80 x10*3/uL     Eosinophils Absolute 0.14 0.00 - 0.40 x10*3/uL     Basophils Absolute 0.05 0.00 - 0.10 x10*3/uL      Patient recently  received an antibiotic (last 12 hours)         Date/Time Action Medication Dose     06/23/25 1049 Given    cefuroxime (Ceftin) tablet 250 mg 250 mg             Plan discussed with interdisciplinary team, continue current and repeat labs in the AM.         Discharge planning discussed with patient and care team. Therapy evaluations ordered.      Patient aware and agreeable to current plan, continue plan as above.      I spent a total of 60 minutes on the date of the service which included preparing to see the patient, face-to-face patient care, completing clinical documentation, obtaining and/or reviewing separately obtained history, performing a medically appropriate examination, counseling and educating the patient/family/caregiver, ordering medications, tests, or procedures, communicating with other HCPs (not separately reported), independently interpreting results (not separately reported), communicating results to the patient/family/caregiver, and care coordination (not separately reported).                     Revision History    Patient fully evaluated  06/24 for   Assessment & Plan  Shortness of breath    Suspected CHF (congestive heart failure)    PALLAVI (acute kidney injury)    Abdominal tenderness    Patient with significant clinical improvement noted, patient medically cleared for discharge today. Patient seen resting in bed with head of bed elevated, no s/s or c/o acute difficulties at this time. Vital signs for last 24 hours:  Temp:  [36.5 °C (97.7 °F)-36.8 °C (98.2 °F)] 36.5 °C (97.7 °F)  Heart Rate:  [60-72] 63  Resp:  [16-20] 16  BP: (119-141)/(58-65) 139/65 Medications and labs reviewed-   Results for orders placed or performed during the hospital encounter of 06/21/25 (from the past 24 hours)   Urinalysis with Reflex Microscopic   Result Value Ref Range    Color, Urine Light-Yellow Light-Yellow, Yellow, Dark-Yellow    Appearance, Urine Turbid (N) Clear    Specific Gravity, Urine 1.015 1.005 - 1.035     pH, Urine 5.0 5.0, 5.5, 6.0, 6.5, 7.0, 7.5, 8.0    Protein, Urine NEGATIVE NEGATIVE, 10 (TRACE), 20 (TRACE) mg/dL    Glucose, Urine Normal Normal mg/dL    Blood, Urine NEGATIVE NEGATIVE mg/dL    Ketones, Urine NEGATIVE NEGATIVE mg/dL    Bilirubin, Urine NEGATIVE NEGATIVE mg/dL    Urobilinogen, Urine Normal Normal mg/dL    Nitrite, Urine NEGATIVE NEGATIVE    Leukocyte Esterase, Urine 500 Yaron/uL (A) NEGATIVE   Microscopic Only, Urine   Result Value Ref Range    WBC, Urine >50 (A) 1-5, NONE /HPF    RBC, Urine 1-2 NONE, 1-2, 3-5 /HPF    Squamous Epithelial Cells, Urine 1-9 (SPARSE) Reference range not established. /HPF    Bacteria, Urine 1+ (A) NONE SEEN /HPF    Mucus, Urine FEW Reference range not established. /LPF   Comprehensive Metabolic Panel   Result Value Ref Range    Glucose 90 74 - 99 mg/dL    Sodium 140 136 - 145 mmol/L    Potassium 4.7 3.5 - 5.3 mmol/L    Chloride 101 98 - 107 mmol/L    Bicarbonate 30 21 - 32 mmol/L    Anion Gap 14 10 - 20 mmol/L    Urea Nitrogen 39 (H) 6 - 23 mg/dL    Creatinine 1.99 (H) 0.50 - 1.05 mg/dL    eGFR 23 (L) >60 mL/min/1.73m*2    Calcium 8.6 8.6 - 10.3 mg/dL    Albumin 3.6 3.4 - 5.0 g/dL    Alkaline Phosphatase 63 33 - 136 U/L    Total Protein 6.3 (L) 6.4 - 8.2 g/dL    AST 14 9 - 39 U/L    Bilirubin, Total 0.3 0.0 - 1.2 mg/dL    ALT 6 (L) 7 - 45 U/L   CBC and Auto Differential   Result Value Ref Range    WBC 5.8 4.4 - 11.3 x10*3/uL    nRBC 0.0 0.0 - 0.0 /100 WBCs    RBC 4.08 4.00 - 5.20 x10*6/uL    Hemoglobin 11.0 (L) 12.0 - 16.0 g/dL    Hematocrit 36.5 36.0 - 46.0 %    MCV 90 80 - 100 fL    MCH 27.0 26.0 - 34.0 pg    MCHC 30.1 (L) 32.0 - 36.0 g/dL    RDW 16.4 (H) 11.5 - 14.5 %    Platelets 141 (L) 150 - 450 x10*3/uL    Neutrophils % 59.8 40.0 - 80.0 %    Immature Granulocytes %, Automated 0.2 0.0 - 0.9 %    Lymphocytes % 26.7 13.0 - 44.0 %    Monocytes % 9.3 2.0 - 10.0 %    Eosinophils % 3.1 0.0 - 6.0 %    Basophils % 0.9 0.0 - 2.0 %    Neutrophils Absolute 3.47 1.60 - 5.50  x10*3/uL    Immature Granulocytes Absolute, Automated 0.01 0.00 - 0.50 x10*3/uL    Lymphocytes Absolute 1.55 0.80 - 3.00 x10*3/uL    Monocytes Absolute 0.54 0.05 - 0.80 x10*3/uL    Eosinophils Absolute 0.18 0.00 - 0.40 x10*3/uL    Basophils Absolute 0.05 0.00 - 0.10 x10*3/uL      Patient recently received an antibiotic (last 12 hours)       Date/Time Action Medication Dose    06/24/25 0850 Given    cefuroxime (Ceftin) tablet 250 mg 250 mg    06/23/25 2255 Given    cefuroxime (Ceftin) tablet 250 mg 250 mg           No results found for the last 90 days.       Continue aggressive pulmonary hygiene and oral hygiene. Off loading as tolerated for skin integrity. No new complaints per patient, stable at time of exam.  Plan discussed with interdisciplinary team, no acute events overnight, patient denies chest pain, worsening SOB at this time. Ok to discharge, will continue current and repeat labs in the AM if patient still hospitalized. Patient aware and agreeable to current plan, continue plan as above.     I spent 60 minutes on the date of the service which included preparing to see the patient, face-to-face patient care, completing clinical documentation, obtaining and/or reviewing separately obtained history, performing a medically appropriate examination, counseling and educating the patient/family/caregiver, ordering medications, tests, or procedures, communicating with other HCPs (not separately reported), independently interpreting results (not separately reported), communicating results to the patient/family/caregiver, and care coordination (not separately reported     Pertinent Physical Exam At Time of Discharge  Physical Exam  no acute events overnight, patient denies chest pain, worsening SOB at this time.  Outpatient Follow-Up  Future Appointments   Date Time Provider Department Center   7/7/2025 10:15 AM Shira Hill OD [id number] [place]   7/17/2025 10:00 AM Pito Henao MD [id number] [facility]  [place]

## 2025-06-24 NOTE — PROGRESS NOTES
Physical Therapy                 Therapy Communication Note    Patient Name: Estefanía Contreras  MRN: [id number]  Department: [facility]  Room: [address] [place] [address]  Today's Date: 6/24/2025     Discipline: Physical Therapy    Missed Visit:   Yes    Missed Visit Reason:  Nursing in room with pt's family. Will continue to follow up as able/available.     Missed Time: Attempt    Comment:

## 2025-06-24 NOTE — CARE PLAN
I was contacted by patient's RN who states patient is endorsing nausea.  Patient's most recent EKG and QTc checked.  QTc is 431.  Will order Zofran as needed.

## 2025-06-25 ENCOUNTER — PATIENT OUTREACH (OUTPATIENT)
Dept: PRIMARY CARE | Facility: CLINIC | Age: OVER 89
End: 2025-06-25
Payer: MEDICARE

## 2025-06-25 NOTE — PROGRESS NOTES
Discharge Facility:[facility]  Discharge Diagnosis:Shortness of breath  Admission Date:6/21/25  Discharge Date: 6/24/25    PCP Appointment Date:No contact made. Message sent to office  Specialist Appointment Date: Ophthalmology 7/7/25, Cardiology 7/17/25  Hospital Encounter and Summary Linked: Yes    ED to Hosp-Admission (Discharged) with Bahman Flores MD; Tylor Rowley MD (06/21/2025)   Discharge Summary by Bahman Flores MD (06/24/2025 09:10)     Two attempts were made to reach patient within two business days after discharge. Left voicemail with contact information for patient to call back with any non-emergent questions or concerns.

## 2025-07-01 LAB
ATRIAL RATE: 75 BPM
P AXIS: 69 DEGREES
P OFFSET: 164 MS
P ONSET: 109 MS
PR INTERVAL: 204 MS
Q ONSET: 211 MS
QRS COUNT: 12 BEATS
QRS DURATION: 80 MS
QT INTERVAL: 386 MS
QTC CALCULATION(BAZETT): 431 MS
QTC FREDERICIA: 415 MS
R AXIS: -61 DEGREES
T AXIS: 49 DEGREES
T OFFSET: 404 MS
VENTRICULAR RATE: 75 BPM

## 2025-07-02 PROBLEM — Z96.612 PRESENCE OF LEFT ARTIFICIAL SHOULDER JOINT: Status: RESOLVED | Noted: 2021-07-02 | Resolved: 2025-07-02

## 2025-07-02 PROBLEM — G57.92 NEUROPATHY OF LEFT FOOT: Status: RESOLVED | Noted: 2023-05-22 | Resolved: 2025-07-02

## 2025-07-02 PROBLEM — Z96.612 STATUS POST REVERSE ARTHROPLASTY OF LEFT SHOULDER: Status: RESOLVED | Noted: 2021-08-02 | Resolved: 2025-07-02

## 2025-07-02 PROBLEM — Z95.828 H/O ENDOVASCULAR STENT GRAFT FOR ABDOMINAL AORTIC ANEURYSM: Status: ACTIVE | Noted: 2025-07-02

## 2025-07-02 PROBLEM — I71.40 ABDOMINAL AORTIC ANEURYSM WITHOUT RUPTURE: Status: RESOLVED | Noted: 2021-07-02 | Resolved: 2025-07-02

## 2025-07-02 PROBLEM — R30.0 DYSURIA: Status: RESOLVED | Noted: 2024-07-12 | Resolved: 2025-07-02

## 2025-07-02 PROBLEM — Z96.619 HISTORY OF SHOULDER REPLACEMENT: Status: ACTIVE | Noted: 2021-07-02

## 2025-07-02 PROBLEM — N39.0 UTI (URINARY TRACT INFECTION): Status: RESOLVED | Noted: 2025-06-24 | Resolved: 2025-07-02

## 2025-07-02 PROBLEM — Z96.619 HISTORY OF SHOULDER REPLACEMENT: Status: RESOLVED | Noted: 2021-07-02 | Resolved: 2025-07-02

## 2025-07-02 PROBLEM — N17.9 AKI (ACUTE KIDNEY INJURY): Status: RESOLVED | Noted: 2025-02-28 | Resolved: 2025-07-02

## 2025-07-02 PROBLEM — R10.819 ABDOMINAL TENDERNESS: Status: RESOLVED | Noted: 2025-06-21 | Resolved: 2025-07-02

## 2025-07-07 ENCOUNTER — APPOINTMENT (OUTPATIENT)
Dept: OPHTHALMOLOGY | Facility: CLINIC | Age: OVER 89
End: 2025-07-07
Payer: MEDICARE

## 2025-07-07 DIAGNOSIS — H35.89 RETINAL MACULAR ATROPHY: ICD-10-CM

## 2025-07-07 DIAGNOSIS — Z96.1 PSEUDOPHAKIA: ICD-10-CM

## 2025-07-07 DIAGNOSIS — H52.223 REGULAR ASTIGMATISM OF BOTH EYES: ICD-10-CM

## 2025-07-07 DIAGNOSIS — H35.89 RPE MOTTLING OF MACULA: ICD-10-CM

## 2025-07-07 DIAGNOSIS — H26.491 RIGHT POSTERIOR CAPSULAR OPACIFICATION: ICD-10-CM

## 2025-07-07 DIAGNOSIS — H52.4 PRESBYOPIA: ICD-10-CM

## 2025-07-07 DIAGNOSIS — H52.03 HYPERMETROPIA OF BOTH EYES: Primary | ICD-10-CM

## 2025-07-07 PROCEDURE — 92015 DETERMINE REFRACTIVE STATE: CPT | Performed by: OPTOMETRIST

## 2025-07-07 PROCEDURE — 92134 CPTRZ OPH DX IMG PST SGM RTA: CPT | Performed by: OPTOMETRIST

## 2025-07-07 PROCEDURE — 92004 COMPRE OPH EXAM NEW PT 1/>: CPT | Performed by: OPTOMETRIST

## 2025-07-07 ASSESSMENT — VISUAL ACUITY
METHOD: SNELLEN - LINEAR
OS_SC+: -1
OD_SC+: -2
OS_SC: 20/80
OD_SC: 20/40

## 2025-07-07 ASSESSMENT — CUP TO DISC RATIO
OS_RATIO: 0.3
OD_RATIO: 0.3

## 2025-07-07 ASSESSMENT — REFRACTION_MANIFEST
OS_CYLINDER: -1.25
OS_SPHERE: +1.25
OS_AXIS: 090
OD_CYLINDER: -0.75
OS_AXIS: 083
OD_ADD: +2.50
OD_SPHERE: +0.25
OD_SPHERE: +0.50
OD_CYLINDER: -0.50
OS_CYLINDER: -1.50
OS_ADD: +2.50
OS_SPHERE: +1.25
OD_AXIS: 080
METHOD_AUTOREFRACTION: 1
OD_AXIS: 092

## 2025-07-07 ASSESSMENT — SLIT LAMP EXAM - LIDS
COMMENTS: DERMATOCHALASIS - UPPER LID, PTOSIS
COMMENTS: DERMATOCHALASIS - UPPER LID, PTOSIS

## 2025-07-07 ASSESSMENT — ENCOUNTER SYMPTOMS
RESPIRATORY NEGATIVE: 0
ENDOCRINE NEGATIVE: 0
EYES NEGATIVE: 1
MUSCULOSKELETAL NEGATIVE: 0
CARDIOVASCULAR NEGATIVE: 0
CONSTITUTIONAL NEGATIVE: 0
ALLERGIC/IMMUNOLOGIC NEGATIVE: 0
NEUROLOGICAL NEGATIVE: 0
HEMATOLOGIC/LYMPHATIC NEGATIVE: 0
GASTROINTESTINAL NEGATIVE: 0
PSYCHIATRIC NEGATIVE: 0

## 2025-07-07 ASSESSMENT — CONF VISUAL FIELD
OS_INFERIOR_TEMPORAL_RESTRICTION: 0
METHOD: COUNTING FINGERS
OD_NORMAL: 1
OS_SUPERIOR_TEMPORAL_RESTRICTION: 0
OD_SUPERIOR_TEMPORAL_RESTRICTION: 0
OS_INFERIOR_NASAL_RESTRICTION: 0
OS_SUPERIOR_NASAL_RESTRICTION: 0
OS_NORMAL: 1
OD_SUPERIOR_NASAL_RESTRICTION: 0
OD_INFERIOR_NASAL_RESTRICTION: 0
OD_INFERIOR_TEMPORAL_RESTRICTION: 0

## 2025-07-07 ASSESSMENT — TONOMETRY: IOP_METHOD: TONOPEN

## 2025-07-07 ASSESSMENT — EXTERNAL EXAM - RIGHT EYE: OD_EXAM: NORMAL

## 2025-07-07 ASSESSMENT — EXTERNAL EXAM - LEFT EYE: OS_EXAM: NORMAL

## 2025-07-07 NOTE — PROGRESS NOTES
Assessment/Plan   Diagnoses and all orders for this visit:  Hypermetropia of both eyes  Regular astigmatism of both eyes  Presbyopia  Pseudophakia  New spec rx released today per patient request. Monitor 1 year or sooner prn. Refraction billed today. Pt consents to receiving glasses Rx today. Patient's/guardian's signature obtained to acknowledge and confirm that a paper copy of glasses Rx was given to patient in compliance with [facility] Eyeglass Rule. Electronic copy of Rx will also be available via [employer].   Discussed option for glasses, pt prefers at this time in lieu of consideration for YAG CAPs OD.     Right posterior capsular opacification  Monitor at this time. Pt defers option for YAG CAPs OD.     Retinal macular atrophy  -     OCT, Retina - OU - Both Eyes  RPE mottling of macula  -     OCT, Retina - OU - Both Eyes  Discussed. No evidence of drusenoid change on OCT. ? Alternative retinal dystrophy (pattern dystrophy?) vs AMD. Recommend consult retina service for opinion. Discussed will expect that vision OS remains stable d/t foveal atrophy. May continue to take AREDS 2 vitamin at this time if desired but may consider pause and wait for opinion from Dr. Ragland.

## 2025-07-09 ENCOUNTER — PATIENT OUTREACH (OUTPATIENT)
Dept: PRIMARY CARE | Facility: CLINIC | Age: OVER 89
End: 2025-07-09
Payer: MEDICARE

## 2025-07-16 PROBLEM — M20.11 ACQUIRED HALLUX VALGUS, RIGHT: Status: ACTIVE | Noted: 2025-06-26

## 2025-07-16 PROBLEM — M25.572 PAIN IN JOINT, FOOT, LEFT: Status: ACTIVE | Noted: 2023-06-08

## 2025-07-16 PROBLEM — M24.576: Status: ACTIVE | Noted: 2025-06-26

## 2025-07-22 ENCOUNTER — APPOINTMENT (OUTPATIENT)
Dept: OPHTHALMOLOGY | Facility: CLINIC | Age: OVER 89
End: 2025-07-22
Payer: MEDICARE

## 2025-08-05 ENCOUNTER — APPOINTMENT (OUTPATIENT)
Dept: OPHTHALMOLOGY | Facility: CLINIC | Age: OVER 89
End: 2025-08-05
Payer: MEDICARE

## 2025-08-05 DIAGNOSIS — H35.3124 ADVANCED ATROPHIC NONEXUDATIVE AGE-RELATED MACULAR DEGENERATION OF LEFT EYE WITH SUBFOVEAL INVOLVEMENT: ICD-10-CM

## 2025-08-05 DIAGNOSIS — H35.89 RETINAL MACULAR ATROPHY: ICD-10-CM

## 2025-08-05 DIAGNOSIS — H35.3112 INTERMEDIATE STAGE DRY AGE-RELATED MACULAR DEGENERATION OF RIGHT EYE: ICD-10-CM

## 2025-08-05 DIAGNOSIS — H35.89 RPE MOTTLING OF MACULA: Primary | ICD-10-CM

## 2025-08-05 PROCEDURE — 99213 OFFICE O/P EST LOW 20 MIN: CPT

## 2025-08-05 PROCEDURE — 92134 CPTRZ OPH DX IMG PST SGM RTA: CPT

## 2025-08-05 ASSESSMENT — ENCOUNTER SYMPTOMS
CONSTITUTIONAL NEGATIVE: 0
RESPIRATORY NEGATIVE: 0
HEMATOLOGIC/LYMPHATIC NEGATIVE: 0
CARDIOVASCULAR NEGATIVE: 0
MUSCULOSKELETAL NEGATIVE: 0
PSYCHIATRIC NEGATIVE: 0
GASTROINTESTINAL NEGATIVE: 0
ENDOCRINE NEGATIVE: 0
ALLERGIC/IMMUNOLOGIC NEGATIVE: 0
NEUROLOGICAL NEGATIVE: 0
EYES NEGATIVE: 0

## 2025-08-05 ASSESSMENT — TONOMETRY
OS_IOP_MMHG: 19
IOP_METHOD: GOLDMANN APPLANATION
OD_IOP_MMHG: 18

## 2025-08-05 ASSESSMENT — SLIT LAMP EXAM - LIDS
COMMENTS: DERMATOCHALASIS - UPPER LID, PTOSIS
COMMENTS: DERMATOCHALASIS - UPPER LID, PTOSIS

## 2025-08-05 ASSESSMENT — VISUAL ACUITY
OS_SC: 20/50
METHOD: SNELLEN - LINEAR
OD_SC: 20/30

## 2025-08-05 ASSESSMENT — CUP TO DISC RATIO
OD_RATIO: 0.3
OS_RATIO: 0.3

## 2025-08-05 ASSESSMENT — EXTERNAL EXAM - RIGHT EYE: OD_EXAM: NORMAL

## 2025-08-05 ASSESSMENT — EXTERNAL EXAM - LEFT EYE: OS_EXAM: NORMAL

## 2025-08-13 PROBLEM — H35.3124 ADVANCED ATROPHIC NONEXUDATIVE AGE-RELATED MACULAR DEGENERATION OF LEFT EYE WITH SUBFOVEAL INVOLVEMENT: Status: ACTIVE | Noted: 2025-08-13

## 2025-08-13 PROBLEM — H35.3112 INTERMEDIATE STAGE DRY AGE-RELATED MACULAR DEGENERATION OF RIGHT EYE: Status: ACTIVE | Noted: 2025-08-13

## 2026-02-11 ENCOUNTER — APPOINTMENT (OUTPATIENT)
Dept: OPHTHALMOLOGY | Facility: CLINIC | Age: OVER 89
End: 2026-02-11
Payer: MEDICARE

## (undated) DEVICE — APPLICATOR, CHLORAPREP, W/ORANGE TINT, 26ML

## (undated) DEVICE — DRAPE, SHEET, U, W/ADHESIVE STRIP, IMPERVIOUS, 60 X 70 IN, DISPOSABLE, LF, STERILE

## (undated) DEVICE — DRAPE, U-DRAPE, NON STERILE

## (undated) DEVICE — SPONGE, VAGINAL, 4-PLY, 2X72

## (undated) DEVICE — BRUSH, INTRAMEDULLARY, FEMORAL

## (undated) DEVICE — TIP, FEMORAL, CANAL, COAXIAL

## (undated) DEVICE — HOOD, SURGICAL, FLYTE HYBRID

## (undated) DEVICE — FEMORAL CANAL TIP FOR INTERPULSE HANDPIECE SET

## (undated) DEVICE — MAT, AIR TRANSFER, 39X81

## (undated) DEVICE — PILLOW, ABDUCTION,CONCAVE, HIP

## (undated) DEVICE — RETRIEVER, SUTURE, HEWSON

## (undated) DEVICE — HANDLE COVER, LIGHT, RIGID, DISP, LF

## (undated) DEVICE — BASIN KIT, SINGLE

## (undated) DEVICE — SPONGE, GAUZE, XRAY DECT, 16 PLY, 4 X 8, W/MASTER DMT,STERILE

## (undated) DEVICE — GOWN, SURGICAL, SMARTGOWN, XLARGE, STERILE

## (undated) DEVICE — RESTRICTOR, CEMENT, MEDIUM 24MM

## (undated) DEVICE — DRESSING, MEPILEX BORDER, POST-OP AG, 4 X 12 IN

## (undated) DEVICE — SLEEVE, VASO PRESS, CALF GARMENT, MEDIUM, GREEN

## (undated) DEVICE — WOUND SYSTEM, DEBRIDEMENT & CLEANING, O.R DUOPAK

## (undated) DEVICE — BANDAGE, COFLEX, 6 X 5 YDS, FOAM TAN, STERILE, LF

## (undated) DEVICE — DRAPE, SHEET, THREE QUARTER, FAN FOLD, 57 X 77 IN

## (undated) DEVICE — PRESSURIZER, FEMORAL CANAL, W/O HUB, MEDIUM, BLUE

## (undated) DEVICE — Device